# Patient Record
Sex: MALE | Race: BLACK OR AFRICAN AMERICAN | NOT HISPANIC OR LATINO
[De-identification: names, ages, dates, MRNs, and addresses within clinical notes are randomized per-mention and may not be internally consistent; named-entity substitution may affect disease eponyms.]

---

## 2018-12-07 ENCOUNTER — LABORATORY RESULT (OUTPATIENT)
Age: 63
End: 2018-12-07

## 2018-12-07 ENCOUNTER — APPOINTMENT (OUTPATIENT)
Dept: RHEUMATOLOGY | Facility: CLINIC | Age: 63
End: 2018-12-07
Payer: MEDICARE

## 2018-12-07 VITALS
HEART RATE: 74 BPM | TEMPERATURE: 97.7 F | DIASTOLIC BLOOD PRESSURE: 82 MMHG | BODY MASS INDEX: 29.39 KG/M2 | HEIGHT: 74 IN | OXYGEN SATURATION: 97 % | WEIGHT: 229 LBS | SYSTOLIC BLOOD PRESSURE: 125 MMHG

## 2018-12-07 PROCEDURE — 99204 OFFICE O/P NEW MOD 45 MIN: CPT

## 2018-12-10 ENCOUNTER — RX RENEWAL (OUTPATIENT)
Age: 63
End: 2018-12-10

## 2018-12-10 LAB
25(OH)D3 SERPL-MCNC: 27.2 NG/ML
ALBUMIN SERPL ELPH-MCNC: 4.4 G/DL
ALP BLD-CCNC: 55 U/L
ALT SERPL-CCNC: 22 U/L
ANION GAP SERPL CALC-SCNC: 12 MMOL/L
APPEARANCE: CLEAR
AST SERPL-CCNC: 22 U/L
B BURGDOR IGG+IGM SER QL IB: NORMAL
BILIRUB SERPL-MCNC: 0.4 MG/DL
BILIRUBIN URINE: NEGATIVE
BLOOD URINE: NEGATIVE
BUN SERPL-MCNC: 15 MG/DL
CALCIUM SERPL-MCNC: 10 MG/DL
CCP AB SER IA-ACNC: >250 UNITS
CHLORIDE SERPL-SCNC: 103 MMOL/L
CO2 SERPL-SCNC: 26 MMOL/L
COLOR: YELLOW
CREAT SERPL-MCNC: 0.94 MG/DL
CRP SERPL-MCNC: <0.1 MG/DL
DEPRECATED KAPPA LC FREE/LAMBDA SER: 1.41 RATIO
FERRITIN SERPL-MCNC: 235 NG/ML
FOLATE SERPL-MCNC: 18.2 NG/ML
GLUCOSE QUALITATIVE U: NEGATIVE MG/DL
GLUCOSE SERPL-MCNC: 98 MG/DL
HBV CORE IGG+IGM SER QL: REACTIVE
HBV SURFACE AB SER QL: NONREACTIVE
HBV SURFACE AG SER QL: NONREACTIVE
HCV AB SER QL: REACTIVE
HCV S/CO RATIO: 12.96 S/CO
IGA SER QL IEP: 221 MG/DL
IGG SER QL IEP: 878 MG/DL
IGM SER QL IEP: 152 MG/DL
IRON SATN MFR SERPL: 39 %
IRON SERPL-MCNC: 119 UG/DL
KAPPA LC CSF-MCNC: 1.04 MG/DL
KAPPA LC SERPL-MCNC: 1.47 MG/DL
KETONES URINE: NEGATIVE
LEUKOCYTE ESTERASE URINE: NEGATIVE
M TB IFN-G BLD-IMP: NEGATIVE
NITRITE URINE: NEGATIVE
PH URINE: 5.5
POTASSIUM SERPL-SCNC: 4.8 MMOL/L
PROT SERPL-MCNC: 6.9 G/DL
PROTEIN URINE: NEGATIVE MG/DL
QUANTIFERON TB PLUS MITOGEN MINUS NIL: 7.48 IU/ML
QUANTIFERON TB PLUS NIL: 0.04 IU/ML
QUANTIFERON TB PLUS TB1 MINUS NIL: -0.01 IU/ML
QUANTIFERON TB PLUS TB2 MINUS NIL: -0.02 IU/ML
RF+CCP IGG SER-IMP: ABNORMAL
RHEUMATOID FACT SER QL: 14 IU/ML
SODIUM SERPL-SCNC: 141 MMOL/L
SPECIFIC GRAVITY URINE: 1.02
TIBC SERPL-MCNC: 305 UG/DL
TSH SERPL-ACNC: 0.49 UIU/ML
UIBC SERPL-MCNC: 186 UG/DL
URATE SERPL-MCNC: 7.3 MG/DL
UROBILINOGEN URINE: NEGATIVE MG/DL
VIT B12 SERPL-MCNC: 682 PG/ML

## 2018-12-13 LAB
ARSENIC, BLOOD: 17 UG/L
CADMIUM SERPL-MCNC: NORMAL UG/L
LEAD BLD-MCNC: 3 UG/DL
MERCURY BLD-MCNC: 2.6 UG/L

## 2018-12-14 ENCOUNTER — APPOINTMENT (OUTPATIENT)
Dept: HEPATOLOGY | Facility: CLINIC | Age: 63
End: 2018-12-14
Payer: MEDICARE

## 2018-12-14 VITALS
BODY MASS INDEX: 30.43 KG/M2 | RESPIRATION RATE: 18 BRPM | SYSTOLIC BLOOD PRESSURE: 133 MMHG | WEIGHT: 237 LBS | HEART RATE: 62 BPM | DIASTOLIC BLOOD PRESSURE: 88 MMHG | TEMPERATURE: 97.5 F | OXYGEN SATURATION: 94 %

## 2018-12-14 PROCEDURE — 99204 OFFICE O/P NEW MOD 45 MIN: CPT

## 2018-12-17 LAB
CHOLEST SERPL-MCNC: 221 MG/DL
CHOLEST/HDLC SERPL: 2.8 RATIO
ESTIMATED AVERAGE GLUCOSE: 126 MG/DL
HBA1C MFR BLD HPLC: 6 %
HBV DNA # SERPL NAA+PROBE: NOT DETECTED IU/ML
HCV GENTYP BLD NAA+PROBE: ABNORMAL
HCV RNA SERPL NAA DL=5-ACNC: ABNORMAL IU/ML
HCV RNA SERPL NAA+PROBE-LOG IU: 6.28 LOGIU/ML
HDLC SERPL-MCNC: 78 MG/DL
HEPATITIS A IGG ANTIBODY: NONREACTIVE
HEPB DNA PCR LOG: NOT DETECTED LOGIU/ML
LDLC SERPL CALC-MCNC: 118 MG/DL
TRIGL SERPL-MCNC: 125 MG/DL

## 2018-12-19 ENCOUNTER — FORM ENCOUNTER (OUTPATIENT)
Age: 63
End: 2018-12-19

## 2018-12-20 ENCOUNTER — APPOINTMENT (OUTPATIENT)
Dept: ULTRASOUND IMAGING | Facility: CLINIC | Age: 63
End: 2018-12-20
Payer: MEDICARE

## 2018-12-20 ENCOUNTER — OUTPATIENT (OUTPATIENT)
Dept: OUTPATIENT SERVICES | Facility: HOSPITAL | Age: 63
LOS: 1 days | End: 2018-12-20
Payer: MEDICARE

## 2018-12-20 ENCOUNTER — APPOINTMENT (OUTPATIENT)
Dept: HEPATOLOGY | Facility: CLINIC | Age: 63
End: 2018-12-20
Payer: MEDICARE

## 2018-12-20 DIAGNOSIS — B19.20 UNSPECIFIED VIRAL HEPATITIS C WITHOUT HEPATIC COMA: ICD-10-CM

## 2018-12-20 PROCEDURE — 76700 US EXAM ABDOM COMPLETE: CPT | Mod: 26

## 2018-12-20 PROCEDURE — 76700 US EXAM ABDOM COMPLETE: CPT

## 2018-12-20 PROCEDURE — 91200 LIVER ELASTOGRAPHY: CPT

## 2018-12-28 ENCOUNTER — RX RENEWAL (OUTPATIENT)
Age: 63
End: 2018-12-28

## 2018-12-28 RX ORDER — ADALIMUMAB 40MG/0.8ML
40 KIT SUBCUTANEOUS
Qty: 2 | Refills: 0 | Status: DISCONTINUED | COMMUNITY
Start: 2018-12-07 | End: 2018-12-28

## 2019-01-02 ENCOUNTER — RX RENEWAL (OUTPATIENT)
Age: 64
End: 2019-01-02

## 2019-01-02 DIAGNOSIS — R25.2 CRAMP AND SPASM: ICD-10-CM

## 2019-01-09 ENCOUNTER — APPOINTMENT (OUTPATIENT)
Dept: RHEUMATOLOGY | Facility: CLINIC | Age: 64
End: 2019-01-09
Payer: MEDICARE

## 2019-01-09 VITALS
SYSTOLIC BLOOD PRESSURE: 127 MMHG | HEIGHT: 74 IN | HEART RATE: 64 BPM | WEIGHT: 240 LBS | BODY MASS INDEX: 30.8 KG/M2 | TEMPERATURE: 97.8 F | DIASTOLIC BLOOD PRESSURE: 79 MMHG | OXYGEN SATURATION: 97 %

## 2019-01-09 PROCEDURE — 99213 OFFICE O/P EST LOW 20 MIN: CPT

## 2019-01-09 RX ORDER — METHOTREXATE 2.5 MG/1
2.5 TABLET ORAL
Qty: 16 | Refills: 2 | Status: DISCONTINUED | COMMUNITY
Start: 2018-12-07 | End: 2019-01-09

## 2019-01-09 NOTE — HISTORY OF PRESENT ILLNESS
[___ Month(s) Ago] : [unfilled] month(s) ago [FreeTextEntry1] : started methotrexate, but developed severe bloating and diarrhea after 2 doses. He has stopped and now this has resolved. \par Continues to have left wrist pain despite prednisone 30 mg\par Pending starting hep C medication.\par No other complains today\par x-rays with radiocarpal joint joint narrowing and signs of RA progressive disease\par Positive CCp and RF\par

## 2019-01-09 NOTE — ASSESSMENT
[FreeTextEntry1] : 63 year-old male with long standing RA - not treated for at least 3 years with signs of progressive disease with likely wrist fusion\par Lumbar and cervical fusion in 2015 - hx of stenosis\par \par 1. RA - on prednisone 30 mg - unable to tolerate methotrexate 10 mg - has stopped - continue with prednisone\par 2. Hepatitis B and C - will speak with ID - possibly starting hepatitis B medication first in order to start Humira ASAP for better disease control. \par 3. spinal fusion - x-rays with no C1-2 widening - continue with pain management\par \par I reviewed previous labs results with patients.\par Diagnosis and Prognosis discussed\par Continue with current medications\par education provided on prednisone side effects\par F/u 2 months\par

## 2019-01-09 NOTE — REVIEW OF SYSTEMS
[Feeling Tired] : feeling tired [As Noted in HPI] : as noted in HPI [Joint Pain] : joint pain [Joint Swelling] : joint swelling [Joint Stiffness] : joint stiffness [Negative] : Heme/Lymph

## 2019-01-09 NOTE — PHYSICAL EXAM
[General Appearance - Alert] : alert [General Appearance - In No Acute Distress] : in no acute distress [Neck Appearance] : the appearance of the neck was normal [Neck Cervical Mass (___cm)] : no neck mass was observed [Jugular Venous Distention Increased] : there was no jugular-venous distention [Thyroid Diffuse Enlargement] : the thyroid was not enlarged [Thyroid Nodule] : there were no palpable thyroid nodules [Auscultation Breath Sounds / Voice Sounds] : lungs were clear to auscultation bilaterally [Heart Rate And Rhythm] : heart rate was normal and rhythm regular [Heart Sounds] : normal S1 and S2 [Heart Sounds Gallop] : no gallops [Murmurs] : no murmurs [Heart Sounds Pericardial Friction Rub] : no pericardial rub [Full Pulse] : the pedal pulses are present [Edema] : there was no peripheral edema [Bowel Sounds] : normal bowel sounds [Abdomen Soft] : soft [Abdomen Tenderness] : non-tender [Abdomen Mass (___ Cm)] : no abdominal mass palpated [Cervical Lymph Nodes Enlarged Posterior Bilaterally] : posterior cervical [Cervical Lymph Nodes Enlarged Anterior Bilaterally] : anterior cervical [Supraclavicular Lymph Nodes Enlarged Bilaterally] : supraclavicular [No CVA Tenderness] : no ~M costovertebral angle tenderness [No Spinal Tenderness] : no spinal tenderness [Abnormal Walk] : normal gait [Nail Clubbing] : no clubbing  or cyanosis of the fingernails [Musculoskeletal - Swelling] : no joint swelling seen [Motor Tone] : muscle strength and tone were normal [FreeTextEntry1] : hands with signs of RA deformities - with decreased wrist rOM and pain on movement - tender joints: shoulders (with decreased ROM), elbows with fixed contraction , knees and ankles/mtp [Skin Color & Pigmentation] : normal skin color and pigmentation [Skin Turgor] : normal skin turgor [] : no rash [Oriented To Time, Place, And Person] : oriented to person, place, and time [Impaired Insight] : insight and judgment were intact [Affect] : the affect was normal

## 2019-01-29 ENCOUNTER — APPOINTMENT (OUTPATIENT)
Dept: HEPATOLOGY | Facility: CLINIC | Age: 64
End: 2019-01-29
Payer: MEDICARE

## 2019-01-29 VITALS
WEIGHT: 240 LBS | SYSTOLIC BLOOD PRESSURE: 125 MMHG | BODY MASS INDEX: 30.8 KG/M2 | HEIGHT: 74 IN | HEART RATE: 67 BPM | DIASTOLIC BLOOD PRESSURE: 80 MMHG | RESPIRATION RATE: 17 BRPM | TEMPERATURE: 97.7 F

## 2019-01-29 PROCEDURE — 99214 OFFICE O/P EST MOD 30 MIN: CPT

## 2019-01-29 NOTE — HISTORY OF PRESENT ILLNESS
[Needlestick Exposure] : needlestick exposure [IV Drug Use] : IV drug use [Autoimmune Disorder] : autoimmune disorder [Fatigue] : denies fatigue [Malaise] : denies malaise [Fever] : denies fever [Diffuse Joint Pain (Arthralgias)] : arthralgias stable [Muscle Aches, Generalized (Myalgias)] : denies myalgias [Yellow Skin Or Eyes (Jaundice)] : denies jaundice [Abdominal Pain] : denies abdominal pain [Urine Tests Nonspecific Abnormal Findings Biliuria] : denies dark urine [Light Colored Bowel Movement (Acholic Stools)] : denies pale stools [Insomnia] : denies insomnia [Skin: Rash] : denies rash [Itching (Pruritus)] : denies pruritus [Shortness Of Breath] : denies shortness of breath [Infected Sexual Partner] : no infected sexual partner [Tattoo] : no tattoos [Body Piercing] : no body piercing [Hemodialysis] : no hemodialysis [Transfusion before 1992] : no transfusion before 1992 [Transplant before 1992] : no transplant before 1992 [Incarceration] : no incarceration [Alcohol Abuse] : no alcohol abuse [Household Contact to HBV] : no household contact to HBV [Travel to Endemic Area] : no travel to an endemic area [Occupational Exposure] : no occupational exposure [Cocaine Use] : no cocaine use [Wt Gain ___ Lbs] : no recent weight gain [Wt Loss ___ Lbs] : no recent weight loss [de-identified] : Mr. AMADO is a 63 year old man with rheumatoid arthritis, who was treated for hepatitis C in 2002 and was found to have again detectable HCV RNA. He is also HBcAb positive (isolated) and planned to start Humira.\par Adalimumab and methotrexate were ordered, but he has not started these, pending today's appointment. \par His RA was diagnosed in 2000, and he was treated with several drugs including Enbrel and "different kinds of injections" - is unsure about methotrexate. His symptoms improved and he stopped taking these.\par Weight 237 lbs with BMI 30.4 as of 12/14 - used to be 250 lbs for a long time, max weight was almost 300 lbs. Alcohol: does not drink, never did heavily.\par \par - 1/29/19: started Tenofovir (TDF) in mid-January. Since then, has been feeling a little "off" and an empty feeling in his stomach, and early satiety. Has not lost weight. Has not started Humira yet pending today's appointment. Did not tolerate Methotrexate as he developed bloating and diarrhea - not on it. Is on prednisone 30 mg/d for his RA. Has not started hepatitis C medication, but it has been ordered. Taking pain medication for back pain.\par  HCV treatment (8 wk tx) / after HCV tx completion then start Hep B treatment with tenofovir / then start humira.\par \par Workup:\par - fibroscan 12/20/18: 4.5 kPa which is consistent with F0-F1 disease,  dB/m (S 3).\par - US abdomen 12/20/18: liver: diffusely coarsened echotexture, no mass. Spleen WNL. [de-identified] : used IV drugs remotely

## 2019-01-29 NOTE — ASSESSMENT
[FreeTextEntry1] : - h/o hepatitis C, treated in 2002, found HCV RNA positive\par - HBcAb positive (isolated)\par - LFTs and PLT normal; fibroscan suggests no significant fibrosis, US shows coarsened echotexture of liver - nonspecific; no physical exam findings of cirrhosis\par - early satiety since starting Tenofovir - possibly side effect. Is on PPI.\par - obese BMI 30, was much heavier in the past; no DM\par \par Plan:\par - continue Tenofovir TDF. He will call me in 2 weeks - if still uncomfortable, may switch to Entecavir\par - start Humira, as we could switch to Entecavir "on the fly" if necessary\par - start hepatitis C medication once approved\par - return in 1 month, after repeat HCV and HBV PCRs

## 2019-01-29 NOTE — REVIEW OF SYSTEMS
[Arthralgias] : arthralgias [Negative] : Heme/Lymph [Heart Rate Is Slow] : the heart rate was not slow [Heart Rate Is Fast] : the heart rate was not fast [Chest Pain] : no chest pain [Palpitations] : no palpitations [Leg Claudication] : no intermittent leg claudication [FreeTextEntry5] : occas. has edema - not now

## 2019-02-01 ENCOUNTER — MEDICATION RENEWAL (OUTPATIENT)
Age: 64
End: 2019-02-01

## 2019-02-01 ENCOUNTER — RX RENEWAL (OUTPATIENT)
Age: 64
End: 2019-02-01

## 2019-02-26 ENCOUNTER — APPOINTMENT (OUTPATIENT)
Dept: RHEUMATOLOGY | Facility: CLINIC | Age: 64
End: 2019-02-26

## 2019-02-28 ENCOUNTER — EMERGENCY (EMERGENCY)
Facility: HOSPITAL | Age: 64
LOS: 0 days | Discharge: HOME | End: 2019-02-28
Payer: MEDICARE

## 2019-02-28 VITALS
TEMPERATURE: 98 F | DIASTOLIC BLOOD PRESSURE: 65 MMHG | SYSTOLIC BLOOD PRESSURE: 141 MMHG | HEIGHT: 74 IN | WEIGHT: 240.08 LBS | RESPIRATION RATE: 20 BRPM | OXYGEN SATURATION: 99 % | HEART RATE: 71 BPM

## 2019-02-28 PROCEDURE — 73610 X-RAY EXAM OF ANKLE: CPT | Mod: 26,LT

## 2019-02-28 PROCEDURE — 73630 X-RAY EXAM OF FOOT: CPT | Mod: 26,LT

## 2019-02-28 PROCEDURE — 73590 X-RAY EXAM OF LOWER LEG: CPT | Mod: 26,LT

## 2019-02-28 PROCEDURE — 99284 EMERGENCY DEPT VISIT MOD MDM: CPT | Mod: 25

## 2019-02-28 PROCEDURE — 93971 EXTREMITY STUDY: CPT | Mod: 26

## 2019-02-28 PROCEDURE — 29515 APPLICATION SHORT LEG SPLINT: CPT

## 2019-02-28 RX ORDER — IBUPROFEN 200 MG
1 TABLET ORAL
Qty: 20 | Refills: 0
Start: 2019-02-28

## 2019-02-28 NOTE — ED ADULT NURSE NOTE - CHIEF COMPLAINT QUOTE
pt complaining of left ankle pain and swelling times 3 days. denies trauma. history of rheumatoid arthritis and htn.

## 2019-02-28 NOTE — ED ADULT NURSE NOTE - OBJECTIVE STATEMENT
Pt is a 63YOM who is here with pain in his left ankle. Pt was walking around and his left ankle got wrapped up in a carpet 3 days ago, and it twisted his left foot. Pt states the pain hurts in the heel of his foot, through his ankle and up his calf. Pt is able to perform dorsiflexion, but not plantar flexion. Pt has RA and has been medicating with his percocet that he takes for the Rheumatoid Arthritis but iti s not helping reduce the pain.

## 2019-02-28 NOTE — ED PROVIDER NOTE - OBJECTIVE STATEMENT
64 y/o male with PMH RA, HTN here c/o L ankle pain and swelling x 3 days. pt states he stepped funny and ever since then has been having difficulty walking due to the pain. pt states he took percocet which he has from his prior back pain which has helped alleviate the pain. no change in sensation. no fevers. pt otherwise has no other complaints.    ROS: No fever/chills. No eye pain/changes in vision, No ear pain/sore throat/dysphagia, No chest pain/palpitations. No SOB/cough/. No abdominal pain, N/V/D, no black/bloody bm. No dysuria/frequency/discharge, No headache. No Dizziness.    No rashes or breaks in skin. No numbness/tingling/weakness.

## 2019-02-28 NOTE — ED PROVIDER NOTE - CLINICAL SUMMARY MEDICAL DECISION MAKING FREE TEXT BOX
pt here with L ankle/post foot pain s/p ? twisted while walking. no deformity, xray neg for obv fracture or dislocation, ? achilles rupture, pt placed in post splint, given crutches and provided ortho fu, return precuations given, discussed compartment syndrome with pt and educated re fu needs, ok with dc

## 2019-02-28 NOTE — ED PROVIDER NOTE - PHYSICAL EXAMINATION
Gen: Alert, NAD, well appearing  Head: NC, AT, PERRL, EOMI, normal lids/conjunctiva  ENT: B TM WNL, normal hearing, patent oropharynx without erythema/exudate, uvula midline  Neck: +supple, no tenderness/meningismus/JVD, +Trachea midline  Pulm: Bilateral BS, normal resp effort, no wheeze/stridor/retractions  CV: RRR, no M/R/G, +dist pulses  Abd: soft, NT/ND, +BS, no hepatosplenomegaly  Mskel: no erythema/cyanosis, ttp lateral L mal with +swelling, neg muñoz, able to wiggle all toes, sensations intact, str 5/5, no warmth or open wound  Skin: no rash  Neuro: AAOx3, no sensory/motor deficits, CN 2-12 intact

## 2019-03-01 DIAGNOSIS — M25.472 EFFUSION, LEFT ANKLE: ICD-10-CM
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
## 2019-03-01 DIAGNOSIS — Z79.52 LONG TERM (CURRENT) USE OF SYSTEMIC STEROIDS: ICD-10-CM
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
## 2019-03-01 DIAGNOSIS — Z91.013 ALLERGY TO SEAFOOD: ICD-10-CM

## 2019-03-01 DIAGNOSIS — M25.572 PAIN IN LEFT ANKLE AND JOINTS OF LEFT FOOT: ICD-10-CM

## 2019-03-01 DIAGNOSIS — I10 ESSENTIAL (PRIMARY) HYPERTENSION: ICD-10-CM

## 2019-03-01 DIAGNOSIS — M06.9 RHEUMATOID ARTHRITIS, UNSPECIFIED: ICD-10-CM

## 2019-03-04 ENCOUNTER — RX RENEWAL (OUTPATIENT)
Age: 64
End: 2019-03-04

## 2019-03-06 ENCOUNTER — RX RENEWAL (OUTPATIENT)
Age: 64
End: 2019-03-06

## 2019-03-06 ENCOUNTER — LABORATORY RESULT (OUTPATIENT)
Age: 64
End: 2019-03-06

## 2019-03-07 LAB
ALBUMIN SERPL ELPH-MCNC: 4.6 G/DL
ALP BLD-CCNC: 60 U/L
ALT SERPL-CCNC: 21 U/L
ANION GAP SERPL CALC-SCNC: 21 MMOL/L
AST SERPL-CCNC: 31 U/L
BILIRUB SERPL-MCNC: 0.6 MG/DL
BUN SERPL-MCNC: 14 MG/DL
CALCIUM SERPL-MCNC: 10.8 MG/DL
CHLORIDE SERPL-SCNC: 118 MMOL/L
CO2 SERPL-SCNC: 18 MMOL/L
CREAT SERPL-MCNC: 0.92 MG/DL
POTASSIUM SERPL-SCNC: 6.1 MMOL/L
PROT SERPL-MCNC: 7.5 G/DL
SODIUM SERPL-SCNC: 156 MMOL/L

## 2019-03-07 RX ORDER — POTASSIUM CHLORIDE 1500 MG/1
TABLET, EXTENDED RELEASE ORAL
Refills: 0 | Status: DISCONTINUED | COMMUNITY
End: 2019-03-07

## 2019-03-08 LAB
HCV RNA SERPL NAA DL=5-ACNC: NOT DETECTED IU/ML
HCV RNA SERPL NAA+PROBE-LOG IU: NOT DETECTED LOGIU/ML

## 2019-03-11 ENCOUNTER — APPOINTMENT (OUTPATIENT)
Dept: RHEUMATOLOGY | Facility: CLINIC | Age: 64
End: 2019-03-11
Payer: MEDICARE

## 2019-03-11 VITALS
DIASTOLIC BLOOD PRESSURE: 80 MMHG | HEART RATE: 70 BPM | BODY MASS INDEX: 30.8 KG/M2 | OXYGEN SATURATION: 98 % | HEIGHT: 74 IN | TEMPERATURE: 97.8 F | SYSTOLIC BLOOD PRESSURE: 131 MMHG | WEIGHT: 240 LBS | RESPIRATION RATE: 16 BRPM

## 2019-03-11 LAB
ALBUMIN SERPL ELPH-MCNC: 3.7 G/DL
ALP BLD-CCNC: 68 U/L
ALT SERPL-CCNC: 18 U/L
ANION GAP SERPL CALC-SCNC: 15 MMOL/L
AST SERPL-CCNC: 19 U/L
BILIRUB SERPL-MCNC: 0.3 MG/DL
BUN SERPL-MCNC: 20 MG/DL
CALCIUM SERPL-MCNC: 9.6 MG/DL
CHLORIDE SERPL-SCNC: 103 MMOL/L
CO2 SERPL-SCNC: 23 MMOL/L
CREAT SERPL-MCNC: 0.96 MG/DL
GLUCOSE SERPL-MCNC: 125 MG/DL
POTASSIUM SERPL-SCNC: 4.1 MMOL/L
PROT SERPL-MCNC: 6.3 G/DL
SODIUM SERPL-SCNC: 141 MMOL/L

## 2019-03-11 PROCEDURE — 99214 OFFICE O/P EST MOD 30 MIN: CPT

## 2019-03-11 RX ORDER — MORPHINE SULFATE 60 MG/1
60 TABLET, FILM COATED, EXTENDED RELEASE ORAL
Qty: 60 | Refills: 0 | Status: DISCONTINUED | COMMUNITY
Start: 2018-09-19 | End: 2019-03-11

## 2019-03-11 RX ORDER — VALSARTAN AND HYDROCHLOROTHIAZIDE 320; 12.5 MG/1; MG/1
320-12.5 TABLET, FILM COATED ORAL
Refills: 0 | Status: DISCONTINUED | COMMUNITY
End: 2019-03-11

## 2019-03-11 RX ORDER — LEVOFLOXACIN 750 MG/1
750 TABLET, FILM COATED ORAL
Qty: 10 | Refills: 0 | Status: DISCONTINUED | COMMUNITY
Start: 2019-02-23 | End: 2019-03-11

## 2019-03-11 RX ORDER — FOLIC ACID 1 MG/1
1 TABLET ORAL DAILY
Qty: 90 | Refills: 3 | Status: DISCONTINUED | COMMUNITY
Start: 2018-12-07 | End: 2019-03-11

## 2019-03-12 ENCOUNTER — LABORATORY RESULT (OUTPATIENT)
Age: 64
End: 2019-03-12

## 2019-03-12 ENCOUNTER — APPOINTMENT (OUTPATIENT)
Dept: HEPATOLOGY | Facility: CLINIC | Age: 64
End: 2019-03-12
Payer: MEDICARE

## 2019-03-12 VITALS
DIASTOLIC BLOOD PRESSURE: 85 MMHG | HEIGHT: 74 IN | WEIGHT: 243 LBS | RESPIRATION RATE: 16 BRPM | BODY MASS INDEX: 31.18 KG/M2 | TEMPERATURE: 97.6 F | HEART RATE: 75 BPM | SYSTOLIC BLOOD PRESSURE: 125 MMHG

## 2019-03-12 DIAGNOSIS — R68.81 EARLY SATIETY: ICD-10-CM

## 2019-03-12 DIAGNOSIS — E87.5 HYPERKALEMIA: ICD-10-CM

## 2019-03-12 PROCEDURE — 99214 OFFICE O/P EST MOD 30 MIN: CPT

## 2019-03-12 NOTE — HISTORY OF PRESENT ILLNESS
[Needlestick Exposure] : needlestick exposure [IV Drug Use] : IV drug use [Autoimmune Disorder] : autoimmune disorder [Fatigue] : denies fatigue [Malaise] : denies malaise [Fever] : denies fever [Diffuse Joint Pain (Arthralgias)] : arthralgias stable [Muscle Aches, Generalized (Myalgias)] : denies myalgias [Yellow Skin Or Eyes (Jaundice)] : denies jaundice [Abdominal Pain] : denies abdominal pain [Urine Tests Nonspecific Abnormal Findings Biliuria] : denies dark urine [Light Colored Bowel Movement (Acholic Stools)] : denies pale stools [Insomnia] : denies insomnia [Skin: Rash] : denies rash [Itching (Pruritus)] : denies pruritus [Shortness Of Breath] : denies shortness of breath [Infected Sexual Partner] : no infected sexual partner [Tattoo] : no tattoos [Body Piercing] : no body piercing [Hemodialysis] : no hemodialysis [Transfusion before 1992] : no transfusion before 1992 [Transplant before 1992] : no transplant before 1992 [Incarceration] : no incarceration [Alcohol Abuse] : no alcohol abuse [Household Contact to HBV] : no household contact to HBV [Travel to Endemic Area] : no travel to an endemic area [Occupational Exposure] : no occupational exposure [Cocaine Use] : no cocaine use [Wt Gain ___ Lbs] : no recent weight gain [Wt Loss ___ Lbs] : no recent weight loss [de-identified] : Mr. AMADO is a 63 year old man with rheumatoid arthritis, who was treated for hepatitis C in 2002 and was found to have again detectable HCV RNA. He is also HBcAb positive (isolated) and planned to start Humira.\par Adalimumab and methotrexate were ordered, but he has not started these, pending today's appointment. \par His RA was diagnosed in 2000, and he was treated with several drugs including Enbrel and "different kinds of injections" - is unsure about methotrexate. His symptoms improved and he stopped taking these.\par Weight 237 lbs with BMI 30.4 as of 12/14 - used to be 250 lbs for a long time, max weight was almost 300 lbs. Alcohol: does not drink, never did heavily.\par \par - 3/12/19: arthritis not improved. Did not complain of early satiety anymore.\par - 1/29/19: started Tenofovir (TDF) in mid-January. Since then, has been feeling a little "off" and an empty feeling in his stomach, and early satiety. Has not lost weight. Has not started Humira yet pending today's appointment. Did not tolerate Methotrexate as he developed bloating and diarrhea - not on it. Is on prednisone 30 mg/d for his RA. Has not started hepatitis C medication, but it has been ordered. Taking pain medication for back pain.\par  HCV treatment (8 wk tx) / after HCV tx completion then start Hep B treatment with tenofovir / then start humira.\par \par Workup:\par - fibroscan 12/20/18: 4.5 kPa which is consistent with F0-F1 disease,  dB/m (S 3).\par - US abdomen 12/20/18: liver: diffusely coarsened echotexture, no mass. Spleen WNL. [de-identified] : used IV drugs remotely

## 2019-03-12 NOTE — ASSESSMENT
[FreeTextEntry1] : - hepatitis C recurrence after treatment in 2002, on Mavyret until early April; last viral load negative.\par - HBcAb positive (isolated)\par - LFTs and PLT normal; fibroscan suggests no significant fibrosis, US shows coarsened echotexture of liver - nonspecific\par - early satiety since starting Tenofovir - possibly side effect. Is on PPI. Resolved.\par - obese BMI 30, was much heavier in the past; no DM\par \par Plan:\par - continue Tenofovir TDF\par - continue antirheumatic medications as required, currently on humira, methotrexate, prednisone; I explained that he will need to continue hepatitis B medication until at least 6 months after stopping humira\par - start hepatitis C medication once approved\par - return in July, after repeat hepatitis C PCR to evaluate for SVR12. He already has an appointment and labs have been ordered.\par - hyperkalemia: resolved after stopping KCl. He will call his PCP

## 2019-03-13 LAB
BASOPHILS # BLD AUTO: 0.03 K/UL
BASOPHILS NFR BLD AUTO: 0.2 %
CRP SERPL-MCNC: <0.1 MG/DL
EOSINOPHIL # BLD AUTO: 0.18 K/UL
EOSINOPHIL NFR BLD AUTO: 1.3 %
ERYTHROCYTE [SEDIMENTATION RATE] IN BLOOD BY WESTERGREN METHOD: 27 MM/HR
HCT VFR BLD CALC: 43.4 %
HGB BLD-MCNC: 13.7 G/DL
IMM GRANULOCYTES NFR BLD AUTO: 0.6 %
LYMPHOCYTES # BLD AUTO: 4.49 K/UL
LYMPHOCYTES NFR BLD AUTO: 32.3 %
MAN DIFF?: NORMAL
MCHC RBC-ENTMCNC: 31.6 GM/DL
MCHC RBC-ENTMCNC: 33.2 PG
MCV RBC AUTO: 105.1 FL
MONOCYTES # BLD AUTO: 1.36 K/UL
MONOCYTES NFR BLD AUTO: 9.8 %
NEUTROPHILS # BLD AUTO: 7.74 K/UL
NEUTROPHILS NFR BLD AUTO: 55.8 %
PLATELET # BLD AUTO: 237 K/UL
RBC # BLD: 4.13 M/UL
RBC # FLD: 13.6 %
WBC # FLD AUTO: 13.88 K/UL

## 2019-03-17 ENCOUNTER — RX RENEWAL (OUTPATIENT)
Age: 64
End: 2019-03-17

## 2019-03-17 RX ORDER — ADALIMUMAB 40MG/0.4ML
40 KIT SUBCUTANEOUS
Qty: 1 | Refills: 3 | Status: DISCONTINUED | COMMUNITY
Start: 2018-12-07 | End: 2019-03-17

## 2019-03-18 ENCOUNTER — RX RENEWAL (OUTPATIENT)
Age: 64
End: 2019-03-18

## 2019-03-18 LAB
ADALIMUMAB AB SERPL-MCNC: 1645 NG/ML
ADALIMUMAB SERPL-MCNC: <0.6 UG/ML
AMPHET UR-MCNC: NEGATIVE
BARBITURATES UR-MCNC: NEGATIVE
BENZODIAZ UR-MCNC: NEGATIVE
COCAINE METAB.OTHER UR-MCNC: NEGATIVE
CREATININE, URINE: 117.7 MG/DL
METHADONE UR-MCNC: NEGATIVE
METHAQUALONE UR-MCNC: NEGATIVE
OPIATES UR-MCNC: NORMAL
PCP UR-MCNC: NEGATIVE
PROPOXYPH UR QL: NEGATIVE
THC UR QL: NEGATIVE

## 2019-03-19 ENCOUNTER — RX RENEWAL (OUTPATIENT)
Age: 64
End: 2019-03-19

## 2019-03-20 ENCOUNTER — RX RENEWAL (OUTPATIENT)
Age: 64
End: 2019-03-20

## 2019-03-27 ENCOUNTER — LABORATORY RESULT (OUTPATIENT)
Age: 64
End: 2019-03-27

## 2019-03-27 ENCOUNTER — APPOINTMENT (OUTPATIENT)
Dept: RHEUMATOLOGY | Facility: CLINIC | Age: 64
End: 2019-03-27
Payer: MEDICARE

## 2019-03-27 PROCEDURE — 96413 CHEMO IV INFUSION 1 HR: CPT

## 2019-03-27 PROCEDURE — 36415 COLL VENOUS BLD VENIPUNCTURE: CPT

## 2019-04-10 ENCOUNTER — RX RENEWAL (OUTPATIENT)
Age: 64
End: 2019-04-10

## 2019-04-23 ENCOUNTER — APPOINTMENT (OUTPATIENT)
Dept: RHEUMATOLOGY | Facility: CLINIC | Age: 64
End: 2019-04-23
Payer: MEDICARE

## 2019-04-23 VITALS
WEIGHT: 240 LBS | DIASTOLIC BLOOD PRESSURE: 85 MMHG | SYSTOLIC BLOOD PRESSURE: 136 MMHG | HEART RATE: 68 BPM | HEIGHT: 74 IN | TEMPERATURE: 97.6 F | BODY MASS INDEX: 30.8 KG/M2 | OXYGEN SATURATION: 97 %

## 2019-04-23 PROCEDURE — 99214 OFFICE O/P EST MOD 30 MIN: CPT | Mod: 25

## 2019-04-23 PROCEDURE — 20610 DRAIN/INJ JOINT/BURSA W/O US: CPT | Mod: LT

## 2019-04-23 RX ORDER — LIDOCAINE HYDROCHLORIDE 10 MG/ML
1 INJECTION, SOLUTION INFILTRATION; PERINEURAL
Refills: 0 | Status: COMPLETED | OUTPATIENT
Start: 2019-04-23

## 2019-04-23 RX ORDER — METHYLPRED ACET/NACL,ISO-OS/PF 40 MG/ML
40 VIAL (ML) INJECTION
Qty: 1 | Refills: 0 | Status: COMPLETED | OUTPATIENT
Start: 2019-04-23

## 2019-04-23 RX ADMIN — METHYLPREDNISOLONE ACETATE MG/ML: 40 INJECTION, SUSPENSION INTRA-ARTICULAR; INTRALESIONAL; INTRAMUSCULAR; SOFT TISSUE at 00:00

## 2019-04-23 RX ADMIN — LIDOCAINE HYDROCHLORIDE %: 10 INJECTION, SOLUTION INFILTRATION; PERINEURAL at 00:00

## 2019-04-24 ENCOUNTER — APPOINTMENT (OUTPATIENT)
Dept: RHEUMATOLOGY | Facility: CLINIC | Age: 64
End: 2019-04-24
Payer: MEDICARE

## 2019-04-24 ENCOUNTER — LABORATORY RESULT (OUTPATIENT)
Age: 64
End: 2019-04-24

## 2019-04-24 PROCEDURE — 36415 COLL VENOUS BLD VENIPUNCTURE: CPT

## 2019-04-24 PROCEDURE — 96413 CHEMO IV INFUSION 1 HR: CPT

## 2019-05-22 ENCOUNTER — APPOINTMENT (OUTPATIENT)
Dept: RHEUMATOLOGY | Facility: CLINIC | Age: 64
End: 2019-05-22
Payer: MEDICARE

## 2019-05-22 PROCEDURE — 36415 COLL VENOUS BLD VENIPUNCTURE: CPT

## 2019-05-22 PROCEDURE — 96413 CHEMO IV INFUSION 1 HR: CPT

## 2019-06-06 ENCOUNTER — RX RENEWAL (OUTPATIENT)
Age: 64
End: 2019-06-06

## 2019-06-19 ENCOUNTER — APPOINTMENT (OUTPATIENT)
Dept: RHEUMATOLOGY | Facility: CLINIC | Age: 64
End: 2019-06-19
Payer: MEDICARE

## 2019-06-19 ENCOUNTER — LABORATORY RESULT (OUTPATIENT)
Age: 64
End: 2019-06-19

## 2019-06-19 PROCEDURE — 36415 COLL VENOUS BLD VENIPUNCTURE: CPT

## 2019-06-19 PROCEDURE — 96413 CHEMO IV INFUSION 1 HR: CPT

## 2019-06-24 ENCOUNTER — FORM ENCOUNTER (OUTPATIENT)
Age: 64
End: 2019-06-24

## 2019-06-25 ENCOUNTER — APPOINTMENT (OUTPATIENT)
Dept: RHEUMATOLOGY | Facility: CLINIC | Age: 64
End: 2019-06-25
Payer: MEDICARE

## 2019-06-25 VITALS
HEART RATE: 64 BPM | HEIGHT: 74 IN | WEIGHT: 240 LBS | SYSTOLIC BLOOD PRESSURE: 129 MMHG | OXYGEN SATURATION: 96 % | BODY MASS INDEX: 30.8 KG/M2 | TEMPERATURE: 97.8 F | DIASTOLIC BLOOD PRESSURE: 89 MMHG

## 2019-06-25 PROCEDURE — 73562 X-RAY EXAM OF KNEE 3: CPT | Mod: RT

## 2019-06-25 PROCEDURE — 20610 DRAIN/INJ JOINT/BURSA W/O US: CPT | Mod: LT

## 2019-06-25 PROCEDURE — 99214 OFFICE O/P EST MOD 30 MIN: CPT | Mod: 25

## 2019-06-25 RX ORDER — LIDOCAINE HYDROCHLORIDE 10 MG/ML
1 INJECTION, SOLUTION INFILTRATION; PERINEURAL
Refills: 0 | Status: COMPLETED | OUTPATIENT
Start: 2019-06-25

## 2019-06-25 RX ORDER — METHYLPRED ACET/NACL,ISO-OS/PF 40 MG/ML
40 VIAL (ML) INJECTION
Qty: 1 | Refills: 0 | Status: COMPLETED | OUTPATIENT
Start: 2019-06-25

## 2019-06-25 RX ADMIN — LIDOCAINE HYDROCHLORIDE %: 10 INJECTION, SOLUTION INFILTRATION; PERINEURAL at 00:00

## 2019-06-25 RX ADMIN — METHYLPREDNISOLONE ACETATE MG/ML: 40 INJECTION, SUSPENSION INTRA-ARTICULAR; INTRALESIONAL; INTRAMUSCULAR; SOFT TISSUE at 00:00

## 2019-07-01 ENCOUNTER — APPOINTMENT (OUTPATIENT)
Dept: HEPATOLOGY | Facility: CLINIC | Age: 64
End: 2019-07-01

## 2019-07-02 ENCOUNTER — RX RENEWAL (OUTPATIENT)
Age: 64
End: 2019-07-02

## 2019-08-01 ENCOUNTER — APPOINTMENT (OUTPATIENT)
Dept: RHEUMATOLOGY | Facility: CLINIC | Age: 64
End: 2019-08-01
Payer: MEDICARE

## 2019-08-01 VITALS
HEART RATE: 84 BPM | BODY MASS INDEX: 30.8 KG/M2 | HEIGHT: 74 IN | TEMPERATURE: 98 F | SYSTOLIC BLOOD PRESSURE: 127 MMHG | DIASTOLIC BLOOD PRESSURE: 87 MMHG | OXYGEN SATURATION: 96 % | WEIGHT: 240 LBS

## 2019-08-01 PROCEDURE — 99214 OFFICE O/P EST MOD 30 MIN: CPT

## 2019-08-01 RX ORDER — IBUPROFEN 600 MG/1
600 TABLET, FILM COATED ORAL
Qty: 20 | Refills: 0 | Status: DISCONTINUED | COMMUNITY
Start: 2019-02-28 | End: 2019-08-01

## 2019-08-14 ENCOUNTER — APPOINTMENT (OUTPATIENT)
Dept: RHEUMATOLOGY | Facility: CLINIC | Age: 64
End: 2019-08-14
Payer: MEDICARE

## 2019-08-14 ENCOUNTER — LABORATORY RESULT (OUTPATIENT)
Age: 64
End: 2019-08-14

## 2019-08-14 PROCEDURE — 96413 CHEMO IV INFUSION 1 HR: CPT

## 2019-08-26 ENCOUNTER — RX RENEWAL (OUTPATIENT)
Age: 64
End: 2019-08-26

## 2019-09-10 ENCOUNTER — RX RENEWAL (OUTPATIENT)
Age: 64
End: 2019-09-10

## 2019-10-03 ENCOUNTER — APPOINTMENT (OUTPATIENT)
Dept: RHEUMATOLOGY | Facility: CLINIC | Age: 64
End: 2019-10-03
Payer: MEDICARE

## 2019-10-03 VITALS
TEMPERATURE: 97.4 F | HEART RATE: 61 BPM | DIASTOLIC BLOOD PRESSURE: 85 MMHG | OXYGEN SATURATION: 97 % | SYSTOLIC BLOOD PRESSURE: 133 MMHG

## 2019-10-03 PROCEDURE — 99214 OFFICE O/P EST MOD 30 MIN: CPT

## 2019-10-03 NOTE — PHYSICAL EXAM
[General Appearance - Alert] : alert [General Appearance - In No Acute Distress] : in no acute distress [Neck Appearance] : the appearance of the neck was normal [Neck Cervical Mass (___cm)] : no neck mass was observed [Jugular Venous Distention Increased] : there was no jugular-venous distention [Thyroid Diffuse Enlargement] : the thyroid was not enlarged [Thyroid Nodule] : there were no palpable thyroid nodules [Auscultation Breath Sounds / Voice Sounds] : lungs were clear to auscultation bilaterally [Heart Rate And Rhythm] : heart rate was normal and rhythm regular [Heart Sounds Gallop] : no gallops [Heart Sounds] : normal S1 and S2 [Murmurs] : no murmurs [Heart Sounds Pericardial Friction Rub] : no pericardial rub [Full Pulse] : the pedal pulses are present [Edema] : there was no peripheral edema [Bowel Sounds] : normal bowel sounds [Abdomen Soft] : soft [Abdomen Tenderness] : non-tender [Abdomen Mass (___ Cm)] : no abdominal mass palpated [Cervical Lymph Nodes Enlarged Posterior Bilaterally] : posterior cervical [Cervical Lymph Nodes Enlarged Anterior Bilaterally] : anterior cervical [Supraclavicular Lymph Nodes Enlarged Bilaterally] : supraclavicular [No CVA Tenderness] : no ~M costovertebral angle tenderness [No Spinal Tenderness] : no spinal tenderness [Abnormal Walk] : normal gait [Nail Clubbing] : no clubbing  or cyanosis of the fingernails [Motor Tone] : muscle strength and tone were normal [Musculoskeletal - Swelling] : no joint swelling seen [Skin Color & Pigmentation] : normal skin color and pigmentation [Skin Turgor] : normal skin turgor [] : no rash [Oriented To Time, Place, And Person] : oriented to person, place, and time [Impaired Insight] : insight and judgment were intact [Affect] : the affect was normal [FreeTextEntry1] : tender joints: bilateral wrists and left knee

## 2019-10-03 NOTE — ASSESSMENT
[FreeTextEntry1] : 63 year-old male with long standing RA - not treated for at least 3 years with signs of progressive disease with likely wrist fusion\par Lumbar and cervical fusion in 2015 - hx. of stenosis\par \par 1. RA - on prednisone 30 mg - unable to tolerate methotrexate 10 mg - no improvement on Humira - presence of antibodies - now on Simponi aria - s/p 7 nfusions -on prednisone 20 mg - still with mild synovitis of the wrist - improvement on pain - taking less steroids -  much improvement today - no synovitis - feels better, but still need prednisone - will add low dose leflunomide to help to decrease steroid load - will increase to 20 mg as tolerated with close liver observation\par 2. Hepatitis B and C - on tenofovir and mavyrtec - hep C viral load at 0\par 3. spinal fusion - x-rays with no C1-2 widening - worsening thoracic pain - not planning any more surgery - bilateral leg and hand cramping - \par 4. Pain management - on oxycodone - pain is 3/10 - after medication  better on long acting with more relief and need for less rescue medications\par no side effects  - no constipation - no sleepiness\par 5. Knee pain and swelling -left knee injection today -better since having ankle injection\par \par \par I reviewed previous labs results with patients.\par Diagnosis and Prognosis discussed\par Continue with current medications\par education provided on prednisone side effects\par F/u  1 month

## 2019-10-03 NOTE — HISTORY OF PRESENT ILLNESS
[___ Month(s) Ago] : [unfilled] month(s) ago [RF] : RF [CCP] : CCP [Presence of Erosions] : presence of erosions [Currently Experiencing] : currently experiencing [Joint Pain] : joint pain [Morning Stiffness] : morning stiffness [FreeTextEntry1] : On simponi aria X 7 doses still on prednisone 20 mg  more for wrists and knees - other joints are doing okay\par ongoing morning stiffness and pain\par pain is better control with long acting oxycontin - feels relief throughout the day\par Left Achilles tendon tendinitis -  resolved after the injection\par bilateral knee pain -   s/p left injection - now with return of symptoms with more pain and stiffness \par   [FreeTextEntry5] : Humira

## 2019-10-03 NOTE — REVIEW OF SYSTEMS
[As Noted in HPI] : as noted in HPI [Joint Pain] : joint pain [Joint Swelling] : joint swelling [Joint Stiffness] : joint stiffness [Feeling Tired] : not feeling tired [Negative] : Constitutional

## 2019-10-10 ENCOUNTER — APPOINTMENT (OUTPATIENT)
Dept: RHEUMATOLOGY | Facility: CLINIC | Age: 64
End: 2019-10-10
Payer: MEDICARE

## 2019-10-10 ENCOUNTER — RX RENEWAL (OUTPATIENT)
Age: 64
End: 2019-10-10

## 2019-10-10 PROCEDURE — 36415 COLL VENOUS BLD VENIPUNCTURE: CPT

## 2019-10-10 PROCEDURE — 96413 CHEMO IV INFUSION 1 HR: CPT

## 2019-10-11 LAB
ALBUMIN SERPL ELPH-MCNC: 4.4 G/DL
ALP BLD-CCNC: 47 U/L
ALT SERPL-CCNC: 15 U/L
AST SERPL-CCNC: 20 U/L
BILIRUB DIRECT SERPL-MCNC: 0.1 MG/DL
BILIRUB INDIRECT SERPL-MCNC: 0.3 MG/DL
BILIRUB SERPL-MCNC: 0.4 MG/DL
CRP SERPL-MCNC: <0.1 MG/DL
ERYTHROCYTE [SEDIMENTATION RATE] IN BLOOD BY WESTERGREN METHOD: 14 MM/HR
PROT SERPL-MCNC: 6.5 G/DL

## 2019-11-07 ENCOUNTER — RX RENEWAL (OUTPATIENT)
Age: 64
End: 2019-11-07

## 2019-11-13 ENCOUNTER — RX RENEWAL (OUTPATIENT)
Age: 64
End: 2019-11-13

## 2019-11-26 ENCOUNTER — APPOINTMENT (OUTPATIENT)
Dept: RHEUMATOLOGY | Facility: CLINIC | Age: 64
End: 2019-11-26
Payer: MEDICARE

## 2019-11-26 VITALS
OXYGEN SATURATION: 96 % | SYSTOLIC BLOOD PRESSURE: 134 MMHG | DIASTOLIC BLOOD PRESSURE: 87 MMHG | WEIGHT: 240 LBS | HEART RATE: 63 BPM | TEMPERATURE: 97.6 F | HEIGHT: 74 IN | BODY MASS INDEX: 30.8 KG/M2

## 2019-11-26 PROCEDURE — 99214 OFFICE O/P EST MOD 30 MIN: CPT

## 2019-11-26 RX ORDER — SARILUMAB 200 MG/1.14ML
200 INJECTION, SOLUTION SUBCUTANEOUS
Refills: 0 | Status: COMPLETED | OUTPATIENT
Start: 2019-11-26

## 2019-11-26 RX ORDER — GOLIMUMAB 50 MG/4ML
50 SOLUTION INTRAVENOUS
Qty: 3 | Refills: 6 | Status: DISCONTINUED | OUTPATIENT
Start: 2019-03-17 | End: 2019-11-26

## 2019-11-26 RX ADMIN — SARILUMAB 0 MG/1.14ML: 200 INJECTION, SOLUTION SUBCUTANEOUS at 00:00

## 2019-12-04 ENCOUNTER — APPOINTMENT (OUTPATIENT)
Dept: RHEUMATOLOGY | Facility: CLINIC | Age: 64
End: 2019-12-04

## 2019-12-12 ENCOUNTER — RX RENEWAL (OUTPATIENT)
Age: 64
End: 2019-12-12

## 2020-01-07 ENCOUNTER — RX RENEWAL (OUTPATIENT)
Age: 65
End: 2020-01-07

## 2020-01-07 ENCOUNTER — APPOINTMENT (OUTPATIENT)
Dept: RHEUMATOLOGY | Facility: CLINIC | Age: 65
End: 2020-01-07
Payer: MEDICARE

## 2020-01-07 VITALS
WEIGHT: 244 LBS | HEIGHT: 74 IN | HEART RATE: 66 BPM | RESPIRATION RATE: 16 BRPM | TEMPERATURE: 98.1 F | BODY MASS INDEX: 31.32 KG/M2 | DIASTOLIC BLOOD PRESSURE: 87 MMHG | OXYGEN SATURATION: 96 % | SYSTOLIC BLOOD PRESSURE: 126 MMHG

## 2020-01-07 PROCEDURE — 99213 OFFICE O/P EST LOW 20 MIN: CPT

## 2020-01-07 RX ORDER — GLECAPREVIR AND PIBRENTASVIR 40; 100 MG/1; MG/1
100-40 TABLET, FILM COATED ORAL
Qty: 28 | Refills: 1 | Status: DISCONTINUED | COMMUNITY
Start: 2019-01-29 | End: 2020-01-07

## 2020-01-08 LAB
ALBUMIN SERPL ELPH-MCNC: 4.7 G/DL
ALP BLD-CCNC: 39 U/L
ALT SERPL-CCNC: 20 U/L
ANION GAP SERPL CALC-SCNC: 14 MMOL/L
AST SERPL-CCNC: 21 U/L
BASOPHILS # BLD AUTO: 0.01 K/UL
BASOPHILS NFR BLD AUTO: 0.1 %
BILIRUB SERPL-MCNC: 0.5 MG/DL
BUN SERPL-MCNC: 17 MG/DL
CALCIUM SERPL-MCNC: 9.9 MG/DL
CHLORIDE SERPL-SCNC: 97 MMOL/L
CO2 SERPL-SCNC: 26 MMOL/L
CREAT SERPL-MCNC: 0.99 MG/DL
CRP SERPL-MCNC: <0.1 MG/DL
EOSINOPHIL # BLD AUTO: 0 K/UL
EOSINOPHIL NFR BLD AUTO: 0 %
ERYTHROCYTE [SEDIMENTATION RATE] IN BLOOD BY WESTERGREN METHOD: 8 MM/HR
GLUCOSE SERPL-MCNC: 99 MG/DL
HCT VFR BLD CALC: 44.8 %
HGB BLD-MCNC: 15.1 G/DL
IMM GRANULOCYTES NFR BLD AUTO: 0.3 %
LYMPHOCYTES # BLD AUTO: 2.25 K/UL
LYMPHOCYTES NFR BLD AUTO: 26.1 %
MAN DIFF?: NORMAL
MCHC RBC-ENTMCNC: 33.7 GM/DL
MCHC RBC-ENTMCNC: 33.9 PG
MCV RBC AUTO: 100.7 FL
MONOCYTES # BLD AUTO: 0.56 K/UL
MONOCYTES NFR BLD AUTO: 6.5 %
NEUTROPHILS # BLD AUTO: 5.76 K/UL
NEUTROPHILS NFR BLD AUTO: 67 %
PLATELET # BLD AUTO: 198 K/UL
POTASSIUM SERPL-SCNC: 5.1 MMOL/L
PROT SERPL-MCNC: 6.9 G/DL
RBC # BLD: 4.45 M/UL
RBC # FLD: 13.1 %
SODIUM SERPL-SCNC: 137 MMOL/L
WBC # FLD AUTO: 8.61 K/UL

## 2020-04-21 ENCOUNTER — APPOINTMENT (OUTPATIENT)
Dept: RHEUMATOLOGY | Facility: CLINIC | Age: 65
End: 2020-04-21

## 2020-06-09 ENCOUNTER — APPOINTMENT (OUTPATIENT)
Dept: RHEUMATOLOGY | Facility: CLINIC | Age: 65
End: 2020-06-09
Payer: MEDICARE

## 2020-06-09 VITALS
BODY MASS INDEX: 31.06 KG/M2 | DIASTOLIC BLOOD PRESSURE: 93 MMHG | WEIGHT: 242 LBS | HEIGHT: 74 IN | HEART RATE: 73 BPM | SYSTOLIC BLOOD PRESSURE: 150 MMHG | OXYGEN SATURATION: 97 %

## 2020-06-09 LAB
ALBUMIN SERPL ELPH-MCNC: 4.6 G/DL
ALP BLD-CCNC: 46 U/L
ALT SERPL-CCNC: 23 U/L
ANION GAP SERPL CALC-SCNC: 17 MMOL/L
AST SERPL-CCNC: 19 U/L
BASOPHILS # BLD AUTO: 0.02 K/UL
BASOPHILS NFR BLD AUTO: 0.2 %
BILIRUB SERPL-MCNC: 0.3 MG/DL
BUN SERPL-MCNC: 16 MG/DL
CALCIUM SERPL-MCNC: 9.7 MG/DL
CHLORIDE SERPL-SCNC: 97 MMOL/L
CO2 SERPL-SCNC: 27 MMOL/L
CREAT SERPL-MCNC: 1.12 MG/DL
CRP SERPL-MCNC: <0.1 MG/DL
EOSINOPHIL # BLD AUTO: 0.02 K/UL
EOSINOPHIL NFR BLD AUTO: 0.2 %
ERYTHROCYTE [SEDIMENTATION RATE] IN BLOOD BY WESTERGREN METHOD: 6 MM/HR
GLUCOSE SERPL-MCNC: 90 MG/DL
HCT VFR BLD CALC: 43.2 %
HGB BLD-MCNC: 14 G/DL
IMM GRANULOCYTES NFR BLD AUTO: 0.6 %
LYMPHOCYTES # BLD AUTO: 2.54 K/UL
LYMPHOCYTES NFR BLD AUTO: 23.5 %
MAN DIFF?: NORMAL
MCHC RBC-ENTMCNC: 32.4 GM/DL
MCHC RBC-ENTMCNC: 33.5 PG
MCV RBC AUTO: 103.3 FL
MONOCYTES # BLD AUTO: 1.05 K/UL
MONOCYTES NFR BLD AUTO: 9.7 %
NEUTROPHILS # BLD AUTO: 7.13 K/UL
NEUTROPHILS NFR BLD AUTO: 65.8 %
PLATELET # BLD AUTO: 220 K/UL
POTASSIUM SERPL-SCNC: 4.4 MMOL/L
PROT SERPL-MCNC: 6.4 G/DL
RBC # BLD: 4.18 M/UL
RBC # FLD: 12.6 %
SODIUM SERPL-SCNC: 141 MMOL/L
WBC # FLD AUTO: 10.82 K/UL

## 2020-06-09 PROCEDURE — 99214 OFFICE O/P EST MOD 30 MIN: CPT

## 2020-06-09 RX ORDER — OMEGA-3/DHA/EPA/FISH OIL 300-1000MG
400 CAPSULE ORAL
Qty: 90 | Refills: 2 | Status: DISCONTINUED | COMMUNITY
Start: 2019-01-02 | End: 2020-06-09

## 2020-06-09 RX ORDER — LEFLUNOMIDE 10 MG/1
10 TABLET, FILM COATED ORAL DAILY
Qty: 30 | Refills: 3 | Status: DISCONTINUED | COMMUNITY
Start: 2019-10-03 | End: 2020-06-09

## 2020-06-09 NOTE — ASSESSMENT
[FreeTextEntry1] : 63 year-old male with long standing RA - not treated for at least 3 years with signs of progressive disease with likely wrist fusion\par Lumbar and cervical fusion in 2015 - hx. of stenosis\par \par 1. RA - on prednisone 20 mg - unable to tolerate methotrexate 10 mg - no improvement on Humira - presence of antibodies - was Simponi aria - s/p 7 infusions -on prednisone 20 mg - significant response to kevzara - doing well with no synovitis on exam today - full rom\par 2. Hepatitis B and C - on tenofovir and mavyrtec - hep C viral load at 0\par 3. spinal fusion - x-rays with no C1-2 widening - worsening thoracic pain - not planning any more surgery - bilateral leg and hand cramping - bilateral feet paresthesias  - referral to PMR - likely stenosis - will need injection\par 4. Pain management - on oxycodone and OxyContin - before pain - 9/10 after  - pain is 4/10 - after medication  better on long acting with more relief and need for less rescue medications - no side effects from medication\par no side effects  - no constipation - no sleepiness - doing well with stable symptoms \par 5. Knee pain and swelling -left knee injection today -better since having ankle injection - pending Synvisc  -  - doing well with minimal pain - since the steroid injection\par \par I reviewed previous labs results with patients.\par Labs today\par Diagnosis and Prognosis discussed\par Continue with current medications\par F/u  3 months

## 2020-06-09 NOTE — PHYSICAL EXAM
[General Appearance - Alert] : alert [General Appearance - In No Acute Distress] : in no acute distress [Neck Appearance] : the appearance of the neck was normal [Neck Cervical Mass (___cm)] : no neck mass was observed [Jugular Venous Distention Increased] : there was no jugular-venous distention [Thyroid Diffuse Enlargement] : the thyroid was not enlarged [Thyroid Nodule] : there were no palpable thyroid nodules [Auscultation Breath Sounds / Voice Sounds] : lungs were clear to auscultation bilaterally [Heart Rate And Rhythm] : heart rate was normal and rhythm regular [Heart Sounds] : normal S1 and S2 [Heart Sounds Gallop] : no gallops [Murmurs] : no murmurs [Heart Sounds Pericardial Friction Rub] : no pericardial rub [Full Pulse] : the pedal pulses are present [Edema] : there was no peripheral edema [Bowel Sounds] : normal bowel sounds [Abdomen Soft] : soft [Abdomen Tenderness] : non-tender [Abdomen Mass (___ Cm)] : no abdominal mass palpated [Cervical Lymph Nodes Enlarged Posterior Bilaterally] : posterior cervical [Cervical Lymph Nodes Enlarged Anterior Bilaterally] : anterior cervical [Supraclavicular Lymph Nodes Enlarged Bilaterally] : supraclavicular [No CVA Tenderness] : no ~M costovertebral angle tenderness [No Spinal Tenderness] : no spinal tenderness [Abnormal Walk] : normal gait [Nail Clubbing] : no clubbing  or cyanosis of the fingernails [Musculoskeletal - Swelling] : no joint swelling seen [Motor Tone] : muscle strength and tone were normal [Skin Color & Pigmentation] : normal skin color and pigmentation [Skin Turgor] : normal skin turgor [] : no rash [Oriented To Time, Place, And Person] : oriented to person, place, and time [Impaired Insight] : insight and judgment were intact [Affect] : the affect was normal [FreeTextEntry1] : bilateral feet with decreased sensation

## 2020-06-09 NOTE — HISTORY OF PRESENT ILLNESS
[___ Month(s) Ago] : [unfilled] month(s) ago [RF] : RF [CCP] : CCP [Presence of Erosions] : presence of erosions [Currently Experiencing] : currently experiencing [Joint Pain] : joint pain [Morning Stiffness] : morning stiffness [FreeTextEntry1] : minimal pain over wrists and knees\par no side effects - \par ongoing pain over the back and the bilateral feet - no swelling - has a hx of flat foot \par still on prednisone 20 mg\par ongoing numbness of bilateral feet from mid foot up to the toes\par \par morning stiffness  1-2 hours\par no side effects from kevzara\par has not seen hepatology since 03/2019 [FreeTextEntry5] : Humira

## 2020-06-09 NOTE — REVIEW OF SYSTEMS
[As Noted in HPI] : as noted in HPI [Joint Pain] : joint pain [Joint Swelling] : joint swelling [Joint Stiffness] : joint stiffness [Negative] : Heme/Lymph [Feeling Tired] : not feeling tired

## 2020-06-23 ENCOUNTER — APPOINTMENT (OUTPATIENT)
Dept: PHYSICAL MEDICINE AND REHAB | Facility: CLINIC | Age: 65
End: 2020-06-23
Payer: MEDICARE

## 2020-06-23 VITALS — BODY MASS INDEX: 31.44 KG/M2 | HEIGHT: 74 IN | WEIGHT: 245 LBS

## 2020-06-23 PROCEDURE — 99204 OFFICE O/P NEW MOD 45 MIN: CPT

## 2020-06-23 NOTE — HISTORY OF PRESENT ILLNESS
[FreeTextEntry1] : 63 yo M with PMH RA, HTN, cervical and lumbar fusion 2015, hepatitis B and C who presents with neck and low back pain.  Patient reports that low back pain is more prominent on this visit.\par \par Onset: several years.  No inciting events, trauma, or falls.\par Location: lower lumbar spine and bilateral (R > L ) buttock\par Characteristics: sharp \par Aggravating factors: prolonged standing and walking\par Alleviating factors: rest\par Radiation: denies but patient reports bilateral feet paresthesia.\par Treatments: NSAIDs without benefit, prednisone 20 mg PO daily with some relief, oxycodone with some relief, no recent PT/HEP\par Severity: 6-10/10\par \par Diagnostic studies:\par Last MRI was in 2016 but patient does not recall results.  No recent advanced imaging.\par No previous EMG/NCS\par \par Patient denies new weakness, numbness or paresthesia.  Patient denies bowel/bladder dysfunction, fevers, chills, weight loss, night pain, or night sweats.\par

## 2020-06-23 NOTE — PHYSICAL EXAM
[FreeTextEntry1] : Gen: NAD\par HEENT: neck supple\par CV: no cyanosis\par Pulm: breathing well on room air\par Abd: soft\par Low back: range of motion limited by pain, tenderness to palpation lower lumbar paraspinals, sciatic notch, and bilateral SI joints (R > L), neg straight leg raise, pos FABERE, neg FAIR\par Msk: \par 5/5 hip flexion B/L, 5/5 knee extension B/L, 5/5 knee flexion B/L, 5/5 dorsiflexion B/L, 5/5 plantar flexion B/L\par 5/5 shoulder abduction B/L, 5/5 elbow flexion B/L, 5/5 elbow extension B/L, 5/5 wrist extension B/L, 5/5 hand  B/L\par Neuro: sensation intact to light touch in bilateral upper and lower extremities, reflexes 2+ brachioradialis, biceps, triceps bilaterally, reflexes 2+ patella, medial hamstring, achilles bilaterally, negative babinski, negative cotter\par

## 2020-06-23 NOTE — ASSESSMENT
[FreeTextEntry1] : 63 yo M who presents with low back pain and bilateral buttock pain consistent with lumbar spinal stenosis, failed lumbar spine surgery syndrome, and bilateral sacroiliitis (R > L).\par \par -MRI lumbar spine w/o contrast ordered\par -start PT/HEP, new referral provided \par -Patient asked to bring in a list of medications that he has tried to his next appointment.\par -RTC following MRI to review results, if pain persists or worsen despite compliance with above, then will consider scheduling lumbar TFESI, lumbar MBB, or SI joint injections at next visit pending results of MRI.\par \par Saman Aldridge MD\par Spine and Sports Medicine\par \par [employer]\par [facility] [place]\par \par

## 2020-07-05 ENCOUNTER — NON-APPOINTMENT (OUTPATIENT)
Age: 65
End: 2020-07-05

## 2020-09-08 ENCOUNTER — APPOINTMENT (OUTPATIENT)
Dept: RHEUMATOLOGY | Facility: CLINIC | Age: 65
End: 2020-09-08

## 2020-09-16 ENCOUNTER — APPOINTMENT (OUTPATIENT)
Dept: RHEUMATOLOGY | Facility: CLINIC | Age: 65
End: 2020-09-16
Payer: MEDICARE

## 2020-09-16 VITALS
OXYGEN SATURATION: 98 % | SYSTOLIC BLOOD PRESSURE: 148 MMHG | TEMPERATURE: 97.7 F | WEIGHT: 245 LBS | HEIGHT: 74 IN | BODY MASS INDEX: 31.44 KG/M2 | HEART RATE: 81 BPM | DIASTOLIC BLOOD PRESSURE: 91 MMHG

## 2020-09-16 PROCEDURE — 99214 OFFICE O/P EST MOD 30 MIN: CPT | Mod: 25

## 2020-09-16 PROCEDURE — 20610 DRAIN/INJ JOINT/BURSA W/O US: CPT | Mod: LT

## 2020-09-17 LAB
B PERT IGG+IGM PNL SER: ABNORMAL
COLOR FLD: YELLOW
EOSINOPHIL # FLD MANUAL: 0 %
FLUID INTAKE SUBSTANCE CLASS: NORMAL
LYMPHOCYTES # FLD MANUAL: 30 %
MESOTHL CELL NFR FLD: 0 %
MONOS+MACROS NFR FLD MANUAL: 47 %
NEUTS SEG # FLD MANUAL: 23 %
NRBC # FLD: 0
RBC # FLD MANUAL: 1000 /UL
SYCRY CLARITY: ABNORMAL
SYCRY COLOR: YELLOW
SYCRY ID: NORMAL
SYCRY TUBE: NORMAL
TOTAL CELLS COUNTED FLD: 31 /UL
TUBE TYPE: NORMAL
UNIDENT CELLS NFR FLD MANUAL: 0 %
VARIANT LYMPHS # FLD MANUAL: 0 %

## 2020-12-29 ENCOUNTER — RX RENEWAL (OUTPATIENT)
Age: 65
End: 2020-12-29

## 2021-03-25 ENCOUNTER — APPOINTMENT (OUTPATIENT)
Dept: RHEUMATOLOGY | Facility: CLINIC | Age: 66
End: 2021-03-25
Payer: MEDICARE

## 2021-03-25 ENCOUNTER — LABORATORY RESULT (OUTPATIENT)
Age: 66
End: 2021-03-25

## 2021-03-25 ENCOUNTER — APPOINTMENT (OUTPATIENT)
Dept: INTERNAL MEDICINE | Facility: CLINIC | Age: 66
End: 2021-03-25
Payer: MEDICARE

## 2021-03-25 ENCOUNTER — OUTPATIENT (OUTPATIENT)
Dept: OUTPATIENT SERVICES | Facility: HOSPITAL | Age: 66
LOS: 1 days | End: 2021-03-25
Payer: MEDICARE

## 2021-03-25 VITALS
DIASTOLIC BLOOD PRESSURE: 80 MMHG | BODY MASS INDEX: 30.29 KG/M2 | OXYGEN SATURATION: 96 % | WEIGHT: 236 LBS | SYSTOLIC BLOOD PRESSURE: 130 MMHG | HEART RATE: 63 BPM | HEIGHT: 74 IN

## 2021-03-25 VITALS
HEIGHT: 74 IN | DIASTOLIC BLOOD PRESSURE: 91 MMHG | BODY MASS INDEX: 30.54 KG/M2 | OXYGEN SATURATION: 95 % | WEIGHT: 238 LBS | SYSTOLIC BLOOD PRESSURE: 155 MMHG | HEART RATE: 65 BPM | TEMPERATURE: 96.7 F

## 2021-03-25 DIAGNOSIS — I10 ESSENTIAL (PRIMARY) HYPERTENSION: ICD-10-CM

## 2021-03-25 DIAGNOSIS — B18.1 CHRONIC VIRAL HEPATITIS B W/OUT DELTA-AGENT: ICD-10-CM

## 2021-03-25 DIAGNOSIS — Z80.0 FAMILY HISTORY OF MALIGNANT NEOPLASM OF DIGESTIVE ORGANS: ICD-10-CM

## 2021-03-25 DIAGNOSIS — Z83.3 FAMILY HISTORY OF DIABETES MELLITUS: ICD-10-CM

## 2021-03-25 DIAGNOSIS — Z87.39 PERSONAL HISTORY OF OTHER DISEASES OF THE MUSCULOSKELETAL SYSTEM AND CONNECTIVE TISSUE: ICD-10-CM

## 2021-03-25 DIAGNOSIS — Z86.19 PERSONAL HISTORY OF OTHER INFECTIOUS AND PARASITIC DISEASES: ICD-10-CM

## 2021-03-25 DIAGNOSIS — Z79.52 LONG TERM (CURRENT) USE OF SYSTEMIC STEROIDS: ICD-10-CM

## 2021-03-25 DIAGNOSIS — Z82.5 FAMILY HISTORY OF ASTHMA AND OTHER CHRONIC LOWER RESPIRATORY DISEASES: ICD-10-CM

## 2021-03-25 PROCEDURE — G0463: CPT

## 2021-03-25 PROCEDURE — 99214 OFFICE O/P EST MOD 30 MIN: CPT

## 2021-03-25 PROCEDURE — 99213 OFFICE O/P EST LOW 20 MIN: CPT | Mod: GE

## 2021-03-25 RX ORDER — HYLAN G-F 20 16MG/2ML
48 SYRINGE (ML) INTRAARTICULAR
Qty: 1 | Refills: 0 | Status: DISCONTINUED | COMMUNITY
Start: 2019-10-03 | End: 2021-03-25

## 2021-03-26 NOTE — ASSESSMENT
[FreeTextEntry1] : 63 year old man with rheumatoid arthritis, who was treated for hepatitis C in 2002 and was found to have again detectable HCV RNA, which has again cleared in 2019, isolated HBcAb positivity, which has cleared in 2019, on 10mg prednisone daily, Kevzara 200mg injectable Q2 weeks and Tenofovir oral daily, following closely with GI and rheumatology, who presented to the clinic to establish care.\par \par #Hep C and chronic hep B\par -Pt has cleared labs for Hep C viral detection in 2019 s/p successful treatment, however + for HepBcAb, chronic hep B infection, currently on Tenofovir (TDF) per GI recs\par -Pt regularly follows GI/hepatology with regular imaging of the liver to monitor for HCC\par -Will continue to monitor labs \par \par #Rheumatoid arthritis\par -Pt is on chronic steroids, need to monitor for long term effects such as immunosuppression, platelet dysfunction and coagulopathies, hyperlipidemia, risk of diabetes, osteoporosis risk and upper GI effects such as ulcer formation\par -Offered pt DEXA screening to monitor for osteoporosis\par -Pt has ASCVD score of 18.8%, if cholesterol and LDL elevated, will start pt on moderate intensity statin\par -Will check CBC, CMP, lipid panel, A1C\par \par #ASCVD score elevated at 18.8%\par \par #Chronic HTN\par -Referral ordered for opthalmology for establishing care to rule out end organ damage within eyes from chronic HTN, pt agrees to follow up when convenient\par -Pt Cr stable, will order urinalysis for monitoring for proteinuria within follow up visit\par -Pt denies symptoms of elevated BP such as HA, vision changes, chest pain/SOB\par -Prescribed pt blood pressure device to monitor BPs at home\par -Suggested pt eat DASH diet, low sodium, and increase physical/aerobic activity\par \par #HCM\par -Pt says he received the flu shot every year\par -Pt plans to get the COVID-19 vaccine within the next month\par -Pt told to come into office 1 month after completing course for COVID-19 vaccination, to complete [id number], prevnar 13, and Zoster vaccine\par -Pt declined colonoscopy, does not want cancer screening, and refuses to have future surgeries or colonoscopies, pt understands risks of not pursuing cancer screens\par -Follow up on DEXA scan\par -Will call pt with results on CBC, CMP, lipid panel, A1C\par \par Note authored by Damari Alves MD\par \par Case discussed with Dr. Darryl Williamson, Attending Physician

## 2021-03-26 NOTE — PHYSICAL EXAM
[No Acute Distress] : no acute distress [Well Nourished] : well nourished [Well Developed] : well developed [Well-Appearing] : well-appearing [Normal Sclera/Conjunctiva] : normal sclera/conjunctiva [PERRL] : pupils equal round and reactive to light [EOMI] : extraocular movements intact [Normal Outer Ear/Nose] : the outer ears and nose were normal in appearance [Normal Oropharynx] : the oropharynx was normal [No JVD] : no jugular venous distention [No Lymphadenopathy] : no lymphadenopathy [Supple] : supple [No Respiratory Distress] : no respiratory distress  [No Accessory Muscle Use] : no accessory muscle use [Clear to Auscultation] : lungs were clear to auscultation bilaterally [Normal Rate] : normal rate  [Regular Rhythm] : with a regular rhythm [Normal S1, S2] : normal S1 and S2 [No Murmur] : no murmur heard [No Edema] : there was no peripheral edema [Soft] : abdomen soft [Non Tender] : non-tender [Non-distended] : non-distended [No Masses] : no abdominal mass palpated [No HSM] : no HSM [Normal Posterior Cervical Nodes] : no posterior cervical lymphadenopathy [Normal Anterior Cervical Nodes] : no anterior cervical lymphadenopathy [No CVA Tenderness] : no CVA  tenderness [No Spinal Tenderness] : no spinal tenderness [Grossly Normal Strength/Tone] : grossly normal strength/tone [No Focal Deficits] : no focal deficits [Normal Gait] : normal gait [Normal Affect] : the affect was normal [Normal Insight/Judgement] : insight and judgment were intact [de-identified] : No complaint of tenderness in back currently

## 2021-03-26 NOTE — HEALTH RISK ASSESSMENT
[Good] : ~his/her~  mood as  good [No] : No [No falls in past year] : Patient reported no falls in the past year [0] : 2) Feeling down, depressed, or hopeless: Not at all (0) [Patient declined colonoscopy] : Patient declined colonoscopy [With Family] : lives with family [# of Members in Household ___] :  household currently consist of [unfilled] member(s) [Sexually Active] : sexually active [Feels Safe at Home] : Feels safe at home [] : No [[id number]] : 0 [High Risk Behavior] : no high risk behavior [ColonoscopyComments] : Due to preference not to screen for cancer, wishes a natural death [de-identified] : 15 years of marriage

## 2021-03-26 NOTE — HISTORY OF PRESENT ILLNESS
[FreeTextEntry1] : Comprehensive visit to establish care [de-identified] : 63 year old man with rheumatoid arthritis, who was treated for hepatitis C in  and was found to have again detectable HCV RNA, which has again cleared in 2019, isolated HBcAb positivity, which has cleared in 2019, on 10mg prednisone daily, Kevzara 200mg injectable Q2 weeks and Tenofovir oral daily, following closely with GI and rheumatology, who presented to the clinic to establish care.\leslie Pt previously has seen another PMD for 27 years but decided to switch physicians because pt had recently moved.\leslie Pt has good compliance with his medications.\leslie Pt has long standing RA with signs of progressive disease with wrist fusion, Lumbar and cervical fusion in , and history of stenosis. He has had surgery for lumbar stenosis in 2015 which relieved the numbness in his left leg at that time. However, pt felt as if he was left debilitated after the surgery and requires daily oxycontin 10mg BID standing and oxycodone 10mg-325mg Q4-6 hours for chronic back pain. He has arthritis pain chronically in his b/l feet, knees, arms, hands, back.\leslie Pt has personally decided to eliminate future screenings for colon cancer because he did not like the previous colonoscopy he had done about 10 years ago, which was normal. He says he has done the FIT test in the past which also was normal. He says he would not like to know if he has cancer because he would not like to undergo further surgeries or colonoscopies. Pt understands the risks and possible complications of declining a colonoscopy and FIT test and understands and accepts the fact he could potentially die from refusing colon cancer screenings. He says he would like to pursue talk of becoming DNR/DNI when he approaches 80 years old. He feels contented with the life he has lived and would like no further drastic measures to his care. \leslie Pt will be obtaining the COVID-19 vaccine next month, and agrees to follow up afterwards for the [id number] and [id number]. He was previously hospitalized 3 times for walking pneumonia between 2017- 2019. He takes the flu shot every year. \leslie Pt is working on resolving his obesity. His diet consists of plenty of water, vegetables, large amounts of seafood, and occasional "junk food" (cookies, chips), 1 can of soda a day, but avoids fried foods. He denies hx of bone fractures. For exercise he runs and plays with his 6 and 11 year old children and does yard work. He can walk 1-2 blocks and 2 flights limited only by joint pain, especially back pain, but denies SOB or chest pain.\par \par Allergies: none\par PSH: cervical and lumbar fusion in 2015\par FH: negative for heart attacks, strokes, or coagulation disorders. Father  from emphysema and had diabetes. Mother's brother  from colon cancer

## 2021-03-26 NOTE — REVIEW OF SYSTEMS
[Lower Ext Edema] : lower extremity edema [Joint Pain] : joint pain [Back Pain] : back pain [Fever] : no fever [Chills] : no chills [Fatigue] : no fatigue [Night Sweats] : no night sweats [Vision Problems] : no vision problems [Nasal Discharge] : no nasal discharge [Sore Throat] : no sore throat [Chest Pain] : no chest pain [Palpitations] : no palpitations [Orthopena] : no orthopnea [Shortness Of Breath] : no shortness of breath [Wheezing] : no wheezing [Cough] : no cough [Dyspnea on Exertion] : not dyspnea on exertion [Abdominal Pain] : no abdominal pain [Nausea] : no nausea [Constipation] : no constipation [Diarrhea] : no diarrhea [Vomiting] : no vomiting [Heartburn] : no heartburn [Melena] : no melena [Dysuria] : no dysuria [Nocturia] : no nocturia [Hematuria] : no hematuria [Frequency] : no frequency [Skin Rash] : no skin rash [Headache] : no headache [Dizziness] : no dizziness [Fainting] : no fainting [Unsteady Walk] : no ataxia [Memory Loss] : no memory loss [Suicidal] : not suicidal [Insomnia] : no insomnia [Anxiety] : no anxiety [Depression] : no depression [FreeTextEntry5] : Chronic LE pitting edema +1 in left leg and trace in right leg [FreeTextEntry9] : Chronic back and joint pain from cervica/lumbar fusion surgery 2015 and due to rheumatoid arthritis

## 2021-03-27 DIAGNOSIS — Z79.52 LONG TERM (CURRENT) USE OF SYSTEMIC STEROIDS: ICD-10-CM

## 2021-03-27 DIAGNOSIS — M54.9 DORSALGIA, UNSPECIFIED: ICD-10-CM

## 2021-03-27 DIAGNOSIS — E66.9 OBESITY, UNSPECIFIED: ICD-10-CM

## 2021-03-27 DIAGNOSIS — E78.5 HYPERLIPIDEMIA, UNSPECIFIED: ICD-10-CM

## 2021-03-27 DIAGNOSIS — Z87.39 PERSONAL HISTORY OF OTHER DISEASES OF THE MUSCULOSKELETAL SYSTEM AND CONNECTIVE TISSUE: ICD-10-CM

## 2021-03-30 LAB
APPEARANCE: CLEAR
BILIRUBIN URINE: NEGATIVE
BLOOD URINE: NEGATIVE
COLOR: YELLOW
GLUCOSE QUALITATIVE U: NEGATIVE
KETONES URINE: NORMAL
LEUKOCYTE ESTERASE URINE: NEGATIVE
NITRITE URINE: NEGATIVE
PH URINE: 6
PROTEIN URINE: ABNORMAL
SPECIFIC GRAVITY URINE: 1.04
UROBILINOGEN URINE: ABNORMAL

## 2021-04-04 LAB
AMPHET UR-MCNC: NEGATIVE
BARBITURATES UR-MCNC: NEGATIVE
BENZODIAZ UR-MCNC: NEGATIVE
COCAINE METAB.OTHER UR-MCNC: NEGATIVE
CREATININE, URINE: 232.8 MG/DL
METHADONE UR-MCNC: NEGATIVE
METHAQUALONE UR-MCNC: NEGATIVE
OPIATES UR-MCNC: NEGATIVE
PCP UR-MCNC: NEGATIVE
PROPOXYPH UR QL: NEGATIVE
THC UR QL: NEGATIVE

## 2021-04-14 ENCOUNTER — LABORATORY RESULT (OUTPATIENT)
Age: 66
End: 2021-04-14

## 2021-04-15 LAB
ALBUMIN SERPL ELPH-MCNC: 4.8 G/DL
ALP BLD-CCNC: 52 U/L
ALT SERPL-CCNC: 12 U/L
ANION GAP SERPL CALC-SCNC: 18 MMOL/L
AST SERPL-CCNC: 25 U/L
BILIRUB SERPL-MCNC: 0.4 MG/DL
BUN SERPL-MCNC: 14 MG/DL
CALCIUM SERPL-MCNC: 10.4 MG/DL
CHLORIDE SERPL-SCNC: 99 MMOL/L
CO2 SERPL-SCNC: 22 MMOL/L
CREAT SERPL-MCNC: 1.01 MG/DL
CRP SERPL-MCNC: <3 MG/L
DEPRECATED KAPPA LC FREE/LAMBDA SER: 1.17 RATIO
GLUCOSE SERPL-MCNC: 91 MG/DL
HCV AB SER QL: REACTIVE
HCV S/CO RATIO: 10.53 S/CO
IGA SER QL IEP: 177 MG/DL
IGG SER QL IEP: 688 MG/DL
IGM SER QL IEP: 238 MG/DL
KAPPA LC CSF-MCNC: 1.09 MG/DL
KAPPA LC SERPL-MCNC: 1.28 MG/DL
POTASSIUM SERPL-SCNC: 5.7 MMOL/L
PROT SERPL-MCNC: 7.1 G/DL
SODIUM SERPL-SCNC: 140 MMOL/L
TSH SERPL-ACNC: 0.51 UIU/ML
URATE SERPL-MCNC: 5 MG/DL

## 2021-04-20 LAB — DRUG ABUSE PANEL-9, SERUM: NORMAL

## 2021-06-17 ENCOUNTER — APPOINTMENT (OUTPATIENT)
Dept: RHEUMATOLOGY | Facility: CLINIC | Age: 66
End: 2021-06-17
Payer: MEDICARE

## 2021-06-17 VITALS
SYSTOLIC BLOOD PRESSURE: 124 MMHG | DIASTOLIC BLOOD PRESSURE: 73 MMHG | RESPIRATION RATE: 16 BRPM | TEMPERATURE: 97.4 F | HEIGHT: 74 IN | WEIGHT: 235 LBS | BODY MASS INDEX: 30.16 KG/M2 | HEART RATE: 73 BPM | OXYGEN SATURATION: 96 %

## 2021-06-17 PROCEDURE — 20610 DRAIN/INJ JOINT/BURSA W/O US: CPT | Mod: LT

## 2021-06-17 PROCEDURE — 99212 OFFICE O/P EST SF 10 MIN: CPT | Mod: 25

## 2021-06-29 ENCOUNTER — RX RENEWAL (OUTPATIENT)
Age: 66
End: 2021-06-29

## 2021-07-02 ENCOUNTER — RX RENEWAL (OUTPATIENT)
Age: 66
End: 2021-07-02

## 2021-07-09 ENCOUNTER — NON-APPOINTMENT (OUTPATIENT)
Age: 66
End: 2021-07-09

## 2021-07-29 ENCOUNTER — RX RENEWAL (OUTPATIENT)
Age: 66
End: 2021-07-29

## 2021-08-18 ENCOUNTER — APPOINTMENT (OUTPATIENT)
Dept: RHEUMATOLOGY | Facility: CLINIC | Age: 66
End: 2021-08-18

## 2021-08-30 ENCOUNTER — RX RENEWAL (OUTPATIENT)
Age: 66
End: 2021-08-30

## 2021-10-05 ENCOUNTER — RX RENEWAL (OUTPATIENT)
Age: 66
End: 2021-10-05

## 2021-10-08 ENCOUNTER — NON-APPOINTMENT (OUTPATIENT)
Age: 66
End: 2021-10-08

## 2021-10-12 ENCOUNTER — APPOINTMENT (OUTPATIENT)
Dept: RHEUMATOLOGY | Facility: CLINIC | Age: 66
End: 2021-10-12
Payer: MEDICARE

## 2021-10-12 ENCOUNTER — LABORATORY RESULT (OUTPATIENT)
Age: 66
End: 2021-10-12

## 2021-10-12 ENCOUNTER — RX RENEWAL (OUTPATIENT)
Age: 66
End: 2021-10-12

## 2021-10-12 PROCEDURE — 99215 OFFICE O/P EST HI 40 MIN: CPT | Mod: 25

## 2021-10-12 PROCEDURE — 90662 IIV NO PRSV INCREASED AG IM: CPT

## 2021-10-12 PROCEDURE — 20610 DRAIN/INJ JOINT/BURSA W/O US: CPT | Mod: LT

## 2021-10-12 PROCEDURE — G0008: CPT

## 2021-10-12 RX ORDER — METHYLPRED ACET/NACL,ISO-OS/PF 40 MG/ML
40 VIAL (ML) INJECTION
Qty: 1 | Refills: 0 | Status: COMPLETED | OUTPATIENT
Start: 2021-10-12

## 2021-10-12 RX ORDER — METOPROLOL TARTRATE 50 MG/1
50 TABLET, FILM COATED ORAL
Refills: 0 | Status: DISCONTINUED | COMMUNITY
Start: 1900-01-01 | End: 2021-10-12

## 2021-10-12 RX ORDER — POTASSIUM CHLORIDE 750 MG/1
10 CAPSULE, EXTENDED RELEASE ORAL
Qty: 90 | Refills: 1 | Status: ACTIVE | COMMUNITY
Start: 2021-10-12

## 2021-10-12 RX ADMIN — METHYLPREDNISOLONE ACETATE MG/ML: 40 INJECTION, SUSPENSION INTRA-ARTICULAR; INTRALESIONAL; INTRAMUSCULAR; SOFT TISSUE at 00:00

## 2021-10-12 NOTE — REVIEW OF SYSTEMS
[As Noted in HPI] : as noted in HPI [Joint Pain] : joint pain [Joint Swelling] : joint swelling [Negative] : Heme/Lymph

## 2021-10-13 LAB
ALBUMIN SERPL ELPH-MCNC: 4.2 G/DL
ALP BLD-CCNC: 56 U/L
ALT SERPL-CCNC: 18 U/L
ANION GAP SERPL CALC-SCNC: 14 MMOL/L
AST SERPL-CCNC: 21 U/L
B PERT IGG+IGM PNL SER: CLEAR
BASOPHILS # BLD AUTO: 0 K/UL
BASOPHILS NFR BLD AUTO: 0 %
BILIRUB SERPL-MCNC: 0.2 MG/DL
BUN SERPL-MCNC: 18 MG/DL
CALCIUM SERPL-MCNC: 9.6 MG/DL
CHLORIDE SERPL-SCNC: 105 MMOL/L
CO2 SERPL-SCNC: 27 MMOL/L
COLOR FLD: YELLOW
CREAT SERPL-MCNC: 1.01 MG/DL
CRP SERPL-MCNC: <3 MG/L
EOSINOPHIL # BLD AUTO: 0 K/UL
EOSINOPHIL # FLD MANUAL: 3 %
EOSINOPHIL NFR BLD AUTO: 0 %
ERYTHROCYTE [SEDIMENTATION RATE] IN BLOOD BY WESTERGREN METHOD: 9 MM/HR
ESTIMATED AVERAGE GLUCOSE: 114 MG/DL
FLUID INTAKE SUBSTANCE CLASS: NORMAL
GLUCOSE SERPL-MCNC: 86 MG/DL
HBA1C MFR BLD HPLC: 5.6 %
HCT VFR BLD CALC: 43.5 %
HGB BLD-MCNC: 13.7 G/DL
LYMPHOCYTES # BLD AUTO: 2.56 K/UL
LYMPHOCYTES # FLD MANUAL: 17 %
LYMPHOCYTES NFR BLD AUTO: 20 %
MAN DIFF?: NORMAL
MCHC RBC-ENTMCNC: 31.5 GM/DL
MCHC RBC-ENTMCNC: 34.2 PG
MCV RBC AUTO: 108.5 FL
MESOTHL CELL NFR FLD: 0 %
MONOCYTES # BLD AUTO: 1 K/UL
MONOCYTES NFR BLD AUTO: 7.8 %
MONOS+MACROS NFR FLD MANUAL: 59 %
NEUTROPHILS # BLD AUTO: 9.13 K/UL
NEUTROPHILS NFR BLD AUTO: 71.3 %
NEUTS SEG # FLD MANUAL: 21 %
NRBC # FLD: 0
PLATELET # BLD AUTO: 222 K/UL
POTASSIUM SERPL-SCNC: 5 MMOL/L
PROT SERPL-MCNC: 6.3 G/DL
RBC # BLD: 4.01 M/UL
RBC # FLD MANUAL: 1000 /UL
RBC # FLD: 13.2 %
SODIUM SERPL-SCNC: 145 MMOL/L
SYCRY CLARITY: ABNORMAL
SYCRY COLOR: YELLOW
SYCRY ID: NORMAL
SYCRY TUBE: NORMAL
TOTAL CELLS COUNTED FLD: 25 /UL
TSH SERPL-ACNC: 1.01 UIU/ML
TUBE TYPE: NORMAL
UNIDENT CELLS NFR FLD MANUAL: 0 %
VARIANT LYMPHS # FLD MANUAL: 0 %
WBC # FLD AUTO: 12.81 K/UL

## 2021-10-13 NOTE — PHYSICAL EXAM
[General Appearance - Alert] : alert [General Appearance - In No Acute Distress] : in no acute distress [Auscultation Breath Sounds / Voice Sounds] : lungs were clear to auscultation bilaterally [Heart Rate And Rhythm] : heart rate was normal and rhythm regular [Heart Sounds] : normal S1 and S2 [Heart Sounds Gallop] : no gallops [Murmurs] : no murmurs [Heart Sounds Pericardial Friction Rub] : no pericardial rub [Full Pulse] : the pedal pulses are present [Edema] : there was no peripheral edema [Skin Color & Pigmentation] : normal skin color and pigmentation [Skin Turgor] : normal skin turgor [Oriented To Time, Place, And Person] : oriented to person, place, and time [Impaired Insight] : insight and judgment were intact [Affect] : the affect was normal [Bowel Sounds] : normal bowel sounds [Abdomen Soft] : soft [Abdomen Tenderness] : non-tender [] : no hepato-splenomegaly [Abdomen Mass (___ Cm)] : no abdominal mass palpated [Cervical Lymph Nodes Enlarged Posterior Bilaterally] : posterior cervical [Cervical Lymph Nodes Enlarged Anterior Bilaterally] : anterior cervical [Supraclavicular Lymph Nodes Enlarged Bilaterally] : supraclavicular [FreeTextEntry1] : left knee with large effusion

## 2021-10-13 NOTE — PROCEDURE
[Today's Date:] : Date: [unfilled] [Arthrocentesis] : arthrocentesis was performed [Soft Tissue Injection] : soft tissue injection was performed [Patient] : the patient [Risks] : risks [Consent Obtained] : written consent was obtained prior to the procedure and is detailed in the patient's record [Therapeutic] : therapeutic [#1 Site: ______] : #1 site identified in the [unfilled] [Ethyl Chloride] : ethyl chloride [___ ml Inj] : [unfilled] ~Uml [Depomedrol ___ mg] : Depomedrol [unfilled] mg [#2 Site: ___] : # 2 site identified in the [unfilled] [Tolerated Well] : the patient tolerated the procedure well [No Complications] : there were no complications [Instructions Given] : handouts/patient instructions were given to patient [Patient Instructed to Call] : patient was instructed to call if redness at site, a decrease in range of motion or an increase in pain is noted after procedure. [___ml of fluid] : [unfilled] ml of fluid was aspirated [Crystal Identification] : crystal identification [Cell Count] : cell count [de-identified] : fluzone 0.5 ml - IM

## 2021-10-13 NOTE — HISTORY OF PRESENT ILLNESS
[___ Month(s) Ago] : [unfilled] month(s) ago [FreeTextEntry1] : still on 10 mg of prednisone with well controlled symptoms. \par no flares since the last visit\par left knee is better since the aspiration - but with ongoing swelling and decreased ROM\par no other complains\par

## 2021-10-13 NOTE — ASSESSMENT
[FreeTextEntry1] : 63 year-old male with long standing RA - not treated for at least 3 years with signs of progressive disease with likely wrist fusion\par Lumbar and cervical fusion in 2015 - hx. of stenosis\par \par 1. RA -  still on prednisone 10 mg - could not tolerate leflunomide with severe diarrhea\par no flares - but ongoing stiffness and joint pain - probably long term damage - stable on kevzara - now on 10 mg of prednisone will decrease to 7.5 mg\par 2. Hepatitis B and C - on tenofovir  hep C viral load at 0 - needs to make an appt - has not been seen since 2019\par 3. spinal fusion - x-rays with no C1-2 widening - worsening thoracic pain - not planning any more surgery - bilateral leg and hand cramping - bilateral feet paresthesias  - referral to PMR - likely stenosis - will need injection\par 4. Pain management - on oxycodone and OxyContin - before pain - 9/10 after  - pain is 4/10 - after medication  better on long acting with more relief and need for less rescue medications - no side effects from medication\par no side effects  - no constipation - no sleepiness - doing well with stable symptoms  - no signs of abuse  - continue with current regimen\par 5. Knee pain and swelling  arthrocentesis today \par \par \par I reviewed previous labs results with patients.\par Labs today\par Diagnosis and Prognosis discussed\par Continue with current medications\par F/u  3 months

## 2021-11-09 ENCOUNTER — RESULT REVIEW (OUTPATIENT)
Age: 66
End: 2021-11-09

## 2021-11-09 ENCOUNTER — APPOINTMENT (OUTPATIENT)
Dept: RADIOLOGY | Facility: IMAGING CENTER | Age: 66
End: 2021-11-09
Payer: MEDICARE

## 2021-11-09 ENCOUNTER — OUTPATIENT (OUTPATIENT)
Dept: OUTPATIENT SERVICES | Facility: HOSPITAL | Age: 66
LOS: 1 days | End: 2021-11-09
Payer: MEDICARE

## 2021-11-09 DIAGNOSIS — Z00.8 ENCOUNTER FOR OTHER GENERAL EXAMINATION: ICD-10-CM

## 2021-11-09 PROCEDURE — 77080 DXA BONE DENSITY AXIAL: CPT | Mod: 26

## 2021-11-09 PROCEDURE — 73562 X-RAY EXAM OF KNEE 3: CPT | Mod: 26,50

## 2021-11-09 PROCEDURE — 73620 X-RAY EXAM OF FOOT: CPT | Mod: 26,50

## 2021-11-09 PROCEDURE — 73130 X-RAY EXAM OF HAND: CPT | Mod: 26,50

## 2021-11-09 PROCEDURE — 73620 X-RAY EXAM OF FOOT: CPT

## 2021-11-09 PROCEDURE — 77080 DXA BONE DENSITY AXIAL: CPT

## 2021-11-09 PROCEDURE — 73562 X-RAY EXAM OF KNEE 3: CPT

## 2021-11-09 PROCEDURE — 73130 X-RAY EXAM OF HAND: CPT

## 2021-11-11 ENCOUNTER — NON-APPOINTMENT (OUTPATIENT)
Age: 66
End: 2021-11-11

## 2021-11-12 ENCOUNTER — NON-APPOINTMENT (OUTPATIENT)
Age: 66
End: 2021-11-12

## 2021-11-12 ENCOUNTER — APPOINTMENT (OUTPATIENT)
Dept: ORTHOPEDIC SURGERY | Facility: CLINIC | Age: 66
End: 2021-11-12
Payer: MEDICARE

## 2021-11-12 VITALS
SYSTOLIC BLOOD PRESSURE: 147 MMHG | HEIGHT: 74 IN | HEART RATE: 71 BPM | DIASTOLIC BLOOD PRESSURE: 87 MMHG | WEIGHT: 240 LBS | BODY MASS INDEX: 30.8 KG/M2

## 2021-11-12 DIAGNOSIS — M21.42 FLAT FOOT [PES PLANUS] (ACQUIRED), LEFT FOOT: ICD-10-CM

## 2021-11-12 DIAGNOSIS — M21.41 FLAT FOOT [PES PLANUS] (ACQUIRED), RIGHT FOOT: ICD-10-CM

## 2021-11-12 DIAGNOSIS — M76.829 POSTERIOR TIBIAL TENDINITIS, UNSPECIFIED LEG: ICD-10-CM

## 2021-11-12 PROCEDURE — 99203 OFFICE O/P NEW LOW 30 MIN: CPT

## 2021-11-12 NOTE — PHYSICAL EXAM
[de-identified] : Right Lower Extremity\par o Ankle :\par ¦ Inspection/Palpation : marked posterior tibialis tendon tenderness to palpation with localized swelling, tightness to palpation heel chords \par ¦ Range of Motion : arc of motion full and painless in all planes\par ¦ Stability : no joint instability on provocative testing\par ¦ Strength : all muscles 5/5\par o Muscle Bulk : no atrophy\par o Sensation : sensation intact to light touch\par o Skin : no skin lesions, no discoloration\par o Vascular Exam : no edema, no cyanosis, posterior tibialis and dorsalis pedis pulses normal \par o Special Tests: Anterior Drawer (-) \par o Bilateral Standing Exam:\par ¦ Inspection: severe pes planovalgus bilateral L>R\par \par Left Lower Extremity\par ¦ Inspection/Palpation : marked posterior tibialis tendon tenderness to palpation with localized swelling, tightness to palpation heel chords \par ¦ Range of Motion : arc of motion full and painless in all planes\par ¦ Stability : no joint instability on provocative testing\par ¦ Strength : all muscles 5/5\par o Muscle Bulk : no atrophy\par o Sensation : sensation intact to light touch\par o Skin : no skin lesions, no discoloration\par o Vascular Exam : no edema, no cyanosis, posterior tibialis and dorsalis pedis pulses normal \par o Special Tests: Anterior Drawer (-) \par o Bilateral Standing Exam:\par ¦ Inspection: severe pes planovalgus bilateral L>R \par  [de-identified] : Patient comes to today's visit with outside imaging already performed. I reviewed the images in detail with the patient and discussed the findings as highlighted below. \par \par \par EXAM: XR FOOT 2 VIEWS BI\par \par \par PROCEDURE DATE: 11/09/2021\par \par \par \par INTERPRETATION: EXAMINATION: XR FOOT 2 VIEWS BILATERAL\par \par CLINICAL INDICATION:Arthritis, pain\par \par COMPARISON: Left foot radiographs dated 2/28/2019 and bilateral foot radiographs dated 12/7/2018\par \par TECHNIQUE: Bilateral feet, 2 views were obtained each\par \par INTERPRETATION:\par There is no acute fracture or dislocation. Osteopenia is present. In the left foot at the plantar aspect of the second/third digit there is a tiny linear radiopaque foreign body which was not clearly seen on the prior imaging.\par \par Midfoot alignment is maintained bilaterally. There are scattered mild interphalangeal joint and midfoot degenerative changes.\par \par IMPRESSION:\par \par Plantar foot linear foreign body within the left second/third digit region.\par \par Mild midfoot arthritis.\par \par --- End of Report ---\par

## 2021-11-12 NOTE — DISCUSSION/SUMMARY
[de-identified] : The underlying pathophysiology was reviewed in great detail with the patient as well as the various treatment options, including ice, analgesics, NSAIDs, Physical therapy, steroid injections, bracing, regeral to foot and ankle specialist\par \par Discussed utilization of a supportive shoe or over the counter orthotics.\par \par Activity modifications and restrictions were discussed. \par \par A home exercise sheet was given and discussed with the patient to follow. \par \par A prescription for Physical Therapy was provided.\par \par  A prescription was provided for a left ankle [place] brace. \par \par FU 6 weeks. \par \par All questions were answered, all alternatives discussed and the patient is in complete agreement with that plan. Follow-up appointment as instructed. Any issues and the patient will call or come in sooner.

## 2021-11-12 NOTE — HISTORY OF PRESENT ILLNESS
[de-identified] : HUNTER AMADO is a 65 year male presenting to the office complaining of bilateral foot and ankle pain. He presents to the office ambulating independently.  Patient reports pain intermittently for years with worsening pain overtime.  Denies injury or trauma to the area.  The patient describes the pain as a dull aching, and occasionally sharp pain localized to lateral and medial aspect of the ankle that is intermittent in nature.  symptoms are exacerbated prolonged standing, and walking.  Pain is alleviated with rest.  Patient reports some sensations of instability and  weakness. He presents with xrays from [facility] \par Patient is taking NSAIDs for pain relief with mild to moderate relief in His symptoms. \par

## 2022-01-03 ENCOUNTER — RX RENEWAL (OUTPATIENT)
Age: 67
End: 2022-01-03

## 2022-01-25 ENCOUNTER — LABORATORY RESULT (OUTPATIENT)
Age: 67
End: 2022-01-25

## 2022-01-25 ENCOUNTER — APPOINTMENT (OUTPATIENT)
Dept: RHEUMATOLOGY | Facility: CLINIC | Age: 67
End: 2022-01-25
Payer: MEDICARE

## 2022-01-25 VITALS
HEART RATE: 92 BPM | TEMPERATURE: 97.4 F | BODY MASS INDEX: 30.8 KG/M2 | HEIGHT: 74 IN | DIASTOLIC BLOOD PRESSURE: 92 MMHG | OXYGEN SATURATION: 96 % | WEIGHT: 240 LBS | SYSTOLIC BLOOD PRESSURE: 162 MMHG

## 2022-01-25 DIAGNOSIS — M17.10 UNILATERAL PRIMARY OSTEOARTHRITIS, UNSPECIFIED KNEE: ICD-10-CM

## 2022-01-25 DIAGNOSIS — M72.2 PLANTAR FASCIAL FIBROMATOSIS: ICD-10-CM

## 2022-01-25 PROCEDURE — 99214 OFFICE O/P EST MOD 30 MIN: CPT | Mod: 25

## 2022-01-25 PROCEDURE — 20610 DRAIN/INJ JOINT/BURSA W/O US: CPT

## 2022-01-25 NOTE — PROCEDURE
[Today's Date:] : Date: [unfilled] [Arthrocentesis] : arthrocentesis was performed [Soft Tissue Injection] : soft tissue injection was performed [Patient] : the patient [Risks] : risks [Consent Obtained] : written consent was obtained prior to the procedure and is detailed in the patient's record [Therapeutic] : therapeutic [#1 Site: ______] : #1 site identified in the [unfilled] [Ethyl Chloride] : ethyl chloride [___ ml Inj] : [unfilled] ~Uml [___ml of fluid] : [unfilled] ml of fluid was aspirated [Crystal Identification] : crystal identification [Cell Count] : cell count [Depomedrol ___ mg] : Depomedrol [unfilled] mg [Tolerated Well] : the patient tolerated the procedure well [No Complications] : there were no complications [Instructions Given] : handouts/patient instructions were given to patient [Patient Instructed to Call] : patient was instructed to call if redness at site, a decrease in range of motion or an increase in pain is noted after procedure. [#2 Site: ___] : # 2 site identified in the [unfilled]

## 2022-01-26 PROBLEM — M17.10 ARTHRITIS OF KNEE: Status: ACTIVE | Noted: 2019-06-25

## 2022-01-26 LAB
ALBUMIN SERPL ELPH-MCNC: 4.7 G/DL
ALP BLD-CCNC: 62 U/L
ALT SERPL-CCNC: 21 U/L
ANION GAP SERPL CALC-SCNC: 17 MMOL/L
APPEARANCE: CLEAR
AST SERPL-CCNC: 23 U/L
BASOPHILS # BLD AUTO: 0.01 K/UL
BASOPHILS NFR BLD AUTO: 0.1 %
BILIRUB SERPL-MCNC: 0.4 MG/DL
BILIRUBIN URINE: NEGATIVE
BLOOD URINE: NEGATIVE
BUN SERPL-MCNC: 14 MG/DL
CALCIUM SERPL-MCNC: 10.2 MG/DL
CHLORIDE SERPL-SCNC: 98 MMOL/L
CO2 SERPL-SCNC: 25 MMOL/L
COLOR: YELLOW
CREAT SERPL-MCNC: 0.97 MG/DL
CRP SERPL-MCNC: <3 MG/L
EOSINOPHIL # BLD AUTO: 0 K/UL
EOSINOPHIL NFR BLD AUTO: 0 %
ERYTHROCYTE [SEDIMENTATION RATE] IN BLOOD BY WESTERGREN METHOD: 15 MM/HR
GLUCOSE QUALITATIVE U: NEGATIVE
GLUCOSE SERPL-MCNC: 101 MG/DL
HCT VFR BLD CALC: 47 %
HGB BLD-MCNC: 15.1 G/DL
IMM GRANULOCYTES NFR BLD AUTO: 0.5 %
KETONES URINE: NEGATIVE
LEUKOCYTE ESTERASE URINE: NEGATIVE
LYMPHOCYTES # BLD AUTO: 1.09 K/UL
LYMPHOCYTES NFR BLD AUTO: 12.3 %
MAN DIFF?: NORMAL
MCHC RBC-ENTMCNC: 32.1 GM/DL
MCHC RBC-ENTMCNC: 33.1 PG
MCV RBC AUTO: 103.1 FL
MONOCYTES # BLD AUTO: 0.46 K/UL
MONOCYTES NFR BLD AUTO: 5.2 %
NEUTROPHILS # BLD AUTO: 7.27 K/UL
NEUTROPHILS NFR BLD AUTO: 81.9 %
NITRITE URINE: NEGATIVE
PH URINE: 7
PLATELET # BLD AUTO: 234 K/UL
POTASSIUM SERPL-SCNC: 5 MMOL/L
PROT SERPL-MCNC: 7 G/DL
PROTEIN URINE: NORMAL
RBC # BLD: 4.56 M/UL
RBC # FLD: 13.6 %
SODIUM SERPL-SCNC: 139 MMOL/L
SPECIFIC GRAVITY URINE: 1.02
UROBILINOGEN URINE: NORMAL
WBC # FLD AUTO: 8.87 K/UL

## 2022-01-26 NOTE — ASSESSMENT
[FreeTextEntry1] : 63 year-old male with long standing RA - not treated for at least 3 years with signs of progressive disease with likely wrist fusion\par Lumbar and cervical fusion in 2015 - hx. of stenosis\par \par 1. RA -  still on prednisone 10 mg - could not tolerate leflunomide with severe diarrhea\par no flares - but ongoing stiffness and joint pain - probably long term damage - stable on kevzara - now on 10 mg of prednisone will decrease to 7.5 mg - RA is stable on current regimen, despite dependency on prednisone 10 mg - will try to taper slowly 1 mg every month as tolerated\par 2. Hepatitis B and C - on tenofovir  hep C viral load at 0 - needs to make an appt - has not been seen since 2019 - needs f/u\par 3. spinal fusion - x-rays with no C1-2 widening - worsening thoracic pain - not planning any more surgery - bilateral leg and hand cramping - bilateral feet paresthesias  - referral to PMR - likely stenosis - will need injection\par 4. Pain management - on oxycodone and OxyContin - before pain - 9/10 after  - pain is 4/10 - after medication  better on long acting with more relief and need for less rescue medications - no side effects from medication\par no side effects  - no constipation - no sleepiness - doing well with stable symptoms  - no signs of abuse  - continue with current regimen - stable pain levels with no signs of abuse\par 5. Knee pain and swelling  arthrocentesis today again - will send for MRI as x-rays done were normal. \par 6. foot pain - likely plantar fasciitis - referral to podiatry\par \par I reviewed previous labs results with patients.\par Labs today\par Diagnosis and Prognosis discussed\par Continue with current medications\par F/u  3 months

## 2022-01-26 NOTE — HISTORY OF PRESENT ILLNESS
[___ Month(s) Ago] : [unfilled] month(s) ago [FreeTextEntry1] : still on 10 mg of prednisone  and kevzara every other week with well controlled symptoms. \par ongoing left knee pain, left ankle and foot\par ongoing swelling of the left nee - x-rays showed mild OA - has pain while walking. \par all other joints are stable\par

## 2022-01-26 NOTE — PHYSICAL EXAM
[General Appearance - Alert] : alert [General Appearance - In No Acute Distress] : in no acute distress [Auscultation Breath Sounds / Voice Sounds] : lungs were clear to auscultation bilaterally [Heart Rate And Rhythm] : heart rate was normal and rhythm regular [Heart Sounds] : normal S1 and S2 [Heart Sounds Gallop] : no gallops [Murmurs] : no murmurs [Heart Sounds Pericardial Friction Rub] : no pericardial rub [Full Pulse] : the pedal pulses are present [Edema] : there was no peripheral edema [Bowel Sounds] : normal bowel sounds [Abdomen Soft] : soft [Abdomen Tenderness] : non-tender [Abdomen Mass (___ Cm)] : no abdominal mass palpated [Cervical Lymph Nodes Enlarged Posterior Bilaterally] : posterior cervical [Cervical Lymph Nodes Enlarged Anterior Bilaterally] : anterior cervical [Supraclavicular Lymph Nodes Enlarged Bilaterally] : supraclavicular [Skin Color & Pigmentation] : normal skin color and pigmentation [Skin Turgor] : normal skin turgor [] : no rash [Oriented To Time, Place, And Person] : oriented to person, place, and time [Impaired Insight] : insight and judgment were intact [Affect] : the affect was normal [FreeTextEntry1] : left knee with large effusion -all joints with full ROM - no active synovitis

## 2022-01-27 ENCOUNTER — NON-APPOINTMENT (OUTPATIENT)
Age: 67
End: 2022-01-27

## 2022-01-28 ENCOUNTER — OUTPATIENT (OUTPATIENT)
Dept: OUTPATIENT SERVICES | Facility: HOSPITAL | Age: 67
LOS: 1 days | End: 2022-01-28
Payer: MEDICARE

## 2022-01-28 ENCOUNTER — APPOINTMENT (OUTPATIENT)
Dept: INTERNAL MEDICINE | Facility: CLINIC | Age: 67
End: 2022-01-28
Payer: MEDICARE

## 2022-01-28 VITALS
OXYGEN SATURATION: 97 % | DIASTOLIC BLOOD PRESSURE: 74 MMHG | SYSTOLIC BLOOD PRESSURE: 134 MMHG | HEART RATE: 70 BPM | WEIGHT: 242.13 LBS | BODY MASS INDEX: 31.07 KG/M2 | HEIGHT: 74 IN

## 2022-01-28 DIAGNOSIS — I10 ESSENTIAL (PRIMARY) HYPERTENSION: ICD-10-CM

## 2022-01-28 LAB
DEPRECATED KAPPA LC FREE/LAMBDA SER: 1.35 RATIO
HBV CORE IGG+IGM SER QL: REACTIVE
HBV SURFACE AB SER QL: ABNORMAL
HBV SURFACE AG SER QL: NONREACTIVE
HCV AB SER QL: REACTIVE
HCV S/CO RATIO: 12.73 S/CO
IGA SER QL IEP: 202 MG/DL
IGG SER QL IEP: 655 MG/DL
IGM SER QL IEP: 294 MG/DL
KAPPA LC CSF-MCNC: 1.01 MG/DL
KAPPA LC SERPL-MCNC: 1.36 MG/DL

## 2022-01-28 PROCEDURE — G0463: CPT

## 2022-01-28 PROCEDURE — 99214 OFFICE O/P EST MOD 30 MIN: CPT | Mod: GC

## 2022-01-30 NOTE — ASSESSMENT
[FreeTextEntry1] : 67yo man with PMH RA (on Kevzara and prednisone), HBV (on Tenofovir), HCV, and history of chronic back pain s/p cervical and lumbar spinal fusion surgery 2014 who presents for f/u lower back pain.\par \par #Lower Back Pain\par - PMR referral given\par - Stressed follow up with MRI and PT referrals\par - Continue current pain regimen, oxycodone & oxycontin BID, patient endorses no constipation, no sedation, no tolerance or cravings, aware of addictive risks and motivated to use safely (family obligations)\par \par #HCV/HBV\par Not currently following with GI and low health literacy regarding HCV & HBV, compliant with tenofovir. CMP wnl 1/25/2022. \par - GI referral given\par \par #Current Smoker\par 1ppd, has quit in the past, has found nicotine replacement helpful in the past, declines medical intervention at this time, smokefree.gov resource reviewed, motivational interviewing given. Declined referral to smoking cessation program.\par \par #HCM\par - Declines CRC screening, declines lung cancer screening\par --> -Extensive discussion of CRC screening well documented in CPE note 3/2021, patient does not wish to pursue screening for any cancers at this time i\par - fully covid vaccinated incluing booster\par - had flu shot 10/2021\par - does not think he has had any pneumococcal or zoster vaccinations --> PCV13 offered and declined, patient will consider; counseled on indication for  vaccination and risks.\par - has immunization record at home and will bring in to clinic next visit\par - DEXA 11/2021 wnl

## 2022-01-30 NOTE — COUNSELING
[Yes] : Risk of tobacco use and health benefits of smoking cessation discussed: Yes [Cessation strategies including cessation program discussed] : Cessation strategies including cessation program discussed [Use of nicotine replacement therapies and other medications discussed] : Use of nicotine replacement therapies and other medications discussed [Encouraged to pick a quit date and identify support needed to quit] : Encouraged to pick a quit date and identify support needed to quit [FreeTextEntry2] : p [FreeTextEntry1] : 8

## 2022-01-30 NOTE — PHYSICAL EXAM
[No Acute Distress] : no acute distress [Well Nourished] : well nourished [Well Developed] : well developed [Normal Sclera/Conjunctiva] : normal sclera/conjunctiva [PERRL] : pupils equal round and reactive to light [Normal Oropharynx] : the oropharynx was normal [No Respiratory Distress] : no respiratory distress  [No Accessory Muscle Use] : no accessory muscle use [Normal Rate] : normal rate  [Regular Rhythm] : with a regular rhythm [No Edema] : there was no peripheral edema [Soft] : abdomen soft [Non Tender] : non-tender [Non-distended] : non-distended [No HSM] : no HSM [No CVA Tenderness] : no CVA  tenderness [No Spinal Tenderness] : no spinal tenderness [Grossly Normal Strength/Tone] : grossly normal strength/tone [Normal Affect] : the affect was normal [Normal Insight/Judgement] : insight and judgment were intact [de-identified] : no point tenderness

## 2022-01-30 NOTE — REVIEW OF SYSTEMS
[Joint Pain] : joint pain [Muscle Pain] : muscle pain [Back Pain] : back pain [Negative] : Respiratory [Fever] : no fever [Chills] : no chills [Fatigue] : no fatigue [Recent Change In Weight] : ~T no recent weight change [Abdominal Pain] : no abdominal pain [Nausea] : no nausea [Constipation] : no constipation [Diarrhea] : no diarrhea [Melena] : no melena [Dysuria] : no dysuria [Incontinence] : no incontinence [Hematuria] : no hematuria [Muscle Weakness] : no muscle weakness [Dizziness] : no dizziness [Confusion] : no confusion [Unsteady Walk] : no ataxia [Easy Bleeding] : no easy bleeding [Easy Bruising] : no easy bruising

## 2022-01-30 NOTE — HISTORY OF PRESENT ILLNESS
[FreeTextEntry1] : Worsening of lower back pain [de-identified] : 65yo man with PMH RA (on Kevzara and prednisone), HBV (HepBcAb (isolated) on Tenofovir), HCV, and history of chronic back pain s/p cervical and lumbar spinal fusion surgery 2014 who presents for f/u lower back pain.\par \par #Lower back pain\par Patient has longstanding back pain treated with oxycodone & oxycontin BID, how with 6 months of new right-sided lower back pain. Pain is most intense just above the iliac crest on the R. States that this pain feels different from typical back pain, does not feel like a muscle spasm, but feels like a "crippling pain," a burning "electricity" that comes on at maximal intensity, very bad for a few minutes, and then reduces to moderate pain and lingers. Denies radiation of pain.  Walking or standing in one place make it worse. Continuing longstanding pain regimen (takes oxycodone and oxycontin in morning and night), which improves but does not entirely alleviate the pain. No numbness, no incontinence, no weakness, no loss of balance. \par Had spinal fusion 2014 for numbness and weakness in legs, numbness/weakness improved but with residual pain and muscle cramping.\par Exercise: has an inversion machine, home weights, exercise bike at home, movement is limited by pain but patient has worked to find modifications that ensure he still has physical activity.\par Discussed risks of opioid use, no constipation, no drowsiness, no cravings, no tolerance, patient is aware of addictive potential of opioids and is vigilant, fearful of addiction due to responsibility to family.\par His biggest concern is that this new pain is related to something internal such as kidneys or liver. Chrnoic pain causing stress, worried he wont be able to provide for his family. Currently on disability. 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  Able to access meds, insurance is covering well. Denies food insecurity. \par \par #HCV/HBV\par Not following with GI and poor health literacy regarding HCV/HBV. Taking tenofovir daily. Agreeable to reconnecting with GI.\par \par #Current every day smoker\par Currently smokes 1ppd. Has quit several times in the past but resumes usually i/s/o stress. Has used patch in the past, but not currently interested in pursuing medical assistance for smoking cessation.\par Discussed lung cancer screening but patient does not wish to pursue any cancer screening at this time (counseling well documented in CPE note 3/2021).\par \par #HCM/Immunization\par - flu shot 10/21\par - received pfizer covid vaccine, no booster and does not currently want.\par - does not think he has had any pneumococcal or zoster vaccinations --> PCV13 offered and declined, patient will consider; counseled on indication for  vaccination and risks.\par - has immunizatino record at home and will bring in to clinic next visit\par \par \par #PCP\par Wants a clinic where he will see same doctor and develop longstanding relationship. Discussed nature of this clinic as a teaching clinic where we try to ensure clinician continuity but it cannot be guaranteed.

## 2022-02-01 LAB
DRUG ABUSE PANEL-9, SERUM: NORMAL
M TB IFN-G BLD-IMP: NEGATIVE
QUANTIFERON TB PLUS MITOGEN MINUS NIL: 5.36 IU/ML
QUANTIFERON TB PLUS NIL: 0.02 IU/ML
QUANTIFERON TB PLUS TB1 MINUS NIL: -0.02 IU/ML
QUANTIFERON TB PLUS TB2 MINUS NIL: -0.01 IU/ML

## 2022-02-04 ENCOUNTER — OUTPATIENT (OUTPATIENT)
Dept: OUTPATIENT SERVICES | Facility: HOSPITAL | Age: 67
LOS: 1 days | End: 2022-02-04
Payer: MEDICARE

## 2022-02-04 ENCOUNTER — APPOINTMENT (OUTPATIENT)
Dept: MRI IMAGING | Facility: CLINIC | Age: 67
End: 2022-02-04
Payer: MEDICARE

## 2022-02-04 DIAGNOSIS — M17.10 UNILATERAL PRIMARY OSTEOARTHRITIS, UNSPECIFIED KNEE: ICD-10-CM

## 2022-02-04 PROCEDURE — G1004: CPT

## 2022-02-04 PROCEDURE — 73721 MRI JNT OF LWR EXTRE W/O DYE: CPT | Mod: ME

## 2022-02-04 PROCEDURE — 73721 MRI JNT OF LWR EXTRE W/O DYE: CPT | Mod: 26,LT,ME

## 2022-02-10 DIAGNOSIS — F17.200 NICOTINE DEPENDENCE, UNSPECIFIED, UNCOMPLICATED: ICD-10-CM

## 2022-02-10 DIAGNOSIS — M54.9 DORSALGIA, UNSPECIFIED: ICD-10-CM

## 2022-02-14 ENCOUNTER — EMERGENCY (EMERGENCY)
Facility: HOSPITAL | Age: 67
LOS: 1 days | Discharge: HOME | End: 2022-02-14
Attending: EMERGENCY MEDICINE | Admitting: EMERGENCY MEDICINE
Payer: MEDICARE

## 2022-02-14 VITALS
WEIGHT: 229.94 LBS | HEART RATE: 74 BPM | OXYGEN SATURATION: 95 % | RESPIRATION RATE: 16 BRPM | HEIGHT: 74 IN | TEMPERATURE: 98 F | DIASTOLIC BLOOD PRESSURE: 85 MMHG | SYSTOLIC BLOOD PRESSURE: 149 MMHG

## 2022-02-14 DIAGNOSIS — M43.26 FUSION OF SPINE, LUMBAR REGION: Chronic | ICD-10-CM

## 2022-02-14 PROCEDURE — 73562 X-RAY EXAM OF KNEE 3: CPT | Mod: 26,LT

## 2022-02-14 PROCEDURE — 99283 EMERGENCY DEPT VISIT LOW MDM: CPT

## 2022-02-14 PROCEDURE — 73562 X-RAY EXAM OF KNEE 3: CPT

## 2022-02-14 PROCEDURE — 99283 EMERGENCY DEPT VISIT LOW MDM: CPT | Mod: 25

## 2022-02-14 NOTE — ED PROVIDER NOTE - PATIENT PORTAL LINK FT
You can access the FollowMyHealth Patient Portal offered by [facility] by registering at the following website: [email or web address]. By joining [facility]’s FollowMyHealth portal, you will also be able to view your health information using other applications (apps) compatible with our system.

## 2022-02-14 NOTE — ED PROVIDER NOTE - PROGRESS NOTE DETAILS
discussed diagnostic value of xray, unlikely to add value however patient still requesting xray - Meenu Oliva PA-C Attending MD Armstrong: Patient re-evaluated and feeling improved.  No acute issues at  this time.  Lab and radiology tests reviewed with patient and/or family.  Patient stable for discharge. Follow up instructions given, importance of follow up emphasized, return to ED parameters reviewed and patient verbalized understanding.  All questions answered, all concerns addressed.

## 2022-02-14 NOTE — ED PROVIDER NOTE - NSFOLLOWUPINSTRUCTIONS_ED_ALL_ED_FT
Rest. Apply cold pack for 20 minutes multiple times daily. Elevate.   Take Motrin 400-600mg every 6 hrs as needed for pain. Take with food  continue your percocet as prescribed  Follow up with orthopedics in 7-10 days if you have persistent pain as you may require physical therapy and or additional imaging

## 2022-02-14 NOTE — ED PROVIDER NOTE - EXTREMITY EXAM
lle with pain to medial joint line, full active ROM, sensation intact. knee joint stable, able to extend against resistance. n/v intact distally

## 2022-02-14 NOTE — ED PROVIDER NOTE - ATTENDING CONTRIBUTION TO CARE
Attending MD Armstrong: I personally have seen and examined this patient.  Resident note reviewed and agree on plan of care and except where noted.  See below for details.     seen in [facility]    66M with PMH/PSH including s/p lumbar fusion, chronic back pain on Percocet, ambulates with cane presents to the ED with L knee pain status post fall.  Reports slipped on ice and fell onto knees, reports both knees "went out" and then fell backwards.  Reports has back pain, took Rx'ed Percocet.  Reports has been able to ambulate since fall but presents because he had already been having issues with L knee and wanted to make sure he did not worsen anything.  Review of EMR reveals MRI 2/4/22 with "1.  Low-grade partial-thickness tear at the attachment of the MCL on the femur. 2.  Horizontal tear of the posterior horn of the medial meniscus with associated 9 x 13 mm parameniscal cyst.  3.  Complex tear of the lateral meniscus.  4.  Chondral loss in the patellofemoral and lateral tibiofemoral compartments.  5.  Small knee joint effusion with synovial proliferation.  6.  Mild semimembranosus-tibial collateral ligament bursitis."  Denies preceding dizziness, weakness, sensory changes.  Denies LOC, hitting head.  Denies chest pain, shortness of breath, palpitations. Denies abdominal pain, nausea, vomiting, diarrhea.  Denies urinary complaints. Denies numbness, weakness or tingling in extremities. Denies loss of urinary or bowel continence. Denies change in vision, double vision, sudden loss of vision, headache.  A ten (10) point review of systems was negative other than as stated in the HPI or elsewhere in the chart.    Exam:   General: NAD  HENT: head NCAT, airway patent  Eyes: PERRL  Lungs: lungs CTAB with good inspiratory effort, no wheezing, no rhonchi, no rales  Cardiac: +S1S2, no m/r/g  GI: abdomen soft with +BS, NT, ND  : no CVAT  MSK: FROM at neck, no tenderness to midline palpation, well healed midline vertical lower spine incision,  +paralumbar tenderness to palpation, +diffuse tenderness to the L knee with no erythema, no ecchymosis, mild edema, FROM at bilateral knees, stable, +2 DPs  Neuro: moving all extremities spontaneously 5/5 strength, sensory grossly intact, no gross neuro deficits  Psych: normal mood and affect     A/P: 66M with L knee pain and back pain after fall, will obtain XR of L Knee, possible worsening of known pathology, discussed would see bony injury but would not see other injuries like those he had imaged on MRI, took Percocet, declines pain medication. Attending MD Armstrong:   I personally have seen and examined this patient.  Physician assistant note reviewed and agree on plan of care and except where noted.  See below for details.     seen in [facility]    66M with PMH/PSH including s/p lumbar fusion, chronic back pain on Percocet, ambulates with cane presents to the ED with L knee pain status post fall.  Reports slipped on ice and fell onto knees, reports both knees "went out" and then fell backwards.  Reports has back pain, took Rx'ed Percocet.  Reports has been able to ambulate since fall but presents because he had already been having issues with L knee and wanted to make sure he did not worsen anything.  Review of EMR reveals MRI 2/4/22 with "1.  Low-grade partial-thickness tear at the attachment of the MCL on the femur. 2.  Horizontal tear of the posterior horn of the medial meniscus with associated 9 x 13 mm parameniscal cyst.  3.  Complex tear of the lateral meniscus.  4.  Chondral loss in the patellofemoral and lateral tibiofemoral compartments.  5.  Small knee joint effusion with synovial proliferation.  6.  Mild semimembranosus-tibial collateral ligament bursitis."  Denies preceding dizziness, weakness, sensory changes.  Denies LOC, hitting head.  Denies chest pain, shortness of breath, palpitations. Denies abdominal pain, nausea, vomiting, diarrhea.  Denies urinary complaints. Denies numbness, weakness or tingling in extremities. Denies loss of urinary or bowel continence. Denies change in vision, double vision, sudden loss of vision, headache.  A ten (10) point review of systems was negative other than as stated in the HPI or elsewhere in the chart.    Exam:   General: NAD  HENT: head NCAT, airway patent  Eyes: PERRL  Lungs: lungs CTAB with good inspiratory effort, no wheezing, no rhonchi, no rales  Cardiac: +S1S2, no m/r/g  GI: abdomen soft with +BS, NT, ND  : no CVAT  MSK: FROM at neck, no tenderness to midline palpation, well healed midline vertical lower spine incision,  +paralumbar tenderness to palpation, +diffuse tenderness to the L knee with no erythema, no ecchymosis, mild edema, FROM at bilateral knees, stable, +2 DPs  Neuro: moving all extremities spontaneously 5/5 strength, sensory grossly intact, no gross neuro deficits  Psych: normal mood and affect     A/P: 66M with L knee pain and back pain after fall, will obtain XR of L Knee, possible worsening of known pathology, discussed would see bony injury but would not see other injuries like those he had imaged on MRI, took Percocet, declines pain medication.

## 2022-02-14 NOTE — ED PROVIDER NOTE - NSFOLLOWUPCLINICS_GEN_ALL_ED_FT
[facility] Orthopedic Surgery  Orthopedic Surgery  [address], [address]rd & 4th [place] [address]  Phone: [phone]  Fax:

## 2022-02-14 NOTE — ED PROVIDER NOTE - OBJECTIVE STATEMENT
65 y/o male hx lumbar fusion, chronic back pain on percocet walks with cane presents to the ED for left medial knee pain s/p fall. patient was walking outside, slipped on ice and states "both his knees twisted out" and he fell backwards. reports mild paraspinal back pain for which he took percocet. patient has been ambulatory since but wanted to come in to "make sure everything was okay"

## 2022-02-15 ENCOUNTER — APPOINTMENT (OUTPATIENT)
Dept: ORTHOPEDIC SURGERY | Facility: CLINIC | Age: 67
End: 2022-02-15
Payer: MEDICARE

## 2022-02-15 VITALS — BODY MASS INDEX: 31.06 KG/M2 | HEIGHT: 74 IN | WEIGHT: 242 LBS

## 2022-02-15 PROCEDURE — 99203 OFFICE O/P NEW LOW 30 MIN: CPT

## 2022-02-25 NOTE — ADDENDUM
[FreeTextEntry1] : This note was written by Jazmin Bah on 02/15/2022 acting solely as a scribe for Dr. Vin Ceron.\par \par All medical record entries made by the Scribe were at my, Dr. Vin Ceron, direction and personally dictated by me on 02/15/2022. I have personally reviewed the chart and agree that the record accurately reflects my personal performance of the history, physical exam, assessment and plan.

## 2022-02-25 NOTE — HISTORY OF PRESENT ILLNESS
[de-identified] : 66 year old male presents today with left knee pain x 1 year. He fell about one year ago, was seen by Dr. Cheyenne Ta, knee was aspirated and injected with cortisone. He has been managing pain with aspiration and cortisone injection. Last injection was 3 weeks. He has obtained MRI which revealed a meniscus tear and referred here for treatment. He slipped on black ice yesterday and further aggravated his knee. He was seen at [facility] ER and x-rays were negative for fx. The pain is intermittent worse with bending and walking. Taking Percocet for pain. He has been referred for PMR and he has appointment next month. \par \par The patient's past medical history, past surgical history, medications and allergies were reviewed by me today with the patient and documented accordingly. In addition, the patient's family and social history, which were noncontributory to this visit, were reviewed also.

## 2022-02-25 NOTE — PHYSICAL EXAM
[de-identified] : Oriented to time, place, person\par Mood: Normal\par Affect: Normal\par Appearance: Healthy, well appearing, no acute distress.\par Gait: Normal\par Assistive Devices: None\par \par Left Knee Exam:\par \par Skin: Clean, dry, intact\par Inspection: No obvious malalignment, no masses, + swelling, moderate effusion\par Pulses: 2+ DP/PT pulses \par ROM: 0-130 degrees of flexion. + pain with deep knee flexion/extension.\par Tenderness: mild MJLT. mild LJLT. No pain over the patella facets. No pain to the quadriceps tendon. No pain to the patella tendon. No posterior knee tenderness.\par Stability: Stable to varus, valgus. Negative Lachman testing. Negative anterior drawer, negative posterior drawer.\par Strength: 5/5 Q/H/TA/GS/EHL, without atrophy\par Neuro: Intact to light touch throughout, DTRs normal\par Additional Tests: Negative Parish's test, Negative patellar grind test  [de-identified] : Images were reviewed from ER dated 2.14.2021\par \par 4 views of the left knee were obtained that show no acute fracture or dislocation. There is no medial, no lateral and mild patellofemoral degenerative changes seen. There is no significant malalignment. No significant other obvious osseous abnormality, otherwise unremarkable. \par \par MRI left knee dated 2.4.2022 shows low-grade partial-thickness tear at the attachment of the MCL on the femur. Horizontal tear of the posterior horn of the medial meniscus with associated 9 x 13 mm parameniscal cyst. Complex tear of the lateral meniscus. Chondral loss in the patellofemoral and lateral tibiofemoral compartments.

## 2022-02-25 NOTE — DISCUSSION/SUMMARY
[de-identified] : 65 y/o male with left knee pain. \par \par Patient presents for evaluation of left knee pain.  Patient is obtained an MRI through his rheumatologist that shows degenerative cleavage tearing of the posterior horn of the medial meniscus with associated meniscal cyst.  There is also degenerative tearing of the lateral meniscus with tricompartmental chondral loss.  Discussed that this is consistent with degenerative arthrosis of the knee as well as some irritation of the medial collateral ligament.  \par \par I discussed the treatment of degenerative arthritis with the patient at length today, as well as the chronic degenerative process and likely future progression of the disease. Pain may be related to the bony degenerative changes seen on XR imaging, or the associated degeneration to the soft tissues within the knee joint, including cartilage, meniscus, or ligamentous structures.  I described the spectrum of treatment options available including nonoperative modalities to total joint arthroplasty. Noninvasive and nonoperative treatment modalities include weight reduction, activity modification with low impact exercise, PRN use of acetaminophen or anti-inflammatory medication, natural anti-inflammatory supplements, glucosamine/chondroitin, and physical therapy (for strengthening and conditioning). More invasive treatments can include injection therapies; including cortisone, hyaluronic acid (visco-supplementation), or platelet-rich-plasma (PRP) injections. Definitive treatment could also include future total joint arthroplasty if symptoms persist and cause prolonged disability or interference with activities of daily living. \par \par The risks and benefits of each treatment option was discussed and all questions were answered. At this time recommendations are for conservative and symptomatic care as detailed above with impact loading activity restriction, low impact exercise and avoidance of strenuous activity. \par \par Other recommendations include OTC NSAIDs or acetaminophen (if tolerated/able), with application of ice to the knee 2-3x daily for 20 minutes or after periods of activity. \par \par Follow up as needed.

## 2022-03-01 ENCOUNTER — APPOINTMENT (OUTPATIENT)
Dept: PHYSICAL MEDICINE AND REHAB | Facility: CLINIC | Age: 67
End: 2022-03-01

## 2022-03-01 VITALS
DIASTOLIC BLOOD PRESSURE: 83 MMHG | TEMPERATURE: 98 F | HEART RATE: 69 BPM | SYSTOLIC BLOOD PRESSURE: 148 MMHG | OXYGEN SATURATION: 95 %

## 2022-04-07 ENCOUNTER — TRANSCRIPTION ENCOUNTER (OUTPATIENT)
Age: 67
End: 2022-04-07

## 2022-04-07 ENCOUNTER — OUTPATIENT (OUTPATIENT)
Dept: OUTPATIENT SERVICES | Facility: HOSPITAL | Age: 67
LOS: 1 days | End: 2022-04-07
Payer: MEDICARE

## 2022-04-07 ENCOUNTER — APPOINTMENT (OUTPATIENT)
Dept: INTERNAL MEDICINE | Facility: CLINIC | Age: 67
End: 2022-04-07
Payer: MEDICARE

## 2022-04-07 VITALS
SYSTOLIC BLOOD PRESSURE: 138 MMHG | BODY MASS INDEX: 27.08 KG/M2 | HEART RATE: 96 BPM | DIASTOLIC BLOOD PRESSURE: 74 MMHG | OXYGEN SATURATION: 97 % | HEIGHT: 74 IN | WEIGHT: 211 LBS

## 2022-04-07 DIAGNOSIS — Z00.00 ENCOUNTER FOR GENERAL ADULT MEDICAL EXAMINATION W/OUT ABNORMAL FINDINGS: ICD-10-CM

## 2022-04-07 DIAGNOSIS — M43.26 FUSION OF SPINE, LUMBAR REGION: Chronic | ICD-10-CM

## 2022-04-07 DIAGNOSIS — I10 ESSENTIAL (PRIMARY) HYPERTENSION: ICD-10-CM

## 2022-04-07 PROCEDURE — G0463: CPT

## 2022-04-07 PROCEDURE — 99214 OFFICE O/P EST MOD 30 MIN: CPT | Mod: GC

## 2022-04-07 PROCEDURE — 82550 ASSAY OF CK (CPK): CPT

## 2022-04-07 PROCEDURE — 82607 VITAMIN B-12: CPT

## 2022-04-07 NOTE — REVIEW OF SYSTEMS
[Joint Pain] : joint pain [Muscle Weakness] : muscle weakness [Back Pain] : back pain [Joint Swelling] : joint swelling [Unsteady Walk] : ataxia [Negative] : Heme/Lymph

## 2022-04-08 LAB
CK SERPL-CCNC: 429 U/L
FOLATE SERPL-MCNC: 15.7 NG/ML
VIT B12 SERPL-MCNC: 558 PG/ML

## 2022-04-08 NOTE — PHYSICAL EXAM
[No Acute Distress] : no acute distress [Well Nourished] : well nourished [Well Developed] : well developed [Well-Appearing] : well-appearing [Normal Sclera/Conjunctiva] : normal sclera/conjunctiva [PERRL] : pupils equal round and reactive to light [EOMI] : extraocular movements intact [Normal Outer Ear/Nose] : the outer ears and nose were normal in appearance [Normal Oropharynx] : the oropharynx was normal [No JVD] : no jugular venous distention [No Lymphadenopathy] : no lymphadenopathy [Supple] : supple [Thyroid Normal, No Nodules] : the thyroid was normal and there were no nodules present [No Respiratory Distress] : no respiratory distress  [No Accessory Muscle Use] : no accessory muscle use [Clear to Auscultation] : lungs were clear to auscultation bilaterally [Normal Rate] : normal rate  [Regular Rhythm] : with a regular rhythm [Normal S1, S2] : normal S1 and S2 [No Murmur] : no murmur heard [No Carotid Bruits] : no carotid bruits [No Abdominal Bruit] : a ~M bruit was not heard ~T in the abdomen [No Varicosities] : no varicosities [Pedal Pulses Present] : the pedal pulses are present [No Edema] : there was no peripheral edema [No Palpable Aorta] : no palpable aorta [No Extremity Clubbing/Cyanosis] : no extremity clubbing/cyanosis [Soft] : abdomen soft [Non Tender] : non-tender [Non-distended] : non-distended [No Masses] : no abdominal mass palpated [No HSM] : no HSM [Normal Bowel Sounds] : normal bowel sounds [Normal Posterior Cervical Nodes] : no posterior cervical lymphadenopathy [Normal Anterior Cervical Nodes] : no anterior cervical lymphadenopathy [No CVA Tenderness] : no CVA  tenderness [No Spinal Tenderness] : no spinal tenderness [Grossly Normal Strength/Tone] : grossly normal strength/tone [No Rash] : no rash [Coordination Grossly Intact] : coordination grossly intact [No Focal Deficits] : no focal deficits [Normal Gait] : normal gait [Deep Tendon Reflexes (DTR)] : deep tendon reflexes were 2+ and symmetric [Normal Affect] : the affect was normal [Normal Insight/Judgement] : insight and judgment were intact [de-identified] : Joint swelling in hands, no erythematous.

## 2022-04-08 NOTE — HISTORY OF PRESENT ILLNESS
[FreeTextEntry1] : unstable gait, subjective LE weakness [de-identified] : 67yo man with PMH RA (on Kevzara and prednisone), HBV (HepBcAb (isolated) on Tenofovir), HCV, and history of chronic back pain s/p cervical and lumbar spinal fusion surgery 2014 who presents for intermittent unstable gait and subjective LE weakness for the last 1-2 months.\par \par Patient notes he does have chronic pain in his joints and back which has been stable, but recently noticed over the last 1-2 months that he has been a bit unsteady. When walking to the bathroom he notes needing to hold on to the wall to steady himself. He feels his knees might buckle. He notes that he is active and has two kids that he plays with at home. He denies pain in his legs and knees, but does not some numbness in his feet, which hes had for awhile. Denies numbness or tingling in his legs, or radiating pain. Denies new back pain, night sweats. He notes that as the day moves on he is gradually more mobile. Notes he hasn’t started and new meds recently, and he denies stiffness. denies vertigo or confusion, saddle anesthesia, bowel or bladder incontinence.

## 2022-04-08 NOTE — END OF VISIT
[] : Resident [FreeTextEntry3] : offered pt as well as ortho spine referral given numbness in feet and hx back surgery but pt declined. wanting to f/u with specialist only

## 2022-04-08 NOTE — ASSESSMENT
[FreeTextEntry1] : 65yo man with PMH RA (on Kevzara and prednisone), HBV (HepBcAb (isolated) on Tenofovir), HCV, and history of chronic back pain s/p cervical and lumbar spinal fusion surgery 2014 who presents for unsteady gait and subjective weakness.\par \par #unsteady gait/LE weakness.\par - Complains of unsteady gait and possible weakness, but on exam, he has good strength. He does appear a bit unsteady on his feet, but he has good vibratory and sensory detection. His hx of back surgery and degenerative disease is somewhat suspicious for spinal stenosis. Possible that he has deconditioning from his pain and possible inactivity, which he denies. He is not interested in trying PT, which i think could benefit him greatly and decrease risk of fall.\par - Recommend referral to ortho back specialist. Will defer CT or MRI for now.\par - No alarm symptoms to suggest cord compression or urgent escalation in care. \par - Will check CK as his is on steroids chronically (r/o myopathy). Will check B12/folate to r/o deficiency.\par \par Mickey Hagan\par PGY-3

## 2022-04-11 ENCOUNTER — NON-APPOINTMENT (OUTPATIENT)
Age: 67
End: 2022-04-11

## 2022-04-13 DIAGNOSIS — M54.9 DORSALGIA, UNSPECIFIED: ICD-10-CM

## 2022-04-13 DIAGNOSIS — M54.16 RADICULOPATHY, LUMBAR REGION: ICD-10-CM

## 2022-04-13 DIAGNOSIS — M48.061 SPINAL STENOSIS, LUMBAR REGION WITHOUT NEUROGENIC CLAUDICATION: ICD-10-CM

## 2022-04-20 ENCOUNTER — EMERGENCY (EMERGENCY)
Facility: HOSPITAL | Age: 67
LOS: 1 days | End: 2022-04-20
Attending: EMERGENCY MEDICINE
Payer: MEDICARE

## 2022-04-20 VITALS
SYSTOLIC BLOOD PRESSURE: 122 MMHG | DIASTOLIC BLOOD PRESSURE: 83 MMHG | HEART RATE: 83 BPM | RESPIRATION RATE: 18 BRPM | OXYGEN SATURATION: 100 % | TEMPERATURE: 98 F | WEIGHT: 240.08 LBS | HEIGHT: 74 IN

## 2022-04-20 DIAGNOSIS — M43.26 FUSION OF SPINE, LUMBAR REGION: Chronic | ICD-10-CM

## 2022-04-20 DIAGNOSIS — Z98.1 ARTHRODESIS STATUS: Chronic | ICD-10-CM

## 2022-04-20 PROCEDURE — 70450 CT HEAD/BRAIN W/O DYE: CPT | Mod: 26,MA

## 2022-04-20 PROCEDURE — 99220: CPT

## 2022-04-20 PROCEDURE — 99284 EMERGENCY DEPT VISIT MOD MDM: CPT

## 2022-04-20 RX ORDER — ACETAMINOPHEN 500 MG
650 TABLET ORAL ONCE
Refills: 0 | Status: COMPLETED | OUTPATIENT
Start: 2022-04-20 | End: 2022-04-20

## 2022-04-20 RX ORDER — OXYCODONE HYDROCHLORIDE 5 MG/1
10 TABLET ORAL ONCE
Refills: 0 | Status: DISCONTINUED | OUTPATIENT
Start: 2022-04-20 | End: 2022-04-20

## 2022-04-20 RX ADMIN — OXYCODONE HYDROCHLORIDE 10 MILLIGRAM(S): 5 TABLET ORAL at 22:03

## 2022-04-20 RX ADMIN — Medication 650 MILLIGRAM(S): at 22:03

## 2022-04-20 NOTE — ED PROVIDER NOTE - PROGRESS NOTE DETAILS
Attending MD Armstrong: Seen by neuro, recommend MRI Brain without, add on Vit B12, Folate. Attending MD Armstrong: Consulted neuro, will come evaluate patient.  Consulted spine, requested XR L spine.  Will await.

## 2022-04-20 NOTE — ED PROVIDER NOTE - PHYSICAL EXAMINATION
CONSTITUTIONAL: Patient is awake, alert and oriented x 3. Patient is well appearing and in no acute distress.  HEAD: NC/AT  EYES: PERRL b/l, EOMI  ENT: Airway patent, nasal mucosa clear, mouth with normal mucosa.  NECK: supple, no LAD  LUNGS: CTAB, no increased WOB, no wheezing/rales appreciated  HEART: RRR.+S1S2, no murmurs appreciated  ABDOMEN: Soft nd/nt, +BS, no rebound or guarding.   EXTREMITY: WWP, no edema or calf tenderness b/l, full ROM upper and lower ext b/l  SKIN: No rash or lesions appreciated  NEURO: Strength 5/5 UE/LE. normal sensation, unstable gait

## 2022-04-20 NOTE — ED ADULT NURSE NOTE - OBJECTIVE STATEMENT
66y m pt with spouse at bedside c/o chronic back pain; pain radiates down left leg to foot; pt also experiencing numbness to foot; pt states had spinal surgery 5 years ago; fusion to L1-L2 and a cervical fusion,  not sure of numbers; pt also has RA and takes an infusion with prednisone and percocet; 66y m pt with spouse at bedside c/o chronic back pain; pain radiates down left leg to foot; pt also experiencing numbness to foot; pt states had spinal surgery 5 years ago; fusion to L1-L2 and a cervical fusion,  not sure of numbers; pt also has RA and takes an infusion with prednisone and percocet; pt states was supposed to get a referral to neurologist but referral was for rheumatologist; pt states has been having balance problems; pt also fell x 1 year ago and has had left knee drained of fluid multiple times; pt wants referral to neurologist

## 2022-04-20 NOTE — ED PROVIDER NOTE - OBJECTIVE STATEMENT
65 yo male with PMHx of rheumatoid arthritis, lumbar spine fusion, chronic back pain presented with months of worsening low back pain with radiation to b/l anterior thighs and associated paresthesias. Also noted numbness b/l in feet. Pain is unrelieved by Percocet which pt takes BID. Endorsed difficulty with gait, has been feeling unstable lately. Denied chest pain, SOB, LOC/syncope, dizziness, headaches.

## 2022-04-20 NOTE — ED PROVIDER NOTE - NS ED ATTENDING STATEMENT MOD
This was a shared visit with the BRANDON. I reviewed and verified the documentation and independently performed the documented:

## 2022-04-20 NOTE — ED PROVIDER NOTE - ATTENDING APP SHARED VISIT CONTRIBUTION OF CARE
Attending MD Armstrong: I personally have seen and examined this patient.  Resident note reviewed and agree on plan of care and except where noted.  See below for details.     seen in Blue 32R, accompanied by wife    66M with PMH/PSH including HTN, chronic back pain on BID Percocet 325/10 s/p lumbar fusion in 2015 presents to the ED with worsening gait and chronic back pain.  Reports presents today because wife noted that patient is veering off to a side when walking.  Wife reports that over the last two months or so patient had previously had episodes on first waking where he needed to hold on to the wall but reports that this would resolve after initial time of waking.  Reports has also noted bilateral numbness in toes over the last two months, reports this is unchanged.  Denies increased pain with ambulating, reports increased instability walking.  Denies feeling decreased strength in legs.  Denies loss of urinary or bowel continence.  Denies new fall or trauma.  Denies dizziness, change in vision, double vision, sudden loss of vision, difficulty speaking, facial asymmetry.  Denies chest pain, shortness of breath, abdominal pain, nausea, vomiting, diarrhea, urinary complaints. Denies fevers, chills.  A ten (10) point review of systems was negative other than as stated in the HPI or elsewhere in the chart.     Exam:   General: NAD  HENT: head NCAT, airway patent   Eyes: PERRL  Lungs: lungs CTAB with good inspiratory effort, no wheezing, no rhonchi, no rales  Cardiac: +S1S2, no m/r/g  GI: abdomen soft with +BS, NT, ND  MSK: FROM at neck, well healed midline vertical lower spine incision (lower thoracic through lumbar), +L paralumbar tenderness to palpation, no calf tenderness, swelling, erythema or warmth  Neuro: CN 2-12 grossly intact, moving all extremities spontaneously, sensory grossly intact, no gross neuro deficits, no saddle anesthesia, veers to left with ambulating, antalgic gait  Psych: normal mood and affect     A/P: 66M with     TO BE COMPLETED Attending MD Armstrong: I personally have seen and examined this patient.  Resident note reviewed and agree on plan of care and except where noted.  See below for details.     seen in Blue 32R, accompanied by wife    66M with PMH/PSH including HTN, chronic back pain on BID Percocet 325/10 s/p lumbar fusion in 2015 presents to the ED with worsening gait and chronic back pain.  Reports presents today because wife noted that patient is veering off to a side when walking.  Wife reports that over the last two months or so patient had previously had episodes on first waking where he needed to hold on to the wall but reports that this would resolve after initial time of waking.  Reports has also noted bilateral numbness in toes over the last two months, reports this is unchanged.  Denies increased pain with ambulating, reports increased instability walking.  Denies feeling decreased strength in legs.  Denies loss of urinary or bowel continence.  Denies new fall or trauma.  Denies dizziness, change in vision, double vision, sudden loss of vision, difficulty speaking, facial asymmetry.  Denies chest pain, shortness of breath, abdominal pain, nausea, vomiting, diarrhea, urinary complaints. Denies fevers, chills.  A ten (10) point review of systems was negative other than as stated in the HPI or elsewhere in the chart.     Exam:   General: NAD  HENT: head NCAT, airway patent   Eyes: PERRL  Lungs: lungs CTAB with good inspiratory effort, no wheezing, no rhonchi, no rales  Cardiac: +S1S2, no m/r/g  GI: abdomen soft with +BS, NT, ND  MSK: FROM at neck, well healed midline vertical lower spine incision (lower thoracic through lumbar), +L paralumbar tenderness to palpation, no calf tenderness, swelling, erythema or warmth  Neuro: CN 2-12 grossly intact, moving all extremities spontaneously, sensory grossly intact, no gross neuro deficits, no saddle anesthesia, veers to left with ambulating, antalgic gait  Psych: normal mood and affect     A/P: 66M with Attending MD Armstrong: I personally have seen and examined this patient.  Resident note reviewed and agree on plan of care and except where noted.  See below for details.     seen in Blue 32R, accompanied by wife    66M with PMH/PSH including HTN, chronic back pain on BID Percocet 325/10 s/p lumbar fusion in 2015 presents to the ED with worsening gait and chronic back pain.  Reports presents today because wife noted that patient is veering off to a side when walking.  Wife reports that over the last two months or so patient had previously had episodes on first waking where he needed to hold on to the wall but reports that this would resolve after initial time of waking.  Reports has also noted bilateral numbness in toes over the last two months, reports this is unchanged.  Denies increased pain with ambulating, reports increased instability walking.  Denies feeling decreased strength in legs.  Denies loss of urinary or bowel continence.  Denies new fall or trauma.  Denies dizziness, change in vision, double vision, sudden loss of vision, difficulty speaking, facial asymmetry.  Denies chest pain, shortness of breath, abdominal pain, nausea, vomiting, diarrhea, urinary complaints. Denies fevers, chills.  A ten (10) point review of systems was negative other than as stated in the HPI or elsewhere in the chart.     Exam:   General: NAD  HENT: head NCAT, airway patent   Eyes: PERRL  Lungs: lungs CTAB with good inspiratory effort, no wheezing, no rhonchi, no rales  Cardiac: +S1S2, no m/r/g  GI: abdomen soft with +BS, NT, ND  MSK: FROM at neck, well healed midline vertical lower spine incision (lower thoracic through lumbar), +L paralumbar tenderness to palpation, no calf tenderness, swelling, erythema or warmth  Neuro: CN 2-12 grossly intact, moving all extremities spontaneously, sensory grossly intact, no gross neuro deficits, no saddle anesthesia, veers to left with ambulating, antalgic gait  Psych: normal mood and affect     A/P: 66M with new change in gait, antalgic vs neuro, will obtain CT head, labs, neuro consult, spine consult, pain control, reassess

## 2022-04-21 VITALS
RESPIRATION RATE: 18 BRPM | SYSTOLIC BLOOD PRESSURE: 112 MMHG | HEART RATE: 67 BPM | DIASTOLIC BLOOD PRESSURE: 73 MMHG | TEMPERATURE: 98 F | OXYGEN SATURATION: 97 %

## 2022-04-21 LAB
ALBUMIN SERPL ELPH-MCNC: 4 G/DL — SIGNIFICANT CHANGE UP (ref 3.3–5)
ALP SERPL-CCNC: 50 U/L — SIGNIFICANT CHANGE UP (ref 40–120)
ALT FLD-CCNC: 17 U/L — SIGNIFICANT CHANGE UP (ref 10–45)
ANION GAP SERPL CALC-SCNC: 16 MMOL/L — SIGNIFICANT CHANGE UP (ref 5–17)
AST SERPL-CCNC: 25 U/L — SIGNIFICANT CHANGE UP (ref 10–40)
BASOPHILS # BLD AUTO: 0.03 K/UL — SIGNIFICANT CHANGE UP (ref 0–0.2)
BASOPHILS NFR BLD AUTO: 0.3 % — SIGNIFICANT CHANGE UP (ref 0–2)
BILIRUB SERPL-MCNC: 0.4 MG/DL — SIGNIFICANT CHANGE UP (ref 0.2–1.2)
BUN SERPL-MCNC: 10 MG/DL — SIGNIFICANT CHANGE UP (ref 7–23)
CALCIUM SERPL-MCNC: 9.1 MG/DL — SIGNIFICANT CHANGE UP (ref 8.4–10.5)
CHLORIDE SERPL-SCNC: 98 MMOL/L — SIGNIFICANT CHANGE UP (ref 96–108)
CK SERPL-CCNC: 293 U/L — HIGH (ref 30–200)
CO2 SERPL-SCNC: 21 MMOL/L — LOW (ref 22–31)
CREAT SERPL-MCNC: 0.9 MG/DL — SIGNIFICANT CHANGE UP (ref 0.5–1.3)
EGFR: 94 ML/MIN/1.73M2 — SIGNIFICANT CHANGE UP
EOSINOPHIL # BLD AUTO: 0.09 K/UL — SIGNIFICANT CHANGE UP (ref 0–0.5)
EOSINOPHIL NFR BLD AUTO: 0.8 % — SIGNIFICANT CHANGE UP (ref 0–6)
FOLATE SERPL-MCNC: >20 NG/ML — SIGNIFICANT CHANGE UP
GLUCOSE SERPL-MCNC: 95 MG/DL — SIGNIFICANT CHANGE UP (ref 70–99)
HCT VFR BLD CALC: 43.5 % — SIGNIFICANT CHANGE UP (ref 39–50)
HGB BLD-MCNC: 14.3 G/DL — SIGNIFICANT CHANGE UP (ref 13–17)
IMM GRANULOCYTES NFR BLD AUTO: 0.5 % — SIGNIFICANT CHANGE UP (ref 0–1.5)
LYMPHOCYTES # BLD AUTO: 36.7 % — SIGNIFICANT CHANGE UP (ref 13–44)
LYMPHOCYTES # BLD AUTO: 4.13 K/UL — HIGH (ref 1–3.3)
MAGNESIUM SERPL-MCNC: 2.2 MG/DL — SIGNIFICANT CHANGE UP (ref 1.6–2.6)
MCHC RBC-ENTMCNC: 32.8 PG — SIGNIFICANT CHANGE UP (ref 27–34)
MCHC RBC-ENTMCNC: 32.9 GM/DL — SIGNIFICANT CHANGE UP (ref 32–36)
MCV RBC AUTO: 99.8 FL — SIGNIFICANT CHANGE UP (ref 80–100)
MONOCYTES # BLD AUTO: 1.42 K/UL — HIGH (ref 0–0.9)
MONOCYTES NFR BLD AUTO: 12.6 % — SIGNIFICANT CHANGE UP (ref 2–14)
NEUTROPHILS # BLD AUTO: 5.52 K/UL — SIGNIFICANT CHANGE UP (ref 1.8–7.4)
NEUTROPHILS NFR BLD AUTO: 49.1 % — SIGNIFICANT CHANGE UP (ref 43–77)
NRBC # BLD: 0 /100 WBCS — SIGNIFICANT CHANGE UP (ref 0–0)
PHOSPHATE SERPL-MCNC: 4 MG/DL — SIGNIFICANT CHANGE UP (ref 2.5–4.5)
PLATELET # BLD AUTO: 247 K/UL — SIGNIFICANT CHANGE UP (ref 150–400)
POTASSIUM SERPL-MCNC: 4.6 MMOL/L — SIGNIFICANT CHANGE UP (ref 3.5–5.3)
POTASSIUM SERPL-SCNC: 4.6 MMOL/L — SIGNIFICANT CHANGE UP (ref 3.5–5.3)
PROT SERPL-MCNC: 6.6 G/DL — SIGNIFICANT CHANGE UP (ref 6–8.3)
RBC # BLD: 4.36 M/UL — SIGNIFICANT CHANGE UP (ref 4.2–5.8)
RBC # FLD: 12.6 % — SIGNIFICANT CHANGE UP (ref 10.3–14.5)
SARS-COV-2 RNA SPEC QL NAA+PROBE: SIGNIFICANT CHANGE UP
SODIUM SERPL-SCNC: 135 MMOL/L — SIGNIFICANT CHANGE UP (ref 135–145)
VIT B12 SERPL-MCNC: 584 PG/ML — SIGNIFICANT CHANGE UP (ref 232–1245)
WBC # BLD: 11.25 K/UL — HIGH (ref 3.8–10.5)
WBC # FLD AUTO: 11.25 K/UL — HIGH (ref 3.8–10.5)

## 2022-04-21 PROCEDURE — 87635 SARS-COV-2 COVID-19 AMP PRB: CPT

## 2022-04-21 PROCEDURE — 85025 COMPLETE CBC W/AUTO DIFF WBC: CPT

## 2022-04-21 PROCEDURE — 72100 X-RAY EXAM L-S SPINE 2/3 VWS: CPT

## 2022-04-21 PROCEDURE — 70450 CT HEAD/BRAIN W/O DYE: CPT | Mod: MA

## 2022-04-21 PROCEDURE — 82550 ASSAY OF CK (CPK): CPT

## 2022-04-21 PROCEDURE — 83735 ASSAY OF MAGNESIUM: CPT

## 2022-04-21 PROCEDURE — 82607 VITAMIN B-12: CPT

## 2022-04-21 PROCEDURE — 99284 EMERGENCY DEPT VISIT MOD MDM: CPT | Mod: 25

## 2022-04-21 PROCEDURE — 99236 HOSP IP/OBS SAME DATE HI 85: CPT | Mod: FS

## 2022-04-21 PROCEDURE — 84100 ASSAY OF PHOSPHORUS: CPT

## 2022-04-21 PROCEDURE — 80053 COMPREHEN METABOLIC PANEL: CPT

## 2022-04-21 PROCEDURE — 82746 ASSAY OF FOLIC ACID SERUM: CPT

## 2022-04-21 PROCEDURE — 72100 X-RAY EXAM L-S SPINE 2/3 VWS: CPT | Mod: 26

## 2022-04-21 PROCEDURE — G0378: CPT

## 2022-04-21 PROCEDURE — 97161 PT EVAL LOW COMPLEX 20 MIN: CPT

## 2022-04-21 PROCEDURE — 72148 MRI LUMBAR SPINE W/O DYE: CPT | Mod: 26,MA

## 2022-04-21 PROCEDURE — 72148 MRI LUMBAR SPINE W/O DYE: CPT | Mod: MA

## 2022-04-21 RX ORDER — ACETAMINOPHEN 500 MG
975 TABLET ORAL EVERY 6 HOURS
Refills: 0 | Status: DISCONTINUED | OUTPATIENT
Start: 2022-04-21 | End: 2022-04-24

## 2022-04-21 RX ORDER — OXYCODONE HYDROCHLORIDE 5 MG/1
10 TABLET ORAL
Refills: 0 | Status: DISCONTINUED | OUTPATIENT
Start: 2022-04-21 | End: 2022-04-21

## 2022-04-21 RX ORDER — HYDROCHLOROTHIAZIDE 25 MG
25 TABLET ORAL DAILY
Refills: 0 | Status: DISCONTINUED | OUTPATIENT
Start: 2022-04-21 | End: 2022-04-24

## 2022-04-21 RX ORDER — VALSARTAN 80 MG/1
320 TABLET ORAL DAILY
Refills: 0 | Status: DISCONTINUED | OUTPATIENT
Start: 2022-04-21 | End: 2022-04-24

## 2022-04-21 RX ORDER — OXYCODONE HYDROCHLORIDE 5 MG/1
10 TABLET ORAL ONCE
Refills: 0 | Status: DISCONTINUED | OUTPATIENT
Start: 2022-04-21 | End: 2022-04-21

## 2022-04-21 RX ORDER — METOPROLOL TARTRATE 50 MG
50 TABLET ORAL
Refills: 0 | Status: DISCONTINUED | OUTPATIENT
Start: 2022-04-21 | End: 2022-04-24

## 2022-04-21 RX ORDER — DEXAMETHASONE 0.5 MG/5ML
10 ELIXIR ORAL ONCE
Refills: 0 | Status: DISCONTINUED | OUTPATIENT
Start: 2022-04-21 | End: 2022-04-21

## 2022-04-21 RX ADMIN — OXYCODONE HYDROCHLORIDE 10 MILLIGRAM(S): 5 TABLET ORAL at 10:42

## 2022-04-21 RX ADMIN — OXYCODONE HYDROCHLORIDE 10 MILLIGRAM(S): 5 TABLET ORAL at 14:58

## 2022-04-21 RX ADMIN — OXYCODONE HYDROCHLORIDE 10 MILLIGRAM(S): 5 TABLET ORAL at 11:54

## 2022-04-21 RX ADMIN — Medication 25 MILLIGRAM(S): at 10:33

## 2022-04-21 RX ADMIN — Medication 975 MILLIGRAM(S): at 11:52

## 2022-04-21 RX ADMIN — Medication 50 MILLIGRAM(S): at 06:35

## 2022-04-21 RX ADMIN — Medication 975 MILLIGRAM(S): at 11:18

## 2022-04-21 RX ADMIN — Medication 10 MILLIGRAM(S): at 06:35

## 2022-04-21 RX ADMIN — VALSARTAN 320 MILLIGRAM(S): 80 TABLET ORAL at 10:34

## 2022-04-21 RX ADMIN — OXYCODONE HYDROCHLORIDE 10 MILLIGRAM(S): 5 TABLET ORAL at 10:30

## 2022-04-21 RX ADMIN — Medication 650 MILLIGRAM(S): at 10:42

## 2022-04-21 RX ADMIN — OXYCODONE HYDROCHLORIDE 10 MILLIGRAM(S): 5 TABLET ORAL at 08:32

## 2022-04-21 NOTE — ED CDU PROVIDER INITIAL DAY NOTE - PHYSICAL EXAMINATION
CONSTITUTIONAL: Patient is awake, alert and oriented x 3. Patient is well appearing and in no acute distress.  	HEAD: NC/AT  	EYES: EOMI, Eyes clear.  	ENT: Airway patent, nasal mucosa clear  	NECK: supple, no midline c-spine tenderness   	LUNGS: CTAB, no increased WOB, no wheezing/rales appreciated  	HEART: RRR.+S1S2, no murmurs appreciated  	ABDOMEN: Soft nd/nt, +BS, no rebound or guarding.   	EXTREMITY: WWP, no edema or calf tenderness b/l  	SKIN: +healed lower spine incision  NEURO: Strength 5/5 UE/LE. Sensation grossly equal b/l UE/LE.    Spine: No midline T/L spine tenderness. CONSTITUTIONAL: Patient is awake, alert and oriented x 3. Patient is well appearing and in no acute distress.  	HEAD: NC/AT  	EYES: EOMI, Eyes clear.  	ENT: Airway patent, nasal mucosa clear  	NECK: supple, no midline c-spine tenderness   	LUNGS: CTAB, no increased WOB, no wheezing/rales appreciated  	HEART: RRR.+S1S2, no murmurs appreciated  	ABDOMEN: Soft nd/nt, +BS, no rebound or guarding.   	EXTREMITY: No edema or calf tenderness b/l  	SKIN: +healed lower spine incision  NEURO: Strength 5/5 UE/LE. Sensation grossly equal b/l UE/LE.    Spine: No midline T/L spine tenderness.

## 2022-04-21 NOTE — PHYSICAL THERAPY INITIAL EVALUATION ADULT - PLANNED THERAPY INTERVENTIONS, PT EVAL
GOAL: Pt will negotiate 10 steps with 1 HR and step to pattern independently in 4 weeks./balance training/gait training

## 2022-04-21 NOTE — CONSULT NOTE ADULT - SUBJECTIVE AND OBJECTIVE BOX
p ([id number])     HPI: 66M pmhx htn, RA on prednisone, L2-S2 fxn 7y ago p/w x2m gait imbalance, inc in chronic mechanical back pain, b/l feet numbness, R post. thigh paresthesias. Denies b/b changes, saddle anesthesia.     Imaging: MR L-spine degen throughout, worst at L1-2, no acute pathology. Hardware intact    Exam: BUE 5/5, R HF 4+/5, L HF 4+/5, dec sensation b/l feet, wide based gait, reflexes wnl no hoffmans/clonus     --Anticoagulation:    =====================  PAST MEDICAL HISTORY   HTN (hypertension)    Rheumatoid arthritis      PAST SURGICAL HISTORY   No significant past surgical history    No significant past surgical history    Fusion of spine, lumbar region    Status post cervical spinal fusion      shellfish (Anaphylaxis)      MEDICATIONS:  Antibiotics:    Neuro:  acetaminophen     Tablet .. 975 milliGRAM(s) Oral every 6 hours PRN  oxyCODONE    IR 10 milliGRAM(s) Oral two times a day PRN    Other:  dexAMETHasone  IVPB 10 milliGRAM(s) IV Intermittent once  hydrochlorothiazide 25 milliGRAM(s) Oral daily  metoprolol tartrate 50 milliGRAM(s) Oral two times a day  predniSONE   Tablet 10 milliGRAM(s) Oral daily  valsartan 320 milliGRAM(s) Oral daily      SOCIAL HISTORY:   Occupation:   Marital Status:     FAMILY HISTORY:  No pertinent family history in first degree relatives        ROS: Negative except per HPI    LABS:                          14.3   11.25 )-----------( 247      ( 21 Apr 2022 01:15 )             43.5     04-21    135  |  98  |  10  ----------------------------<  95  4.6   |  21<L>  |  0.90    Ca    9.1      21 Apr 2022 01:15  Phos  4.0     04-21  Mg     2.2     04-21    TPro  6.6  /  Alb  4.0  /  TBili  0.4  /  DBili  x   /  AST  25  /  ALT  17  /  AlkPhos  50  04-21

## 2022-04-21 NOTE — PHYSICAL THERAPY INITIAL EVALUATION ADULT - BALANCE TRAINING, PT EVAL
GOAL: Pt will increase static/ dynamic standing balance to Good with rolling walker to improve safety with functional activities in 4 weeks.

## 2022-04-21 NOTE — PHYSICAL THERAPY INITIAL EVALUATION ADULT - GAIT DEVIATIONS NOTED, PT EVAL
decreased geo/decreased velocity of limb motion/decreased stride length/decreased weight-shifting ability

## 2022-04-21 NOTE — ED ADULT NURSE REASSESSMENT NOTE - NS ED NURSE REASSESS COMMENT FT1
Pt received from FLAQUITA Mahmood. Pt oriented to CDU & plan of care was discussed. Pt AO4. Neuro check intact except pt endorses 8/10 back pain affecting ambulation. Pt limping & hunched over during ambulation. No other deficits noted. Pt aware to notify RN or PA for assistance prior to ambulation Safety & comfort measures maintained. Call bell in reach. Will continue to monitor. Pt received from FLAQUITA Mahmood. Pt oriented to CDU & plan of care was discussed. Pt AO4. Neuro check intact except pt endorses 8/10 back pain affecting ambulation. Pt limping & hunched over during ambulation. Pt does not want any pain meds at this time as he states nothing helps. Hot packs provided for comfort. No other deficits noted. Pt aware to notify RN or PA for assistance prior to ambulation Safety & comfort measures maintained. Call bell in reach. Will continue to monitor.

## 2022-04-21 NOTE — ED CDU PROVIDER INITIAL DAY NOTE - NS ED ROS FT
[id number] [id number] Constitutional: No fever or chills  Eyes: No visual changes, eye pain   CV: No chest pain or lower extremity edema  Resp: No SOB no cough  GI: No abd pain. No nausea or vomiting. No diarrhea.   : No dysuria, hematuria.   MSK: +back pain  Skin: No rash  Psych: No complaints   Neuro: No headache. + numbness +change in gait. No weakness.  Endo: No DM

## 2022-04-21 NOTE — ED CDU PROVIDER INITIAL DAY NOTE - ATTENDING CONTRIBUTION TO CARE
Attending MD Armstrong: I personally have seen and examined this patient.  Resident note reviewed and agree on plan of care and except where noted.  See below for details.     seen in Blue 32R, accompanied by wife    66M with PMH/PSH including HTN, chronic back pain on BID Percocet 325/10 s/p lumbar fusion in 2015 presents to the ED with worsening gait and chronic back pain.  Reports presents today because wife noted that patient is veering off to a side when walking.  Wife reports that over the last two months or so patient had previously had episodes on first waking where he needed to hold on to the wall but reports that this would resolve after initial time of waking.  Reports has also noted bilateral numbness in toes over the last two months, reports this is unchanged.  Denies increased pain with ambulating, reports increased instability walking.  Denies feeling decreased strength in legs.  Denies loss of urinary or bowel continence.  Denies new fall or trauma.  Denies dizziness, change in vision, double vision, sudden loss of vision, difficulty speaking, facial asymmetry.  Denies chest pain, shortness of breath, abdominal pain, nausea, vomiting, diarrhea, urinary complaints. Denies fevers, chills.  A ten (10) point review of systems was negative other than as stated in the HPI or elsewhere in the chart.     Exam:   General: NAD  HENT: head NCAT, airway patent   Eyes: PERRL  Lungs: lungs CTAB with good inspiratory effort, no wheezing, no rhonchi, no rales  Cardiac: +S1S2, no m/r/g  GI: abdomen soft with +BS, NT, ND  MSK: FROM at neck, well healed midline vertical lower spine incision (lower thoracic through lumbar), +L paralumbar tenderness to palpation, no calf tenderness, swelling, erythema or warmth  Neuro: CN 2-12 grossly intact, moving all extremities spontaneously, sensory grossly intact, no gross neuro deficits, no saddle anesthesia, veers to left with ambulating, antalgic gait  Psych: normal mood and affect     A/P: 66M with change in gait, neuro vs spine etiology, CDU for frequent re-eval, MRIs, final consultant recs

## 2022-04-21 NOTE — ED CDU PROVIDER DISPOSITION NOTE - ATTENDING APP SHARED VISIT CONTRIBUTION OF CARE
I performed a history and physical exam of the patient and discussed their management with the Advanced Care Practitioner. I reviewed the ACP's note and agree with the documented findings and plan of care.     66 year old male  with pmhx of RA on prednisone daily, hep C htn, here with chronic back pain and with worsening gait.  Patient reports walking to one side. Patient has 5/5 b/l ue/le. skin intact. no bowel/bladder incontinence. PT evaluated patient. CT head negative. neuro/ortho spine following. Ptient has MRi.  neuro checks, reassess

## 2022-04-21 NOTE — CONSULT NOTE ADULT - ATTENDING COMMENTS
History and exam per resident section, confirmed by me. He has a history of spinal fusion and back pain, for past 2 months has felt unsteady when getting up ("crashes into walls") and some numbness in his feet. No diplopia, vision changes, speech changes, difficulty with ADLs.     On exam, no nystagmus, EOMI, PERRL, no ataxia on FNF or HTS. Staggered when getting up, but Romberg negative. Gait wide-based. Motor 5/5. Decrease sensation in toes, including decreased joint position sense, worse on right.    Impression: Imbalance for 2 months, exam with neuropathy in his feet, but no clear ataxia. He will need neuropathy workup and possible brain MRI (will need open MR), should follow up for that with general neurology.

## 2022-04-21 NOTE — ED CDU PROVIDER DISPOSITION NOTE - PATIENT PORTAL LINK FT
You can access the FollowMyHealth Patient Portal offered by [facility] by registering at the following website: [email or web address]. By joining [employer]’s FollowMyHealth portal, you will also be able to view your health information using other applications (apps) compatible with our system.

## 2022-04-21 NOTE — CONSULT NOTE ADULT - ASSESSMENT
66M pmhx htn, RA on prednisone, L2-S2 fxn 7y ago p/w x2m gait imbalance, inc in chronic mechanical back pain, b/l feet numbness, R post. thigh paresthesias. Denies b/b changes, saddle anesthesia. MR L-spine degen throughout, worst at L1-2, no acute pathology. BUE 5/5, R HF 4+/5, L HF 4+/5, dec sensation b/l feet, wide based gait, reflexes wnl no hoffmans/clonus  -No acute nsgy intervention  -Outpt open MR brain, c-spine to investigate gait imbalance as patient cannot tolerate   -PT, conservative management   -Rec outpt f/u Dr. Vazquez 1-2 weeks
Sunady Hernández is a 66 year old RH male with a past medical history of HTN, rheumatoid arthritis (on prednisone), lumbar spine fusion, chronic back pain who is presenting for a 1.5-2 month history of increased difficulty in ambulation, vearing to the L.    Impression: L leaning antalgic gait with severe back pain likely localized to the lumbar spine. Cannot fully exclude a vascular etiology which would be localized to the cerebellum however much less likely.    Recs:  [] agree with MRI lumbar spine w/o contrast  [] add MRI brain w/o contrast  [] may remain off aspirin for now as lower suspicion for stroke  [] please draw B12, folate, CPK, A1c, lipid panel  [] PT/OT  [] fall precautions  [] upon discharge may follow up with his neurologist or may follow up at [address] [place] [address] ([phone])    Case to be seen and discussed with neurology attending, Dr. Carranza.

## 2022-04-21 NOTE — ED CDU PROVIDER INITIAL DAY NOTE - OBJECTIVE STATEMENT
65 yo male with PMHx of Rheumatoid Arthritis on prednisone daily, Hep C, HTN, cervical spine/lumbar spine fusion 2015 with Dr. Clay MADDOX, chronic back pain presents with chronic back pain and worsening gait. State that in the morning after first waking up he has been having unsteady gait that typically resolves. Yesterday he states that his instability persisted throughout the day, causing him to veer more to the side. Endorses chronic numbness to his feet, unchanged today. Takes Percocet and Oxycodone for pain, without much relief.  Denies chest pain, visual changes, SOB, LOC/syncope, dizziness, headaches, urinary complaints. In the past, patient reports following with pain management.  ED course: CT head negative for acute pathology. Neuro and Ortho/Spine consulted. Plan for MRI, neuro chec 67 yo male with PMHx of Rheumatoid Arthritis on prednisone daily, Hep C, HTN, cervical spine/lumbar spine fusion 2015 with Dr. Clay MADDOX, chronic back pain presents with chronic back pain and worsening gait. State that in the morning after first waking up he has been having unsteady gait that typically resolves. Yesterday he states that his instability persisted throughout the day, causing him to veer more to the side. Endorses chronic numbness to his feet, unchanged today. Takes Percocet and Oxycodone for pain, without much relief.  Denies chest pain, visual changes, SOB, LOC/syncope, dizziness, headaches, urinary complaints. In the past, patient reports following with pain management.  ED course: CT head negative for acute pathology. Neuro and Ortho/Spine consulted. Plan for MRI, neuro checks, PT consult in CDU.

## 2022-04-21 NOTE — ED CDU PROVIDER INITIAL DAY NOTE - PROGRESS NOTE DETAILS
CDU NOTE MINH Stephenson: VSS NAD. Patient reporting persistent pain despite 10mg Oxycodone. Pt states he normally takes 2 percocet each 10/325 q 12 hours. I stop checked. Pt is prescribed Percocet 10/325, last dispensed 120 tablets on 4/1 which is consistent with home dose he prescribes. He is also prescribed Oxycontin ER 10mg last dispensed 60 tablets on 4/20. Will given an additional 10mg Oxycodone IR to relief given this is what pt takes at home. Pt had MR L spine this am. States he cannot stay in machine to tolerate MR brain. Seen by Neuro resident and attending. Per Neuro if MR L-Spine w/out acute findings pt can have outpt open MR for further evaluation  Sandy Stephenson PA-C Pt felt improvement of pain after given additional 10mg Oxy. MR L-Spine revealed degenerative changes, surgical changes, L1-L2 mild to moderate thecal sac compression and spinal stenosis. He was seen and evaluated by NSX Spine service who recommended Decadron 10 IV x 1, PT and out pt follow up. Results and recommendations reviewed with pt. Pt endorsed understanding to f/up with both Neuro and Nsx for continued management. Will provide Podiatry as well per Neuro recs. Pt seen and evaluated by PT who recommended pt have outpt PT with d/c home with a walker to be used PRN. Case management arranged for walker. Discussed plan with Dr. Young, will d/c home   Sandy Stephenson PA-C Initial verbal recommendation for decadron 10. Nsx did not place rec in their note. Spoke to nsx Halima who advised pt does not need IV steroids, rec made before formal eval. Order canceled  Sandy Stephenson PA-C Pt felt improvement of pain after given additional 10mg Oxy. MR L-Spine revealed degenerative changes, surgical changes, L1-L2 mild to moderate thecal sac compression and spinal stenosis. He was seen and evaluated by NSX Spine service who recommended, PT and out pt follow up. Results and recommendations reviewed with pt. Pt endorsed understanding to f/up with both Neuro and Nsx for continued management. Will provide Podiatry as well per Neuro recs. Pt seen and evaluated by PT who recommended pt have outpt PT with d/c home with a walker to be used PRN. Case management arranged for walker. Discussed plan with Dr. Young, will d/c home   Sandy Stephenson PA-C

## 2022-04-21 NOTE — ED CDU PROVIDER INITIAL DAY NOTE - DETAILS
Back pain/Change in gait  -Neuro/Spine consults  -Neuro checks, pain control, MRI  -DW Arata, vitals every 4 hours, frequent reevaluations

## 2022-04-21 NOTE — PHYSICAL THERAPY INITIAL EVALUATION ADULT - PERTINENT HX OF CURRENT PROBLEM, REHAB EVAL
65 yo M h/o rheumatoid arthritis, lumbar spine fusion, chronic back pain p/w months of worsening LBP with radiation to b/l anterior thighs and associated paresthesias. Also noted numbness b/l in feet. Pain is unrelieved by Percocet which pt takes BID. Endorsed difficulty with gait, has been feeling unstable lately. He notes that as the day progresses he feels that his symptoms get worse. CT Head 4/20: Neg. XRay Lumbar 4/21: No radiographically evident fx. Spinal canal is poorly evaluated.

## 2022-04-21 NOTE — CONSULT NOTE ADULT - SUBJECTIVE AND OBJECTIVE BOX
HPI:  Sunday Hernández is a 66 year old RH male with a past medical history of HTN, rheumatoid arthritis (on prednisone), lumbar spine fusion, chronic back pain who is presenting for a 1.5-2 month history of increased difficulty in ambulation, vearing to the L. At baseline, patient ambulates without assistive devices but notes that he has a lot of back pain when he does ambulate and is oriented to person, place, time and currently working. He notes that for the last 2 months he has had increased difficulty in ambulating and notes that when he wakes up in the morning he feels that he is "drunk" and is stumbling and has to hold on to walls in order to ambulate. He notes that as the day progresses he feels that his symptoms get worse. He has back pain which is increased when he does ambulate. He also notes that he has numbness on the bottom of his feet which is chronic. Denies any exacerbating or remitting factors. Denies any headaches    NIHSS: 0  MRS: 0    REVIEW OF SYSTEMS    A 10-system ROS was performed and is negative except for those items noted above and/or in the HPI.    PAST MEDICAL & SURGICAL HISTORY:  HTN (hypertension)    Rheumatoid arthritis    Fusion of spine, lumbar region    Status post cervical spinal fusion    FAMILY HISTORY:    SOCIAL HISTORY:   T/E/D:   Occupation:   Lives with:     MEDICATIONS (HOME):  Home Medications:  hydrochlorothiazide 25 mg oral tablet: 1 tab(s) orally once a day (28 Feb 2019 18:17)  HYDROcodone:  orally  (28 Feb 2019 18:17)  leflunomide 20 mg oral tablet: 1 tab(s) orally once a day (28 Feb 2019 18:17)  Metoprolol Tartrate 50 mg oral tablet: 1 tab(s) orally 2 times a day (28 Feb 2019 18:17)  predniSONE 10 mg oral tablet: 1 tab(s) orally once a day (28 Feb 2019 18:17)    MEDICATIONS  (STANDING):    MEDICATIONS  (PRN):    ALLERGIES/INTOLERANCES:  Allergies  No Known Drug Allergies  shellfish (Anaphylaxis)    Intolerances    VITALS & EXAMINATION:  Vital Signs Last 24 Hrs  T(C): 37.2 (20 Apr 2022 22:21), Max: 37.2 (20 Apr 2022 22:21)  T(F): 98.9 (20 Apr 2022 22:21), Max: 98.9 (20 Apr 2022 22:21)  HR: 93 (20 Apr 2022 22:21) (83 - 93)  BP: 128/78 (20 Apr 2022 22:21) (122/83 - 128/78)  BP(mean): --  RR: 20 (20 Apr 2022 22:21) (18 - 20)  SpO2: 94% (20 Apr 2022 22:21) (94% - 100%)    General:  Constitutional: Obese Male, appears stated age, in no apparent distress including pain  Head: Normocephalic & atraumatic.  ENT: Patent ear canals, intact TM, mucus membranes moist & pink, neck supple, no lymphadenopathy.   Respiratory: Patent airway. All lung fields are clear to auscultation bilaterally.  Extremities: No cyanosis, clubbing, or edema.  Skin: No rashes, bruising, or discoloration.    Cardiovascular (>2): RRR no murmurs. Carotid pulsations symmetric, no bruits. Normal capillary beds refill, 1-2 seconds or less.     Neurological (>12):  MS: Awake, alert, oriented to person, place, situation, time. Normal affect. Follows all commands.    Language: Speech is clear, fluent with good repetition & comprehension (able to name objects___)    CNs: PERRLA (R = 3mm, L = 3mm). VFF. EOMI no nystagmus, no diplopia. V1-3 intact to LT/pinprick, well developed masseter muscles b/l. No facial asymmetry b/l, full eye closure strength b/l. Hearing grossly normal (rubbing fingers) b/l. Symmetric palate elevation in midline. Gag reflex deferred. Head turning & shoulder shrug intact b/l. Tongue midline, normal movements, no atrophy.    Fundoscopic: pale w/ sharp discs margins No vascular changes.      Motor: Normal muscle bulk & tone. No noticeable tremor or seizure. No pronator drift.              Deltoid	Biceps	Triceps	Wrist	Finger ABd	   R	5	5	5	5	5		5 	  L	5	5	5	5	5		5    	H-Flex	H-Ext	H-ABd	H-ADd	K-Flex	K-Ext	D-Flex	P-Flex  R	5	5	5	5	5	5	5	5 	   L	5	5	5	5	5	5	5	5	     Sensation: Intact to LT/PP/Temp/Vibration/Position b/l throughout.     Cortical: Extinction on DSS (neglect): none    Reflexes:              Biceps(C5)       BR(C6)     Triceps(C7)               Patellar(L4)    Achilles(S1)    Plantar Resp  R	2	          2	             2		        2		    2		Down   L	2	          2	             2		        2		    2		Down     Coordination: intact rapid-alt movements. No dysmetria to FTN/HTS    Gait: Normal Romberg. No postural instability. Normal stance and tandem gait.     LABORATORY:  CBC   Chem       LFTs   Coagulopathy   Lipid Panel   A1c   Cardiac enzymes     U/A   CSF  Immunological  Other    STUDIES & IMAGING:  Studies (EKG, EEG, EMG, etc):     Radiology (XR, CT, MR, U/S, TTE/JHOAN): HPI:  Sunday Hernández is a 66 year old RH male with a past medical history of HTN, rheumatoid arthritis (on prednisone), lumbar spine fusion, chronic back pain who is presenting for a 1.5-2 month history of increased difficulty in ambulation, vearing to the L. At baseline, patient ambulates without assistive devices but notes that he has a lot of back pain when he does ambulate and is oriented to person, place, time and currently working. He notes that for the last 2 months he has had increased difficulty in ambulating and notes that when he wakes up in the morning he feels that he is "drunk" and is stumbling and has to hold on to walls in order to ambulate. He notes that as the day progresses he feels that his symptoms get worse. He has back pain which is increased when he does ambulate. He also notes that he has numbness on the bottom of his feet which is chronic. Denies any exacerbating or remitting factors. Denies any headaches, visual changes, auditory changes. vertigo, lightheadedness, loss of consciousness, dysphagia, dysarthria, any symptoms in his UE. No recent illnesses, sick contacts, falls, head trauma. Was recently seen in his PCP clinic and had labs ordered noting an elevated , low normal B12. At that time, further imaging of back was deferred.   Prior imaging of the L spine in 2020 as per outpatient records note degenerative changes to the L1-L2 area of the spine which are severe high grade stenosis which is progression from imaging done in 2015.    NIHSS: 0  MRS: 0    REVIEW OF SYSTEMS    A 10-system ROS was performed and is negative except for those items noted above and/or in the HPI.    PAST MEDICAL & SURGICAL HISTORY:  HTN (hypertension)    Rheumatoid arthritis    Fusion of spine, lumbar region    Status post cervical spinal fusion    FAMILY HISTORY:  No reported family history of neurological problems    SOCIAL HISTORY:   T/E/D: current smoker 1/2ppd for 30 years, denies alcohol, illicit drugs  Occupation:   Lives with wife and children    MEDICATIONS (HOME):  Home Medications:  hydrochlorothiazide 25 mg oral tablet: 1 tab(s) orally once a day (28 Feb 2019 18:17)  HYDROcodone:  orally  (28 Feb 2019 18:17)  leflunomide 20 mg oral tablet: 1 tab(s) orally once a day (28 Feb 2019 18:17)  Metoprolol Tartrate 50 mg oral tablet: 1 tab(s) orally 2 times a day (28 Feb 2019 18:17)  predniSONE 10 mg oral tablet: 1 tab(s) orally once a day (28 Feb 2019 18:17)    ALLERGIES/INTOLERANCES:  Allergies  No Known Drug Allergies  shellfish (Anaphylaxis)    Intolerances    VITALS & EXAMINATION:  Vital Signs Last 24 Hrs  T(C): 37.2 (20 Apr 2022 22:21), Max: 37.2 (20 Apr 2022 22:21)  T(F): 98.9 (20 Apr 2022 22:21), Max: 98.9 (20 Apr 2022 22:21)  HR: 93 (20 Apr 2022 22:21) (83 - 93)  BP: 128/78 (20 Apr 2022 22:21) (122/83 - 128/78)  BP(mean): --  RR: 20 (20 Apr 2022 22:21) (18 - 20)  SpO2: 94% (20 Apr 2022 22:21) (94% - 100%)    General:  Constitutional: Appears stated age.  Head: Normocephalic & atraumatic.    Neurological (>12):  MS: Awake, alert, oriented to person, place, situation, time. Normal affect. Follows all commands.    Language: Speech is clear, fluent.    CNs: PERRL (R = 4mm, L = 4mm), sluggish reaction bilaterally. VF intact in all 4 quadrants. EOMI no nystagmus, no diplopia. V1-3 intact to LT, well developed masseter muscles b/l. No facial asymmetry b/l, full eye closure strength b/l. Hearing grossly normal (rubbing fingers) b/l. Symmetric palate elevation in midline. Shoulder shrug intact b/l. Tongue midline, normal movements, no atrophy.    Motor: Normal muscle bulk & tone. No noticeable tremor or seizure. No pronator drift.              Deltoid	Biceps	Triceps	   R	5	5	5	5		  L	5	5	5	5    	H-Flex	H-Ext	K-Flex	K-Ext	D-Flex	P-Flex  R	5-	5	5	5	5-	5		   L	5-	5	5	5	5-	5		     Sensation: Intact to LT b/l throughout. Slight reduction to vibratory sense at the feet bilaterally.    Reflexes:              Biceps(C5)       BR(C6)     Triceps(C7)               Patellar(L4)    Achilles(S1)    Plantar Resp  R	2	          2	             2		        0		    0		Down   L	2	          2	             2		        0		    0		Down     Coordination: No dysmetria to FTN    Gait: Antalgic gait favoring the L leg. L leaning wide based gait. Unable to perform tandem gait    LABORATORY:    STUDIES & IMAGING:    Radiology (XR, CT, MR, U/S, TTE/JHOAN):    CT Head No Cont (04.20.22 @ 22:38)  IMPRESSION:    No acute intracranial hemorrhage, mass effect, or CT evidence of an acute   vascular territorial infarct.

## 2022-04-21 NOTE — ED CDU PROVIDER INITIAL DAY NOTE - NS ED ATTENDING STATEMENT MOD
I have seen and examined this patient and fully participated in the care of this patient as the teaching attending.  The service was shared with the BRANDON.  I reviewed and verified the documentation and independently performed the documented:

## 2022-04-21 NOTE — ED CDU PROVIDER DISPOSITION NOTE - NSFOLLOWUPINSTRUCTIONS_ED_ALL_ED_FT
1. Please follow up with your Primary Care Doctor after discharge to discuss ED visit. Bring a copy of your results to follow up appointment     2. Please rest, stay hydrated and continue all at home medications as previously prescribed    3. Follow up with both Neurology and Neurosurgery (Spine) for continued evaluation, treatment and management once discharged from the hospital. Please see office information below to call and schedule appointments:    Antonino Carranza; PhD)  Neurology  [address], [address]  [place] [address]  Phone: [phone]  Fax: [phone]    Tomasa Vazquez (MD)  Neurosurgery (Spine)  [address], [address]  [place] [address]  Phone: [phone]  Fax: [phone]    4. Please use walker assistance as recommended. A prescription for Physical Therapy was provided for you for outpatient treatment    5. We additionally recommend follow up with Podiatry for further evaluation of your foot tingling. Please see office information below:    Fede Sharma ([facility])  [address] [place] [address]  Phone: [phone]  Fax: [phone]    6. Return to ED for any new or worsened symptoms of concern

## 2022-05-03 ENCOUNTER — APPOINTMENT (OUTPATIENT)
Dept: RHEUMATOLOGY | Facility: CLINIC | Age: 67
End: 2022-05-03
Payer: MEDICARE

## 2022-05-03 VITALS
OXYGEN SATURATION: 95 % | BODY MASS INDEX: 27.85 KG/M2 | WEIGHT: 217 LBS | DIASTOLIC BLOOD PRESSURE: 89 MMHG | HEIGHT: 74 IN | HEART RATE: 69 BPM | SYSTOLIC BLOOD PRESSURE: 136 MMHG | TEMPERATURE: 97.2 F

## 2022-05-03 PROCEDURE — 99215 OFFICE O/P EST HI 40 MIN: CPT

## 2022-05-13 ENCOUNTER — APPOINTMENT (OUTPATIENT)
Dept: NEUROSURGERY | Facility: CLINIC | Age: 67
End: 2022-05-13
Payer: MEDICARE

## 2022-05-13 VITALS — WEIGHT: 235 LBS | BODY MASS INDEX: 30.16 KG/M2 | HEIGHT: 74 IN

## 2022-05-13 PROCEDURE — 99214 OFFICE O/P EST MOD 30 MIN: CPT

## 2022-05-19 ENCOUNTER — APPOINTMENT (OUTPATIENT)
Dept: CT IMAGING | Facility: CLINIC | Age: 67
End: 2022-05-19
Payer: MEDICARE

## 2022-05-19 ENCOUNTER — OUTPATIENT (OUTPATIENT)
Dept: OUTPATIENT SERVICES | Facility: HOSPITAL | Age: 67
LOS: 1 days | End: 2022-05-19
Payer: MEDICARE

## 2022-05-19 DIAGNOSIS — M51.26 OTHER INTERVERTEBRAL DISC DISPLACEMENT, LUMBAR REGION: ICD-10-CM

## 2022-05-19 DIAGNOSIS — M43.26 FUSION OF SPINE, LUMBAR REGION: Chronic | ICD-10-CM

## 2022-05-19 DIAGNOSIS — M54.16 RADICULOPATHY, LUMBAR REGION: ICD-10-CM

## 2022-05-19 DIAGNOSIS — Z98.1 ARTHRODESIS STATUS: Chronic | ICD-10-CM

## 2022-05-19 PROCEDURE — 72131 CT LUMBAR SPINE W/O DYE: CPT | Mod: 26,ME

## 2022-05-19 PROCEDURE — 72131 CT LUMBAR SPINE W/O DYE: CPT | Mod: ME

## 2022-05-19 PROCEDURE — G1004: CPT

## 2022-05-19 PROCEDURE — 72128 CT CHEST SPINE W/O DYE: CPT | Mod: 26,ME

## 2022-05-19 PROCEDURE — 72128 CT CHEST SPINE W/O DYE: CPT | Mod: ME

## 2022-05-23 ENCOUNTER — APPOINTMENT (OUTPATIENT)
Dept: NEUROLOGY | Facility: CLINIC | Age: 67
End: 2022-05-23
Payer: MEDICARE

## 2022-05-23 VITALS
BODY MASS INDEX: 30.16 KG/M2 | WEIGHT: 235 LBS | SYSTOLIC BLOOD PRESSURE: 124 MMHG | HEIGHT: 74 IN | HEART RATE: 97 BPM | DIASTOLIC BLOOD PRESSURE: 92 MMHG

## 2022-05-23 DIAGNOSIS — M62.58 MUSCLE WASTING AND ATROPHY, NOT ELSEWHERE CLASSIFIED, OTHER SITE: ICD-10-CM

## 2022-05-23 PROCEDURE — 99214 OFFICE O/P EST MOD 30 MIN: CPT

## 2022-06-01 ENCOUNTER — APPOINTMENT (OUTPATIENT)
Dept: NEUROSURGERY | Facility: CLINIC | Age: 67
End: 2022-06-01
Payer: MEDICARE

## 2022-06-01 ENCOUNTER — NON-APPOINTMENT (OUTPATIENT)
Age: 67
End: 2022-06-01

## 2022-06-01 VITALS
SYSTOLIC BLOOD PRESSURE: 124 MMHG | DIASTOLIC BLOOD PRESSURE: 92 MMHG | WEIGHT: 235 LBS | HEART RATE: 76 BPM | BODY MASS INDEX: 30.16 KG/M2 | HEIGHT: 74 IN | OXYGEN SATURATION: 94 %

## 2022-06-01 DIAGNOSIS — R26.0 ATAXIC GAIT: ICD-10-CM

## 2022-06-01 DIAGNOSIS — M50.90 CERVICAL DISC DISORDER, UNSPECIFIED, UNSPECIFIED CERVICAL REGION: ICD-10-CM

## 2022-06-01 DIAGNOSIS — M48.04 SPINAL STENOSIS, THORACIC REGION: ICD-10-CM

## 2022-06-01 PROCEDURE — 99213 OFFICE O/P EST LOW 20 MIN: CPT

## 2022-06-01 NOTE — REASON FOR VISIT
[New Patient Visit] : a new patient visit [Referred By: _________] : Patient was referred by CHLOE [Spouse] : spouse

## 2022-06-02 NOTE — ASSESSMENT
[FreeTextEntry1] : Mr. Sunday Hernández is a 66 year old man S/P Spinal Fusion and decompression presenting with adjacent level stenosis and Junctional Kyphosis. Comprehensive  workup recommended MRI Thoracic And LUMBAR and Scoliosis Xray to evaluate junctional kyphosis and sagittal balance. Smoking cessation recommended and is necessary to proceed with surgery. Referral to Dr. Saman Souza to establish care with PCP for comprehensive medical and cardiovascular workup.\par \par Please see Dr. Conner's dictation for details.\par I, Dr. Rohan Conner evaluated the patient with the nurse practitioner Jason Flores and established the plan of care. I personally discuss this patient with the nurse practitioner at the time of the visit. I agree with the assessment and plan as written, unless noted below.\par \par

## 2022-06-02 NOTE — PHYSICAL EXAM
[Oriented To Time, Place, And Person] : oriented to person, place, and time [Impaired Insight] : insight and judgment were intact [No Visual Abnormalities] : no visible abnormalities [Deformity] : deformity [Antalgic] : antalgic [] : no respiratory distress [Heart Rate And Rhythm] : heart rate was normal and rhythm regular [Able to toe walk] : the patient was not able to toe walk [Able to heel walk] : the patient was not able to heel walk [Sclera] : the sclera and conjunctiva were normal [Outer Ear] : the ears and nose were normal in appearance [Neck Appearance] : the appearance of the neck was normal [FreeTextEntry1] : Ambulates with cane; Wears LSO brace

## 2022-06-02 NOTE — ASSESSMENT
[FreeTextEntry1] : 65 yo man with post-laminectomy syndrome.  On exam patient has distal atrophy in lower extremities with lower motor neuron sensorimotor dysfunction (related to nerve root disease vs polyneuropathy).\par \par Plan:\par 1. EMG/NCS and consult Dr Henderson after reviewing EMG\par 2. PT/OT, gait stabilization\par 3. Follow up with Dr Vazquez to review spinal imaging\par 4. Patient agrees with plan.\par 5. Follow up after testing completed.\par \par Samy Hector MD\par [facility]\par \par Greater than 50% of the encounter was spent on counseling and coordination of care discussing differential diagnosis, diagnostic testing, and treatment options. We have talked about appropriate follow up, and I have spent 45 minutes of face to face time with the patient.\par

## 2022-06-02 NOTE — REVIEW OF SYSTEMS
[As Noted in HPI] : as noted in HPI [Feeling Poorly] : feeling poorly [Sleep Disturbances] : sleep disturbances [Leg Weakness] : leg weakness [Poor Coordination] : poor coordination [Numbness] : numbness [Tingling] : tingling [Abnormal Sensation] : an abnormal sensation [Difficulty Walking] : difficulty walking [Negative] : Heme/Lymph

## 2022-06-02 NOTE — HISTORY OF PRESENT ILLNESS
[FreeTextEntry1] : progressive gait difficulty [de-identified] : Mr. Sunday Hernández is a 66 year old man presenting with a PMHx of RA,  HTN, GERD, Hepatitic C, Smoker, S/P Prior Lumbar Surgeries (decompression and  fusion) who presents for evaluation of lumbar spine.\par \par In 2014 he underwent Cervical spine fusion followed by lowerback surgery that same month\par Over the year her reports progressive gait difficulty. Progressive gait difficulty. He uses LSO brace and cane to ambulate. He reports that he is unable to stand with an erect posture. He ambulates with cane with great difficulty. He states that he spends most of his days lying down. His current pain syndrome is impacting his quality of life. He denies any bowel bladder difficulty.

## 2022-06-02 NOTE — PHYSICAL EXAM
[FreeTextEntry1] : Awake, alert, oriented x 3.  Language fluent.  Comprehension intact.  Naming intact.  Repetition intact.  Affect normal.  Cranial nerves grossly intact.  Motor exam: distal atrophy in lower extremities, 4+/5 foot dorsiflexion and plantarflexion.  No tremors or fasciculations.  Sensory exam: impaired LT/vibration in distal LEs, proprioception intact.  DTRs: 1+ throughout, flexor plantar response bilaterally, no clonus.  Coordination: no dysmetria.  Gait: wide based, unsteady.  Romberg - negative

## 2022-06-07 NOTE — PHYSICAL EXAM
[General Appearance - Alert] : alert [General Appearance - In No Acute Distress] : in no acute distress [General Appearance - Well Nourished] : well nourished [Oriented To Time, Place, And Person] : oriented to person, place, and time [Impaired Insight] : insight and judgment were intact [Affect] : the affect was normal [Motor Tone] : muscle tone was normal in all four extremities [Motor Strength] : muscle strength was normal in all four extremities [Involuntary Movements] : no involuntary movements were seen [5] : C5 Biceps 5/5 [4] : L3 quadriceps 4/5 [Sensation Tactile Decrease] : light touch was intact [Limited Balance] : the patient's balance was impaired [2+] : Patella left 2+ [No Visual Abnormalities] : no visible abnormailities [Antalgic] : antalgic [Sclera] : the sclera and conjunctiva were normal [PERRL With Normal Accommodation] : pupils were equal in size, round, reactive to light, with normal accommodation [Extraocular Movements] : extraocular movements were intact [Full Visual Field] : full visual field [Outer Ear] : the ears and nose were normal in appearance [Hearing Threshold Finger Rub Not [place]] : hearing was normal [Neck Appearance] : the appearance of the neck was normal [Neck Cervical Mass (___cm)] : no neck mass was observed [Exaggerated Use Of Accessory Muscles For Inspiration] : no accessory muscle use [Heart Rate And Rhythm] : heart rate was normal and rhythm regular [Edema] : there was no peripheral edema [Bowel Sounds] : normal bowel sounds [Abdomen Tenderness] : non-tender [No Spinal Tenderness] : no spinal tenderness [Skin Color & Pigmentation] : normal skin color and pigmentation [Skin Turgor] : normal skin turgor [] : no rash [FreeTextEntry1] : ambulating with a cane [Tremor] : no tremor present [___] : absent on the right [___] : absent on the left [Able to toe walk] : the patient was not able to toe walk [Able to heel walk] : the patient was not able to heel walk

## 2022-06-07 NOTE — ASSESSMENT
[FreeTextEntry1] : 66 years old man with PMHx of rheumatoid arthritis on Prednisone, lumbar spine fusion L2 to S2 and cervical spine fusion; started to have worsening low back pain radiating to b/l anterior thighs and associated paresthesias in feet and difficulty ambulating  MRI thoracic and  lumbar spine showed  degenerative discs  throughout with spinal stenosis.\par \par Plan:\par - CT thoracic and lumbar spine\par - Referral to Dr. Cory Conner to asses for surgical intervention \par - Referral to neurology

## 2022-06-07 NOTE — HISTORY OF PRESENT ILLNESS
[de-identified] : The patient with PMHx of rheumatoid arthritis on Prednisone, lumbar spine fusion L2 to S2 7 years ago who was recently admitted for worsening low back pain with radiation to b/l anterior thighs and associated paresthesias in feet. He also has difficulty with ambulation and unsteady gait.  He is ambulating with a cane. Pain is unrelieved by Percocet. MRI lumbar spine showed  degenerative discs  throughout, worst at L1-2, with thecal sac compression. No acute intervention indicated and he was discharged home with a follow up here.\par \par MRI Thoracic spine done and showed multilevel spondylosis and spinal canal stenosis. \par \par

## 2022-06-13 ENCOUNTER — RX RENEWAL (OUTPATIENT)
Age: 67
End: 2022-06-13

## 2022-06-15 ENCOUNTER — APPOINTMENT (OUTPATIENT)
Dept: MRI IMAGING | Facility: CLINIC | Age: 67
End: 2022-06-15
Payer: MEDICARE

## 2022-06-15 ENCOUNTER — OUTPATIENT (OUTPATIENT)
Dept: OUTPATIENT SERVICES | Facility: HOSPITAL | Age: 67
LOS: 1 days | End: 2022-06-15
Payer: MEDICARE

## 2022-06-15 DIAGNOSIS — M43.26 FUSION OF SPINE, LUMBAR REGION: Chronic | ICD-10-CM

## 2022-06-15 DIAGNOSIS — M50.90 CERVICAL DISC DISORDER, UNSPECIFIED, UNSPECIFIED CERVICAL REGION: ICD-10-CM

## 2022-06-15 DIAGNOSIS — Z98.1 ARTHRODESIS STATUS: Chronic | ICD-10-CM

## 2022-06-15 DIAGNOSIS — M48.061 SPINAL STENOSIS, LUMBAR REGION WITHOUT NEUROGENIC CLAUDICATION: ICD-10-CM

## 2022-06-15 PROCEDURE — 72141 MRI NECK SPINE W/O DYE: CPT | Mod: ME

## 2022-06-15 PROCEDURE — G1004: CPT

## 2022-06-15 PROCEDURE — 72141 MRI NECK SPINE W/O DYE: CPT | Mod: 26,ME

## 2022-06-17 ENCOUNTER — APPOINTMENT (OUTPATIENT)
Dept: MRI IMAGING | Facility: CLINIC | Age: 67
End: 2022-06-17
Payer: MEDICARE

## 2022-06-17 ENCOUNTER — APPOINTMENT (OUTPATIENT)
Dept: RADIOLOGY | Facility: CLINIC | Age: 67
End: 2022-06-17
Payer: MEDICARE

## 2022-06-17 ENCOUNTER — OUTPATIENT (OUTPATIENT)
Dept: OUTPATIENT SERVICES | Facility: HOSPITAL | Age: 67
LOS: 1 days | End: 2022-06-17
Payer: MEDICARE

## 2022-06-17 DIAGNOSIS — M43.26 FUSION OF SPINE, LUMBAR REGION: Chronic | ICD-10-CM

## 2022-06-17 DIAGNOSIS — M54.16 RADICULOPATHY, LUMBAR REGION: ICD-10-CM

## 2022-06-17 DIAGNOSIS — R26.0 ATAXIC GAIT: ICD-10-CM

## 2022-06-17 DIAGNOSIS — Z98.1 ARTHRODESIS STATUS: Chronic | ICD-10-CM

## 2022-06-17 DIAGNOSIS — M48.061 SPINAL STENOSIS, LUMBAR REGION WITHOUT NEUROGENIC CLAUDICATION: ICD-10-CM

## 2022-06-17 DIAGNOSIS — M54.9 DORSALGIA, UNSPECIFIED: ICD-10-CM

## 2022-06-17 DIAGNOSIS — M48.04 SPINAL STENOSIS, THORACIC REGION: ICD-10-CM

## 2022-06-17 PROCEDURE — 72082 X-RAY EXAM ENTIRE SPI 2/3 VW: CPT | Mod: 26

## 2022-06-17 PROCEDURE — G1004: CPT

## 2022-06-17 PROCEDURE — 72146 MRI CHEST SPINE W/O DYE: CPT | Mod: 26,ME

## 2022-06-17 PROCEDURE — 72146 MRI CHEST SPINE W/O DYE: CPT | Mod: ME

## 2022-06-17 PROCEDURE — 72082 X-RAY EXAM ENTIRE SPI 2/3 VW: CPT

## 2022-06-22 ENCOUNTER — APPOINTMENT (OUTPATIENT)
Dept: NEUROSURGERY | Facility: CLINIC | Age: 67
End: 2022-06-22
Payer: MEDICARE

## 2022-06-22 PROCEDURE — 99212 OFFICE O/P EST SF 10 MIN: CPT

## 2022-06-24 NOTE — REASON FOR VISIT
[Follow-Up: _____] : a [unfilled] follow-up visit [Family Member] : family member [FreeTextEntry1] : Mr. Hernández is here today to review images and discuss surgical interventions.  He continues to have lower back pain. Take Percocet and oxycodone for pain with some relief. MRI of thoracic spine shows severe spinal stenosis compressing on the spinal cord. He reports he stopped smoking on 6/1/2022. Uses nicotine patches occasionally. Patient has been taking prednisone for RA for many decades.

## 2022-06-24 NOTE — HISTORY OF PRESENT ILLNESS
[FreeTextEntry1] : In 2014 he underwent Cervical spine fusion followed by lower back surgery that same month\par Over the year her reports progressive gait difficulty. Progressive gait difficulty. He uses LSO brace and cane to ambulate. He reports that he is unable to stand with an erect posture. He ambulates with cane with great difficulty. He states that he spends most of his days lying down. His current pain syndrome is impacting his quality of life. He denies any bowel bladder difficulty.

## 2022-06-24 NOTE — ASSESSMENT
[FreeTextEntry1] : Mr. Sunday Hernández is a 66 year old man S/P Spinal Fusion and decompression presenting with adjacent level stenosis and Junctional Kyphosis. MRI Thoracic And Lumbar spine reviewed today. Scoliosis Xray was not done he presented today with thoracic AP and Lat. Patient will have scoliosis series done for surgical planning at [place]. Encouraged to continue smoking cessation. Encouraged to exercise and stay active. He will start Prehab at [facility] prior to surgery. He will return in one month. \par \par \par Please see Dr. Conner's dictation for details.\par I, Dr. Rohan Conner evaluated the patient with the nurse practitioner Sera Alvarenga and established the plan of care. I personally discuss this patient with the nurse practitioner at the time of the visit. I agree with the assessment and plan as written, unless noted below.

## 2022-07-01 ENCOUNTER — APPOINTMENT (OUTPATIENT)
Dept: RADIOLOGY | Facility: CLINIC | Age: 67
End: 2022-07-01

## 2022-07-01 ENCOUNTER — OUTPATIENT (OUTPATIENT)
Dept: OUTPATIENT SERVICES | Facility: HOSPITAL | Age: 67
LOS: 1 days | End: 2022-07-01
Payer: MEDICARE

## 2022-07-01 DIAGNOSIS — M48.04 SPINAL STENOSIS, THORACIC REGION: ICD-10-CM

## 2022-07-01 DIAGNOSIS — Z98.1 ARTHRODESIS STATUS: Chronic | ICD-10-CM

## 2022-07-01 DIAGNOSIS — M47.14 OTHER SPONDYLOSIS WITH MYELOPATHY, THORACIC REGION: ICD-10-CM

## 2022-07-01 DIAGNOSIS — M43.26 FUSION OF SPINE, LUMBAR REGION: Chronic | ICD-10-CM

## 2022-07-01 PROCEDURE — 72082 X-RAY EXAM ENTIRE SPI 2/3 VW: CPT | Mod: 26

## 2022-07-01 PROCEDURE — 72082 X-RAY EXAM ENTIRE SPI 2/3 VW: CPT

## 2022-07-12 ENCOUNTER — RX RENEWAL (OUTPATIENT)
Age: 67
End: 2022-07-12

## 2022-07-14 ENCOUNTER — APPOINTMENT (OUTPATIENT)
Dept: NEUROLOGY | Facility: CLINIC | Age: 67
End: 2022-07-14

## 2022-07-14 PROCEDURE — 95907 NVR CNDJ TST 1-2 STUDIES: CPT

## 2022-07-18 ENCOUNTER — APPOINTMENT (OUTPATIENT)
Dept: HEPATOLOGY | Facility: CLINIC | Age: 67
End: 2022-07-18

## 2022-07-18 VITALS
HEIGHT: 74 IN | HEART RATE: 87 BPM | OXYGEN SATURATION: 94 % | WEIGHT: 210 LBS | SYSTOLIC BLOOD PRESSURE: 130 MMHG | DIASTOLIC BLOOD PRESSURE: 86 MMHG | TEMPERATURE: 97.6 F | BODY MASS INDEX: 26.95 KG/M2 | RESPIRATION RATE: 16 BRPM

## 2022-07-18 LAB
AFP-TM SERPL-MCNC: <1.8 NG/ML
ALBUMIN SERPL ELPH-MCNC: 4.3 G/DL
ALP BLD-CCNC: 53 U/L
ALT SERPL-CCNC: 20 U/L
ANION GAP SERPL CALC-SCNC: 16 MMOL/L
AST SERPL-CCNC: 28 U/L
BASOPHILS # BLD AUTO: 0.04 K/UL
BASOPHILS NFR BLD AUTO: 0.3 %
BILIRUB SERPL-MCNC: 0.3 MG/DL
BUN SERPL-MCNC: 11 MG/DL
CALCIUM SERPL-MCNC: 10.2 MG/DL
CHLORIDE SERPL-SCNC: 99 MMOL/L
CHOLEST SERPL-MCNC: 208 MG/DL
CO2 SERPL-SCNC: 23 MMOL/L
CREAT SERPL-MCNC: 0.95 MG/DL
EGFR: 88 ML/MIN/1.73M2
EOSINOPHIL # BLD AUTO: 0.08 K/UL
EOSINOPHIL NFR BLD AUTO: 0.6 %
ESTIMATED AVERAGE GLUCOSE: 117 MG/DL
GLUCOSE SERPL-MCNC: 78 MG/DL
HBA1C MFR BLD HPLC: 5.7 %
HCT VFR BLD CALC: 43.7 %
HDLC SERPL-MCNC: 52 MG/DL
HEPB DNA PCR INT: NOT DETECTED
HEPB DNA PCR LOG: NOT DETECTED LOGIU/ML
HGB BLD-MCNC: 14.2 G/DL
IMM GRANULOCYTES NFR BLD AUTO: 0.3 %
INR PPP: 1.02 RATIO
LDLC SERPL CALC-MCNC: 126 MG/DL
LYMPHOCYTES # BLD AUTO: 2.6 K/UL
LYMPHOCYTES NFR BLD AUTO: 18.8 %
MAN DIFF?: NORMAL
MCHC RBC-ENTMCNC: 32.5 GM/DL
MCHC RBC-ENTMCNC: 33 PG
MCV RBC AUTO: 101.6 FL
MONOCYTES # BLD AUTO: 1.46 K/UL
MONOCYTES NFR BLD AUTO: 10.6 %
NEUTROPHILS # BLD AUTO: 9.61 K/UL
NEUTROPHILS NFR BLD AUTO: 69.4 %
NONHDLC SERPL-MCNC: 155 MG/DL
PLATELET # BLD AUTO: 250 K/UL
POTASSIUM SERPL-SCNC: 4.2 MMOL/L
PROT SERPL-MCNC: 6.9 G/DL
PT BLD: 12 SEC
RBC # BLD: 4.3 M/UL
RBC # FLD: 12.4 %
SODIUM SERPL-SCNC: 139 MMOL/L
TRIGL SERPL-MCNC: 149 MG/DL
WBC # FLD AUTO: 13.83 K/UL

## 2022-07-18 PROCEDURE — 99214 OFFICE O/P EST MOD 30 MIN: CPT

## 2022-07-18 NOTE — HISTORY OF PRESENT ILLNESS
[Needlestick Exposure] : needlestick exposure [IV Drug Use] : IV drug use [Autoimmune Disorder] : autoimmune disorder [Fatigue] : denies fatigue [Malaise] : denies malaise [Fever] : denies fever [Diffuse Joint Pain (Arthralgias)] : arthralgias stable [Muscle Aches, Generalized (Myalgias)] : denies myalgias [Yellow Skin Or Eyes (Jaundice)] : denies jaundice [Urine Tests Nonspecific Abnormal Findings Biliuria] : denies dark urine [Abdominal Pain] : denies abdominal pain [Light Colored Bowel Movement (Acholic Stools)] : denies pale stools [Insomnia] : denies insomnia [Skin: Rash] : denies rash [Itching (Pruritus)] : denies pruritus [Shortness Of Breath] : denies shortness of breath [Infected Sexual Partner] : no infected sexual partner [Tattoo] : no tattoos [Body Piercing] : no body piercing [Hemodialysis] : no hemodialysis [Transfusion before 1992] : no transfusion before 1992 [Transplant before 1992] : no transplant before 1992 [Incarceration] : no incarceration [Alcohol Abuse] : no alcohol abuse [Household Contact to HBV] : no household contact to HBV [Travel to Endemic Area] : no travel to an endemic area [Occupational Exposure] : no occupational exposure [Cocaine Use] : no cocaine use [Wt Gain ___ Lbs] : no recent weight gain [Wt Loss ___ Lbs] : no recent weight loss [de-identified] : - 7/18/22: returns after >3 yrs. Finished Mavyret in early 2019. Had recent BW. Doing well, lost 30 lbs by dieting, 210 lbs, BMI 27. He needs another back surgery. Now off adalimumab, on sarilumab (anti-IL-6)\par \par - Mr. AMADO is a 63 year old man with rheumatoid arthritis, who was treated for hepatitis C in 2002 and was found to have again detectable HCV RNA. He is also HBcAb positive (isolated) and planned to start Humira.\par Adalimumab and methotrexate were ordered, but he has not started these, pending today's appointment. \par His RA was diagnosed in 2000, and he was treated with several drugs including Enbrel and "different kinds of injections" - is unsure about methotrexate. His symptoms improved and he stopped taking these.\par Weight 237 lbs with BMI 30.4 as of 12/14 - used to be 250 lbs for a long time, max weight was almost 300 lbs. Alcohol: does not drink, never did heavily.\par \par - 3/12/19: arthritis not improved. Did not complain of early satiety anymore.\par - 1/29/19: started Tenofovir (TDF) in mid-January. Since then, has been feeling a little "off" and an empty feeling in his stomach, and early satiety. Has not lost weight. Has not started Humira yet pending today's appointment. Did not tolerate Methotrexate as he developed bloating and diarrhea - not on it. Is on prednisone 30 mg/d for his RA. Has not started hepatitis C medication, but it has been ordered. Taking pain medication for back pain.\par  HCV treatment (8 wk tx) / after HCV tx completion then start Hep B treatment with tenofovir / then start humira.\par \par Workup:\par - fibroscan 12/20/18: 4.5 kPa which is consistent with F0-F1 disease,  dB/m (S 3).\par - US abdomen 12/20/18: liver: diffusely coarsened echotexture, no mass. Spleen WNL. [de-identified] : used IV drugs remotely

## 2022-07-18 NOTE — ASSESSMENT
[FreeTextEntry1] : Mr. AMADO is a 66 year old man with \par - h/o hepatitis C recurrence after treatment in 2002, s/p Mavyret in 2019 with SVR12, i.e. successful treatment\par - HBcAb positive, HBsAb indeterminate; on tenofovir as he is \par - LFTs and PLT normal; fibroscan suggests no significant fibrosis, US shows coarsened echotexture of liver - nonspecific\par - early satiety since starting Tenofovir - possibly side effect. Is on PPI. Resolved.\par - obese BMI 27 after 30 lbs weight loss by dieting; glucose intolerance\par \par Plan:\par - continue Tenofovir TDF\par - continue antirheumatic medications as required, currently on sarilumab (anti-IL6), prednisone;\par - fibroscan before next visit in 1 year after bloowork and fibroscan. Continuation of tenofovir will depend on his immunosuppression

## 2022-07-20 LAB
HCV RNA SERPL NAA+PROBE-LOG IU: NOT DETECTED LOGIU/ML
HEPC RNA INTERP: NOT DETECTED

## 2022-07-21 ENCOUNTER — RX RENEWAL (OUTPATIENT)
Age: 67
End: 2022-07-21

## 2022-07-21 ENCOUNTER — APPOINTMENT (OUTPATIENT)
Dept: CARDIOLOGY | Facility: CLINIC | Age: 67
End: 2022-07-21

## 2022-07-21 ENCOUNTER — NON-APPOINTMENT (OUTPATIENT)
Age: 67
End: 2022-07-21

## 2022-07-21 VITALS
HEIGHT: 74 IN | WEIGHT: 215.44 LBS | TEMPERATURE: 98.3 F | OXYGEN SATURATION: 98 % | HEART RATE: 59 BPM | DIASTOLIC BLOOD PRESSURE: 70 MMHG | BODY MASS INDEX: 27.65 KG/M2 | SYSTOLIC BLOOD PRESSURE: 110 MMHG

## 2022-07-21 DIAGNOSIS — Z87.891 PERSONAL HISTORY OF NICOTINE DEPENDENCE: ICD-10-CM

## 2022-07-21 DIAGNOSIS — M25.421 EFFUSION, RIGHT ELBOW: ICD-10-CM

## 2022-07-21 DIAGNOSIS — Z12.5 ENCOUNTER FOR SCREENING FOR MALIGNANT NEOPLASM OF PROSTATE: ICD-10-CM

## 2022-07-21 DIAGNOSIS — Z00.00 ENCOUNTER FOR GENERAL ADULT MEDICAL EXAMINATION W/OUT ABNORMAL FINDINGS: ICD-10-CM

## 2022-07-21 DIAGNOSIS — M70.30 OTHER BURSITIS OF ELBOW, UNSPECIFIED ELBOW: ICD-10-CM

## 2022-07-21 PROCEDURE — 99204 OFFICE O/P NEW MOD 45 MIN: CPT

## 2022-07-21 PROCEDURE — 93000 ELECTROCARDIOGRAM COMPLETE: CPT

## 2022-07-21 RX ORDER — POTASSIUM CHLORIDE 1500 MG/1
20 TABLET, FILM COATED, EXTENDED RELEASE ORAL
Qty: 180 | Refills: 3 | Status: DISCONTINUED | COMMUNITY
Start: 2021-10-12 | End: 2022-07-21

## 2022-07-21 NOTE — REVIEW OF SYSTEMS
[Joint Pain] : joint pain [Back Pain] : back pain [Joint Swelling] : joint swelling [Negative] : Heme/Lymph [FreeTextEntry6] : see hpi

## 2022-07-21 NOTE — PHYSICAL EXAM
[No Acute Distress] : no acute distress [Well Nourished] : well nourished [Well Developed] : well developed [Well-Appearing] : well-appearing [Normal Sclera/Conjunctiva] : normal sclera/conjunctiva [PERRL] : pupils equal round and reactive to light [EOMI] : extraocular movements intact [Normal Outer Ear/Nose] : the outer ears and nose were normal in appearance [Normal Oropharynx] : the oropharynx was normal [No JVD] : no jugular venous distention [No Lymphadenopathy] : no lymphadenopathy [Supple] : supple [Thyroid Normal, No Nodules] : the thyroid was normal and there were no nodules present [No Respiratory Distress] : no respiratory distress  [No Accessory Muscle Use] : no accessory muscle use [Clear to Auscultation] : lungs were clear to auscultation bilaterally [Normal Rate] : normal rate  [Regular Rhythm] : with a regular rhythm [Normal S1, S2] : normal S1 and S2 [No Murmur] : no murmur heard [No Carotid Bruits] : no carotid bruits [No Abdominal Bruit] : a ~M bruit was not heard ~T in the abdomen [No Varicosities] : no varicosities [Pedal Pulses Present] : the pedal pulses are present [No Edema] : there was no peripheral edema [No Palpable Aorta] : no palpable aorta [No Extremity Clubbing/Cyanosis] : no extremity clubbing/cyanosis [Soft] : abdomen soft [Non Tender] : non-tender [Non-distended] : non-distended [No Masses] : no abdominal mass palpated [No HSM] : no HSM [Normal Bowel Sounds] : normal bowel sounds [Normal Posterior Cervical Nodes] : no posterior cervical lymphadenopathy [Normal Anterior Cervical Nodes] : no anterior cervical lymphadenopathy [No CVA Tenderness] : no CVA  tenderness [No Spinal Tenderness] : no spinal tenderness [No Rash] : no rash [Coordination Grossly Intact] : coordination grossly intact [No Focal Deficits] : no focal deficits [Normal Gait] : normal gait [Deep Tendon Reflexes (DTR)] : deep tendon reflexes were 2+ and symmetric [Normal Affect] : the affect was normal [Normal Insight/Judgement] : insight and judgment were intact [de-identified] : right elbow bursitis

## 2022-07-21 NOTE — HISTORY OF PRESENT ILLNESS
[FreeTextEntry1] : Here to establish primary care [de-identified] : This is a 67 y/o male with a pmhx of HTN, Hep C, Rheum arthritis here to establish today. Pt reports chest congestion since quitting smoking 2.5 months ago. Pt admits to dyspnea due to back pain. Pt reports spinal fusion and decompression of L1-L2, but still admits to pain and will be needing another surgery.  Pt reports a fall 1 month ago with subsequent swelling of right elbow. Patient denies chest pain, palpitations, dizziness, syncope, changes in bowel/bladder habits or appetite. pt with ocassional dyspnea and rare cough

## 2022-07-27 ENCOUNTER — APPOINTMENT (OUTPATIENT)
Dept: NEUROSURGERY | Facility: CLINIC | Age: 67
End: 2022-07-27

## 2022-07-27 VITALS
SYSTOLIC BLOOD PRESSURE: 100 MMHG | HEIGHT: 74 IN | DIASTOLIC BLOOD PRESSURE: 68 MMHG | OXYGEN SATURATION: 95 % | HEART RATE: 71 BPM | BODY MASS INDEX: 27.65 KG/M2 | WEIGHT: 215.44 LBS

## 2022-07-27 DIAGNOSIS — G95.20 UNSPECIFIED CORD COMPRESSION: ICD-10-CM

## 2022-07-27 PROCEDURE — 99213 OFFICE O/P EST LOW 20 MIN: CPT

## 2022-07-27 NOTE — REASON FOR VISIT
[Follow-Up: _____] : a [unfilled] follow-up visit [FreeTextEntry1] : Today he presents with comprehensive discussion regarding surgery\par He reports that his symptoms continue to impact his quality of life.\par He ambulates with cane to support his balance

## 2022-07-27 NOTE — DATA REVIEWED
[de-identified] : Cervical and Thoracic Spine 6/17/22 [facility] [de-identified] : Scoliosis Xray [facility] 7/1/22

## 2022-07-27 NOTE — REVIEW OF SYSTEMS
[Feeling Poorly] : feeling poorly [Poor Coordination] : poor coordination [Numbness] : numbness [Tingling] : tingling [Abnormal Sensation] : an abnormal sensation [Difficulty Walking] : difficulty walking [Arthralgias] : arthralgias [Negative] : Gastrointestinal [FreeTextEntry9] : Ambulates with cane

## 2022-07-27 NOTE — PHYSICAL EXAM
[General Appearance - Alert] : alert [General Appearance - In No Acute Distress] : in no acute distress [Oriented To Time, Place, And Person] : oriented to person, place, and time [Motor Tone] : muscle tone was normal in all four extremities [Motor Strength] : muscle strength was normal in all four extremities [Sensation Tactile Decrease] : light touch was intact [Deformity] : deformity [Loss Of Normal Lordosis] : a loss of normal lordosis [Sclera] : the sclera and conjunctiva were normal [Neck Appearance] : the appearance of the neck was normal [] : no respiratory distress [Heart Rate And Rhythm] : heart rate was normal and rhythm regular [FreeTextEntry1] : Ambulates with cane

## 2022-07-27 NOTE — ASSESSMENT
[FreeTextEntry1] : Mr. Sunday Hernández is a 66 year old man Presenting with Lumbar Cord compression, proximal junctional kyphosis and Failed fusion and conservative management.\par \par Smoking cessation since 6-1-22; Continual smoking cessation recommended\par Proximal Junctional Kyphosis and degenerative Spinal stenosis and spinal cord compression and hardware failure\par Goals of surgery to revisie fusion, extend hardware \par \par \par Surgery recommendation was result of shared decision making. The patient tried conservative treatment and failed therapy. All surgical  and non-surgical options were discussed. After detailed imaging review and patient examination surgical intervention was discussed with the patient to halt progression of symptoms. \par Potential surgical risks and benefits and alternative discussed with time allowed for questions and answers given. \par Pre-op requirements and postop recovery and expectations discussed regarding the benefits and risks for surgery.\par \par Instructions to Stop taking all non-steroidal anti-inflammatory medicines (ibuprofen, naproxen, etc.) and blood thinners (Coumadin, Plavix, aspirin, etc.) 7-10 days before surgery. Stop using nicotine and drinking alcohol 1 week before and 2 weeks after surgery to avoid bleeding and healing problems.\par \par  Pt agrees to proceed with surgery.\par Preop planning and care coordination in progress. Spine surgery questionnaire and Virtual Spine class information provided.\par Teachback protocol implemented.\par \par \par Rehabilitation \par Bone stimulator\par \par Please see Dr. Conner's dictation for details.\par I, Dr. Rohan Conner evaluated the patient with the nurse practitioner Jason Flores and established the plan of care. I personally discuss this patient with the nurse practitioner at the time of the visit. I agree with the assessment and plan as written, unless noted below.\par \par \par \par

## 2022-07-28 ENCOUNTER — NON-APPOINTMENT (OUTPATIENT)
Age: 67
End: 2022-07-28

## 2022-07-28 ENCOUNTER — APPOINTMENT (OUTPATIENT)
Dept: THORACIC SURGERY | Facility: CLINIC | Age: 67
End: 2022-07-28

## 2022-07-28 VITALS — WEIGHT: 215 LBS | HEIGHT: 74 IN | BODY MASS INDEX: 27.59 KG/M2

## 2022-07-28 DIAGNOSIS — Z87.891 PERSONAL HISTORY OF NICOTINE DEPENDENCE: ICD-10-CM

## 2022-07-28 PROCEDURE — G0296 VISIT TO DETERM LDCT ELIG: CPT

## 2022-07-29 ENCOUNTER — NON-APPOINTMENT (OUTPATIENT)
Age: 67
End: 2022-07-29

## 2022-07-29 LAB
25(OH)D3 SERPL-MCNC: 54.8 NG/ML
ANION GAP SERPL CALC-SCNC: 14 MMOL/L
APPEARANCE: CLEAR
BACTERIA: NEGATIVE
BASOPHILS # BLD AUTO: 0.03 K/UL
BASOPHILS NFR BLD AUTO: 0.3 %
BILIRUBIN URINE: NEGATIVE
BLOOD URINE: NORMAL
BUN SERPL-MCNC: 11 MG/DL
CALCIUM SERPL-MCNC: 10.4 MG/DL
CHLORIDE SERPL-SCNC: 100 MMOL/L
CHOLEST SERPL-MCNC: 219 MG/DL
CO2 SERPL-SCNC: 25 MMOL/L
COLOR: YELLOW
CREAT SERPL-MCNC: 1 MG/DL
EGFR: 83 ML/MIN/1.73M2
EOSINOPHIL # BLD AUTO: 0.02 K/UL
EOSINOPHIL NFR BLD AUTO: 0.2 %
ESTIMATED AVERAGE GLUCOSE: 114 MG/DL
GLUCOSE QUALITATIVE U: NEGATIVE
GLUCOSE SERPL-MCNC: 95 MG/DL
HBA1C MFR BLD HPLC: 5.6 %
HCT VFR BLD CALC: 45 %
HDLC SERPL-MCNC: 58 MG/DL
HGB BLD-MCNC: 15.1 G/DL
HYALINE CASTS: 0 /LPF
IMM GRANULOCYTES NFR BLD AUTO: 0.4 %
KETONES URINE: NEGATIVE
LDLC SERPL CALC-MCNC: 139 MG/DL
LEUKOCYTE ESTERASE URINE: NEGATIVE
LYMPHOCYTES # BLD AUTO: 1.49 K/UL
LYMPHOCYTES NFR BLD AUTO: 15.5 %
MAN DIFF?: NORMAL
MCHC RBC-ENTMCNC: 33.3 PG
MCHC RBC-ENTMCNC: 33.6 GM/DL
MCV RBC AUTO: 99.3 FL
MICROSCOPIC-UA: NORMAL
MONOCYTES # BLD AUTO: 0.65 K/UL
MONOCYTES NFR BLD AUTO: 6.7 %
NEUTROPHILS # BLD AUTO: 7.4 K/UL
NEUTROPHILS NFR BLD AUTO: 76.9 %
NITRITE URINE: NEGATIVE
NONHDLC SERPL-MCNC: 161 MG/DL
PH URINE: 6.5
PLATELET # BLD AUTO: 246 K/UL
POTASSIUM SERPL-SCNC: 4.4 MMOL/L
PROTEIN URINE: NEGATIVE
PSA SERPL-MCNC: 0.43 NG/ML
RBC # BLD: 4.53 M/UL
RBC # FLD: 12.3 %
RED BLOOD CELLS URINE: 4 /HPF
SODIUM SERPL-SCNC: 140 MMOL/L
SPECIFIC GRAVITY URINE: 1.02
SQUAMOUS EPITHELIAL CELLS: 0 /HPF
T4 FREE SERPL-MCNC: 1.4 NG/DL
TRIGL SERPL-MCNC: 110 MG/DL
TSH SERPL-ACNC: 1.32 UIU/ML
UROBILINOGEN URINE: NORMAL
WBC # FLD AUTO: 9.63 K/UL
WHITE BLOOD CELLS URINE: 1 /HPF

## 2022-08-01 ENCOUNTER — OUTPATIENT (OUTPATIENT)
Dept: OUTPATIENT SERVICES | Facility: HOSPITAL | Age: 67
LOS: 1 days | End: 2022-08-01
Payer: MEDICARE

## 2022-08-01 ENCOUNTER — APPOINTMENT (OUTPATIENT)
Dept: CT IMAGING | Facility: CLINIC | Age: 67
End: 2022-08-01

## 2022-08-01 DIAGNOSIS — M43.26 FUSION OF SPINE, LUMBAR REGION: Chronic | ICD-10-CM

## 2022-08-01 DIAGNOSIS — Z98.1 ARTHRODESIS STATUS: Chronic | ICD-10-CM

## 2022-08-01 DIAGNOSIS — Z87.891 PERSONAL HISTORY OF NICOTINE DEPENDENCE: ICD-10-CM

## 2022-08-01 PROCEDURE — 71271 CT THORAX LUNG CANCER SCR C-: CPT

## 2022-08-01 PROCEDURE — 71271 CT THORAX LUNG CANCER SCR C-: CPT | Mod: 26

## 2022-08-01 NOTE — HISTORY OF PRESENT ILLNESS
[TextBox_13] : Referred by Dr. Yang\par \par Mr. HUNTER AMADO is a 66 year old man with a history of nicotine dependence\par \par He was seen in the office by Dr. Yang for review of eligibility for, as well as, discussion of Low-Dose CT lung cancer screening program. Over the telephone today we reviewed and confirmed that the patient meets screening eligibility criteria:\par \par -Age: 66 years old\par \par Smoking status:\par \par -Former smoker\par \par \par -Number of pack(s) per day: 1\par \par -Number of years smoked: 20\par \par -Number of pack years smokin\par \par -Quit year:\par \par Mr. AMADO denies any signs or symptoms of lung cancer including new cough, change in cough, hemoptysis and unintentional weight loss.\par \par Mr. AMADO denies any personal history of lung cancer. No lung cancer in a 1st degree relative. Denies any history of lung disease. Denies any history of occupational exposures\par  [TextBox_6] : 1 [TextBox_8] : 20

## 2022-08-01 NOTE — PLAN
[FreeTextEntry1] : Plan:\par \par -Low Dose CT chest for lung cancer screening scheduled for 8/1/22 at [address] [place] [address]\par \par -Patient to follow up with Dr. Yang to review results of LDCT\par \par -Encouraged continued smoking abstinence\par \par \par Engaged in shared decision making with Mr. HUNTER AMADO . Answered all questions. He verbalized understanding and agreement. He knows to call back with any questions or concerns\par

## 2022-08-02 ENCOUNTER — OUTPATIENT (OUTPATIENT)
Dept: OUTPATIENT SERVICES | Facility: HOSPITAL | Age: 67
LOS: 1 days | End: 2022-08-02
Payer: MEDICARE

## 2022-08-02 ENCOUNTER — APPOINTMENT (OUTPATIENT)
Dept: GASTROENTEROLOGY | Facility: HOSPITAL | Age: 67
End: 2022-08-02

## 2022-08-02 VITALS
HEIGHT: 74 IN | BODY MASS INDEX: 28.23 KG/M2 | WEIGHT: 220 LBS | DIASTOLIC BLOOD PRESSURE: 78 MMHG | RESPIRATION RATE: 14 BRPM | TEMPERATURE: 97 F | HEART RATE: 62 BPM | SYSTOLIC BLOOD PRESSURE: 113 MMHG

## 2022-08-02 DIAGNOSIS — Z98.1 ARTHRODESIS STATUS: Chronic | ICD-10-CM

## 2022-08-02 DIAGNOSIS — M43.26 FUSION OF SPINE, LUMBAR REGION: Chronic | ICD-10-CM

## 2022-08-02 DIAGNOSIS — Z12.11 ENCOUNTER FOR SCREENING FOR MALIGNANT NEOPLASM OF COLON: ICD-10-CM

## 2022-08-02 DIAGNOSIS — R10.9 UNSPECIFIED ABDOMINAL PAIN: ICD-10-CM

## 2022-08-02 PROCEDURE — 99204 OFFICE O/P NEW MOD 45 MIN: CPT

## 2022-08-02 PROCEDURE — G0463: CPT

## 2022-08-02 PROCEDURE — 99214 OFFICE O/P EST MOD 30 MIN: CPT

## 2022-08-02 NOTE — END OF VISIT
[] : Fellow [FreeTextEntry3] : ATTEST CONSULT\par I personally saw and examined the patient on 08/02/2022 and confirmed the nuñez examination findings. I reviewed the relevant data an discussed the plans as noted above with the resident/fellow and with the patient. I was involved in the integration of the findings in order to formulate the plan of care. \par 65 yo M with HTN, HCV, RA, h/o spinal fusion and decompression of L1-L2 with plans for possible s urgical intervention who presents as a new patient to GI referred for screening colonoscopy.\par - Patient declined a screening colonoscopy and a cologuard test despite explaining the risks and benefits of both methods. \par

## 2022-08-02 NOTE — HISTORY OF PRESENT ILLNESS
[de-identified] : 67 yo M with HTN, HCV, RA, h/o spinal fusion and decompression of L1-L2 with plans for possible s urgical intervention who presents as a new patient to GI referred for screening colonoscopy.\par \par Patient states he is no longer seeing the PCP who initially sent the referral for colonoscopy and states that he informed her that he does not want to proceed with a colonoscopy. States he had one in 2013/14 that was unremarkable and that he told his prior PCP that he would do it only that one time and never again. Denies any n/v/d or constipation, no bloody red/black BMs, no abdominal pain or difficulty or painful swallowing. Former smoker (quit a few mos prior). Reportedly has a maternal uncle who had colon cancer (unclear age of x). No alcohol or illicit drug use.  No unintentional weight loss.\par \par Last CBC and CMP 7/2022 were wnl.

## 2022-08-02 NOTE — PHYSICAL EXAM
[General Appearance - Alert] : alert [General Appearance - In No Acute Distress] : in no acute distress [Sclera] : the sclera and conjunctiva were normal [Extraocular Movements] : extraocular movements were intact [Hearing Threshold Finger Rub Not [place]] : hearing was normal [Neck Appearance] : the appearance of the neck was normal [Exaggerated Use Of Accessory Muscles For Inspiration] : no accessory muscle use [Abdomen Soft] : soft [Abdomen Tenderness] : non-tender [] : no hepato-splenomegaly [Abdomen Mass (___ Cm)] : no abdominal mass palpated [Abdomen Hernia] : no hernia was discovered [Involuntary Movements] : no involuntary movements were seen [Oriented To Time, Place, And Person] : oriented to person, place, and time [Affect] : the affect was normal [FreeTextEntry1] : Ambulates with cane

## 2022-08-02 NOTE — REVIEW OF SYSTEMS
[As Noted in HPI] : as noted in HPI [Fever] : no fever [Chills] : no chills [Recent Weight Loss (___ Lbs)] : no recent weight loss [Sore Throat] : no sore throat

## 2022-08-02 NOTE — CONSULT LETTER
[Dear  ___] : Dear  [unfilled], [Consult Letter:] : I had the pleasure of evaluating your patient, [unfilled]. [Please see my note below.] : Please see my note below. [Consult Closing:] : Thank you very much for allowing me to participate in the care of this patient.  If you have any questions, please do not hesitate to contact me. [Sincerely,] : Sincerely, [FreeTextEntry3] : Daina Valerio MD\par \par Assistant Clinical Professor \par Division of Gastroenterology at [facility]\par [facility]\par [facility]\par [facility]\par \par [address], [address] [facility] [address], [place] [address]\par Tel: [phone] | Fax: [phone]\par \par Twitter (Personal): @[email or web address] \par \par \par

## 2022-08-02 NOTE — ASSESSMENT
[FreeTextEntry1] : 67 yo M with HTN, HCV, RA, h/o spinal fusion and decompression of L1-L2 with plans for possible s urgical intervention who presents as a new patient to GI referred for screening colonoscopy.\par \par # Colon cancer screening\par - Discussed risks and benefits with patient regarding screening colonoscopy; patient declining colonoscopy as well as any non invasive testing such as Cologuard. Patient declining any evaluation to screen or assess for colon cancer; believes if he is going to die from colon cancer, then let it be, but he does not want to know or get any additional evaluation. States that he understands the risk of not proceeding with colon cancer screening and that it may result in missing cancer and, ultimately, death and does not want to proceed. Understands that he may be at increased risk considering his family history and continues to decline. \par \par Plan\par - No plans for colonoscopy per patient wishes \par - Continue to follow up with PCP and Hepatology\par \par RTC as needed\par \par Preliminary note until signed by Attending.\par \par Ami Brown MD\par GI/Hepatology Fellow, PGYVI

## 2022-08-04 ENCOUNTER — APPOINTMENT (OUTPATIENT)
Dept: RHEUMATOLOGY | Facility: CLINIC | Age: 67
End: 2022-08-04

## 2022-08-04 VITALS
SYSTOLIC BLOOD PRESSURE: 119 MMHG | OXYGEN SATURATION: 96 % | WEIGHT: 220 LBS | HEIGHT: 74 IN | TEMPERATURE: 97.6 F | DIASTOLIC BLOOD PRESSURE: 83 MMHG | BODY MASS INDEX: 28.23 KG/M2 | HEART RATE: 73 BPM

## 2022-08-04 PROCEDURE — 99214 OFFICE O/P EST MOD 30 MIN: CPT

## 2022-08-08 ENCOUNTER — APPOINTMENT (OUTPATIENT)
Dept: CARDIOLOGY | Facility: CLINIC | Age: 67
End: 2022-08-08

## 2022-08-08 ENCOUNTER — NON-APPOINTMENT (OUTPATIENT)
Age: 67
End: 2022-08-08

## 2022-08-08 DIAGNOSIS — R91.1 SOLITARY PULMONARY NODULE: ICD-10-CM

## 2022-08-08 PROCEDURE — 93306 TTE W/DOPPLER COMPLETE: CPT

## 2022-08-09 ENCOUNTER — APPOINTMENT (OUTPATIENT)
Dept: CARDIOLOGY | Facility: CLINIC | Age: 67
End: 2022-08-09

## 2022-08-09 PROCEDURE — A9500: CPT

## 2022-08-09 PROCEDURE — 93015 CV STRESS TEST SUPVJ I&R: CPT

## 2022-08-09 PROCEDURE — 78452 HT MUSCLE IMAGE SPECT MULT: CPT

## 2022-08-09 RX ORDER — REGADENOSON 0.08 MG/ML
0.4 INJECTION, SOLUTION INTRAVENOUS
Qty: 1 | Refills: 0 | Status: COMPLETED | OUTPATIENT
Start: 2022-08-09

## 2022-08-09 RX ADMIN — REGADENOSON 5 MG/5ML: 0.08 INJECTION, SOLUTION INTRAVENOUS at 00:00

## 2022-08-10 ENCOUNTER — OUTPATIENT (OUTPATIENT)
Dept: OUTPATIENT SERVICES | Facility: HOSPITAL | Age: 67
LOS: 1 days | End: 2022-08-10
Payer: MEDICARE

## 2022-08-10 ENCOUNTER — NON-APPOINTMENT (OUTPATIENT)
Age: 67
End: 2022-08-10

## 2022-08-10 VITALS
SYSTOLIC BLOOD PRESSURE: 125 MMHG | OXYGEN SATURATION: 97 % | TEMPERATURE: 98 F | RESPIRATION RATE: 16 BRPM | DIASTOLIC BLOOD PRESSURE: 85 MMHG | HEART RATE: 86 BPM | HEIGHT: 74 IN | WEIGHT: 210.1 LBS

## 2022-08-10 DIAGNOSIS — G95.20 UNSPECIFIED CORD COMPRESSION: ICD-10-CM

## 2022-08-10 DIAGNOSIS — Z29.9 ENCOUNTER FOR PROPHYLACTIC MEASURES, UNSPECIFIED: ICD-10-CM

## 2022-08-10 DIAGNOSIS — Z98.1 ARTHRODESIS STATUS: Chronic | ICD-10-CM

## 2022-08-10 DIAGNOSIS — I10 ESSENTIAL (PRIMARY) HYPERTENSION: ICD-10-CM

## 2022-08-10 DIAGNOSIS — Z01.818 ENCOUNTER FOR OTHER PREPROCEDURAL EXAMINATION: ICD-10-CM

## 2022-08-10 DIAGNOSIS — M43.26 FUSION OF SPINE, LUMBAR REGION: Chronic | ICD-10-CM

## 2022-08-10 DIAGNOSIS — B19.20 UNSPECIFIED VIRAL HEPATITIS C WITHOUT HEPATIC COMA: ICD-10-CM

## 2022-08-10 LAB
ALBUMIN SERPL ELPH-MCNC: 4.3 G/DL — SIGNIFICANT CHANGE UP (ref 3.3–5)
ALP SERPL-CCNC: 61 U/L — SIGNIFICANT CHANGE UP (ref 40–120)
ALT FLD-CCNC: 22 U/L — SIGNIFICANT CHANGE UP (ref 10–45)
ANION GAP SERPL CALC-SCNC: 15 MMOL/L — SIGNIFICANT CHANGE UP (ref 5–17)
AST SERPL-CCNC: 21 U/L — SIGNIFICANT CHANGE UP (ref 10–40)
BILIRUB SERPL-MCNC: 0.3 MG/DL — SIGNIFICANT CHANGE UP (ref 0.2–1.2)
BLD GP AB SCN SERPL QL: NEGATIVE — SIGNIFICANT CHANGE UP
BUN SERPL-MCNC: 16 MG/DL — SIGNIFICANT CHANGE UP (ref 7–23)
CALCIUM SERPL-MCNC: 9.4 MG/DL — SIGNIFICANT CHANGE UP (ref 8.4–10.5)
CHLORIDE SERPL-SCNC: 97 MMOL/L — SIGNIFICANT CHANGE UP (ref 96–108)
CO2 SERPL-SCNC: 24 MMOL/L — SIGNIFICANT CHANGE UP (ref 22–31)
CREAT SERPL-MCNC: 0.93 MG/DL — SIGNIFICANT CHANGE UP (ref 0.5–1.3)
EGFR: 91 ML/MIN/1.73M2 — SIGNIFICANT CHANGE UP
GLUCOSE SERPL-MCNC: 118 MG/DL — HIGH (ref 70–99)
HCT VFR BLD CALC: 40.8 % — SIGNIFICANT CHANGE UP (ref 39–50)
HGB BLD-MCNC: 13.4 G/DL — SIGNIFICANT CHANGE UP (ref 13–17)
MCHC RBC-ENTMCNC: 32.3 PG — SIGNIFICANT CHANGE UP (ref 27–34)
MCHC RBC-ENTMCNC: 32.8 GM/DL — SIGNIFICANT CHANGE UP (ref 32–36)
MCV RBC AUTO: 98.3 FL — SIGNIFICANT CHANGE UP (ref 80–100)
NRBC # BLD: 0 /100 WBCS — SIGNIFICANT CHANGE UP (ref 0–0)
PLATELET # BLD AUTO: 264 K/UL — SIGNIFICANT CHANGE UP (ref 150–400)
POTASSIUM SERPL-MCNC: 3.9 MMOL/L — SIGNIFICANT CHANGE UP (ref 3.5–5.3)
POTASSIUM SERPL-SCNC: 3.9 MMOL/L — SIGNIFICANT CHANGE UP (ref 3.5–5.3)
PROT SERPL-MCNC: 7.2 G/DL — SIGNIFICANT CHANGE UP (ref 6–8.3)
RBC # BLD: 4.15 M/UL — LOW (ref 4.2–5.8)
RBC # FLD: 11.9 % — SIGNIFICANT CHANGE UP (ref 10.3–14.5)
RH IG SCN BLD-IMP: POSITIVE — SIGNIFICANT CHANGE UP
SODIUM SERPL-SCNC: 136 MMOL/L — SIGNIFICANT CHANGE UP (ref 135–145)
WBC # BLD: 17.83 K/UL — HIGH (ref 3.8–10.5)
WBC # FLD AUTO: 17.83 K/UL — HIGH (ref 3.8–10.5)

## 2022-08-10 PROCEDURE — 80053 COMPREHEN METABOLIC PANEL: CPT

## 2022-08-10 PROCEDURE — 86900 BLOOD TYPING SEROLOGIC ABO: CPT

## 2022-08-10 PROCEDURE — 87641 MR-STAPH DNA AMP PROBE: CPT

## 2022-08-10 PROCEDURE — 85027 COMPLETE CBC AUTOMATED: CPT

## 2022-08-10 PROCEDURE — G0463: CPT

## 2022-08-10 PROCEDURE — 87640 STAPH A DNA AMP PROBE: CPT

## 2022-08-10 PROCEDURE — 86901 BLOOD TYPING SEROLOGIC RH(D): CPT

## 2022-08-10 PROCEDURE — 86850 RBC ANTIBODY SCREEN: CPT

## 2022-08-10 RX ORDER — CEFAZOLIN SODIUM 1 G
2000 VIAL (EA) INJECTION ONCE
Refills: 0 | Status: DISCONTINUED | OUTPATIENT
Start: 2022-08-18 | End: 2022-08-19

## 2022-08-10 NOTE — H&P PST ADULT - HISTORY OF PRESENT ILLNESS
65 yo M with h/o HTN, RA, GERD, Hepatitis C, s/p prior lumbar decompression & fusion in 2015  c/o worsening of  back pain x 2 months,  unsteady when getting up ("crashes into walls")  numbness in his feet with difficulty with ADLs . Pt had neurology evaluation- s/p MRI lumbar spine revealed unspecified cord compression. Pt scheduled for posterior approach, exploration of prior L2 pelvis fusion, T10-L2 decompression, T9 pelvis fusion on 8/18/22  **Pt denies any double vision, vision changes, speech changes, urinary or bowel incontinence, fever, chills, or sick contacts  **Covid 19 PCR on 8/15/22

## 2022-08-10 NOTE — H&P PST ADULT - ASSESSMENT
CAPRINI SCORE [CLOT updated 18]    AGE RELATED RISK FACTORS                                                       MOBILITY RELATED FACTORS  [ ] Age 41-60 years                                            (1 Point)                    [ ] Bed rest                                                        (1 Point)  [x ] Age: 61-74 years                                           (2 Points)                  [ ] Plaster cast                                                   (2 Points)  [ ] Age= 75 years                                              (3 Points)                    [ ] Bed bound for more than 72 hours                 (2 Points)    DISEASE RELATED RISK FACTORS                                               GENDER SPECIFIC FACTORS  [ ] Edema in the lower extremities                       (1 Point)              [ ] Pregnancy                                                     (1 Point)  [ ] Varicose veins                                               (1 Point)                     [ ] Post-partum < 6 weeks                                   (1 Point)             [ x] BMI > 25 Kg/m2                                            (1 Point)                     [ ] Hormonal therapy  or oral contraception          (1 Point)                 [ ] Sepsis (in the previous month)                        (1 Point)               [ ] History of pregnancy complications                 (1 point)  [ ] Pneumonia or serious lung disease                                               [ ] Unexplained or recurrent                     (1 Point)           (in the previous month)                               (1 Point)  [ ] Abnormal pulmonary function test                     (1 Point)                 SURGERY RELATED RISK FACTORS  [ ] Acute myocardial infarction                              (1 Point)               [ ]  Section                                             (1 Point)  [ ] Congestive heart failure (in the previous month)  (1 Point)      [ ] Minor surgery                                                  (1 Point)   [ ] Inflammatory bowel disease                             (1 Point)               [ ] Arthroscopic surgery                                        (2 Points)  [ ] Central venous access                                      (2 Points)                x[ ] General surgery lasting more than 45 minutes (2 points)  [ ] Present or previous malignancy                     (2 Points)                [ ] Elective arthroplasty                                         (5 points)    [ ] Stroke (in the previous month)                          (5 Points)                                                                                                                                                           HEMATOLOGY RELATED FACTORS                                                 TRAUMA RELATED RISK FACTORS  [ ] Prior episodes of VTE                                     (3 Points)                [ ] Fracture of the hip, pelvis, or leg                       (5 Points)  [ ] Positive family history for VTE                         (3 Points)             [ ] Acute spinal cord injury (in the previous month)  (5 Points)  [ ] Prothrombin 22407 A                                     (3 Points)               [ ] Paralysis  (less than 1 month)                             (5 Points)  [ ] Factor V Leiden                                             (3 Points)                  [ ] Multiple Trauma within 1 month                        (5 Points)  [ ] Lupus anticoagulants                                     (3 Points)                                                           [ ] Anticardiolipin antibodies                               (3 Points)                                                       [ ] High homocysteine in the blood                      (3 Points)                                             [ ] Other congenital or acquired thrombophilia      (3 Points)                                                [ ] Heparin induced thrombocytopenia                  (3 Points)                                     Total Score [    5      ]

## 2022-08-10 NOTE — H&P PST ADULT - NSICDXPASTSURGICALHX_GEN_ALL_CORE_FT
PAST SURGICAL HISTORY:  Fusion of spine, lumbar region 2015    Status post cervical spinal fusion 2014

## 2022-08-10 NOTE — H&P PST ADULT - PROBLEM SELECTOR PLAN 1
Posterior approach  Exploration of prior L2-Pelvis fusion  T10-L2 decompression  T9- Pelvis fusion  Labs- CBC, CMP, MRSA/MSSA, T&S  Pre op instructions discussed Posterior approach  Exploration of prior L2-Pelvis fusion  T10-L2 decompression  T9- Pelvis fusion  Labs- CBC, CMP, MRSA/MSSA, T&S  Pre op instructions discussed  Pre op cardiology evaluation

## 2022-08-10 NOTE — H&P PST ADULT - NSICDXPASTMEDICALHX_GEN_ALL_CORE_FT
PAST MEDICAL HISTORY:  Chronic GERD     Degenerative lumbar spinal stenosis     History of hepatitis C     HTN (hypertension)     Lumbar cord compression     Rheumatoid arthritis

## 2022-08-10 NOTE — H&P PST ADULT - FALL HARM RISK - RISK INTERVENTIONS

## 2022-08-11 LAB
MRSA PCR RESULT.: SIGNIFICANT CHANGE UP
S AUREUS DNA NOSE QL NAA+PROBE: SIGNIFICANT CHANGE UP

## 2022-08-12 PROBLEM — K21.9 GASTRO-ESOPHAGEAL REFLUX DISEASE WITHOUT ESOPHAGITIS: Chronic | Status: ACTIVE | Noted: 2022-08-10

## 2022-08-12 PROBLEM — G95.20 UNSPECIFIED CORD COMPRESSION: Chronic | Status: ACTIVE | Noted: 2022-08-10

## 2022-08-12 PROBLEM — Z86.19 PERSONAL HISTORY OF OTHER INFECTIOUS AND PARASITIC DISEASES: Chronic | Status: ACTIVE | Noted: 2022-08-10

## 2022-08-12 PROBLEM — M48.061 SPINAL STENOSIS, LUMBAR REGION WITHOUT NEUROGENIC CLAUDICATION: Chronic | Status: ACTIVE | Noted: 2022-08-10

## 2022-08-15 ENCOUNTER — NON-APPOINTMENT (OUTPATIENT)
Age: 67
End: 2022-08-15

## 2022-08-15 ENCOUNTER — APPOINTMENT (OUTPATIENT)
Dept: CARDIOLOGY | Facility: CLINIC | Age: 67
End: 2022-08-15

## 2022-08-15 ENCOUNTER — OUTPATIENT (OUTPATIENT)
Dept: OUTPATIENT SERVICES | Facility: HOSPITAL | Age: 67
LOS: 1 days | End: 2022-08-15

## 2022-08-15 VITALS
HEIGHT: 74 IN | OXYGEN SATURATION: 96 % | DIASTOLIC BLOOD PRESSURE: 70 MMHG | BODY MASS INDEX: 27.98 KG/M2 | WEIGHT: 218 LBS | SYSTOLIC BLOOD PRESSURE: 118 MMHG | HEART RATE: 64 BPM | TEMPERATURE: 97.7 F

## 2022-08-15 DIAGNOSIS — Z11.52 ENCOUNTER FOR SCREENING FOR COVID-19: ICD-10-CM

## 2022-08-15 DIAGNOSIS — M43.26 FUSION OF SPINE, LUMBAR REGION: Chronic | ICD-10-CM

## 2022-08-15 DIAGNOSIS — Z01.818 ENCOUNTER FOR OTHER PREPROCEDURAL EXAMINATION: ICD-10-CM

## 2022-08-15 DIAGNOSIS — M51.26 OTHER INTERVERTEBRAL DISC DISPLACEMENT, LUMBAR REGION: ICD-10-CM

## 2022-08-15 DIAGNOSIS — R79.89 OTHER SPECIFIED ABNORMAL FINDINGS OF BLOOD CHEMISTRY: ICD-10-CM

## 2022-08-15 DIAGNOSIS — Z98.1 ARTHRODESIS STATUS: Chronic | ICD-10-CM

## 2022-08-15 LAB — SARS-COV-2 RNA SPEC QL NAA+PROBE: SIGNIFICANT CHANGE UP

## 2022-08-15 PROCEDURE — 93000 ELECTROCARDIOGRAM COMPLETE: CPT | Mod: NC,PD

## 2022-08-15 PROCEDURE — 99214 OFFICE O/P EST MOD 30 MIN: CPT

## 2022-08-15 NOTE — PHYSICAL EXAM
[General Appearance - Well Developed] : well developed [Normal Appearance] : normal appearance [Well Groomed] : well groomed [General Appearance - Well Nourished] : well nourished [No Deformities] : no deformities [General Appearance - In No Acute Distress] : no acute distress [Normal Conjunctiva] : the conjunctiva exhibited no abnormalities [Eyelids - No Xanthelasma] : the eyelids demonstrated no xanthelasmas [Normal Oral Mucosa] : normal oral mucosa [No Oral Pallor] : no oral pallor [No Oral Cyanosis] : no oral cyanosis [Normal Jugular Venous A Waves Present] : normal jugular venous A waves present [Normal Jugular Venous V Waves Present] : normal jugular venous V waves present [No Jugular Venous Regalado A Waves] : no jugular venous regalado A waves [Respiration, Rhythm And Depth] : normal respiratory rhythm and effort [Exaggerated Use Of Accessory Muscles For Inspiration] : no accessory muscle use [Heart Rate And Rhythm] : heart rate and rhythm were normal [Heart Sounds] : normal S1 and S2 [Murmurs] : no murmurs present [Abdomen Soft] : soft [Abdomen Tenderness] : non-tender [Abdomen Mass (___ Cm)] : no abdominal mass palpated [Nail Clubbing] : no clubbing of the fingernails [Cyanosis, Localized] : no localized cyanosis [Petechial Hemorrhages (___cm)] : no petechial hemorrhages [Skin Color & Pigmentation] : normal skin color and pigmentation [No Venous Stasis] : no venous stasis [] : no rash [Skin Lesions] : no skin lesions [No Skin Ulcers] : no skin ulcer [No Xanthoma] : no  xanthoma was observed [Oriented To Time, Place, And Person] : oriented to person, place, and time [Affect] : the affect was normal [Mood] : the mood was normal [No Anxiety] : not feeling anxious [Abnormal Walk] : normal gait [Gait - Sufficient For Exercise Testing] : the gait was sufficient for exercise testing [FreeTextEntry1] : mild wheezes bilaterally

## 2022-08-15 NOTE — HISTORY OF PRESENT ILLNESS
[Preoperative Visit] : for a medical evaluation prior to surgery [Date of Surgery ___] : on [unfilled] [Good] : Good [Electrocardiogram] : ~T an ECG ~C was performed [Scheduled Procedure ___] : a [unfilled] [Surgeon Name ___] : surgeon: [unfilled] [Prior Anesthesia] : Prior anesthesia [Prev Anesthesia Reaction] : no previous anesthesia reaction [FreeTextEntry1] : This is a 66 year old male who presents today for pre-op clearance for spinal surgery scheduled for 8/18/2022 with Dr. Conner. He feels overall well today with no acute complaints. Denies chest pain, dyspnea, dizziness, palpations, changes in appetite/bowel habits, nausea, vomiting, abdominal pain.pt with improvement in cough s/p recent labs increased wbc however was on medrol \par

## 2022-08-16 ENCOUNTER — APPOINTMENT (OUTPATIENT)
Dept: PULMONOLOGY | Facility: CLINIC | Age: 67
End: 2022-08-16

## 2022-08-16 VITALS
BODY MASS INDEX: 27.46 KG/M2 | OXYGEN SATURATION: 96 % | RESPIRATION RATE: 16 BRPM | SYSTOLIC BLOOD PRESSURE: 101 MMHG | HEART RATE: 68 BPM | WEIGHT: 214 LBS | DIASTOLIC BLOOD PRESSURE: 69 MMHG | HEIGHT: 74 IN | TEMPERATURE: 97.5 F

## 2022-08-16 DIAGNOSIS — R06.2 WHEEZING: ICD-10-CM

## 2022-08-16 DIAGNOSIS — K21.9 GASTRO-ESOPHAGEAL REFLUX DISEASE W/OUT ESOPHAGITIS: ICD-10-CM

## 2022-08-16 PROCEDURE — 94060 EVALUATION OF WHEEZING: CPT

## 2022-08-16 PROCEDURE — ZZZZZ: CPT

## 2022-08-16 PROCEDURE — 99205 OFFICE O/P NEW HI 60 MIN: CPT | Mod: 25

## 2022-08-16 PROCEDURE — 94727 GAS DIL/WSHOT DETER LNG VOL: CPT

## 2022-08-16 PROCEDURE — 94729 DIFFUSING CAPACITY: CPT

## 2022-08-16 NOTE — PHYSICAL EXAM
[No Acute Distress] : no acute distress [Normal Oropharynx] : normal oropharynx [Normal Appearance] : normal appearance [No Neck Mass] : no neck mass [Normal Rate/Rhythm] : normal rate/rhythm [Normal S1, S2] : normal s1, s2 [No Murmurs] : no murmurs [No Resp Distress] : no resp distress [Clear to Auscultation Bilaterally] : clear to auscultation bilaterally [Normal to Percussion] : normal to percussion [Kyphosis] : kyphosis [Benign] : benign [Not Tender] : not tender [No HSM] : no hsm [Normal Gait] : normal gait [No Clubbing] : no clubbing [No Cyanosis] : no cyanosis [No Edema] : no edema [FROM] : FROM [Normal Color/ Pigmentation] : normal color/ pigmentation [No Focal Deficits] : no focal deficits [Oriented x3] : oriented x3 [Normal Affect] : normal affect

## 2022-08-16 NOTE — DISCUSSION/SUMMARY
[FreeTextEntry1] : Component of chronic obstructive pulmonary disease.\par Symptomatic GERD.\par Nocturnal wheezing or cough may be related to underlying COPD or related to GE reflux.\par Component of reversibility.\par Preop for back surgery

## 2022-08-16 NOTE — ASSESSMENT
[FreeTextEntry1] : Change Omeprazole to PM\par Start Trelegy 200 OD.  Given samples and instructed in proper use.\par F/U 1 month to assess response to therapy.\par Follow-up CT in 1 year.\par No pulmonary contraindications to surgery.\par \par 80 minutes spent in evaluation management review of studies and discussion.

## 2022-08-16 NOTE — HISTORY OF PRESENT ILLNESS
[Former] : former [TextBox_4] : HUNTER AMADO is a 66 year old  M referred for pulmonary evaluation for nocturnal wheeze. \par \par \par 3 weeks ago noted wheezing in PM. No URI. Mild AM mucous in throat. Predominantly white or yellow. \par Took Doxy then Ceftin and medrol henny with no definitive response.\par Positive limited by back in terms of exercise tolerance.\par No chest pain. \par Denies upper airway congestion\par Positive nocturnal heartburn. Increased because eats lying down secondary to pain.\par \par Had screening CT 8/1/22\par \par For OR this week lower back. \par \par Past pulmonary history. N\par Occupational Exposure. N\par Family history of pulmonary disease. Father COPD smoker.  Father asthma\par Recent travel N\par Pets N\par  [TextBox_11] : 1 [TextBox_13] : 44 [YearQuit] : 2022

## 2022-08-16 NOTE — REASON FOR VISIT
[Consultation] : a consultation [Abnormal CXR/ Chest CT] : an abnormal CXR/ chest CT [TextBox_44] : wheezing for weeks ,especially at night

## 2022-08-16 NOTE — PROCEDURE
[FreeTextEntry1] : 08/16/2022\par Pulmonary function testing\par These data demonstrate a mild-moderate obstructive ventilatory deficit. There is a significant bronchodilator response There is elevation in the RV/TLC ratio indicative of possible air trapping. There is a mild diffusion impairment. Corrects to normal with lung volume correction

## 2022-08-17 ENCOUNTER — NON-APPOINTMENT (OUTPATIENT)
Age: 67
End: 2022-08-17

## 2022-08-17 ENCOUNTER — TRANSCRIPTION ENCOUNTER (OUTPATIENT)
Age: 67
End: 2022-08-17

## 2022-08-18 ENCOUNTER — APPOINTMENT (OUTPATIENT)
Dept: NEUROSURGERY | Facility: HOSPITAL | Age: 67
End: 2022-08-18

## 2022-08-18 ENCOUNTER — INPATIENT (INPATIENT)
Facility: HOSPITAL | Age: 67
LOS: 7 days | Discharge: NURSING, REHAB OR LONG-TERM CARE | DRG: 453 | End: 2022-08-26
Attending: NEUROLOGICAL SURGERY | Admitting: NEUROLOGICAL SURGERY
Payer: MEDICARE

## 2022-08-18 ENCOUNTER — TRANSCRIPTION ENCOUNTER (OUTPATIENT)
Age: 67
End: 2022-08-18

## 2022-08-18 VITALS
TEMPERATURE: 98 F | HEART RATE: 71 BPM | DIASTOLIC BLOOD PRESSURE: 78 MMHG | SYSTOLIC BLOOD PRESSURE: 122 MMHG | WEIGHT: 210.1 LBS | RESPIRATION RATE: 16 BRPM | HEIGHT: 74 IN | OXYGEN SATURATION: 98 %

## 2022-08-18 DIAGNOSIS — M43.26 FUSION OF SPINE, LUMBAR REGION: Chronic | ICD-10-CM

## 2022-08-18 DIAGNOSIS — Z98.1 ARTHRODESIS STATUS: Chronic | ICD-10-CM

## 2022-08-18 DIAGNOSIS — G95.20 UNSPECIFIED CORD COMPRESSION: ICD-10-CM

## 2022-08-18 LAB
ALBUMIN SERPL ELPH-MCNC: 3.3 G/DL — SIGNIFICANT CHANGE UP (ref 3.3–5)
ALP SERPL-CCNC: 46 U/L — SIGNIFICANT CHANGE UP (ref 40–120)
ALT FLD-CCNC: 29 U/L — SIGNIFICANT CHANGE UP (ref 10–45)
ANION GAP SERPL CALC-SCNC: 16 MMOL/L — SIGNIFICANT CHANGE UP (ref 5–17)
APTT BLD: 21.9 SEC — LOW (ref 27.5–35.5)
AST SERPL-CCNC: 56 U/L — HIGH (ref 10–40)
BILIRUB SERPL-MCNC: 0.3 MG/DL — SIGNIFICANT CHANGE UP (ref 0.2–1.2)
BUN SERPL-MCNC: 10 MG/DL — SIGNIFICANT CHANGE UP (ref 7–23)
CALCIUM SERPL-MCNC: 8.4 MG/DL — SIGNIFICANT CHANGE UP (ref 8.4–10.5)
CHLORIDE SERPL-SCNC: 100 MMOL/L — SIGNIFICANT CHANGE UP (ref 96–108)
CK SERPL-CCNC: 5470 U/L — HIGH (ref 30–200)
CO2 SERPL-SCNC: 19 MMOL/L — LOW (ref 22–31)
CREAT SERPL-MCNC: 0.78 MG/DL — SIGNIFICANT CHANGE UP (ref 0.5–1.3)
EGFR: 98 ML/MIN/1.73M2 — SIGNIFICANT CHANGE UP
GAS PNL BLDA: SIGNIFICANT CHANGE UP
GLUCOSE SERPL-MCNC: 180 MG/DL — HIGH (ref 70–99)
INR BLD: 1.05 RATIO — SIGNIFICANT CHANGE UP (ref 0.88–1.16)
NT-PROBNP SERPL-SCNC: 44 PG/ML — SIGNIFICANT CHANGE UP (ref 0–300)
POTASSIUM SERPL-MCNC: 4.4 MMOL/L — SIGNIFICANT CHANGE UP (ref 3.5–5.3)
POTASSIUM SERPL-SCNC: 4.4 MMOL/L — SIGNIFICANT CHANGE UP (ref 3.5–5.3)
PROT SERPL-MCNC: 5.2 G/DL — LOW (ref 6–8.3)
PROTHROM AB SERPL-ACNC: 12.1 SEC — SIGNIFICANT CHANGE UP (ref 10.5–13.4)
RH IG SCN BLD-IMP: POSITIVE — SIGNIFICANT CHANGE UP
SODIUM SERPL-SCNC: 135 MMOL/L — SIGNIFICANT CHANGE UP (ref 135–145)

## 2022-08-18 PROCEDURE — 71045 X-RAY EXAM CHEST 1 VIEW: CPT | Mod: 26

## 2022-08-18 PROCEDURE — 63266 EXCISE INTRSPINL LESION THRC: CPT | Mod: 59

## 2022-08-18 PROCEDURE — 22843 INSERT SPINE FIXATION DEVICE: CPT

## 2022-08-18 PROCEDURE — 22854 INSJ BIOMECHANICAL DEVICE: CPT

## 2022-08-18 PROCEDURE — 72100 X-RAY EXAM L-S SPINE 2/3 VWS: CPT | Mod: 26

## 2022-08-18 PROCEDURE — 61783 SCAN PROC SPINAL: CPT

## 2022-08-18 PROCEDURE — 22614 ARTHRD PST TQ 1NTRSPC EA ADD: CPT

## 2022-08-18 PROCEDURE — 22610 ARTHRD PST TQ 1NTRSPC THRC: CPT

## 2022-08-18 PROCEDURE — 22207 INCIS SPINE 3 COLUMN LUMBAR: CPT | Mod: 22

## 2022-08-18 PROCEDURE — 22558 ARTHRD ANT NTRBD MIN DSC LUM: CPT

## 2022-08-18 PROCEDURE — 22212 INCIS 1 VERTEBRAL SEG THORAC: CPT | Mod: 59

## 2022-08-18 DEVICE — SCREW VERSE CFX FENESTRATED 7X50MM: Type: IMPLANTABLE DEVICE | Site: BACK | Status: FUNCTIONAL

## 2022-08-18 DEVICE — IMPLANTABLE DEVICE: Type: IMPLANTABLE DEVICE | Site: BACK | Status: FUNCTIONAL

## 2022-08-18 DEVICE — SCREW VERSE CFX FENESTRATED 6X45MM: Type: IMPLANTABLE DEVICE | Site: BACK | Status: FUNCTIONAL

## 2022-08-18 DEVICE — SCREW ST MIS SNGL INNER VIPER: Type: IMPLANTABLE DEVICE | Site: BACK | Status: FUNCTIONAL

## 2022-08-18 DEVICE — HEMASORB ABS BONE PUTTY PLUS: Type: IMPLANTABLE DEVICE | Site: BACK | Status: FUNCTIONAL

## 2022-08-18 DEVICE — SCREW VERSE FENESTRATED 6X50MM: Type: IMPLANTABLE DEVICE | Site: BACK | Status: FUNCTIONAL

## 2022-08-18 DEVICE — ROD 480MM STRL: Type: IMPLANTABLE DEVICE | Site: BACK | Status: FUNCTIONAL

## 2022-08-18 DEVICE — KIT A-LINE 1LUM 20G X 12CM SAFE KIT: Type: IMPLANTABLE DEVICE | Site: BACK | Status: FUNCTIONAL

## 2022-08-18 DEVICE — FLOSEAL FAST PREP 10ML: Type: IMPLANTABLE DEVICE | Site: BACK | Status: FUNCTIONAL

## 2022-08-18 DEVICE — CONN TI OPEN/CLOSED 5.5-5.5: Type: IMPLANTABLE DEVICE | Site: BACK | Status: FUNCTIONAL

## 2022-08-18 DEVICE — KIT BONE CEMENT CONFIDENCE: Type: IMPLANTABLE DEVICE | Site: BACK | Status: FUNCTIONAL

## 2022-08-18 DEVICE — GRAFT BONE INFUSE KIT LG II: Type: IMPLANTABLE DEVICE | Site: BACK | Status: FUNCTIONAL

## 2022-08-18 RX ORDER — HYDROCORTISONE 20 MG
50 TABLET ORAL EVERY 8 HOURS
Refills: 0 | Status: COMPLETED | OUTPATIENT
Start: 2022-08-18 | End: 2022-08-19

## 2022-08-18 RX ORDER — POLYETHYLENE GLYCOL 3350 17 G/17G
17 POWDER, FOR SOLUTION ORAL DAILY
Refills: 0 | Status: DISCONTINUED | OUTPATIENT
Start: 2022-08-18 | End: 2022-08-20

## 2022-08-18 RX ORDER — SODIUM CHLORIDE 9 MG/ML
3 INJECTION INTRAMUSCULAR; INTRAVENOUS; SUBCUTANEOUS EVERY 8 HOURS
Refills: 0 | Status: DISCONTINUED | OUTPATIENT
Start: 2022-08-18 | End: 2022-08-18

## 2022-08-18 RX ORDER — ACETAMINOPHEN 500 MG
1000 TABLET ORAL EVERY 8 HOURS
Refills: 0 | Status: DISCONTINUED | OUTPATIENT
Start: 2022-08-18 | End: 2022-08-19

## 2022-08-18 RX ORDER — HYDROMORPHONE HYDROCHLORIDE 2 MG/ML
0.5 INJECTION INTRAMUSCULAR; INTRAVENOUS; SUBCUTANEOUS
Refills: 0 | Status: DISCONTINUED | OUTPATIENT
Start: 2022-08-18 | End: 2022-08-18

## 2022-08-18 RX ORDER — SODIUM CHLORIDE 9 MG/ML
1000 INJECTION, SOLUTION INTRAVENOUS
Refills: 0 | Status: DISCONTINUED | OUTPATIENT
Start: 2022-08-18 | End: 2022-08-21

## 2022-08-18 RX ORDER — CEFAZOLIN SODIUM 1 G
2000 VIAL (EA) INJECTION EVERY 8 HOURS
Refills: 0 | Status: COMPLETED | OUTPATIENT
Start: 2022-08-18 | End: 2022-08-19

## 2022-08-18 RX ORDER — MAGNESIUM HYDROXIDE 400 MG/1
30 TABLET, CHEWABLE ORAL EVERY 12 HOURS
Refills: 0 | Status: DISCONTINUED | OUTPATIENT
Start: 2022-08-18 | End: 2022-08-26

## 2022-08-18 RX ORDER — TENOFOVIR DISOPROXIL FUMARATE 300 MG/1
300 TABLET, FILM COATED ORAL DAILY
Refills: 0 | Status: DISCONTINUED | OUTPATIENT
Start: 2022-08-18 | End: 2022-08-26

## 2022-08-18 RX ORDER — HYDROMORPHONE HYDROCHLORIDE 2 MG/ML
0.25 INJECTION INTRAMUSCULAR; INTRAVENOUS; SUBCUTANEOUS ONCE
Refills: 0 | Status: DISCONTINUED | OUTPATIENT
Start: 2022-08-18 | End: 2022-08-18

## 2022-08-18 RX ORDER — NALOXONE HYDROCHLORIDE 4 MG/.1ML
0.1 SPRAY NASAL
Refills: 0 | Status: DISCONTINUED | OUTPATIENT
Start: 2022-08-18 | End: 2022-08-18

## 2022-08-18 RX ORDER — HYDROMORPHONE HYDROCHLORIDE 2 MG/ML
30 INJECTION INTRAMUSCULAR; INTRAVENOUS; SUBCUTANEOUS
Refills: 0 | Status: DISCONTINUED | OUTPATIENT
Start: 2022-08-18 | End: 2022-08-18

## 2022-08-18 RX ORDER — ACETAMINOPHEN 500 MG
1000 TABLET ORAL ONCE
Refills: 0 | Status: COMPLETED | OUTPATIENT
Start: 2022-08-18 | End: 2022-08-18

## 2022-08-18 RX ORDER — GABAPENTIN 400 MG/1
300 CAPSULE ORAL
Refills: 0 | Status: DISCONTINUED | OUTPATIENT
Start: 2022-08-18 | End: 2022-08-21

## 2022-08-18 RX ORDER — SENNA PLUS 8.6 MG/1
2 TABLET ORAL AT BEDTIME
Refills: 0 | Status: DISCONTINUED | OUTPATIENT
Start: 2022-08-18 | End: 2022-08-26

## 2022-08-18 RX ORDER — PANTOPRAZOLE SODIUM 20 MG/1
40 TABLET, DELAYED RELEASE ORAL
Refills: 0 | Status: DISCONTINUED | OUTPATIENT
Start: 2022-08-18 | End: 2022-08-26

## 2022-08-18 RX ORDER — ENOXAPARIN SODIUM 100 MG/ML
40 INJECTION SUBCUTANEOUS EVERY 24 HOURS
Refills: 0 | Status: DISCONTINUED | OUTPATIENT
Start: 2022-08-19 | End: 2022-08-26

## 2022-08-18 RX ORDER — ONDANSETRON 8 MG/1
4 TABLET, FILM COATED ORAL EVERY 6 HOURS
Refills: 0 | Status: DISCONTINUED | OUTPATIENT
Start: 2022-08-18 | End: 2022-08-26

## 2022-08-18 RX ORDER — CHLORHEXIDINE GLUCONATE 213 G/1000ML
1 SOLUTION TOPICAL ONCE
Refills: 0 | Status: DISCONTINUED | OUTPATIENT
Start: 2022-08-18 | End: 2022-08-18

## 2022-08-18 RX ORDER — METHOCARBAMOL 500 MG/1
500 TABLET, FILM COATED ORAL EVERY 8 HOURS
Refills: 0 | Status: DISCONTINUED | OUTPATIENT
Start: 2022-08-18 | End: 2022-08-26

## 2022-08-18 RX ORDER — ATORVASTATIN CALCIUM 80 MG/1
20 TABLET, FILM COATED ORAL AT BEDTIME
Refills: 0 | Status: DISCONTINUED | OUTPATIENT
Start: 2022-08-18 | End: 2022-08-26

## 2022-08-18 RX ORDER — METOPROLOL TARTRATE 50 MG
50 TABLET ORAL
Refills: 0 | Status: DISCONTINUED | OUTPATIENT
Start: 2022-08-18 | End: 2022-08-21

## 2022-08-18 RX ORDER — INSULIN LISPRO 100/ML
VIAL (ML) SUBCUTANEOUS EVERY 6 HOURS
Refills: 0 | Status: DISCONTINUED | OUTPATIENT
Start: 2022-08-18 | End: 2022-08-19

## 2022-08-18 RX ORDER — LIDOCAINE HCL 20 MG/ML
0.2 VIAL (ML) INJECTION ONCE
Refills: 0 | Status: DISCONTINUED | OUTPATIENT
Start: 2022-08-18 | End: 2022-08-18

## 2022-08-18 RX ORDER — ONDANSETRON 8 MG/1
4 TABLET, FILM COATED ORAL EVERY 6 HOURS
Refills: 0 | Status: DISCONTINUED | OUTPATIENT
Start: 2022-08-18 | End: 2022-08-18

## 2022-08-18 RX ADMIN — HYDROMORPHONE HYDROCHLORIDE 0.25 MILLIGRAM(S): 2 INJECTION INTRAMUSCULAR; INTRAVENOUS; SUBCUTANEOUS at 23:06

## 2022-08-18 RX ADMIN — HYDROMORPHONE HYDROCHLORIDE 0.25 MILLIGRAM(S): 2 INJECTION INTRAMUSCULAR; INTRAVENOUS; SUBCUTANEOUS at 23:36

## 2022-08-18 RX ADMIN — Medication 100 MILLIGRAM(S): at 22:51

## 2022-08-18 RX ADMIN — Medication 400 MILLIGRAM(S): at 22:51

## 2022-08-18 RX ADMIN — Medication 50 MILLIGRAM(S): at 23:59

## 2022-08-18 RX ADMIN — Medication 1000 MILLIGRAM(S): at 23:51

## 2022-08-18 RX ADMIN — HYDROMORPHONE HYDROCHLORIDE 0.25 MILLIGRAM(S): 2 INJECTION INTRAMUSCULAR; INTRAVENOUS; SUBCUTANEOUS at 23:51

## 2022-08-18 RX ADMIN — SODIUM CHLORIDE 100 MILLILITER(S): 9 INJECTION, SOLUTION INTRAVENOUS at 22:50

## 2022-08-18 RX ADMIN — ONDANSETRON 4 MILLIGRAM(S): 8 TABLET, FILM COATED ORAL at 22:15

## 2022-08-18 NOTE — PATIENT PROFILE ADULT - FALL HARM RISK - HARM RISK INTERVENTIONS

## 2022-08-18 NOTE — BRIEF OPERATIVE NOTE - OPERATION/FINDINGS
Surgical incision closure of back. 4 drains, 2 HMV placed deep, and 2 JPs placed over muscle. Muscle flaps elevated and closed, skin reapproximated in multi-layered fashion. EBL minimal from plastic surgery portion. No additional specimens
Posterior L2 PSO, extension of fusion to T9  T10-L2 Decompression    Plastics Closure (Donn)

## 2022-08-18 NOTE — PRE-OP CHECKLIST - IV STARTED
Constitutional: Awake, alert, in no acute distress, appears intoxicated with slurred speech  Eyes: no scleral icterus  HENT: normocephalic, atraumatic, moist oral mucosa  Neck: supple  CV: RRR, no murmur  Pulm: non-labored respirations, CTAB  Abdomen: soft, non-tender, non-distended  Extremities: no edema, no deformity  Skin: no rash, no jaundice  Neuro: moving all extremities equally
yes

## 2022-08-18 NOTE — BRIEF OPERATIVE NOTE - NSICDXBRIEFPROCEDURE_GEN_ALL_CORE_FT
PROCEDURES:  Pedicle subtraction osteotomy, lumbar spine, posterior approach 18-Aug-2022 20:12:57  Gregory Venegas  
PROCEDURES:  Closure, wound, back, secondary 18-Aug-2022 21:23:19  Taz Guardado

## 2022-08-19 LAB
A1C WITH ESTIMATED AVERAGE GLUCOSE RESULT: 5.5 % — SIGNIFICANT CHANGE UP (ref 4–5.6)
ALBUMIN SERPL ELPH-MCNC: 3.4 G/DL — SIGNIFICANT CHANGE UP (ref 3.3–5)
ALP SERPL-CCNC: 44 U/L — SIGNIFICANT CHANGE UP (ref 40–120)
ALT FLD-CCNC: 35 U/L — SIGNIFICANT CHANGE UP (ref 10–45)
AMPHET UR-MCNC: NEGATIVE — SIGNIFICANT CHANGE UP
ANION GAP SERPL CALC-SCNC: 14 MMOL/L — SIGNIFICANT CHANGE UP (ref 5–17)
ANION GAP SERPL CALC-SCNC: 16 MMOL/L — SIGNIFICANT CHANGE UP (ref 5–17)
AST SERPL-CCNC: 76 U/L — HIGH (ref 10–40)
BARBITURATES UR SCN-MCNC: NEGATIVE — SIGNIFICANT CHANGE UP
BASOPHILS # BLD AUTO: 0.04 K/UL — SIGNIFICANT CHANGE UP (ref 0–0.2)
BASOPHILS NFR BLD AUTO: 0.1 % — SIGNIFICANT CHANGE UP (ref 0–2)
BENZODIAZ UR-MCNC: NEGATIVE — SIGNIFICANT CHANGE UP
BILIRUB SERPL-MCNC: 0.3 MG/DL — SIGNIFICANT CHANGE UP (ref 0.2–1.2)
BUN SERPL-MCNC: 11 MG/DL — SIGNIFICANT CHANGE UP (ref 7–23)
BUN SERPL-MCNC: 12 MG/DL — SIGNIFICANT CHANGE UP (ref 7–23)
CALCIUM SERPL-MCNC: 8.3 MG/DL — LOW (ref 8.4–10.5)
CALCIUM SERPL-MCNC: 8.3 MG/DL — LOW (ref 8.4–10.5)
CHLORIDE SERPL-SCNC: 100 MMOL/L — SIGNIFICANT CHANGE UP (ref 96–108)
CHLORIDE SERPL-SCNC: 101 MMOL/L — SIGNIFICANT CHANGE UP (ref 96–108)
CHOLEST SERPL-MCNC: 124 MG/DL — SIGNIFICANT CHANGE UP
CK SERPL-CCNC: 8045 U/L — HIGH (ref 30–200)
CK SERPL-CCNC: 9014 U/L — HIGH (ref 30–200)
CO2 SERPL-SCNC: 20 MMOL/L — LOW (ref 22–31)
CO2 SERPL-SCNC: 21 MMOL/L — LOW (ref 22–31)
COCAINE METAB.OTHER UR-MCNC: NEGATIVE — SIGNIFICANT CHANGE UP
CREAT SERPL-MCNC: 0.77 MG/DL — SIGNIFICANT CHANGE UP (ref 0.5–1.3)
CREAT SERPL-MCNC: 0.81 MG/DL — SIGNIFICANT CHANGE UP (ref 0.5–1.3)
EGFR: 97 ML/MIN/1.73M2 — SIGNIFICANT CHANGE UP
EGFR: 99 ML/MIN/1.73M2 — SIGNIFICANT CHANGE UP
EOSINOPHIL # BLD AUTO: 0 K/UL — SIGNIFICANT CHANGE UP (ref 0–0.5)
EOSINOPHIL NFR BLD AUTO: 0 % — SIGNIFICANT CHANGE UP (ref 0–6)
ESTIMATED AVERAGE GLUCOSE: 111 MG/DL — SIGNIFICANT CHANGE UP (ref 68–114)
ETHANOL SERPL-MCNC: <10 MG/DL — SIGNIFICANT CHANGE UP (ref 0–10)
GLUCOSE BLDC GLUCOMTR-MCNC: 111 MG/DL — HIGH (ref 70–99)
GLUCOSE BLDC GLUCOMTR-MCNC: 141 MG/DL — HIGH (ref 70–99)
GLUCOSE BLDC GLUCOMTR-MCNC: 143 MG/DL — HIGH (ref 70–99)
GLUCOSE BLDC GLUCOMTR-MCNC: 145 MG/DL — HIGH (ref 70–99)
GLUCOSE SERPL-MCNC: 136 MG/DL — HIGH (ref 70–99)
GLUCOSE SERPL-MCNC: 154 MG/DL — HIGH (ref 70–99)
HCT VFR BLD CALC: 30.2 % — LOW (ref 39–50)
HCT VFR BLD CALC: 32.1 % — LOW (ref 39–50)
HDLC SERPL-MCNC: 45 MG/DL — SIGNIFICANT CHANGE UP
HGB BLD-MCNC: 10.3 G/DL — LOW (ref 13–17)
HGB BLD-MCNC: 10.8 G/DL — LOW (ref 13–17)
IMM GRANULOCYTES NFR BLD AUTO: 1.1 % — SIGNIFICANT CHANGE UP (ref 0–1.5)
LIPID PNL WITH DIRECT LDL SERPL: 62 MG/DL — SIGNIFICANT CHANGE UP
LYMPHOCYTES # BLD AUTO: 0.72 K/UL — LOW (ref 1–3.3)
LYMPHOCYTES # BLD AUTO: 2.6 % — LOW (ref 13–44)
MAGNESIUM SERPL-MCNC: 2 MG/DL — SIGNIFICANT CHANGE UP (ref 1.6–2.6)
MANUAL SMEAR VERIFICATION: SIGNIFICANT CHANGE UP
MCHC RBC-ENTMCNC: 32.5 PG — SIGNIFICANT CHANGE UP (ref 27–34)
MCHC RBC-ENTMCNC: 32.8 PG — SIGNIFICANT CHANGE UP (ref 27–34)
MCHC RBC-ENTMCNC: 33.6 GM/DL — SIGNIFICANT CHANGE UP (ref 32–36)
MCHC RBC-ENTMCNC: 34.1 GM/DL — SIGNIFICANT CHANGE UP (ref 32–36)
MCV RBC AUTO: 96.2 FL — SIGNIFICANT CHANGE UP (ref 80–100)
MCV RBC AUTO: 96.7 FL — SIGNIFICANT CHANGE UP (ref 80–100)
METHADONE UR-MCNC: NEGATIVE — SIGNIFICANT CHANGE UP
MONOCYTES # BLD AUTO: 3.4 K/UL — HIGH (ref 0–0.9)
MONOCYTES NFR BLD AUTO: 12.5 % — SIGNIFICANT CHANGE UP (ref 2–14)
NEUTROPHILS # BLD AUTO: 22.83 K/UL — HIGH (ref 1.8–7.4)
NEUTROPHILS NFR BLD AUTO: 83.7 % — HIGH (ref 43–77)
NON HDL CHOLESTEROL: 78 MG/DL — SIGNIFICANT CHANGE UP
NRBC # BLD: 0 /100 WBCS — SIGNIFICANT CHANGE UP (ref 0–0)
NRBC # BLD: 0 /100 WBCS — SIGNIFICANT CHANGE UP (ref 0–0)
OPIATES UR-MCNC: NEGATIVE — SIGNIFICANT CHANGE UP
OXYCODONE UR-MCNC: POSITIVE
PCP SPEC-MCNC: SIGNIFICANT CHANGE UP
PCP UR-MCNC: NEGATIVE — SIGNIFICANT CHANGE UP
PHOSPHATE SERPL-MCNC: 3.9 MG/DL — SIGNIFICANT CHANGE UP (ref 2.5–4.5)
PLAT MORPH BLD: NORMAL — SIGNIFICANT CHANGE UP
PLATELET # BLD AUTO: 181 K/UL — SIGNIFICANT CHANGE UP (ref 150–400)
PLATELET # BLD AUTO: 200 K/UL — SIGNIFICANT CHANGE UP (ref 150–400)
POTASSIUM SERPL-MCNC: 3.8 MMOL/L — SIGNIFICANT CHANGE UP (ref 3.5–5.3)
POTASSIUM SERPL-MCNC: 3.9 MMOL/L — SIGNIFICANT CHANGE UP (ref 3.5–5.3)
POTASSIUM SERPL-SCNC: 3.8 MMOL/L — SIGNIFICANT CHANGE UP (ref 3.5–5.3)
POTASSIUM SERPL-SCNC: 3.9 MMOL/L — SIGNIFICANT CHANGE UP (ref 3.5–5.3)
PROT SERPL-MCNC: 5.5 G/DL — LOW (ref 6–8.3)
RBC # BLD: 3.14 M/UL — LOW (ref 4.2–5.8)
RBC # BLD: 3.32 M/UL — LOW (ref 4.2–5.8)
RBC # FLD: 11.9 % — SIGNIFICANT CHANGE UP (ref 10.3–14.5)
RBC # FLD: 12.3 % — SIGNIFICANT CHANGE UP (ref 10.3–14.5)
RBC BLD AUTO: NORMAL — SIGNIFICANT CHANGE UP
SMUDGE CELLS # BLD: PRESENT — SIGNIFICANT CHANGE UP
SODIUM SERPL-SCNC: 136 MMOL/L — SIGNIFICANT CHANGE UP (ref 135–145)
SODIUM SERPL-SCNC: 136 MMOL/L — SIGNIFICANT CHANGE UP (ref 135–145)
T3 SERPL-MCNC: 84 NG/DL — SIGNIFICANT CHANGE UP (ref 80–200)
T4 AB SER-ACNC: 7.2 UG/DL — SIGNIFICANT CHANGE UP (ref 4.6–12)
THC UR QL: NEGATIVE — SIGNIFICANT CHANGE UP
TRIGL SERPL-MCNC: 80 MG/DL — SIGNIFICANT CHANGE UP
TROPONIN T, HIGH SENSITIVITY RESULT: 12 NG/L — SIGNIFICANT CHANGE UP (ref 0–51)
TSH SERPL-MCNC: 0.24 UIU/ML — LOW (ref 0.27–4.2)
URATE SERPL-MCNC: 2.1 MG/DL — LOW (ref 3.4–8.8)
WBC # BLD: 26.55 K/UL — HIGH (ref 3.8–10.5)
WBC # BLD: 27.49 K/UL — HIGH (ref 3.8–10.5)
WBC # FLD AUTO: 26.55 K/UL — HIGH (ref 3.8–10.5)
WBC # FLD AUTO: 27.49 K/UL — HIGH (ref 3.8–10.5)

## 2022-08-19 PROCEDURE — 99291 CRITICAL CARE FIRST HOUR: CPT

## 2022-08-19 PROCEDURE — 71045 X-RAY EXAM CHEST 1 VIEW: CPT | Mod: 26

## 2022-08-19 PROCEDURE — 70450 CT HEAD/BRAIN W/O DYE: CPT | Mod: 26

## 2022-08-19 PROCEDURE — 72128 CT CHEST SPINE W/O DYE: CPT | Mod: 26

## 2022-08-19 PROCEDURE — 72131 CT LUMBAR SPINE W/O DYE: CPT | Mod: 26

## 2022-08-19 RX ORDER — ACETAMINOPHEN 500 MG
650 TABLET ORAL EVERY 6 HOURS
Refills: 0 | Status: DISCONTINUED | OUTPATIENT
Start: 2022-08-19 | End: 2022-08-26

## 2022-08-19 RX ORDER — OXYCODONE HYDROCHLORIDE 5 MG/1
15 TABLET ORAL EVERY 12 HOURS
Refills: 0 | Status: DISCONTINUED | OUTPATIENT
Start: 2022-08-19 | End: 2022-08-26

## 2022-08-19 RX ORDER — METOPROLOL TARTRATE 50 MG
1 TABLET ORAL
Qty: 0 | Refills: 0 | DISCHARGE

## 2022-08-19 RX ORDER — DEXMEDETOMIDINE HYDROCHLORIDE IN 0.9% SODIUM CHLORIDE 4 UG/ML
0.2 INJECTION INTRAVENOUS
Qty: 200 | Refills: 0 | Status: DISCONTINUED | OUTPATIENT
Start: 2022-08-19 | End: 2022-08-19

## 2022-08-19 RX ORDER — HALOPERIDOL DECANOATE 100 MG/ML
2.5 INJECTION INTRAMUSCULAR ONCE
Refills: 0 | Status: COMPLETED | OUTPATIENT
Start: 2022-08-19 | End: 2022-08-19

## 2022-08-19 RX ORDER — ACETAMINOPHEN 500 MG
1000 TABLET ORAL ONCE
Refills: 0 | Status: COMPLETED | OUTPATIENT
Start: 2022-08-19 | End: 2022-08-19

## 2022-08-19 RX ORDER — OXYCODONE HYDROCHLORIDE 5 MG/1
10 TABLET ORAL EVERY 4 HOURS
Refills: 0 | Status: DISCONTINUED | OUTPATIENT
Start: 2022-08-19 | End: 2022-08-26

## 2022-08-19 RX ORDER — PHENYLEPHRINE HYDROCHLORIDE 10 MG/ML
0.2 INJECTION INTRAVENOUS
Qty: 40 | Refills: 0 | Status: DISCONTINUED | OUTPATIENT
Start: 2022-08-19 | End: 2022-08-19

## 2022-08-19 RX ORDER — SODIUM CHLORIDE 9 MG/ML
1000 INJECTION INTRAMUSCULAR; INTRAVENOUS; SUBCUTANEOUS ONCE
Refills: 0 | Status: COMPLETED | OUTPATIENT
Start: 2022-08-19 | End: 2022-08-19

## 2022-08-19 RX ORDER — SODIUM CHLORIDE 9 MG/ML
500 INJECTION, SOLUTION INTRAVENOUS ONCE
Refills: 0 | Status: COMPLETED | OUTPATIENT
Start: 2022-08-19 | End: 2022-08-19

## 2022-08-19 RX ORDER — OXYCODONE HYDROCHLORIDE 5 MG/1
15 TABLET ORAL EVERY 4 HOURS
Refills: 0 | Status: DISCONTINUED | OUTPATIENT
Start: 2022-08-19 | End: 2022-08-26

## 2022-08-19 RX ORDER — OMEPRAZOLE 10 MG/1
1 CAPSULE, DELAYED RELEASE ORAL
Qty: 0 | Refills: 0 | DISCHARGE

## 2022-08-19 RX ORDER — POTASSIUM PHOSPHATE, MONOBASIC 500 MG/1
1 TABLET, SOLUBLE ORAL
Qty: 0 | Refills: 0 | DISCHARGE

## 2022-08-19 RX ORDER — NALOXONE HYDROCHLORIDE 4 MG/.1ML
0.4 SPRAY NASAL ONCE
Refills: 0 | Status: COMPLETED | OUTPATIENT
Start: 2022-08-19 | End: 2022-08-19

## 2022-08-19 RX ADMIN — OXYCODONE HYDROCHLORIDE 15 MILLIGRAM(S): 5 TABLET ORAL at 20:30

## 2022-08-19 RX ADMIN — OXYCODONE HYDROCHLORIDE 15 MILLIGRAM(S): 5 TABLET ORAL at 17:13

## 2022-08-19 RX ADMIN — ATORVASTATIN CALCIUM 20 MILLIGRAM(S): 80 TABLET, FILM COATED ORAL at 21:25

## 2022-08-19 RX ADMIN — Medication 50 MILLIGRAM(S): at 08:41

## 2022-08-19 RX ADMIN — Medication 400 MILLIGRAM(S): at 05:30

## 2022-08-19 RX ADMIN — OXYCODONE HYDROCHLORIDE 10 MILLIGRAM(S): 5 TABLET ORAL at 22:20

## 2022-08-19 RX ADMIN — HALOPERIDOL DECANOATE 2.5 MILLIGRAM(S): 100 INJECTION INTRAMUSCULAR at 02:30

## 2022-08-19 RX ADMIN — SODIUM CHLORIDE 100 MILLILITER(S): 9 INJECTION, SOLUTION INTRAVENOUS at 08:40

## 2022-08-19 RX ADMIN — SODIUM CHLORIDE 100 MILLILITER(S): 9 INJECTION, SOLUTION INTRAVENOUS at 19:10

## 2022-08-19 RX ADMIN — ENOXAPARIN SODIUM 40 MILLIGRAM(S): 100 INJECTION SUBCUTANEOUS at 21:26

## 2022-08-19 RX ADMIN — Medication 1000 MILLIGRAM(S): at 22:20

## 2022-08-19 RX ADMIN — Medication 50 MILLIGRAM(S): at 17:13

## 2022-08-19 RX ADMIN — NALOXONE HYDROCHLORIDE 0.4 MILLIGRAM(S): 4 SPRAY NASAL at 01:45

## 2022-08-19 RX ADMIN — Medication 1000 MILLIGRAM(S): at 13:00

## 2022-08-19 RX ADMIN — SODIUM CHLORIDE 1000 MILLILITER(S): 9 INJECTION INTRAMUSCULAR; INTRAVENOUS; SUBCUTANEOUS at 14:41

## 2022-08-19 RX ADMIN — OXYCODONE HYDROCHLORIDE 15 MILLIGRAM(S): 5 TABLET ORAL at 19:32

## 2022-08-19 RX ADMIN — OXYCODONE HYDROCHLORIDE 10 MILLIGRAM(S): 5 TABLET ORAL at 21:25

## 2022-08-19 RX ADMIN — OXYCODONE HYDROCHLORIDE 15 MILLIGRAM(S): 5 TABLET ORAL at 17:43

## 2022-08-19 RX ADMIN — TENOFOVIR DISOPROXIL FUMARATE 300 MILLIGRAM(S): 300 TABLET, FILM COATED ORAL at 11:22

## 2022-08-19 RX ADMIN — DEXMEDETOMIDINE HYDROCHLORIDE IN 0.9% SODIUM CHLORIDE 4.77 MICROGRAM(S)/KG/HR: 4 INJECTION INTRAVENOUS at 02:20

## 2022-08-19 RX ADMIN — METHOCARBAMOL 500 MILLIGRAM(S): 500 TABLET, FILM COATED ORAL at 13:00

## 2022-08-19 RX ADMIN — HYDROMORPHONE HYDROCHLORIDE 0.25 MILLIGRAM(S): 2 INJECTION INTRAMUSCULAR; INTRAVENOUS; SUBCUTANEOUS at 00:21

## 2022-08-19 RX ADMIN — GABAPENTIN 300 MILLIGRAM(S): 400 CAPSULE ORAL at 17:13

## 2022-08-19 RX ADMIN — DEXMEDETOMIDINE HYDROCHLORIDE IN 0.9% SODIUM CHLORIDE 4.77 MICROGRAM(S)/KG/HR: 4 INJECTION INTRAVENOUS at 08:41

## 2022-08-19 RX ADMIN — SODIUM CHLORIDE 500 MILLILITER(S): 9 INJECTION, SOLUTION INTRAVENOUS at 02:20

## 2022-08-19 RX ADMIN — Medication 1000 MILLIGRAM(S): at 21:25

## 2022-08-19 RX ADMIN — OXYCODONE HYDROCHLORIDE 15 MILLIGRAM(S): 5 TABLET ORAL at 11:50

## 2022-08-19 RX ADMIN — PHENYLEPHRINE HYDROCHLORIDE 7.15 MICROGRAM(S)/KG/MIN: 10 INJECTION INTRAVENOUS at 02:20

## 2022-08-19 RX ADMIN — OXYCODONE HYDROCHLORIDE 15 MILLIGRAM(S): 5 TABLET ORAL at 11:20

## 2022-08-19 RX ADMIN — METHOCARBAMOL 500 MILLIGRAM(S): 500 TABLET, FILM COATED ORAL at 21:25

## 2022-08-19 RX ADMIN — Medication 1000 MILLIGRAM(S): at 13:30

## 2022-08-19 RX ADMIN — OXYCODONE HYDROCHLORIDE 15 MILLIGRAM(S): 5 TABLET ORAL at 14:50

## 2022-08-19 RX ADMIN — Medication 1000 MILLIGRAM(S): at 06:00

## 2022-08-19 RX ADMIN — Medication 100 MILLIGRAM(S): at 05:37

## 2022-08-19 RX ADMIN — OXYCODONE HYDROCHLORIDE 15 MILLIGRAM(S): 5 TABLET ORAL at 14:20

## 2022-08-19 NOTE — PROGRESS NOTE ADULT - ASSESSMENT
66M PMHx HTN, RA, GERD, Hep C s/p posterior L2 PSO, extension of fusion to T9, T10-L2 decompression, and surgical closure of back with PRS on 8/18.     Plan:  - Will remove Aquacel dressing on POD5   - Continue HMV and MALLIKA maintenance   - Remainder care per primary     Plastic Surgery   P# [phone]

## 2022-08-19 NOTE — PROGRESS NOTE ADULT - SUBJECTIVE AND OBJECTIVE BOX
Patient seen and examined at bedside.    --Anticoagulation--  enoxaparin Injectable 40 milliGRAM(s) SubCutaneous every 24 hours    T(C): 37.1 (08-18-22 @ 23:00), Max: 37.1 (08-18-22 @ 21:30)  HR: 86 (08-19-22 @ 00:00) (71 - 86)  BP: 122/78 (08-18-22 @ 05:36) (122/78 - 122/78)  RR: 19 (08-19-22 @ 00:00) (12 - 23)  SpO2: 99% (08-19-22 @ 00:00) (96% - 100%)  Wt(kg): --    Exam: Awake, agitated. Persistently groaning. Pupils 3R, blinks to threat. HUGHES AG, nonfocal exam

## 2022-08-19 NOTE — OCCUPATIONAL THERAPY INITIAL EVALUATION ADULT - PERTINENT HX OF CURRENT PROBLEM, REHAB EVAL
65 yo M with h/o HTN, RA, GERD, Hepatitis C, s/p prior lumbar decompression & fusion in 2015  c/o worsening of  back pain x 2 months,  unsteady when getting up ("crashes into walls")  numbness in his feet with difficulty with ADLs . Pt had neurology evaluation- s/p MRI lumbar spine revealed unspecified cord compression. Pt scheduled for posterior approach, exploration of prior L2 pelvis fusion, T10-L2 decompression, T9 pelvis fusion on 8/18/22.

## 2022-08-19 NOTE — OCCUPATIONAL THERAPY INITIAL EVALUATION ADULT - LIVES WITH, PROFILE
Pt lives in a private house w/ spouse and children, 4 steps to enter. walk in shower +shower chair. ambulated w/ cane prior. Pt completed ADLs seated/children/spouse

## 2022-08-19 NOTE — PROGRESS NOTE ADULT - ATTENDING COMMENTS
POD 1 Posterior L2 PSO, extension of fusion to T9; T10-L2 Decompression; s/p 4 drains  monitor CBC, drain output  per wife, patient experienced severe post anesthesia delirium/agitation for a day and a half back in 2015 after back surgery. Currently on precedex gtt, calm, but confused and perseverating CTH overnight unremarkable, will wean precedex as tolerated, delirium precaution  monitor airway, aspiration precaution   imaging per neurosurgery   PT/OT

## 2022-08-19 NOTE — PHARMACOTHERAPY INTERVENTION NOTE - COMMENTS
Spoke with patient and his wife and patient was taking the following medications. Spoke with Jennie WILKINSON and patient was taking 5 mg of prednisone instead of 10 mg prednisone and order adjusted.   Home Medications:  cefuroxime 500 mg oral tablet: 1  orally once a day for URI (19 Aug 2022 17:37) - no longer taking  Crestor 5 mg oral tablet: 1 tab(s) orally once a day (at bedtime) (19 Aug 2022 17:37)  Endocet 10/325 oral tablet: 1  orally 4 times a day, As Needed (19 Aug 2022 17:37)  hydrochlorothiazide 25 mg oral tablet: 1 tab(s) orally once a day (19 Aug 2022 17:37)  Kevzara 200 mg/1.14 mL subcutaneous solution: subcutaneous every 2 weeks - held prior to surgery x arthritis (19 Aug 2022 17:37)  Metoprolol Tartrate 50 mg oral tablet: 1 tab(s) orally 2 times a day (19 Aug 2022 17:37)  omeprazole 20 mg oral delayed release capsule: 1 cap(s) orally once a day x acid reflux (19 Aug 2022 17:37)  OxyCONTIN 15 mg oral tablet, extended release: 1 tab(s) orally every 12 hours (19 Aug 2022 17:37)  potassium chloride 20 mEq oral tablet, extended release: 1 tab(s) orally once a day (19 Aug 2022 17:37)  predniSONE 5 mg oral tablet: 1 tab(s) orally once a day x arthritis tapering down by his provider (19 Aug 2022 17:37)  rosuvastatin 5 mg oral tablet: 1 tab(s) orally once a day (at bedtime) (19 Aug 2022 17:37)  tenofovir disoproxil fumarate 300 mg oral tablet: 1 tab(s) orally once a day (19 Aug 2022 17:37) x hep B  valsartan 320 mg oral tablet: 1 tab(s) orally once a day (19 Aug 2022 17:37)   Calm

## 2022-08-19 NOTE — PROGRESS NOTE ADULT - SUBJECTIVE AND OBJECTIVE BOX
Plastic Surgery Progress Note     SUBJECTIVE: Pt seen and examined on rounds with team. Patient with agitation post-op, CTH unremarkable, CT spine this AM pending.       OBJECTIVE    PHYSICAL EXAM:   General: NAD, Lying in bed   Neuro: Awake and alert  Back: Aquacel in place, dressing c/d/i. L HMVx2 and R JPx2 with ss output. Back soft, no appreciable collections.       VITALS  T(C): 37.2 (08-19-22 @ 06:00), Max: 37.4 (08-19-22 @ 02:00)  HR: 102 (08-19-22 @ 06:00) (68 - 102)  BP: 145/85 (08-19-22 @ 06:00) (63/48 - 151/92)  RR: 22 (08-19-22 @ 06:00) (12 - 34)  SpO2: 100% (08-19-22 @ 06:00) (94% - 100%)  CAPILLARY BLOOD GLUCOSE      POCT Blood Glucose.: 145 mg/dL (19 Aug 2022 05:30)  POCT Blood Glucose.: 143 mg/dL (19 Aug 2022 00:13)      Is/Os    08-18 @ 07:01  -  08-19 @ 06:56  --------------------------------------------------------  IN:    Dexmedetomidine: 44 mL    IV PiggyBack: 50 mL    Lactated Ringers: 850 mL    Lactated Ringers Bolus: 500 mL    Phenylephrine: 24 mL  Total IN: 1468 mL    OUT:    Accordian (mL): 30 mL    Bulb (mL): 15 mL    Bulb (mL): 40 mL    Indwelling Catheter - Urethral (mL): 410 mL  Total OUT: 495 mL    Total NET: 973 mL          MEDICATIONS (STANDING): acetaminophen     Tablet .. 1000 milliGRAM(s) Oral every 8 hours  atorvastatin 20 milliGRAM(s) Oral at bedtime  ceFAZolin   IVPB 2000 milliGRAM(s) IV Intermittent once  dexMEDEtomidine Infusion 0.2 MICROgram(s)/kG/Hr IV Continuous <Continuous>  enoxaparin Injectable 40 milliGRAM(s) SubCutaneous every 24 hours  gabapentin 300 milliGRAM(s) Oral two times a day  hydrocortisone sodium succinate Injectable 50 milliGRAM(s) IV Push every 8 hours  insulin lispro (ADMELOG) corrective regimen sliding scale   SubCutaneous every 6 hours  lactated ringers. 1000 milliLiter(s) IV Continuous <Continuous>  methocarbamol 500 milliGRAM(s) Oral every 8 hours  metoprolol tartrate 50 milliGRAM(s) Oral two times a day  pantoprazole    Tablet 40 milliGRAM(s) Oral before breakfast  phenylephrine    Infusion 0.2 MICROgram(s)/kG/Min IV Continuous <Continuous>  polyethylene glycol 3350 17 Gram(s) Oral daily  senna 2 Tablet(s) Oral at bedtime  tenofovir disoproxil fumarate (VIREAD) 300 milliGRAM(s) Oral daily    MEDICATIONS (PRN):bisacodyl 5 milliGRAM(s) Oral every 12 hours PRN Constipation  magnesium hydroxide Suspension 30 milliLiter(s) Oral every 12 hours PRN Constipation  ondansetron Injectable 4 milliGRAM(s) IV Push every 6 hours PRN Nausea and/or Vomiting      LABS  CBC (08-19 @ 01:11)                              10.8<L>                         27.49<H>  )----------------(  200        83.7<H>% Neutrophils, 2.6<L>% Lymphocytes, ANC: 22.83<H>                              32.1<L>    BMP (08-19 @ 01:11)             136     |  101     |  11    		Ca++ --      Ca 8.3<L>             ---------------------------------( 154<H>		Mg --                 3.9     |  21<L>   |  0.77  			Ph --      BMP (08-18 @ 23:11)             135     |  100     |  10    		Ca++ --      Ca 8.4                ---------------------------------( 180<H>		Mg --                 4.4     |  19<L>   |  0.78  			Ph --        LFTs (08-19 @ 01:11)      TPro 5.5<L> / Alb 3.4 / TBili 0.3 / DBili -- / AST 76<H> / ALT 35 / AlkPhos 44  LFTs (08-18 @ 23:11)      TPro 5.2<L> / Alb 3.3 / TBili 0.3 / DBili -- / AST 56<H> / ALT 29 / AlkPhos 46    Coags (08-18 @ 23:12)  aPTT 21.9<L> / INR 1.05 / PT 12.1    Cardiac Markers (08-19 @ 01:11)     HSTrop: -- / CKMB: -- / CK: 8045  Cardiac Markers (08-18 @ 23:11)     HSTrop: -- / CKMB: -- / CK: 5470    ABG (08-18 @ 22:58)     7.45 / 30<L> / 89 / 21 / -2.3<L> / 97.4%     Lactate:    ABG (08-18 @ 18:19)     7.44 / 38 / 453<H> / 26 / 1.6 / 98.7<H>%     Lactate:          IMAGING STUDIES

## 2022-08-19 NOTE — PROGRESS NOTE ADULT - SUBJECTIVE AND OBJECTIVE BOX
[facility] Progress Note    Assessment/Hospital Course:    67 yo M with h/o HTN, RA, GERD, Hepatitis C, s/p prior lumbar decompression & fusion in 2015  c/o worsening of  back pain x 2 months,  unsteady when getting up ("crashes into walls")  numbness in his feet with difficulty with ADLs . Pt had neurology evaluation- s/p MRI lumbar spine revealed unspecified cord compression. Pt scheduled for posterior approach, exploration of prior L2 pelvis fusion, T10-L2 decompression, T9 pelvis fusion on 8/18/22    24 Hour Events/Subjective:  - POD0 s/p Posterior L2 PSO, extension of fusion to T9; T10-L2 Decompression; s/p 4 drians  - delayed anesthesia awakening w/ hyperactive delerium; on prop/fent gtt during OR, on further eval, ?ocular clonus with agitation, had been given dose zofran when arrived to unit for emesis; c/f serotonin syndrome...ordered for CTH for AMS which was ultimately unremarkable, required precedex for agitation during CTH c/b transient hypotension requiring brief pressors; POCUS with good contractiltiy, +Blines;  - had been given dilaudid 0.25mg IVPx2 for pain control with tylenol; during hypotensive episode, protected airway though with some hypoventilation; mm reactive pupils, given narcan 0.4 IVPx1 dose with no significant response; ultimately, as BP iproved, remained agitated with VSS; had received stress dose steroids in the s/o chronic steroid use for RA        REVIEW OF SYSTEMS:  - negative except as above    VITALS:   - T(C): 37.1 (08-18-22 @ 23:00), Max: 37.1 (08-18-22 @ 21:30)  T(F): 98.7 (08-18-22 @ 23:00), Max: 98.7 (08-18-22 @ 21:30)  HR: 86 (08-19-22 @ 00:00) (71 - 86)  BP: 122/78 (08-18-22 @ 05:36) (122/78 - 122/78)  ABP: 169/80 (08-19-22 @ 00:00) (143/76 - 171/80)  ABP(mean): 107 (08-19-22 @ 00:00) (98 - 118)  RR: 19 (08-19-22 @ 00:00) (12 - 23)  SpO2: 99% (08-19-22 @ 00:00) (96% - 100%)      IMAGING/DATA:   - Reviewed          PHYSICAL EXAM:    General: agitated  CVS: RRR  Pulm: CTAB  GI: Soft, NTND  Extremities: No LE Edema  Neuro: delayed anesthesia awakening, AOx0, ?ocular clonus, groaning , does not FC, does not attend, does not BTT, HUGHES spont    [facility] Progress Note    Assessment/Hospital Course:    67 yo M with h/o HTN, RA, GERD, Hepatitis C, s/p prior lumbar decompression & fusion in 2015  c/o worsening of  back pain x 2 months,  unsteady when getting up ("crashes into walls")  numbness in his feet with difficulty with ADLs . Pt had neurology evaluation- s/p MRI lumbar spine revealed unspecified cord compression. Pt scheduled for posterior approach, exploration of prior L2 pelvis fusion, T10-L2 decompression, T9 pelvis fusion on 8/18/22    24 Hour Events/Subjective:  - POD0 s/p Posterior L2 PSO, extension of fusion to T9; T10-L2 Decompression; s/p 4 drians  - delayed anesthesia awakening w/ hyperactive delerium; on prop/fent gtt during OR, on further eval, ?ocular clonus with agitation, had been given dose zofran when arrived to unit for emesis; c/f serotonin syndrome...ordered for CTH for AMS which was ultimately unremarkable, required precedex for agitation during CTH c/b transient hypotension requiring brief pressors; POCUS with good contractiltiy, +Blines;  - had been given dilaudid 0.25mg IVPx2 for pain control with tylenol; during hypotensive episode, protected airway though with some hypoventilation; mm reactive pupils, given narcan 0.4 IVPx1 dose with no significant response; ultimately, as BP iproved, remained agitated with VSS; had received stress dose steroids in the s/o chronic steroid use for RA  this am off precedex and back to baseline        REVIEW OF SYSTEMS:  - negative except as above    VITALS: reviewed      IMAGING/DATA:   - Reviewed          PHYSICAL EXAM:    General: calm, NAD  CVS: RRR  Pulm: CTAB  GI: Soft, NTND  Extremities: No LE Edema  Neuro: AOx2, EOMI, perseverating , FC, HUGHES spont  5/5 except LLE 4+/5 sensation intact   [facility] Progress Note    Assessment/Hospital Course:    67 yo M with h/o HTN, RA, GERD, Hepatitis C, s/p prior lumbar decompression & fusion in 2015  c/o worsening of  back pain x 2 months,  unsteady when getting up ("crashes into walls")  numbness in his feet with difficulty with ADLs . Pt had neurology evaluation- s/p MRI lumbar spine revealed unspecified cord compression. Pt scheduled for posterior approach, exploration of prior L2 pelvis fusion, T10-L2 decompression, T9 pelvis fusion on 8/18/22    24 Hour Events/Subjective:  - POD0 s/p Posterior L2 PSO, extension of fusion to T9; T10-L2 Decompression; s/p 4 drians  - delayed anesthesia awakening w/ hyperactive delerium; on prop/fent gtt during OR, on further eval, ?ocular clonus with agitation, had been given dose zofran when arrived to unit for emesis; c/f serotonin syndrome...ordered for CTH for AMS which was ultimately unremarkable, required precedex for agitation during CTH c/b transient hypotension requiring brief pressors; POCUS with good contractiltiy, +Blines;  - had been given dilaudid 0.25mg IVPx2 for pain control with tylenol; during hypotensive episode, protected airway though with some hypoventilation; mm reactive pupils, given narcan 0.4 IVPx1 dose with no significant response; ultimately, as BP iproved, remained agitated with VSS; had received stress dose steroids in the s/o chronic steroid use for RA  this am off precedex and back to baseline    REVIEW OF SYSTEMS: [x] Unable to Assess due to neurologic exam confused, perseverating  [ x] All ROS addressed below are non-contributory, except:   Neuro: [ ] Headache [ ] Back pain [ ] Numbness [ ] Weakness [ ] Ataxia [ ] Dizziness [ ] Aphasia [ ] Dysarthria [ ] Visual disturbance  Resp: [ ] Shortness of breath/dyspnea [ ] Orthopnea [ ] Cough  CV: [ ] Chest pain [ ] Palpitation [ ] Lightheadedness [ ] Syncope  Renal: [ ] Thirst [ ] Edema  GI: [ ] Nausea [ ] Emesis [ ] Abdominal pain [ ] Constipation [ ] Diarrhea  Hem: [ ] Hematemesis [ ] bBright red blood per rectum  ID: [ ] Fever [ ] Chills [ ] Dysuria  ENT: [ ] Rhinorrhea    VITALS: reviewed    IMAGING/DATA:   - Reviewed    PHYSICAL EXAM:    General: calm, NAD  CVS: RRR  Pulm: CTAB  GI: Soft, NTND  Extremities: No LE Edema  Neuro: AOx2, EOMI, perseverating , FC, HUGHES spont  5/5 except LLE 4+/5 sensation intact

## 2022-08-19 NOTE — PHYSICAL THERAPY INITIAL EVALUATION ADULT - PERTINENT HX OF CURRENT PROBLEM, REHAB EVAL
66M PMHx HTN, RA, GERD, Hep C s/p posterior L2 PSO, extension of fusion to T9, T10-L2 decompression, and surgical closure of back with PRS on 8/18. CT head 9-) 66M PMHx HTN, RA, GERD, Hep C s/p posterior L2 PSO, extension of fusion to T9, T10-L2 decompression, and surgical closure of back with PRS on 8/18. CT head (-)

## 2022-08-19 NOTE — OCCUPATIONAL THERAPY INITIAL EVALUATION ADULT - ORIENTATION, REHAB EVAL
Shingrix Pending    Insurance response  Prescription Drug Insurance: [employer]  Notes: Prior authorization submitted - will update provider when decision has been made by insurance.          person/place/time

## 2022-08-19 NOTE — PROGRESS NOTE ADULT - ASSESSMENT
-CTH noncon given AMS   -ABG, BMP show elevated CK and lactate. Otherwise values wnl.   -On IVF for elevated lactate  -CT T/L spine to eval hardware

## 2022-08-19 NOTE — PROGRESS NOTE ADULT - SUBJECTIVE AND OBJECTIVE BOX
NSCU Progress Note    Assessment/Hospital Course:    65 yo M with h/o HTN, RA, GERD, Hepatitis C, s/p prior lumbar decompression & fusion in 2015  c/o worsening of  back pain x 2 months,  unsteady when getting up ("crashes into walls")  numbness in his feet with difficulty with ADLs . Pt had neurology evaluation- s/p MRI lumbar spine revealed unspecified cord compression. Pt scheduled for posterior approach, exploration of prior L2 pelvis fusion, T10-L2 decompression, T9 pelvis fusion on 8/18/22    24 Hour Events/Subjective:  - POD0 s/p Posterior L2 PSO, extension of fusion to T9; T10-L2 Decompression; s/p 4 drians  - delayed anesthesia awakening      REVIEW OF SYSTEMS:  - negative except as above    VITALS:   - T(C): 37.1 (08-18-22 @ 23:00), Max: 37.1 (08-18-22 @ 21:30)  T(F): 98.7 (08-18-22 @ 23:00), Max: 98.7 (08-18-22 @ 21:30)  HR: 86 (08-19-22 @ 00:00) (71 - 86)  BP: 122/78 (08-18-22 @ 05:36) (122/78 - 122/78)  ABP: 169/80 (08-19-22 @ 00:00) (143/76 - 171/80)  ABP(mean): 107 (08-19-22 @ 00:00) (98 - 118)  RR: 19 (08-19-22 @ 00:00) (12 - 23)  SpO2: 99% (08-19-22 @ 00:00) (96% - 100%)      IMAGING/DATA:   - Reviewed          PHYSICAL EXAM:    General: agitated  CVS: RRR  Pulm: CTAB  GI: Soft, NTND  Extremities: No LE Edema  Neuro: delayed anesthesia awakening, AOx0, groaning , does not FC, does not attend, does not BTT, HUGHES spont    NSCU Progress Note    Assessment/Hospital Course:    67 yo M with h/o HTN, RA, GERD, Hepatitis C, s/p prior lumbar decompression & fusion in 2015  c/o worsening of  back pain x 2 months,  unsteady when getting up ("crashes into walls")  numbness in his feet with difficulty with ADLs . Pt had neurology evaluation- s/p MRI lumbar spine revealed unspecified cord compression. Pt scheduled for posterior approach, exploration of prior L2 pelvis fusion, T10-L2 decompression, T9 pelvis fusion on 8/18/22    24 Hour Events/Subjective:  - POD0 s/p Posterior L2 PSO, extension of fusion to T9; T10-L2 Decompression; s/p 4 drians  - delayed anesthesia awakening w/ hyperactive delerium      REVIEW OF SYSTEMS:  - negative except as above    VITALS:   - T(C): 37.1 (08-18-22 @ 23:00), Max: 37.1 (08-18-22 @ 21:30)  T(F): 98.7 (08-18-22 @ 23:00), Max: 98.7 (08-18-22 @ 21:30)  HR: 86 (08-19-22 @ 00:00) (71 - 86)  BP: 122/78 (08-18-22 @ 05:36) (122/78 - 122/78)  ABP: 169/80 (08-19-22 @ 00:00) (143/76 - 171/80)  ABP(mean): 107 (08-19-22 @ 00:00) (98 - 118)  RR: 19 (08-19-22 @ 00:00) (12 - 23)  SpO2: 99% (08-19-22 @ 00:00) (96% - 100%)      IMAGING/DATA:   - Reviewed          PHYSICAL EXAM:    General: agitated  CVS: RRR  Pulm: CTAB  GI: Soft, NTND  Extremities: No LE Edema  Neuro: delayed anesthesia awakening, AOx0, groaning , does not FC, does not attend, does not BTT, HUGHES spont    NSCU Progress Note    Assessment/Hospital Course:    65 yo M with h/o HTN, RA, GERD, Hepatitis C, s/p prior lumbar decompression & fusion in 2015  c/o worsening of  back pain x 2 months,  unsteady when getting up ("crashes into walls")  numbness in his feet with difficulty with ADLs . Pt had neurology evaluation- s/p MRI lumbar spine revealed unspecified cord compression. Pt scheduled for posterior approach, exploration of prior L2 pelvis fusion, T10-L2 decompression, T9 pelvis fusion on 8/18/22    24 Hour Events/Subjective:  - POD0 s/p Posterior L2 PSO, extension of fusion to T9; T10-L2 Decompression; s/p 4 drians  - delayed anesthesia awakening w/ hyperactive delerium      REVIEW OF SYSTEMS:  - negative except as above    VITALS:   - T(C): 37.1 (08-18-22 @ 23:00), Max: 37.1 (08-18-22 @ 21:30)  T(F): 98.7 (08-18-22 @ 23:00), Max: 98.7 (08-18-22 @ 21:30)  HR: 86 (08-19-22 @ 00:00) (71 - 86)  BP: 122/78 (08-18-22 @ 05:36) (122/78 - 122/78)  ABP: 169/80 (08-19-22 @ 00:00) (143/76 - 171/80)  ABP(mean): 107 (08-19-22 @ 00:00) (98 - 118)  RR: 19 (08-19-22 @ 00:00) (12 - 23)  SpO2: 99% (08-19-22 @ 00:00) (96% - 100%)      IMAGING/DATA:   - Reviewed          PHYSICAL EXAM:    General: agitated  CVS: RRR  Pulm: CTAB  GI: Soft, NTND  Extremities: No LE Edema  Neuro: delayed anesthesia awakening, AOx0, ?ocular clonus, groaning , does not FC, does not attend, does not BTT, HUGHES spont    NSCU Progress Note    Assessment/Hospital Course:    65 yo M with h/o HTN, RA, GERD, Hepatitis C, s/p prior lumbar decompression & fusion in 2015  c/o worsening of  back pain x 2 months,  unsteady when getting up ("crashes into walls")  numbness in his feet with difficulty with ADLs . Pt had neurology evaluation- s/p MRI lumbar spine revealed unspecified cord compression. Pt scheduled for posterior approach, exploration of prior L2 pelvis fusion, T10-L2 decompression, T9 pelvis fusion on 8/18/22    24 Hour Events/Subjective:  - POD0 s/p Posterior L2 PSO, extension of fusion to T9; T10-L2 Decompression; s/p 4 drians  - delayed anesthesia awakening w/ hyperactive delerium; on prop/fent gtt during OR on further eval, ?ocular clonus with agitation, had been given dose zofran when arrived to unit for emesis; c/f serotonin syndrome...ordered for CTH for AMS which was ultimately unremarkable, required precedex for agitation during CTH c/b transient hypotension requiring brief pressors; POCUS with good contractiltiy, +Blines  - had been given dilaudid 0.25mg IVPx2 for pain control with tylenol; during hypotensive episode, protected airway though with some hypoventilation; mm reactive pupils, given narcan 0.4 IVPx1 dose with no significant response; ultimately, as BP iproved, remained agitated with VSS        REVIEW OF SYSTEMS:  - negative except as above    VITALS:   - T(C): 37.1 (08-18-22 @ 23:00), Max: 37.1 (08-18-22 @ 21:30)  T(F): 98.7 (08-18-22 @ 23:00), Max: 98.7 (08-18-22 @ 21:30)  HR: 86 (08-19-22 @ 00:00) (71 - 86)  BP: 122/78 (08-18-22 @ 05:36) (122/78 - 122/78)  ABP: 169/80 (08-19-22 @ 00:00) (143/76 - 171/80)  ABP(mean): 107 (08-19-22 @ 00:00) (98 - 118)  RR: 19 (08-19-22 @ 00:00) (12 - 23)  SpO2: 99% (08-19-22 @ 00:00) (96% - 100%)      IMAGING/DATA:   - Reviewed          PHYSICAL EXAM:    General: agitated  CVS: RRR  Pulm: CTAB  GI: Soft, NTND  Extremities: No LE Edema  Neuro: delayed anesthesia awakening, AOx0, ?ocular clonus, groaning , does not FC, does not attend, does not BTT, HUGHES spont    NSCU Progress Note    Assessment/Hospital Course:    65 yo M with h/o HTN, RA, GERD, Hepatitis C, s/p prior lumbar decompression & fusion in 2015  c/o worsening of  back pain x 2 months,  unsteady when getting up ("crashes into walls")  numbness in his feet with difficulty with ADLs . Pt had neurology evaluation- s/p MRI lumbar spine revealed unspecified cord compression. Pt scheduled for posterior approach, exploration of prior L2 pelvis fusion, T10-L2 decompression, T9 pelvis fusion on 8/18/22    24 Hour Events/Subjective:  - POD0 s/p Posterior L2 PSO, extension of fusion to T9; T10-L2 Decompression; s/p 4 drians  - delayed anesthesia awakening w/ hyperactive delerium; on prop/fent gtt during OR, on further eval, ?ocular clonus with agitation, had been given dose zofran when arrived to unit for emesis; c/f serotonin syndrome...ordered for CTH for AMS which was ultimately unremarkable, required precedex for agitation during CTH c/b transient hypotension requiring brief pressors; POCUS with good contractiltiy, +Blines;  - had been given dilaudid 0.25mg IVPx2 for pain control with tylenol; during hypotensive episode, protected airway though with some hypoventilation; mm reactive pupils, given narcan 0.4 IVPx1 dose with no significant response; ultimately, as BP iproved, remained agitated with VSS; had received stress dose steroids in the s/o chronic steroid use for RA        REVIEW OF SYSTEMS:  - negative except as above    VITALS:   - T(C): 37.1 (08-18-22 @ 23:00), Max: 37.1 (08-18-22 @ 21:30)  T(F): 98.7 (08-18-22 @ 23:00), Max: 98.7 (08-18-22 @ 21:30)  HR: 86 (08-19-22 @ 00:00) (71 - 86)  BP: 122/78 (08-18-22 @ 05:36) (122/78 - 122/78)  ABP: 169/80 (08-19-22 @ 00:00) (143/76 - 171/80)  ABP(mean): 107 (08-19-22 @ 00:00) (98 - 118)  RR: 19 (08-19-22 @ 00:00) (12 - 23)  SpO2: 99% (08-19-22 @ 00:00) (96% - 100%)      IMAGING/DATA:   - Reviewed          PHYSICAL EXAM:    General: agitated  CVS: RRR  Pulm: CTAB  GI: Soft, NTND  Extremities: No LE Edema  Neuro: delayed anesthesia awakening, AOx0, ?ocular clonus, groaning , does not FC, does not attend, does not BTT, HUGHES spont

## 2022-08-19 NOTE — PROGRESS NOTE ADULT - ASSESSMENT
ASSESSMENT/PLAN:    s/p Posterior L2 PSO, extension of fusion to T9; T10-L2 Decompression; s/p 4 iva    NEURO:  - neuro checks q1h  - iva per plastics  - CTH tonight d/t AMS ddx includes delayed anesthesia awakening; discussed concern for vertebrobasilar insuff with nsg though exam difficult to interpret  - CT spine AM  - consider vessel imaging to r/o vertobasilar insuff 2/2 prolonged prone position   - pain control; unable to use PCA pump now d/t mental status, IV dilaudid 0.5mg IVPx2, tylenol given  - Activity: PT/OT as tolerated    CVS:  - SBP goal   - restart home antihypertensives as tolerated  - CXR, RIJ TLC suboptimal positioning    PULM:  - RA  - IS As tolerated  - Aspiration precautions  - ABG    RENAL:  - Fluids: 100cc/h LR for rhabdo  - daily IOs  - BMP q8h  - trend CPK levels      GI:  - Diet: ADAT, dysphagia screen  - GI prophylaxis: PPI while on CCS  - Bowel regimen: miralax, senna    ENDO:   - FS goal 120-180    HEME/ONC:  - SCDs  - Chemoppx: hold d/t fresh post op  - cbc, coags    ID:  - monitor for fevers    MISC:  RA  on home prednisone 10mg qd  - given Hydrocort 50mg [id number] then will restart home prednisone     ASSESSMENT/PLAN:    s/p Posterior L2 PSO, extension of fusion to T9; T10-L2 Decompression; s/p 4 iva    NEURO:  - neuro checks q1h  - iva per plastics  - CTH tonight d/t AMS ddx includes delayed anesthesia awakening w/ hyperactive delerium; discussed concern for vertebrobasilar insuff with nsg though exam difficult to interpret  - CT spine AM  - consider vessel imaging to r/o vertobasilar insuff 2/2 prolonged prone position   - pain control; unable to use PCA pump now d/t mental status, IV dilaudid 0.5mg IVPx2, tylenol given  - Activity: PT/OT as tolerated    CVS:  - SBP goal   - restart home antihypertensives as tolerated  - CXR, RIJ TLC suboptimal positioning    PULM:  - RA  - IS As tolerated  - Aspiration precautions  - ABG    RENAL:  - Fluids: 100cc/h LR for rhabdo  - daily IOs  - BMP q8h  - trend CPK levels      GI:  - Diet: ADAT, dysphagia screen  - GI prophylaxis: PPI while on CCS  - Bowel regimen: miralax, senna    ENDO:   - FS goal 120-180    HEME/ONC:  - SCDs  - Chemoppx: hold d/t fresh post op  - cbc, coags    ID:  - monitor for fevers    MISC:  RA  on home prednisone 10mg qd  - given Hydrocort 50mg [id number] then will restart home prednisone     ASSESSMENT/PLAN:    s/p Posterior L2 PSO, extension of fusion to T9; T10-L2 Decompression; s/p 4 iva    NEURO:  - neuro checks q1h  - iva per plastics  - CTH tonight d/t AMS ddx includes delayed anesthesia awakening w/ hyperactive delerium; discussed concern for vertebrobasilar insuff with nsg though exam difficult to interpret  - CT spine AM  - consider vessel imaging to r/o vertobasilar insuff 2/2 prolonged prone position   - pain control; unable to use PCA pump now d/t mental status, IV dilaudid 0.5mg IVPx2, tylenol given  - precedex for agitation  - ?ocular clonus c/f serotonin syndrome, will eval med rec though low concern for now  - Activity: PT/OT as tolerated    CVS:  - SBP goal   - restart home antihypertensives as tolerated  - CXR, RIJ TLC suboptimal positioning    PULM:  - RA  - IS As tolerated  - Aspiration precautions  - ABG    RENAL:  - Fluids: 100cc/h LR for rhabdo  - daily IOs  - BMP q8h  - trend CPK levels      GI:  - Diet: ADAT, dysphagia screen  - GI prophylaxis: PPI while on CCS  - Bowel regimen: miralax, senna    ENDO:   - FS goal 120-180    HEME/ONC:  - SCDs  - Chemoppx: hold d/t fresh post op  - cbc, coags    ID:  - monitor for fevers    MISC:  RA  on home prednisone 10mg qd  - given Hydrocort 50mg [id number] then will restart home prednisone     ASSESSMENT/PLAN:    s/p Posterior L2 PSO, extension of fusion to T9; T10-L2 Decompression; s/p 4 iva    NEURO:  - neuro checks q1h  - drians per plastics  - CTH tonight d/t AMS ddx includes delayed anesthesia awakening w/ hyperactive delerium; discussed concern for vertebrobasilar insuff with nsg though exam difficult to interpret  - CT spine AM  - consider vessel imaging to r/o vertobasilar insuff 2/2 prolonged prone position   - pain control; unable to use PCA pump now d/t mental status, IV dilaudid 0.5mg IVPx2, tylenol given  - precedex for agitation on hold for hypotension  - ?ocular clonus c/f serotonin syndrome, will eval med rec though low concern for now; on prop/fent drip during procedure; will avoid cyprhheptadine d/t pressor requirements, likely etiology delayed anesthesa emergence with hyperactive delerium  - Activity: PT/OT as tolerated    CVS:  - SBP goal   - restart home antihypertensives as tolerated  - pressors PRN to maintian goal  - CXR, RIJ TLC suboptimal positioning    PULM:  - RA  - IS As tolerated  - Aspiration precautions  - ABG    RENAL:  - Fluids: 100cc/h LR for rhabdo  - daily IOs  - BMP q8h  - trend CPK levels      GI:  - Diet: ADAT, dysphagia screen  - GI prophylaxis: PPI while on CCS  - Bowel regimen: miralax, senna    ENDO:   - FS goal 120-180    HEME/ONC:  - SCDs  - Chemoppx: hold d/t fresh post op  - cbc, coags    ID:  - monitor for fevers    MISC:  RA  on home prednisone 10mg qd  - given Hydrocort 50mg [id number] then will restart home prednisone     ASSESSMENT/PLAN:    s/p Posterior L2 PSO, extension of fusion to T9; T10-L2 Decompression; s/p 4 iva    NEURO:  - neuro checks q1h  - drians per plastics  - CTH tonight d/t AMS ddx includes delayed anesthesia awakening w/ hyperactive delerium; discussed concern for vertebrobasilar insuff with nsg though exam difficult to interpret  - CT spine AM  - consider vessel imaging to r/o vertobasilar insuff 2/2 prolonged prone position   - pain control; unable to use PCA pump now d/t mental status, IV dilaudid 0.5mg IVPx2, tylenol given  - precedex for agitation on hold for hypotension  - ?ocular clonus c/f serotonin syndrome, will eval med rec though low concern for now; on prop/fent drip during procedure; will avoid cyprhheptadine d/t pressor requirements, likely etiology delayed anesthesa emergence with hyperactive delerium  - Activity: PT/OT as tolerated    CVS:  - SBP goal   - restart home antihypertensives as tolerated  - pressors PRN to maintian goal  - CXR, RIJ TLC suboptimal positioning    PULM:  - RA  - IS As tolerated  - Aspiration precautions  - ABG    RENAL:  - Fluids: 100cc/h LR for rhabdo  - daily IOs  - BMP q8h  - trend CPK levels      GI:  HBV  - Diet: ADAT, dysphagia screen  - GI prophylaxis: PPI while on CCS  - Bowel regimen: miralax, senna  - on home tenofovir    ENDO:   - FS goal 120-180    HEME/ONC:  - SCDs  - Chemoppx: hold d/t fresh post op  - cbc, coags    ID:  - monitor for fevers    MISC:  RA  on home prednisone 10mg qd  - given Hydrocort 50mg [id number] then will restart home prednisone     ASSESSMENT/PLAN:    s/p Posterior L2 PSO, extension of fusion to T9; T10-L2 Decompression; s/p 4 iva    NEURO:  - neuro checks q1h  - drians per plastics  - CTH tonight d/t AMS ddx includes delayed anesthesia awakening w/ hyperactive delerium; discussed concern for vertebrobasilar insuff with nsg though exam difficult to interpret  - CT spine AM  - consider vessel imaging to r/o vertobasilar insuff 2/2 prolonged prone position   - pain control; unable to use PCA pump now d/t mental status, IV dilaudid 0.5mg IVPx2, tylenol given  - precedex for agitation on hold for hypotension  - ?ocular clonus c/f serotonin syndrome, will eval med rec though low concern for now; on prop/fent drip during procedure; will avoid cyprhheptadine d/t pressor requirements, likely etiology delayed anesthesa emergence with hyperactive delerium  - Activity: PT/OT as tolerated  - utox, alcohol level    CVS:  - SBP goal   - restart home antihypertensives as tolerated  - pressors PRN to maintian goal  - CXR, RIJ TLC suboptimal positioning    PULM:  - RA  - IS As tolerated  - Aspiration precautions  - ABG    RENAL:  - Fluids: 100cc/h LR for rhabdo; given additional 1L IVB  - daily IOs  - BMP q8h  - trend CPK levels      GI:  HBV  - Diet: ADAT, dysphagia screen  - GI prophylaxis: PPI while on CCS  - Bowel regimen: miralax, senna  - on home tenofovir    ENDO:   - FS goal 120-180    HEME/ONC:  - SCDs  - Chemoppx: hold d/t fresh post op  - cbc, coags    ID:  - monitor for fevers    MISC:  RA  on home prednisone 10mg qd  - given Hydrocort 50mg [id number] then will restart home prednisone

## 2022-08-19 NOTE — PHYSICAL THERAPY INITIAL EVALUATION ADULT - DID THE PATIENT HAVE SURGERY?
s/p spine surgical wound closure/yes s/p Posterior L2 PSO, extension of fusion to T9; T10-L2 Decompression/yes

## 2022-08-19 NOTE — PROGRESS NOTE ADULT - SUBJECTIVE AND OBJECTIVE BOX
NSCU Progress Note    Assessment/Hospital Course:    67 yo M with h/o HTN, RA, GERD, Hepatitis C, s/p prior lumbar decompression & fusion in 2015  c/o worsening of  back pain x 2 months,  unsteady when getting up ("crashes into walls")  numbness in his feet with difficulty with ADLs . Pt had neurology evaluation- s/p MRI lumbar spine revealed unspecified cord compression. Pt scheduled for posterior approach, exploration of prior L2 pelvis fusion, T10-L2 decompression, T9 pelvis fusion on 8/18/22    24 Hour Events/Subjective:  - POD0 s/p Posterior L2 PSO, extension of fusion to T9; T10-L2 Decompression; s/p 4 drians  - delayed anesthesia awakening w/ hyperactive delerium; on prop/fent gtt during OR, on further eval, ?ocular clonus with agitation, had been given dose zofran when arrived to unit for emesis; c/f serotonin syndrome...ordered for CTH for AMS which was ultimately unremarkable, required precedex for agitation during CTH c/b transient hypotension requiring brief pressors; POCUS with good contractiltiy, +Blines;  - had been given dilaudid 0.25mg IVPx2 for pain control with tylenol; during hypotensive episode, protected airway though with some hypoventilation; mm reactive pupils, given narcan 0.4 IVPx1 dose with no significant response; ultimately, as BP iproved, remained agitated with VSS; had received stress dose steroids in the s/o chronic steroid use for RA        REVIEW OF SYSTEMS:  - negative except as above    VITALS:   - T(C): 37.1 (08-18-22 @ 23:00), Max: 37.1 (08-18-22 @ 21:30)  T(F): 98.7 (08-18-22 @ 23:00), Max: 98.7 (08-18-22 @ 21:30)  HR: 86 (08-19-22 @ 00:00) (71 - 86)  BP: 122/78 (08-18-22 @ 05:36) (122/78 - 122/78)  ABP: 169/80 (08-19-22 @ 00:00) (143/76 - 171/80)  ABP(mean): 107 (08-19-22 @ 00:00) (98 - 118)  RR: 19 (08-19-22 @ 00:00) (12 - 23)  SpO2: 99% (08-19-22 @ 00:00) (96% - 100%)      IMAGING/DATA:   - Reviewed          PHYSICAL EXAM:    General: agitated  CVS: RRR  Pulm: CTAB  GI: Soft, NTND  Extremities: No LE Edema  Neuro: delayed anesthesia awakening, AOx0, ?ocular clonus, groaning , does not FC, does not attend, does not BTT, HUGHES spont    NSCU Progress Note    Assessment/Hospital Course:    65 yo M with h/o HTN, RA, GERD, Hepatitis C, s/p prior lumbar decompression & fusion in 2015  c/o worsening of  back pain x 2 months,  unsteady when getting up ("crashes into walls")  numbness in his feet with difficulty with ADLs . Pt had neurology evaluation- s/p MRI lumbar spine revealed unspecified cord compression. Pt scheduled for posterior approach, exploration of prior L2 pelvis fusion, T10-L2 decompression, T9 pelvis fusion on 8/18/22    24 Hour Events/Subjective:  - POD1 s/p Posterior L2 PSO, extension of fusion to T9; T10-L2 Decompression; s/p 4 drians  - mental status improved, has hx of delayed anesthesia emergence      REVIEW OF SYSTEMS:  - negative except as above    VITALS:   - Reviewed      IMAGING/DATA:   - Reviewed          PHYSICAL EXAM:    General: agitated  CVS: RRR  Pulm: CTAB  GI: Soft, NTND  Extremities: No LE Edema  Neuro: AOx3, EOMI, perseverating , FC, HUGHES spont  5/5 except LLE 4+/5 sensation intact

## 2022-08-19 NOTE — PROGRESS NOTE ADULT - ASSESSMENT
ASSESSMENT/PLAN:    s/p Posterior L2 PSO, extension of fusion to T9; T10-L2 Decompression; s/p 4 iva    NEURO:  - neuro checks q1h  - iva per plastics  - CTH tonight d/t AMS ddx includes delayed anesthesia awakening w/ hyperactive delerium; discussed concern for vertebrobasilar insuff with nsg though exam difficult to interpret  - CT spine AM  - pain control; unable to use PCA pump now d/t mental status, IV dilaudid 0.5mg IVPx2, tylenol given  dc precedex  oxy ir 5/10  - Activity: PT/OT as tolerated    CVS:  - SBP goal  dc jen  - restart home antihypertensives as tolerated  - pressors PRN to maintian goal curerntly off  - CXR, RIJ TLC suboptimal positioning will dc    PULM:  - RA  - IS As tolerated  - Aspiration precautions  - ABG    RENAL:  - Fluids: 100cc/h LR for rhabdo; given additional 1L IVB  - daily IOs  - BMP daily  - trend CPK levels  cortez, dc      GI:  HBV  - Diet: ADAT, dysphagia screen passed  - GI prophylaxis: PPI while on CCS  - Bowel regimen: miralax, senna  - on home tenofovir    ENDO:  5.5  - FS goal 120-180    HEME/ONC:  - SCDs  - Chemoppx: hold d/t fresh post op, resume if scan stable  - cbc, coags    ID: [email or web address]  ancef periop  - monitor for fevers    MISC:  RA  on home prednisone 10mg qd for Rheum arthritis  - given Hydrocort 50mg [id number] then will restart home prednisone    ICU  full code  at risk for deterioration due to post op delirium, agitation, acute blood loss anemia ASSESSMENT/PLAN:    s/p Posterior L2 PSO, extension of fusion to T9; T10-L2 Decompression; s/p 4 iva    NEURO:  hx of severe anesthetic reaction p/w agitation, delerium, and delayed emergence  - neuro checks q4h  - iva per plastics; theyll remove Aquacel dressing POD5  - pain control oxy ir 5/10  - Activity: PT/OT as tolerated    CVS:  - SBP goal   - restart home antihypertensives as tolerated    PULM:  - RA  - IS As tolerated  - Aspiration precautions    RENAL:  - Fluids: 100cc/h LR for rhabdo  - daily IOs  - BMP daily  - trend CPK levels      GI:  HBV  - Diet: Regular  - GI prophylaxis: PPI while on CCS  - Bowel regimen: miralax, senna  - on home tenofovir    ENDO:  5.5  - FS goal 120-180    HEME/ONC:  - SCDs  - Chemoppx: SQH  - cbc    ID: [email or web address]  ancef periop  - monitor for fevers    MISC:  RA  on home prednisone 10mg qd for Rheum arthritis  - home prednisone    ICU  full code

## 2022-08-19 NOTE — PROGRESS NOTE ADULT - ASSESSMENT
ASSESSMENT/PLAN:    s/p Posterior L2 PSO, extension of fusion to T9; T10-L2 Decompression; s/p 4 iva    NEURO:  - neuro checks q1h  - drians per plastics  - CTH tonight d/t AMS ddx includes delayed anesthesia awakening w/ hyperactive delerium; discussed concern for vertebrobasilar insuff with nsg though exam difficult to interpret  - CT spine AM  - consider vessel imaging to r/o vertobasilar insuff 2/2 prolonged prone position   - pain control; unable to use PCA pump now d/t mental status, IV dilaudid 0.5mg IVPx2, tylenol given  - precedex for agitation on hold for hypotension  - ?ocular clonus c/f serotonin syndrome, will eval med rec though low concern for now; on prop/fent drip during procedure; will avoid cyprhheptadine d/t pressor requirements, likely etiology delayed anesthesa emergence with hyperactive delerium  - Activity: PT/OT as tolerated  - utox, alcohol level    CVS:  - SBP goal   - restart home antihypertensives as tolerated  - pressors PRN to maintian goal  - CXR, RIJ TLC suboptimal positioning    PULM:  - RA  - IS As tolerated  - Aspiration precautions  - ABG    RENAL:  - Fluids: 100cc/h LR for rhabdo; given additional 1L IVB  - daily IOs  - BMP q8h  - trend CPK levels      GI:  HBV  - Diet: ADAT, dysphagia screen  - GI prophylaxis: PPI while on CCS  - Bowel regimen: miralax, senna  - on home tenofovir    ENDO:   - FS goal 120-180    HEME/ONC:  - SCDs  - Chemoppx: hold d/t fresh post op  - cbc, coags    ID:  - monitor for fevers    MISC:  RA  on home prednisone 10mg qd  - given Hydrocort 50mg [id number] then will restart home prednisone     ASSESSMENT/PLAN:    s/p Posterior L2 PSO, extension of fusion to T9; T10-L2 Decompression; s/p 4 iva    NEURO:  - neuro checks q1h  - iva per plastics  - CTH tonight d/t AMS ddx includes delayed anesthesia awakening w/ hyperactive delerium; discussed concern for vertebrobasilar insuff with nsg though exam difficult to interpret  - CT spine AM  - pain control; unable to use PCA pump now d/t mental status, IV dilaudid 0.5mg IVPx2, tylenol given  dc precedex  oxy ir 5/10  - Activity: PT/OT as tolerated    CVS:  - SBP goal  dc jen  - restart home antihypertensives as tolerated  - pressors PRN to maintian goal curerntly off  - CXR, RIJ TLC suboptimal positioning will dc    PULM:  - RA  - IS As tolerated  - Aspiration precautions  - ABG    RENAL:  - Fluids: 100cc/h LR for rhabdo; given additional 1L IVB  - daily IOs  - BMP daily  - trend CPK levels  diego, sherry      GI:  HBV  - Diet: ADAT, dysphagia screen passed  - GI prophylaxis: PPI while on CCS  - Bowel regimen: miralax, senna  - on home tenofovir    ENDO:  5.5  - FS goal 120-180    HEME/ONC:  - SCDs  - Chemoppx: hold d/t fresh post op, resume if scan stable  - cbc, coags    ID: afebirle  ancef periop  - monitor for fevers    MISC:  RA  on home prednisone 10mg qd for Rheum arthritis  - given Hydrocort 50mg [id number] then will restart home prednisone     ASSESSMENT/PLAN:    s/p Posterior L2 PSO, extension of fusion to T9; T10-L2 Decompression; s/p 4 iva    NEURO:  - neuro checks q1h  - iva per plastics  - CTH tonight d/t AMS ddx includes delayed anesthesia awakening w/ hyperactive delerium; discussed concern for vertebrobasilar insuff with nsg though exam difficult to interpret  - CT spine AM  - pain control; unable to use PCA pump now d/t mental status, IV dilaudid 0.5mg IVPx2, tylenol given  dc precedex  oxy ir 5/10  - Activity: PT/OT as tolerated    CVS:  - SBP goal  dc jen  - restart home antihypertensives as tolerated  - pressors PRN to maintian goal curerntly off  - CXR, RIJ TLC suboptimal positioning will dc    PULM:  - RA  - IS As tolerated  - Aspiration precautions  - ABG    RENAL:  - Fluids: 100cc/h LR for rhabdo; given additional 1L IVB  - daily IOs  - BMP daily  - trend CPK levels  cortez, dc      GI:  HBV  - Diet: ADAT, dysphagia screen passed  - GI prophylaxis: PPI while on CCS  - Bowel regimen: miralax, senna  - on home tenofovir    ENDO:  5.5  - FS goal 120-180    HEME/ONC:  - SCDs  - Chemoppx: hold d/t fresh post op, resume if scan stable  - cbc, coags    ID: afebirle  ancef periop  - monitor for fevers    MISC:  RA  on home prednisone 10mg qd for Rheum arthritis  - given Hydrocort 50mg [id number] then will restart home prednisone    ICU  full code  at risk for deterioration due to post op delirium, agitation, acute blood loss anemia

## 2022-08-19 NOTE — OCCUPATIONAL THERAPY INITIAL EVALUATION ADULT - TRANSFER TRAINING, PT EVAL
ACP only visit GOAL: Patient will perform functional transfer independently with use of rolling walker in 4 weeks.

## 2022-08-20 LAB
ANION GAP SERPL CALC-SCNC: 9 MMOL/L — SIGNIFICANT CHANGE UP (ref 5–17)
BUN SERPL-MCNC: 10 MG/DL — SIGNIFICANT CHANGE UP (ref 7–23)
CALCIUM SERPL-MCNC: 8.1 MG/DL — LOW (ref 8.4–10.5)
CHLORIDE SERPL-SCNC: 100 MMOL/L — SIGNIFICANT CHANGE UP (ref 96–108)
CK SERPL-CCNC: 5410 U/L — HIGH (ref 30–200)
CK SERPL-CCNC: 6321 U/L — HIGH (ref 30–200)
CO2 SERPL-SCNC: 27 MMOL/L — SIGNIFICANT CHANGE UP (ref 22–31)
CREAT SERPL-MCNC: 0.82 MG/DL — SIGNIFICANT CHANGE UP (ref 0.5–1.3)
EGFR: 97 ML/MIN/1.73M2 — SIGNIFICANT CHANGE UP
GLUCOSE SERPL-MCNC: 100 MG/DL — HIGH (ref 70–99)
HCT VFR BLD CALC: 25.6 % — LOW (ref 39–50)
HGB BLD-MCNC: 8.7 G/DL — LOW (ref 13–17)
MAGNESIUM SERPL-MCNC: 1.9 MG/DL — SIGNIFICANT CHANGE UP (ref 1.6–2.6)
MCHC RBC-ENTMCNC: 32.7 PG — SIGNIFICANT CHANGE UP (ref 27–34)
MCHC RBC-ENTMCNC: 34 GM/DL — SIGNIFICANT CHANGE UP (ref 32–36)
MCV RBC AUTO: 96.2 FL — SIGNIFICANT CHANGE UP (ref 80–100)
NRBC # BLD: 0 /100 WBCS — SIGNIFICANT CHANGE UP (ref 0–0)
PHOSPHATE SERPL-MCNC: 2.1 MG/DL — LOW (ref 2.5–4.5)
PLATELET # BLD AUTO: 150 K/UL — SIGNIFICANT CHANGE UP (ref 150–400)
POTASSIUM SERPL-MCNC: 3.6 MMOL/L — SIGNIFICANT CHANGE UP (ref 3.5–5.3)
POTASSIUM SERPL-SCNC: 3.6 MMOL/L — SIGNIFICANT CHANGE UP (ref 3.5–5.3)
RBC # BLD: 2.66 M/UL — LOW (ref 4.2–5.8)
RBC # FLD: 12.6 % — SIGNIFICANT CHANGE UP (ref 10.3–14.5)
SODIUM SERPL-SCNC: 136 MMOL/L — SIGNIFICANT CHANGE UP (ref 135–145)
WBC # BLD: 23.77 K/UL — HIGH (ref 3.8–10.5)
WBC # FLD AUTO: 23.77 K/UL — HIGH (ref 3.8–10.5)

## 2022-08-20 PROCEDURE — 71045 X-RAY EXAM CHEST 1 VIEW: CPT | Mod: 26,77

## 2022-08-20 PROCEDURE — 93970 EXTREMITY STUDY: CPT | Mod: 26

## 2022-08-20 PROCEDURE — 71045 X-RAY EXAM CHEST 1 VIEW: CPT | Mod: 26

## 2022-08-20 RX ORDER — POLYETHYLENE GLYCOL 3350 17 G/17G
17 POWDER, FOR SOLUTION ORAL EVERY 12 HOURS
Refills: 0 | Status: DISCONTINUED | OUTPATIENT
Start: 2022-08-20 | End: 2022-08-26

## 2022-08-20 RX ORDER — CHLORHEXIDINE GLUCONATE 213 G/1000ML
1 SOLUTION TOPICAL
Refills: 0 | Status: DISCONTINUED | OUTPATIENT
Start: 2022-08-20 | End: 2022-08-26

## 2022-08-20 RX ORDER — ACETAMINOPHEN 500 MG
1000 TABLET ORAL ONCE
Refills: 0 | Status: COMPLETED | OUTPATIENT
Start: 2022-08-20 | End: 2022-08-20

## 2022-08-20 RX ORDER — SODIUM CHLORIDE 9 MG/ML
10 INJECTION INTRAMUSCULAR; INTRAVENOUS; SUBCUTANEOUS
Refills: 0 | Status: DISCONTINUED | OUTPATIENT
Start: 2022-08-20 | End: 2022-08-26

## 2022-08-20 RX ORDER — SIMETHICONE 80 MG/1
80 TABLET, CHEWABLE ORAL DAILY
Refills: 0 | Status: DISCONTINUED | OUTPATIENT
Start: 2022-08-20 | End: 2022-08-20

## 2022-08-20 RX ORDER — SIMETHICONE 80 MG/1
80 TABLET, CHEWABLE ORAL EVERY 12 HOURS
Refills: 0 | Status: DISCONTINUED | OUTPATIENT
Start: 2022-08-20 | End: 2022-08-26

## 2022-08-20 RX ADMIN — OXYCODONE HYDROCHLORIDE 15 MILLIGRAM(S): 5 TABLET ORAL at 19:33

## 2022-08-20 RX ADMIN — Medication 5 MILLIGRAM(S): at 05:10

## 2022-08-20 RX ADMIN — OXYCODONE HYDROCHLORIDE 15 MILLIGRAM(S): 5 TABLET ORAL at 00:36

## 2022-08-20 RX ADMIN — OXYCODONE HYDROCHLORIDE 15 MILLIGRAM(S): 5 TABLET ORAL at 23:38

## 2022-08-20 RX ADMIN — METHOCARBAMOL 500 MILLIGRAM(S): 500 TABLET, FILM COATED ORAL at 13:24

## 2022-08-20 RX ADMIN — Medication 1000 MILLIGRAM(S): at 00:00

## 2022-08-20 RX ADMIN — Medication 5 MILLIGRAM(S): at 17:54

## 2022-08-20 RX ADMIN — OXYCODONE HYDROCHLORIDE 15 MILLIGRAM(S): 5 TABLET ORAL at 09:36

## 2022-08-20 RX ADMIN — OXYCODONE HYDROCHLORIDE 15 MILLIGRAM(S): 5 TABLET ORAL at 01:30

## 2022-08-20 RX ADMIN — PANTOPRAZOLE SODIUM 40 MILLIGRAM(S): 20 TABLET, DELAYED RELEASE ORAL at 05:16

## 2022-08-20 RX ADMIN — OXYCODONE HYDROCHLORIDE 15 MILLIGRAM(S): 5 TABLET ORAL at 06:10

## 2022-08-20 RX ADMIN — OXYCODONE HYDROCHLORIDE 15 MILLIGRAM(S): 5 TABLET ORAL at 18:54

## 2022-08-20 RX ADMIN — Medication 50 MILLIGRAM(S): at 05:10

## 2022-08-20 RX ADMIN — OXYCODONE HYDROCHLORIDE 15 MILLIGRAM(S): 5 TABLET ORAL at 14:24

## 2022-08-20 RX ADMIN — OXYCODONE HYDROCHLORIDE 15 MILLIGRAM(S): 5 TABLET ORAL at 13:24

## 2022-08-20 RX ADMIN — SIMETHICONE 80 MILLIGRAM(S): 80 TABLET, CHEWABLE ORAL at 11:41

## 2022-08-20 RX ADMIN — GABAPENTIN 300 MILLIGRAM(S): 400 CAPSULE ORAL at 05:09

## 2022-08-20 RX ADMIN — METHOCARBAMOL 500 MILLIGRAM(S): 500 TABLET, FILM COATED ORAL at 22:14

## 2022-08-20 RX ADMIN — Medication 400 MILLIGRAM(S): at 05:11

## 2022-08-20 RX ADMIN — SIMETHICONE 80 MILLIGRAM(S): 80 TABLET, CHEWABLE ORAL at 18:57

## 2022-08-20 RX ADMIN — POLYETHYLENE GLYCOL 3350 17 GRAM(S): 17 POWDER, FOR SOLUTION ORAL at 11:41

## 2022-08-20 RX ADMIN — Medication 50 MILLIGRAM(S): at 17:54

## 2022-08-20 RX ADMIN — METHOCARBAMOL 500 MILLIGRAM(S): 500 TABLET, FILM COATED ORAL at 05:09

## 2022-08-20 RX ADMIN — OXYCODONE HYDROCHLORIDE 15 MILLIGRAM(S): 5 TABLET ORAL at 05:10

## 2022-08-20 RX ADMIN — TENOFOVIR DISOPROXIL FUMARATE 300 MILLIGRAM(S): 300 TABLET, FILM COATED ORAL at 11:42

## 2022-08-20 RX ADMIN — OXYCODONE HYDROCHLORIDE 15 MILLIGRAM(S): 5 TABLET ORAL at 17:54

## 2022-08-20 RX ADMIN — OXYCODONE HYDROCHLORIDE 15 MILLIGRAM(S): 5 TABLET ORAL at 20:33

## 2022-08-20 RX ADMIN — OXYCODONE HYDROCHLORIDE 15 MILLIGRAM(S): 5 TABLET ORAL at 08:36

## 2022-08-20 RX ADMIN — ATORVASTATIN CALCIUM 20 MILLIGRAM(S): 80 TABLET, FILM COATED ORAL at 22:14

## 2022-08-20 NOTE — PROGRESS NOTE ADULT - ASSESSMENT
ASSESSMENT AND PLAN: 67 yo M with h/o HTN, RA, GERD, Hepatitis C, s/p prior lumbar decompression & fusion in 2015  c/o worsening of  back pain x 2 months,  unsteady when getting up ("crashes into walls")  numbness in his feet with difficulty with ADLs . Pt had neurology evaluation- s/p MRI lumbar spine revealed unspecified cord compression.    s/p Posterior L2 PSO, extension of fusion to T9, T10-L2 Decompression, w/ Plastics Closure 8/18/22    NEURO:   - Continue neuro checks q 4  - Continue to monitor drain outputs, drains are per Plastics (Dr. Pop)  - Continue pain control w/ Tylenol prn and Oxycodone prn, Oxycodone ER BID  - Continue Robaxin for muscle spasm  - Continue Gabapentin for neuropathic pain  - Standing Xrays when able to   - PT/OT - Acute Inpatient Rehab, PMR - P    PULM:   - On room air, O2Sat>93%  - Incentive spirometry    CV:  - -160  - Continue Metoprolol for HTN   - Continue Lipitor for HLD    ENDO:   - HgbA1c 5.5, goal euglycemia no issues    HEME/ONC:             8/20 CBC - leukocytosis downtrending, patient is afebrile, H/H downtrend, will trend with AM CBC         DVT ppx: SCDs, LE Dopps - negative, SQL    RENAL:   - LR@100  - Continue to monitor CK, mild improvement today from 6321 to 5410  - F/u AM CK    ID:   - Afebrile   - Received post-op abx  - 8/15 COVID neg    GI:    - Continue oral diet  - Senna, miralax and dulcolax, mag hydroxide prn constipation, last BM 8/19  - Continue protonix for GERD  - Simethicone prn for heartburn    MISC:   - Continue Prednisone for RA    DISCHARGE PLANNING:   PT/OT - Acute Inpatient Rehab, PMR - P    Plan to be discussed w/ Dr. Conner  [id number]    ASSESSMENT AND PLAN: 65 yo M with h/o HTN, RA, GERD, Hepatitis C, s/p prior lumbar decompression & fusion in 2015  c/o worsening of  back pain x 2 months,  unsteady when getting up ("crashes into walls")  numbness in his feet with difficulty with ADLs . Pt had neurology evaluation- s/p MRI lumbar spine revealed unspecified cord compression.    s/p Posterior L2 PSO, extension of fusion to T9, T10-L2 Decompression, w/ Plastics Closure 8/18/22    NEURO:   - Continue neuro checks q 4  - Continue to monitor drain outputs, drains are per Plastics (Dr. Pop)  - Continue pain control w/ Tylenol prn and Oxycodone prn, Oxycodone ER BID  - Continue Robaxin for muscle spasm  - Continue Gabapentin for neuropathic pain  - Standing Xrays when able to   - PT/OT - Acute Inpatient Rehab, PMR - P    PULM:   - On room air, O2Sat>93%  - Incentive spirometry    CV:  - -160  - Continue Metoprolol for HTN   - Continue Lipitor for HLD    ENDO:   - HgbA1c 5.5, goal euglycemia no issues    HEME/ONC:             8/20 CBC - leukocytosis downtrending, patient is afebrile, H/H downtrend, will trend with AM CBC         DVT ppx: SCDs, LE Dopps - negative, SQL    RENAL:   - LR@100  - Continue to monitor CK, mild improvement today from 6321 to 5410  - F/u AM CK    ID:   - Afebrile   - Received post-op abx  - 8/15 COVID neg    GI:    - Continue oral diet  - Senna, miralax and dulcolax, mag hydroxide prn constipation, last BM 8/19  - Continue protonix for GERD  - Simethicone prn for heartburn    MISC:   - Continue Prednisone for RA  - Right IJ central line place, got RT PICC for access, central line to be d/c'd upon confirmation of xray.     DISCHARGE PLANNING:   PT/OT - Acute Inpatient Rehab, PMR - P    Plan to be discussed w/ Dr. Conner  [id number]

## 2022-08-20 NOTE — PROGRESS NOTE ADULT - SUBJECTIVE AND OBJECTIVE BOX
SUBJECTIVE: HPI:  67 yo M with h/o HTN, RA, GERD, Hepatitis C, s/p prior lumbar decompression & fusion in 2015  c/o worsening of  back pain x 2 months,  unsteady when getting up ("crashes into walls")  numbness in his feet with difficulty with ADLs . Pt had neurology evaluation- s/p MRI lumbar spine revealed unspecified cord compression. Pt scheduled for posterior approach, exploration of prior L2 pelvis fusion, T10-L2 decompression, T9 pelvis fusion on 8/18/22  **Pt denies any double vision, vision changes, speech changes, urinary or bowel incontinence, fever, chills, or sick contacts  **Covid 19 PCR on 8/15/22     (10 Aug 2022 14:32)      OVERNIGHT EVENTS: [facility] overnight, patient seen and evaluated at bedside with his wife Allie Curran. Patient c/o incisional pain but controlled with current regimen. C/o heartburn, given Simethicone which resolved. Discussed to patient and wife about getting a PICC line for access as he currently has a right IJ central line, which they agreed to.     Vital Signs Last 24 Hrs  T(C): 36.9 (20 Aug 2022 16:44), Max: 37.2 (20 Aug 2022 08:20)  T(F): 98.4 (20 Aug 2022 16:44), Max: 98.9 (20 Aug 2022 08:20)  HR: 93 (20 Aug 2022 16:44) (90 - 108)  BP: 124/83 (20 Aug 2022 16:44) (102/65 - 140/70)  BP(mean): 89 (19 Aug 2022 20:00) (89 - 89)  RR: 18 (20 Aug 2022 16:44) (18 - 25)  SpO2: 93% (20 Aug 2022 16:44) (93% - 99%)    Parameters below as of 20 Aug 2022 16:44  Patient On (Oxygen Delivery Method): room air        DRAINS: Left HMV 20cc/24hrs, Left lower HMV 170cc/24hrs, Right upper MALLIKA 150cc/24hrs, Right lower MALLIKA 140cc/24hrs - drains per Plastics    PHYSICAL EXAM:    Constitutional: No Acute Distress     Neurological: AOx3, Following Commands, Moving all Extremities     Motor exam:          Upper extremity                         Delt     Bicep     Tricep    HG                                                 R         4+/5       4+5/5        4+/5       5/5                                               L          4+/5        4+/5        4+/5       5/5          Lower extremity                        HF         KF        KE       DF         PF                                                  R        5/5        5/5        5/5       5/5         5/5                                               L         5/5        5/5       5/5       5/5          5/5                                                 Sensation: [] intact to light touch  [] decreased:     Pulmonary: Clear to Auscultation, No rales, No rhonchi, No wheezes     Cardiovascular: S1, S2, Regular rate and rhythm     Gastrointestinal: Soft, Non-tender, Non-distended     Extremities: No calf tenderness     Incision:     LABS:                        8.7    23.77 )-----------( 150      ( 20 Aug 2022 07:18 )             25.6    08-20    136  |  100  |  10  ----------------------------<  100<H>  3.6   |  27  |  0.82    Ca    8.1<L>      20 Aug 2022 06:54  Phos  2.1     08-20  Mg     1.9     08-20    TPro  5.5<L>  /  Alb  3.4  /  TBili  0.3  /  DBili  x   /  AST  76<H>  /  ALT  35  /  AlkPhos  44  08-19  PT/INR - ( 18 Aug 2022 23:12 )   PT: 12.1 sec;   INR: 1.05 ratio         PTT - ( 18 Aug 2022 23:12 )  PTT:21.9 sec    MEDICATIONS:  Antibiotics:  tenofovir disoproxil fumarate (VIREAD) 300 milliGRAM(s) Oral daily    Neuro:  acetaminophen     Tablet .. 650 milliGRAM(s) Oral every 6 hours PRN Mild Pain (1 - 3)  gabapentin 300 milliGRAM(s) Oral two times a day  methocarbamol 500 milliGRAM(s) Oral every 8 hours  ondansetron Injectable 4 milliGRAM(s) IV Push every 6 hours PRN Nausea and/or Vomiting  oxyCODONE    IR 10 milliGRAM(s) Oral every 4 hours PRN Moderate Pain (4 - 6)  oxyCODONE    IR 15 milliGRAM(s) Oral every 4 hours PRN Severe Pain (7 - 10)  oxyCODONE  ER Tablet 15 milliGRAM(s) Oral every 12 hours    Cardiac:  metoprolol tartrate 50 milliGRAM(s) Oral two times a day    Pulm:    GI/:  bisacodyl 5 milliGRAM(s) Oral every 12 hours  magnesium hydroxide Suspension 30 milliLiter(s) Oral every 12 hours PRN Constipation  pantoprazole    Tablet 40 milliGRAM(s) Oral before breakfast  polyethylene glycol 3350 17 Gram(s) Oral daily  senna 2 Tablet(s) Oral at bedtime  simethicone 80 milliGRAM(s) Chew daily PRN Heartburn    Other:   atorvastatin 20 milliGRAM(s) Oral at bedtime  chlorhexidine 4% Liquid 1 Application(s) Topical <User Schedule>  enoxaparin Injectable 40 milliGRAM(s) SubCutaneous every 24 hours  lactated ringers. 1000 milliLiter(s) IV Continuous <Continuous>  predniSONE   Tablet 5 milliGRAM(s) Oral daily  sodium chloride 0.9% lock flush 10 milliLiter(s) IV Push every 1 hour PRN Pre/post blood products, medications, blood draw, and to maintain line patency    DIET: [] Regular [] CCD [] Renal [] Puree [] Dysphagia [] Tube Feeds:     IMAGING:    SUBJECTIVE: HPI:  65 yo M with h/o HTN, RA, GERD, Hepatitis C, s/p prior lumbar decompression & fusion in 2015  c/o worsening of  back pain x 2 months,  unsteady when getting up ("crashes into walls")  numbness in his feet with difficulty with ADLs . Pt had neurology evaluation- s/p MRI lumbar spine revealed unspecified cord compression. Pt scheduled for posterior approach, exploration of prior L2 pelvis fusion, T10-L2 decompression, T9 pelvis fusion on 8/18/22  **Pt denies any double vision, vision changes, speech changes, urinary or bowel incontinence, fever, chills, or sick contacts  **Covid 19 PCR on 8/15/22     (10 Aug 2022 14:32)      OVERNIGHT EVENTS: [facility], patient seen and evaluated at bedside with his wife Allie Curran. Patient c/o incisional pain but controlled with current regimen. C/o heartburn, given Simethicone which resolved. Discussed to patient and wife about getting a PICC line for access as he currently has a right IJ central line, which they agreed to.     Vital Signs Last 24 Hrs  T(C): 36.9 (20 Aug 2022 16:44), Max: 37.2 (20 Aug 2022 08:20)  T(F): 98.4 (20 Aug 2022 16:44), Max: 98.9 (20 Aug 2022 08:20)  HR: 93 (20 Aug 2022 16:44) (90 - 108)  BP: 124/83 (20 Aug 2022 16:44) (102/65 - 140/70)  BP(mean): 89 (19 Aug 2022 20:00) (89 - 89)  RR: 18 (20 Aug 2022 16:44) (18 - 25)  SpO2: 93% (20 Aug 2022 16:44) (93% - 99%)    Parameters below as of 20 Aug 2022 16:44  Patient On (Oxygen Delivery Method): room air        DRAINS: Left HMV 20cc/24hrs, Left lower HMV 170cc/24hrs, Right upper MALLIKA 150cc/24hrs, Right lower MALLIKA 140cc/24hrs - drains per Plastics    PHYSICAL EXAM:    Constitutional: No Acute Distress     Neurological: AOx3, Following Commands, Moving all Extremities     Motor exam: His exam is limited secondary to incision pain.          Upper extremity                         Delt     Bicep     Tricep    HG                                                 R         4+/5       4+5/5        4+/5       5/5                                               L          4+/5        4+/5        4+/5       5/5          Lower extremity                        HF         KF        KE       DF         PF                                                  R        4+/5        4+/5        4+/5       5/5         5/5                                               L         3/5        4+/5       4+/5       5/5          5/5                                                 Sensation: [x] intact to light touch  [] decreased:     Pulmonary: Clear to Auscultation, No rales, No rhonchi, No wheezes     Cardiovascular: S1, S2, Regular rate and rhythm     Gastrointestinal: Soft, Non-tender, Non-distended     Extremities: No calf tenderness     Incision: Aquacel dressing in place, C/D/I    LABS:                        8.7    23.77 )-----------( 150      ( 20 Aug 2022 07:18 )             25.6    08-20    136  |  100  |  10  ----------------------------<  100<H>  3.6   |  27  |  0.82    Ca    8.1<L>      20 Aug 2022 06:54  Phos  2.1     08-20  Mg     1.9     08-20    TPro  5.5<L>  /  Alb  3.4  /  TBili  0.3  /  DBili  x   /  AST  76<H>  /  ALT  35  /  AlkPhos  44  08-19  PT/INR - ( 18 Aug 2022 23:12 )   PT: 12.1 sec;   INR: 1.05 ratio         PTT - ( 18 Aug 2022 23:12 )  PTT:21.9 sec    MEDICATIONS:  Antibiotics:  tenofovir disoproxil fumarate (VIREAD) 300 milliGRAM(s) Oral daily    Neuro:  acetaminophen     Tablet .. 650 milliGRAM(s) Oral every 6 hours PRN Mild Pain (1 - 3)  gabapentin 300 milliGRAM(s) Oral two times a day  methocarbamol 500 milliGRAM(s) Oral every 8 hours  ondansetron Injectable 4 milliGRAM(s) IV Push every 6 hours PRN Nausea and/or Vomiting  oxyCODONE    IR 10 milliGRAM(s) Oral every 4 hours PRN Moderate Pain (4 - 6)  oxyCODONE    IR 15 milliGRAM(s) Oral every 4 hours PRN Severe Pain (7 - 10)  oxyCODONE  ER Tablet 15 milliGRAM(s) Oral every 12 hours    Cardiac:  metoprolol tartrate 50 milliGRAM(s) Oral two times a day    Pulm:    GI/:  bisacodyl 5 milliGRAM(s) Oral every 12 hours  magnesium hydroxide Suspension 30 milliLiter(s) Oral every 12 hours PRN Constipation  pantoprazole    Tablet 40 milliGRAM(s) Oral before breakfast  polyethylene glycol 3350 17 Gram(s) Oral daily  senna 2 Tablet(s) Oral at bedtime  simethicone 80 milliGRAM(s) Chew daily PRN Heartburn    Other:   atorvastatin 20 milliGRAM(s) Oral at bedtime  chlorhexidine 4% Liquid 1 Application(s) Topical <User Schedule>  enoxaparin Injectable 40 milliGRAM(s) SubCutaneous every 24 hours  lactated ringers. 1000 milliLiter(s) IV Continuous <Continuous>  predniSONE   Tablet 5 milliGRAM(s) Oral daily  sodium chloride 0.9% lock flush 10 milliLiter(s) IV Push every 1 hour PRN Pre/post blood products, medications, blood draw, and to maintain line patency    DIET: [x] Regular [] CCD [] Renal [] Puree [] Dysphagia [] Tube Feeds:     IMAGING:   < from: CT Lumbar Spine No Cont (08.19.22 @ 10:32) >  IMPRESSION: Posterior fusion T9-S1 with connectingrods and pedicle   screws. Vertebral plasty material T9 and L1. Interposition grafts L1 to   through L5-S1.    --- End of Report ---    LINNEA BARCLAY MD; Attending Radiologist  This document has been electronically signed. Aug 19 2022  1:38PM    < end of copied text >    < from: [facility] Duplex Lower Ext Vein Scan, Bilat (08.20.22 @ 14:19) >  IMPRESSION:  No evidence of deep venous thrombosis in either lower extremity.      --- End of Report ---      ABHAY XAVIER MD; Attending Radiologist  This document has been electronically signed. Aug 20 2022  3:28PM    < end of copied text >

## 2022-08-21 DIAGNOSIS — R74.8 ABNORMAL LEVELS OF OTHER SERUM ENZYMES: ICD-10-CM

## 2022-08-21 DIAGNOSIS — I10 ESSENTIAL (PRIMARY) HYPERTENSION: ICD-10-CM

## 2022-08-21 DIAGNOSIS — E87.8 OTHER DISORDERS OF ELECTROLYTE AND FLUID BALANCE, NOT ELSEWHERE CLASSIFIED: ICD-10-CM

## 2022-08-21 DIAGNOSIS — Z29.9 ENCOUNTER FOR PROPHYLACTIC MEASURES, UNSPECIFIED: ICD-10-CM

## 2022-08-21 DIAGNOSIS — E87.1 HYPO-OSMOLALITY AND HYPONATREMIA: ICD-10-CM

## 2022-08-21 DIAGNOSIS — D72.829 ELEVATED WHITE BLOOD CELL COUNT, UNSPECIFIED: ICD-10-CM

## 2022-08-21 DIAGNOSIS — M06.9 RHEUMATOID ARTHRITIS, UNSPECIFIED: ICD-10-CM

## 2022-08-21 DIAGNOSIS — B19.20 UNSPECIFIED VIRAL HEPATITIS C WITHOUT HEPATIC COMA: ICD-10-CM

## 2022-08-21 DIAGNOSIS — I95.1 ORTHOSTATIC HYPOTENSION: ICD-10-CM

## 2022-08-21 LAB
ANION GAP SERPL CALC-SCNC: 10 MMOL/L — SIGNIFICANT CHANGE UP (ref 5–17)
ANION GAP SERPL CALC-SCNC: 9 MMOL/L — SIGNIFICANT CHANGE UP (ref 5–17)
BUN SERPL-MCNC: 10 MG/DL — SIGNIFICANT CHANGE UP (ref 7–23)
BUN SERPL-MCNC: 11 MG/DL — SIGNIFICANT CHANGE UP (ref 7–23)
CALCIUM SERPL-MCNC: 7.6 MG/DL — LOW (ref 8.4–10.5)
CALCIUM SERPL-MCNC: 8 MG/DL — LOW (ref 8.4–10.5)
CHLORIDE SERPL-SCNC: 94 MMOL/L — LOW (ref 96–108)
CHLORIDE SERPL-SCNC: 97 MMOL/L — SIGNIFICANT CHANGE UP (ref 96–108)
CK SERPL-CCNC: 3840 U/L — HIGH (ref 30–200)
CO2 SERPL-SCNC: 25 MMOL/L — SIGNIFICANT CHANGE UP (ref 22–31)
CO2 SERPL-SCNC: 25 MMOL/L — SIGNIFICANT CHANGE UP (ref 22–31)
CREAT SERPL-MCNC: 0.81 MG/DL — SIGNIFICANT CHANGE UP (ref 0.5–1.3)
CREAT SERPL-MCNC: 0.85 MG/DL — SIGNIFICANT CHANGE UP (ref 0.5–1.3)
EGFR: 96 ML/MIN/1.73M2 — SIGNIFICANT CHANGE UP
EGFR: 97 ML/MIN/1.73M2 — SIGNIFICANT CHANGE UP
GLUCOSE SERPL-MCNC: 91 MG/DL — SIGNIFICANT CHANGE UP (ref 70–99)
GLUCOSE SERPL-MCNC: 98 MG/DL — SIGNIFICANT CHANGE UP (ref 70–99)
HCT VFR BLD CALC: 23.3 % — LOW (ref 39–50)
HGB BLD-MCNC: 8 G/DL — LOW (ref 13–17)
MAGNESIUM SERPL-MCNC: 1.9 MG/DL — SIGNIFICANT CHANGE UP (ref 1.6–2.6)
MAGNESIUM SERPL-MCNC: 2.2 MG/DL — SIGNIFICANT CHANGE UP (ref 1.6–2.6)
MCHC RBC-ENTMCNC: 33.5 PG — SIGNIFICANT CHANGE UP (ref 27–34)
MCHC RBC-ENTMCNC: 34.3 GM/DL — SIGNIFICANT CHANGE UP (ref 32–36)
MCV RBC AUTO: 97.5 FL — SIGNIFICANT CHANGE UP (ref 80–100)
NRBC # BLD: 0 /100 WBCS — SIGNIFICANT CHANGE UP (ref 0–0)
PHOSPHATE SERPL-MCNC: 1.8 MG/DL — LOW (ref 2.5–4.5)
PHOSPHATE SERPL-MCNC: 3.2 MG/DL — SIGNIFICANT CHANGE UP (ref 2.5–4.5)
PLATELET # BLD AUTO: 139 K/UL — LOW (ref 150–400)
POTASSIUM SERPL-MCNC: 3.3 MMOL/L — LOW (ref 3.5–5.3)
POTASSIUM SERPL-MCNC: 4.1 MMOL/L — SIGNIFICANT CHANGE UP (ref 3.5–5.3)
POTASSIUM SERPL-SCNC: 3.3 MMOL/L — LOW (ref 3.5–5.3)
POTASSIUM SERPL-SCNC: 4.1 MMOL/L — SIGNIFICANT CHANGE UP (ref 3.5–5.3)
RBC # BLD: 2.39 M/UL — LOW (ref 4.2–5.8)
RBC # FLD: 12.1 % — SIGNIFICANT CHANGE UP (ref 10.3–14.5)
SODIUM SERPL-SCNC: 129 MMOL/L — LOW (ref 135–145)
SODIUM SERPL-SCNC: 131 MMOL/L — LOW (ref 135–145)
WBC # BLD: 19.21 K/UL — HIGH (ref 3.8–10.5)
WBC # FLD AUTO: 19.21 K/UL — HIGH (ref 3.8–10.5)

## 2022-08-21 PROCEDURE — 99223 1ST HOSP IP/OBS HIGH 75: CPT

## 2022-08-21 RX ORDER — ROSUVASTATIN CALCIUM 5 MG/1
1 TABLET ORAL
Qty: 0 | Refills: 0 | DISCHARGE

## 2022-08-21 RX ORDER — METOPROLOL TARTRATE 50 MG
25 TABLET ORAL
Refills: 0 | Status: DISCONTINUED | OUTPATIENT
Start: 2022-08-21 | End: 2022-08-25

## 2022-08-21 RX ORDER — POTASSIUM CHLORIDE 20 MEQ
40 PACKET (EA) ORAL EVERY 4 HOURS
Refills: 0 | Status: COMPLETED | OUTPATIENT
Start: 2022-08-21 | End: 2022-08-21

## 2022-08-21 RX ORDER — SODIUM CHLORIDE 9 MG/ML
500 INJECTION INTRAMUSCULAR; INTRAVENOUS; SUBCUTANEOUS ONCE
Refills: 0 | Status: COMPLETED | OUTPATIENT
Start: 2022-08-21 | End: 2022-08-21

## 2022-08-21 RX ORDER — METOPROLOL TARTRATE 50 MG
25 TABLET ORAL ONCE
Refills: 0 | Status: COMPLETED | OUTPATIENT
Start: 2022-08-21 | End: 2022-08-21

## 2022-08-21 RX ORDER — MAGNESIUM SULFATE 500 MG/ML
1 VIAL (ML) INJECTION ONCE
Refills: 0 | Status: COMPLETED | OUTPATIENT
Start: 2022-08-21 | End: 2022-08-21

## 2022-08-21 RX ADMIN — ENOXAPARIN SODIUM 40 MILLIGRAM(S): 100 INJECTION SUBCUTANEOUS at 21:01

## 2022-08-21 RX ADMIN — OXYCODONE HYDROCHLORIDE 15 MILLIGRAM(S): 5 TABLET ORAL at 06:08

## 2022-08-21 RX ADMIN — METHOCARBAMOL 500 MILLIGRAM(S): 500 TABLET, FILM COATED ORAL at 15:09

## 2022-08-21 RX ADMIN — OXYCODONE HYDROCHLORIDE 15 MILLIGRAM(S): 5 TABLET ORAL at 00:38

## 2022-08-21 RX ADMIN — Medication 102 GRAM(S): at 11:25

## 2022-08-21 RX ADMIN — Medication 25 MILLIGRAM(S): at 21:01

## 2022-08-21 RX ADMIN — OXYCODONE HYDROCHLORIDE 15 MILLIGRAM(S): 5 TABLET ORAL at 16:11

## 2022-08-21 RX ADMIN — Medication 5 MILLIGRAM(S): at 06:08

## 2022-08-21 RX ADMIN — METHOCARBAMOL 500 MILLIGRAM(S): 500 TABLET, FILM COATED ORAL at 21:00

## 2022-08-21 RX ADMIN — OXYCODONE HYDROCHLORIDE 15 MILLIGRAM(S): 5 TABLET ORAL at 04:13

## 2022-08-21 RX ADMIN — OXYCODONE HYDROCHLORIDE 15 MILLIGRAM(S): 5 TABLET ORAL at 21:13

## 2022-08-21 RX ADMIN — ATORVASTATIN CALCIUM 20 MILLIGRAM(S): 80 TABLET, FILM COATED ORAL at 21:01

## 2022-08-21 RX ADMIN — TENOFOVIR DISOPROXIL FUMARATE 300 MILLIGRAM(S): 300 TABLET, FILM COATED ORAL at 11:26

## 2022-08-21 RX ADMIN — Medication 85 MILLIMOLE(S): at 12:34

## 2022-08-21 RX ADMIN — Medication 5 MILLIGRAM(S): at 17:18

## 2022-08-21 RX ADMIN — OXYCODONE HYDROCHLORIDE 15 MILLIGRAM(S): 5 TABLET ORAL at 08:55

## 2022-08-21 RX ADMIN — Medication 40 MILLIEQUIVALENT(S): at 15:09

## 2022-08-21 RX ADMIN — Medication 25 MILLIGRAM(S): at 10:27

## 2022-08-21 RX ADMIN — PANTOPRAZOLE SODIUM 40 MILLIGRAM(S): 20 TABLET, DELAYED RELEASE ORAL at 06:08

## 2022-08-21 RX ADMIN — OXYCODONE HYDROCHLORIDE 15 MILLIGRAM(S): 5 TABLET ORAL at 09:55

## 2022-08-21 RX ADMIN — OXYCODONE HYDROCHLORIDE 15 MILLIGRAM(S): 5 TABLET ORAL at 05:13

## 2022-08-21 RX ADMIN — SODIUM CHLORIDE 1000 MILLILITER(S): 9 INJECTION INTRAMUSCULAR; INTRAVENOUS; SUBCUTANEOUS at 10:00

## 2022-08-21 RX ADMIN — OXYCODONE HYDROCHLORIDE 15 MILLIGRAM(S): 5 TABLET ORAL at 15:11

## 2022-08-21 RX ADMIN — OXYCODONE HYDROCHLORIDE 15 MILLIGRAM(S): 5 TABLET ORAL at 20:13

## 2022-08-21 RX ADMIN — POLYETHYLENE GLYCOL 3350 17 GRAM(S): 17 POWDER, FOR SOLUTION ORAL at 17:19

## 2022-08-21 RX ADMIN — SENNA PLUS 2 TABLET(S): 8.6 TABLET ORAL at 21:00

## 2022-08-21 RX ADMIN — CHLORHEXIDINE GLUCONATE 1 APPLICATION(S): 213 SOLUTION TOPICAL at 12:18

## 2022-08-21 RX ADMIN — Medication 40 MILLIEQUIVALENT(S): at 10:28

## 2022-08-21 RX ADMIN — METHOCARBAMOL 500 MILLIGRAM(S): 500 TABLET, FILM COATED ORAL at 06:08

## 2022-08-21 RX ADMIN — OXYCODONE HYDROCHLORIDE 15 MILLIGRAM(S): 5 TABLET ORAL at 17:18

## 2022-08-21 RX ADMIN — OXYCODONE HYDROCHLORIDE 15 MILLIGRAM(S): 5 TABLET ORAL at 07:08

## 2022-08-21 RX ADMIN — OXYCODONE HYDROCHLORIDE 15 MILLIGRAM(S): 5 TABLET ORAL at 18:12

## 2022-08-21 NOTE — PROGRESS NOTE ADULT - ASSESSMENT
ASSESSMENT AND PLAN: 65 yo M with h/o HTN, RA, GERD, Hepatitis C, s/p prior lumbar decompression & fusion in 2015  c/o worsening of  back pain x 2 months,  unsteady when getting up ("crashes into walls")  numbness in his feet with difficulty with ADLs . Pt had neurology evaluation- s/p MRI lumbar spine revealed unspecified cord compression.    s/p Posterior L2 PSO, extension of fusion to T9, T10-L2 Decompression, w/ Plastics Closure 8/18/22    NEURO:   - Continue neuro checks q 4  - Continue to monitor drain outputs, drains are per Plastics (Dr. Pop)  - Continue pain control w/ Tylenol prn and Oxycodone prn, Oxycodone ER BID  - Continue Robaxin for muscle spasm  - Patient states he does not want to take Gabapentin as it makes him feel nauseas, discontinued   - Standing Xrays when able to   - PT/OT - Acute Inpatient Rehab, PMR - P    PULM:   - On room air, O2Sat>93%  - Incentive spirometry  - Continuous pulse ox  - 8/20 CXR - clear lungs    CV:  - -160  - Continue Metoprolol for HTN, halved dose this morning to 25mg BID with appropriate hold parameters  - Continue Lipitor for HLD    ENDO:   - HgbA1c 5.5, goal euglycemia no issues    HEME/ONC:             8/21 CBC - leukocytosis downtrending, patient is afebrile, H/H downtrend, will trend with AM CBC         DVT ppx: SCDs, LE Dopps - negative, SQL    RENAL:   - IVL due to hyponatremia from LR, will monitor Na  - Supplemented K3.3 with KCl 40meq x 2 doses, Mag 1.9 with Mag 1gIVPB, Phos 1.8 w/ Sodium Phos 30meq x1  - Will f/u BMP    ID:   - Afebrile   - Received post-op abx  - 8/15 COVID neg    GI:    - Continue oral diet  - Senna, miralax and dulcolax, mag hydroxide prn constipation, last BM 8/19  - Continue protonix for GERD  - Simethicone prn for heartburn    MISC:   - Continue Prednisone for RA  - Right PICC for access, confirmed on CXR    DISCHARGE PLANNING:   PT/OT - Acute Inpatient Rehab, PMR - P    Plan to be discussed w/ Dr. Conner  [id number]

## 2022-08-21 NOTE — CONSULT NOTE ADULT - PROBLEM SELECTOR RECOMMENDATION 4
likely reactive already downtrending.  afebrile with no signs/symptoms of acute infection  - monitor off abx  - if febrile, send infectious work-up

## 2022-08-21 NOTE — CONSULT NOTE ADULT - SUBJECTIVE AND OBJECTIVE BOX
DATE OF SERVICE: 08-21-22 @ 11:39    CHIEF COMPLAINT:Patient is a 66y old  Male who presents with a chief complaint of "I have back pain" Goal to reduce pain & improve ADL (10 Aug 2022 14:32)      HISTORY OF PRESENT ILLNESS:  67 yo M with h/o HTN, RA, GERD, Hepatitis C, s/p prior lumbar decompression & fusion in 2015  c/o worsening of  back pain x 2 months,  unsteady when getting up ("crashes into walls")  numbness in his feet with difficulty with ADLs . Pt had neurology evaluation- s/p MRI lumbar spine revealed unspecified cord compression. Pt scheduled for posterior approach, exploration of prior L2 pelvis fusion, T10-L2 decompression, T9 pelvis fusion on 8/18/22        PAST MEDICAL & SURGICAL HISTORY:  HTN (hypertension)      Rheumatoid arthritis      Chronic GERD      Degenerative lumbar spinal stenosis      History of hepatitis C      Lumbar cord compression      Fusion of spine, lumbar region  2015      Status post cervical spinal fusion  2014              MEDICATIONS:  enoxaparin Injectable 40 milliGRAM(s) SubCutaneous every 24 hours  metoprolol tartrate 25 milliGRAM(s) Oral two times a day    tenofovir disoproxil fumarate (VIREAD) 300 milliGRAM(s) Oral daily      acetaminophen     Tablet .. 650 milliGRAM(s) Oral every 6 hours PRN  methocarbamol 500 milliGRAM(s) Oral every 8 hours  ondansetron Injectable 4 milliGRAM(s) IV Push every 6 hours PRN  oxyCODONE    IR 10 milliGRAM(s) Oral every 4 hours PRN  oxyCODONE    IR 15 milliGRAM(s) Oral every 4 hours PRN  oxyCODONE  ER Tablet 15 milliGRAM(s) Oral every 12 hours    bisacodyl 5 milliGRAM(s) Oral every 12 hours  magnesium hydroxide Suspension 30 milliLiter(s) Oral every 12 hours PRN  pantoprazole    Tablet 40 milliGRAM(s) Oral before breakfast  polyethylene glycol 3350 17 Gram(s) Oral every 12 hours  senna 2 Tablet(s) Oral at bedtime  simethicone 80 milliGRAM(s) Chew every 12 hours PRN    atorvastatin 20 milliGRAM(s) Oral at bedtime  predniSONE   Tablet 5 milliGRAM(s) Oral daily    chlorhexidine 4% Liquid 1 Application(s) Topical <User Schedule>  chlorhexidine 4% Liquid 1 Application(s) Topical <User Schedule>  potassium chloride    Tablet ER 40 milliEquivalent(s) Oral every 4 hours  sodium chloride 0.9% Bolus 500 milliLiter(s) IV Bolus once  sodium chloride 0.9% lock flush 10 milliLiter(s) IV Push every 1 hour PRN  sodium chloride 0.9% lock flush 10 milliLiter(s) IV Push every 1 hour PRN  sodium phosphate IVPB 30 milliMole(s) IV Intermittent once      FAMILY HISTORY:  FH: emphysema (Father)        Non-contributory    SOCIAL HISTORY:    [ ] Tobacco- former smoker  [ ] Drugs  [ ] Alcohol    Allergies    gabapentin (Other)  shellfish (Anaphylaxis)    Intolerances    	    REVIEW OF SYSTEMS:  CONSTITUTIONAL: No fever  EYES: No eye pain, visual disturbances, or discharge  ENMT:  No difficulty hearing, tinnitus  NECK: No pain or stiffness  RESPIRATORY: No cough, wheezing,  CARDIOVASCULAR: No chest pain, palpitations, passing out, dizziness, or leg swelling  GASTROINTESTINAL:  No nausea, vomiting, diarrhea or constipation. No melena.  GENITOURINARY: No dysuria, hematuria  NEUROLOGICAL: No stroke like symptoms  SKIN: No burning or lesions   ENDOCRINE: No heat or cold intolerance  MUSCULOSKELETAL: No joint pain or swelling  PSYCHIATRIC: No  anxiety, mood swings  HEME/LYMPH: No bleeding gums  ALLERGY AND IMMUNOLOGIC: No hives or eczema	    All other ROS negative    PHYSICAL EXAM:  T(C): 37.1 (08-21-22 @ 06:00), Max: 37.3 (08-21-22 @ 00:08)  HR: 103 (08-21-22 @ 06:00) (93 - 103)  BP: 114/69 (08-21-22 @ 08:35) (101/60 - 124/83)  RR: 18 (08-21-22 @ 08:35) (18 - 18)  SpO2: 95% (08-21-22 @ 08:35) (93% - 95%)  Wt(kg): --  I&O's Summary    20 Aug 2022 07:01  -  21 Aug 2022 07:00  --------------------------------------------------------  IN: 720 mL / OUT: 1415 mL / NET: -695 mL    21 Aug 2022 07:01  -  21 Aug 2022 11:39  --------------------------------------------------------  IN: 480 mL / OUT: 0 mL / NET: 480 mL        Appearance: Normal	  HEENT:   Normal oral mucosa, EOMI	  Cardiovascular:  S1 S2, No JVD,    Respiratory: Lungs clear to auscultation	  Psychiatry: Alert  Gastrointestinal:  Soft, Non-tender, + BS	  Skin: No rashes   Neurologic: Non-focal  Extremities:  No edema  Vascular: Peripheral pulses palpable    	    	  	  CARDIAC MARKERS:  Labs personally reviewed by me                                  8.0    19.21 )-----------( 139      ( 21 Aug 2022 07:38 )             23.3     08-21    129<L>  |  94<L>  |  11  ----------------------------<  91  3.3<L>   |  25  |  0.85    Ca    7.6<L>      21 Aug 2022 07:34  Phos  1.8     08-21  Mg     1.9     08-21            EKG: Personally reviewed by me - SR with RBBB and PVCs  Radiology: Personally reviewed by me -     Xray Chest 1 View- PORTABLE-Urgent (Xray Chest 1 View- PORTABLE-Urgent .) (08.20.22 @ 15:47) >  IMPRESSION:  New right upper extremity PICC line with tip terminating distal SVC.    Clear lungs.    VA Duplex Lower Ext Vein Scan, Bilat (08.20.22 @ 14:19) >  IMPRESSION:  No evidence of deep venous thrombosis in either lower extremity.        Assessment /Plan:     Mr. Hernández is a 67 yo male with PMH of HTN, RA, GERD, Hep C, and previous back surgery who is s/p PSO L2, extension of fusion t9, T10-L2 decompression who had delayed emergence from anesthsia and ?delirium and now +orthostatic hypotension.    Problem/Plan -1  Problem: Orthostatic Hypotension  - Patient reports lightheadedness and dizziness upon trying to stand for the first time post-operatively  - Had delayed emergence from GA  - B/L LE negative for DVT  - CXR shows clear lungs  - ARB, HCTZ and CCB currently on hold  - BB reduced (now Metoprolol 25 mg PO BID)  - s/p IVF bolus   - Continue to monitor orthos daily  - Will check TTE    Problem/Plan -2  Problem: HTN  - + orthostatic as noted above  - ARB, HCTZ and CCB currently on hold  - c/w Metoprolol 25mg PO BID with hold parameters    Problem/Plan -3  Problem: HLD  - c/w stating    Problem/Plan -4  Problem: DVT PPX  - DVT neg on US  - c/w Lovenox SQ    Problem/Plan -5  Problem: s/p Spine surgery (PSO L2, extension of fusion t9, T10-L2 decompression)  - PT as tolerated  - Pain mgmt    Differential diagnosis and plan of care discussed with patient after the evaluation. Counseling on diet, nutritional counseling, weight management, exercise and medication compliance was done.   Advanced care planning/advanced directives discussed with patient/family. DNR status including forceful chest compressions to attempt to restart the heart, ventilator support/artificial breathing, electric shock, artificial nutrition, health care proxy, Molst form all discussed with pt. Pt wishes to consider. More than fifteen minutes spent on discussing advanced directives.     Leonarda Mendoza [facility]  David Westfall DO [facility]  Cardiovascular Medicine  [address], [address]  Office [phone]  Cell [phone]

## 2022-08-21 NOTE — CONSULT NOTE ADULT - PROBLEM SELECTOR RECOMMENDATION 7
now with post-op hypotension.  - hold home valsartan 320mg daily, HCTZ 25mg daily  - c/w reduced dose metoprolol tartrate 25mg BID with hold parameters  - monitor vitals

## 2022-08-21 NOTE — CONSULT NOTE ADULT - PROBLEM SELECTOR RECOMMENDATION 5
possibly 2/2 to aggressive IVF  in setting of elevated CPK  - agree with holding off further LR today  - NS bolus above  - hold HCTZ   - monitor Na on BMP closely

## 2022-08-21 NOTE — PROVIDER CONTACT NOTE (OTHER) - ACTION/TREATMENT ORDERED:
PA came to bedside, metoprolol 25mg, 500cc NS bolus, continuos pulse ox.
CT scan, covering surgical resident to bedside

## 2022-08-21 NOTE — CONSULT NOTE ADULT - PROBLEM SELECTOR RECOMMENDATION 9
s/p Posterior L2 PSO, extension of fusion to T9, T10-L2 Decompression, w/ Plastics Closure 8/18/22 with EBL 750cc. Post-op complicated by hypotension requiring pressors in NSCU  - post-op care as per neurosurgery team  - monitor hemovac output  - c/w oxycontin ER 15mg q12h (home dose)  - c/w multimodal PRN pain regimen as ordered - pain currently well controlled stable.  - c/w tenofovir 300mg daily

## 2022-08-21 NOTE — PROGRESS NOTE ADULT - SUBJECTIVE AND OBJECTIVE BOX
SUBJECTIVE: HPI:  65 yo M with h/o HTN, RA, GERD, Hepatitis C, s/p prior lumbar decompression & fusion in 2015  c/o worsening of  back pain x 2 months,  unsteady when getting up ("crashes into walls")  numbness in his feet with difficulty with ADLs . Pt had neurology evaluation- s/p MRI lumbar spine revealed unspecified cord compression. Pt scheduled for posterior approach, exploration of prior L2 pelvis fusion, T10-L2 decompression, T9 pelvis fusion on 8/18/22  **Pt denies any double vision, vision changes, speech changes, urinary or bowel incontinence, fever, chills, or sick contacts  **Covid 19 PCR on 8/15/22     (10 Aug 2022 14:32)      OVERNIGHT EVENTS: Patient's central line removed overnight as he had gotten his right PICC yesterday, gauze with tegaderm over exit site - C/D/I. Patient seen and evaluated sitting in chair today with his wife in the room. He states feeling well on the chair, it his first time this morning getting on the chair, he worked with PT and was tachycardic to the 130s - combination of pain and missed dose of Metoprolol (gets 50mg in the mornings that on review of chart he did not get, likely from low BP), found blisters on his chest that was causing him discomfort, placed cavalon and gauze, gave him an  bolus since his SBP 110s and gave him Metoprolol 25mg. On follow up in the afternoon HR much improved. C/o intermittent thigh numbness when I examined he said it resolved but it tends to come and go.     Vital Signs Last 24 Hrs  T(C): 36.7 (21 Aug 2022 12:55), Max: 37.3 (21 Aug 2022 00:08)  T(F): 98.1 (21 Aug 2022 12:55), Max: 99.1 (21 Aug 2022 00:08)  HR: 105 (21 Aug 2022 12:55) (94 - 105)  BP: 122/77 (21 Aug 2022 12:55) (101/60 - 122/77)  BP(mean): --  RR: 18 (21 Aug 2022 12:55) (18 - 18)  SpO2: 96% (21 Aug 2022 12:55) (93% - 96%)    Parameters below as of 21 Aug 2022 12:55  Patient On (Oxygen Delivery Method): room air      DRAINS: Left upper HMV 70cc/24hrs, Left lower HMV 15cc/24hrs, Right upper MALLIKA 55cc/24hrs, Right lower MALLIKA 75cc/24hrs - drains per Plastics    PHYSICAL EXAM:    Constitutional: No Acute Distress     Neurological: AOx3, Following Commands, Moving all Extremities     Motor exam: His exam is limited secondary to incision pain.          Upper extremity                         Delt     Bicep     Tricep    HG                                                 R         4+/5       4+5/5        4+/5       5/5                                               L          4+/5        4+/5        4+/5       5/5          Lower extremity                        HF         KF        KE       DF         PF                                                  R        4+/5        4+/5        4+/5       5/5         5/5                                               L         3/5        4+/5       4+/5       5/5          5/5                                                 Sensation: [] intact to light touch  [x] decreased: intermittent numbness on right thigh per the patient but on exam was intact, some numbness on his toes that he says comes and goes but per patient this has happened before surgery    Pulmonary: Clear to Auscultation, No rales, No rhonchi, No wheezes     Cardiovascular: S1, S2, Regular rate and rhythm     Gastrointestinal: Soft, Non-tender, Non-distended     Extremities: No calf tenderness     Incision: Aquacel dressing in place, C/D/I    LABS:                                   8.0    19.21 )-----------( 139      ( 21 Aug 2022 07:38 )             23.3     08-21    129<L>  |  94<L>  |  11  ----------------------------<  91  3.3<L>   |  25  |  0.85    Ca    7.6<L>      21 Aug 2022 07:34  Phos  1.8     08-21  Mg     1.9     08-21      MEDICATIONS:  Antibiotics:  tenofovir disoproxil fumarate (VIREAD) 300 milliGRAM(s) Oral daily    Neuro:  acetaminophen     Tablet .. 650 milliGRAM(s) Oral every 6 hours PRN Mild Pain (1 - 3)  gabapentin 300 milliGRAM(s) Oral two times a day  methocarbamol 500 milliGRAM(s) Oral every 8 hours  ondansetron Injectable 4 milliGRAM(s) IV Push every 6 hours PRN Nausea and/or Vomiting  oxyCODONE    IR 10 milliGRAM(s) Oral every 4 hours PRN Moderate Pain (4 - 6)  oxyCODONE    IR 15 milliGRAM(s) Oral every 4 hours PRN Severe Pain (7 - 10)  oxyCODONE  ER Tablet 15 milliGRAM(s) Oral every 12 hours    Cardiac:  metoprolol tartrate 50 milliGRAM(s) Oral two times a day    Pulm:    GI/:  bisacodyl 5 milliGRAM(s) Oral every 12 hours  magnesium hydroxide Suspension 30 milliLiter(s) Oral every 12 hours PRN Constipation  pantoprazole    Tablet 40 milliGRAM(s) Oral before breakfast  polyethylene glycol 3350 17 Gram(s) Oral daily  senna 2 Tablet(s) Oral at bedtime  simethicone 80 milliGRAM(s) Chew daily PRN Heartburn    Other:   atorvastatin 20 milliGRAM(s) Oral at bedtime  chlorhexidine 4% Liquid 1 Application(s) Topical <User Schedule>  enoxaparin Injectable 40 milliGRAM(s) SubCutaneous every 24 hours  lactated ringers. 1000 milliLiter(s) IV Continuous <Continuous>  predniSONE   Tablet 5 milliGRAM(s) Oral daily  sodium chloride 0.9% lock flush 10 milliLiter(s) IV Push every 1 hour PRN Pre/post blood products, medications, blood draw, and to maintain line patency    DIET: [x] Regular [] CCD [] Renal [] Puree [] Dysphagia [] Tube Feeds:     IMAGING:   < from: CT Lumbar Spine No Cont (08.19.22 @ 10:32) >  IMPRESSION: Posterior fusion T9-S1 with connectingrods and pedicle   screws. Vertebral plasty material T9 and L1. Interposition grafts L1 to   through L5-S1.    --- End of Report ---    LINNEA BARCLAY MD; Attending Radiologist  This document has been electronically signed. Aug 19 2022  1:38PM    < end of copied text >    < from: [facility] Duplex Lower Ext Vein Scan, Bilat (08.20.22 @ 14:19) >  IMPRESSION:  No evidence of deep venous thrombosis in either lower extremity.      --- End of Report ---      ABHAY XAVIER MD; Attending Radiologist  This document has been electronically signed. Aug 20 2022  3:28PM    < end of copied text >

## 2022-08-21 NOTE — CONSULT NOTE ADULT - SUBJECTIVE AND OBJECTIVE BOX
Esperanza Thomson MD  Division of Hospital Medicine  [facility]   Spectra: [phone]    PCP: Dr. Saman Yang    HPI:  67 yo M with h/o HTN, RA, GERD, Hepatitis C, s/p prior lumbar decompression & fusion in 2015 who presents with worsening back pain x 2 months associated with unsteadiness when getting up ("crashes into walls"), numbness in his feet, and difficulty performing ADLs. MRI Lumbar spine as outpatient revealed unspecified cord compression and presented to [facility] for scheduled spine surgery. Now s/p Posterior L2 PSO, extension of fusion to T9, T10-L2 Decompression, w/ Plastics Closure 8/18/22 with EBL 750cc. Post-op complicated by hypotension requiring pressors in NSCU. Transferred out of NSCU 8/20 with course further complicated by elevated CPK, leukocytosis, electrolyte derangements, and symptomatic orthostatic hypotension. Medicine consulted for co-management.    Interval History: patient seen and examined out of bed in chair. states this was his first time out of the bed since surgery and did have transient dizziness and nausea with standing. per neurosurgery PA, was tachycardic to 130-140s with standing. no fever, chills, chest pain, sob, abd pain, n/v, nor dysuria. last BM this AM.    PAST MEDICAL & SURGICAL HISTORY:  HTN (hypertension)  Rheumatoid arthritis  Chronic GERD  Degenerative lumbar spinal stenosis  History of hepatitis C  Lumbar cord compression  Fusion of spine, lumbar region  2015  Status post cervical spinal fusion  2014    Home Medications reviewed [x]:  cefuroxime 500 mg oral tablet: 1  orally once a day for URI (21 Aug 2022 12:10)  Crestor 5 mg oral tablet: 1 tab(s) orally once a day (at bedtime) (21 Aug 2022 12:10)  Endocet 10/325 oral tablet: 2  orally 2 times a day, As Needed (21 Aug 2022 12:10)  hydrochlorothiazide 25 mg oral tablet: 1 tab(s) orally once a day (21 Aug 2022 12:10)  Kevzara 200 mg/1.14 mL subcutaneous solution: subcutaneous every 2 weeks - held prior to surgery x arthritis (21 Aug 2022 12:10)  Metoprolol Tartrate 50 mg oral tablet: 1 tab(s) orally 2 times a day (21 Aug 2022 12:10)  omeprazole 20 mg oral delayed release capsule: 1 cap(s) orally once a day x acid refleux (21 Aug 2022 12:10)  OxyCONTIN 15 mg oral tablet, extended release: 1 tab(s) orally every 12 hours (21 Aug 2022 12:10)  potassium chloride 20 mEq oral tablet, extended release: 1 tab(s) orally once a day (21 Aug 2022 12:10)  predniSONE 5 mg oral tablet: 1 tab(s) orally once a day x arthritis tapering down (21 Aug 2022 12:10)  tenofovir disoproxil fumarate 300 mg oral tablet: 1 tab(s) orally once a day (21 Aug 2022 12:10)  valsartan 320 mg oral tablet: 1 tab(s) orally once a day (21 Aug 2022 12:10)    Review of Systems:   CONSTITUTIONAL: No fever, chills  HEENT: No sore throat, vision changes  RESPIRATORY: No cough, wheezing, shortness of breath  CARDIOVASCULAR: No chest pain, palpitations, leg edema  GASTROINTESTINAL: No abdominal pain, vomiting, diarrhea or constipation. No melena or hematochezia. +nausea with standing, resolved  GENITOURINARY: No dysuria, frequency, hematuria  NEUROLOGICAL: No headaches, memory loss, loss of strength, numbness, or tremors, +dizziness with standing  SKIN: No itching, burning, rashes, or lesions   MUSCULOSKELETAL: No joint pain or swelling; No muscle, back, or extremity pain  PSYCHIATRIC: No depression, anxiety  HEME/LYMPH: No easy bruising, or bleeding gums      Allergies  gabapentin (Other)  shellfish (Anaphylaxis)    Intolerances      Social History:   former smoker, smoked 1PPD x 40 years. quit 3 months ago  social EtOH use  denies IVDU  retired, used to work as   lives with wife Allie    FAMILY HISTORY:  FH: emphysema (Father)        MEDICATIONS  (STANDING):  atorvastatin 20 milliGRAM(s) Oral at bedtime  bisacodyl 5 milliGRAM(s) Oral every 12 hours  chlorhexidine 4% Liquid 1 Application(s) Topical <User Schedule>  chlorhexidine 4% Liquid 1 Application(s) Topical <User Schedule>  enoxaparin Injectable 40 milliGRAM(s) SubCutaneous every 24 hours  methocarbamol 500 milliGRAM(s) Oral every 8 hours  metoprolol tartrate 25 milliGRAM(s) Oral two times a day  oxyCODONE  ER Tablet 15 milliGRAM(s) Oral every 12 hours  pantoprazole    Tablet 40 milliGRAM(s) Oral before breakfast  polyethylene glycol 3350 17 Gram(s) Oral every 12 hours  potassium chloride    Tablet ER 40 milliEquivalent(s) Oral every 4 hours  predniSONE   Tablet 5 milliGRAM(s) Oral daily  senna 2 Tablet(s) Oral at bedtime  sodium chloride 0.9% Bolus 500 milliLiter(s) IV Bolus once  sodium phosphate IVPB 30 milliMole(s) IV Intermittent once  tenofovir disoproxil fumarate (VIREAD) 300 milliGRAM(s) Oral daily    MEDICATIONS  (PRN):  acetaminophen     Tablet .. 650 milliGRAM(s) Oral every 6 hours PRN Mild Pain (1 - 3)  magnesium hydroxide Suspension 30 milliLiter(s) Oral every 12 hours PRN Constipation  ondansetron Injectable 4 milliGRAM(s) IV Push every 6 hours PRN Nausea and/or Vomiting  oxyCODONE    IR 10 milliGRAM(s) Oral every 4 hours PRN Moderate Pain (4 - 6)  oxyCODONE    IR 15 milliGRAM(s) Oral every 4 hours PRN Severe Pain (7 - 10)  simethicone 80 milliGRAM(s) Chew every 12 hours PRN Heartburn  sodium chloride 0.9% lock flush 10 milliLiter(s) IV Push every 1 hour PRN Pre/post blood products, medications, blood draw, and to maintain line patency  sodium chloride 0.9% lock flush 10 milliLiter(s) IV Push every 1 hour PRN Pre/post blood products, medications, blood draw, and to maintain line patency        CAPILLARY BLOOD GLUCOSE        I&O's Summary    20 Aug 2022 07:01  -  21 Aug 2022 07:00  --------------------------------------------------------  IN: 720 mL / OUT: 1415 mL / NET: -695 mL    21 Aug 2022 07:01  -  21 Aug 2022 12:01  --------------------------------------------------------  IN: 480 mL / OUT: 0 mL / NET: 480 mL        Physical Exam:  Vital Signs Last 24 Hrs  T(C): 37.1 (21 Aug 2022 06:00), Max: 37.3 (21 Aug 2022 00:08)  T(F): 98.8 (21 Aug 2022 06:00), Max: 99.1 (21 Aug 2022 00:08)  HR: 103 (21 Aug 2022 06:00) (93 - 103)  BP: 114/69 (21 Aug 2022 08:35) (101/60 - 124/83)  BP(mean): --  RR: 18 (21 Aug 2022 08:35) (18 - 18)  SpO2: 95% (21 Aug 2022 08:35) (93% - 95%)    Parameters below as of 21 Aug 2022 08:35  Patient On (Oxygen Delivery Method): room air        CONSTITUTIONAL: NAD, well-developed, well-groomed  EYES: PERRLA; conjunctiva and sclera clear  ENMT: Moist oral mucosa, no pharyngeal injection or exudates; normal dentition  NECK: Supple, no palpable masses; no thyromegaly, +prior R IJ cordis site c/d/i  RESPIRATORY: Normal respiratory effort; lungs are clear to auscultation bilaterally  CARDIOVASCULAR: Regular rate and rhythm, normal S1 and S2, no murmur/rub/gallop; No lower extremity edema; Peripheral pulses are 2+ bilaterally  ABDOMEN: Soft, Nondistended,  Nontender to palpation, normoactive bowel sounds  MUSCULOSKELETAL:  No clubbing or cyanosis of digits; no joint swelling or tenderness to palpation  PSYCH: A+O to person, place, and time; affect appropriate  NEUROLOGY: CN 2-12 are intact and symmetric; no gross sensory deficits   SKIN: +surgical site dressing c/d/i, +hemovac x 2 with serosanguinous drainage, +RUE PICC c/d/i    LABS:                        8.0    19.21 )-----------( 139      ( 21 Aug 2022 07:38 )             23.3     08-21    129<L>  |  94<L>  |  11  ----------------------------<  91  3.3<L>   |  25  |  0.85    Ca    7.6<L>      21 Aug 2022 07:34  Phos  1.8     08-21  Mg     1.9     08-21      CARDIAC MARKERS ( 21 Aug 2022 07:34 )  x     / x     / 3840 U/L / x     / x      CARDIAC MARKERS ( 20 Aug 2022 10:33 )  x     / x     / 5410 U/L / x     / x      CARDIAC MARKERS ( 20 Aug 2022 06:54 )  x     / x     / 6321 U/L / x     / x            RADIOLOGY & ADDITIONAL TESTS:  Results Reviewed: leukocytosis downtrending, hyponatremia, hypokalemia, hypophosphatemia, hypomagnesemia - repleted  Imaging Personally Reviewed:  8/19/22 CT Thoracic/Lumbar Spine:  IMPRESSION: Posterior fusion T9-S1 with connecting rods and pedicle   screws. Vertebral plasty material T9 and L1. Interposition grafts L1 to   through L5-S1.    8/20/22 Duplex of LE:  FINDINGS:    RIGHT:  Normal compressibility of the RIGHT common femoral, femoral and popliteal   veins.  Doppler examination shows normal spontaneous and phasic flow.  No RIGHT calf vein thrombosis is detected.    LEFT:  Normal compressibility of the LEFT common femoral, femoral and popliteal   veins.  Doppler examination shows normal spontaneous and phasic flow.  No LEFT calf vein thrombosis is detected.    IMPRESSION:  No evidence of deep venous thrombosis in either lower extremity.    8/20/22 CXR with clear lungs    Electrocardiogram Personally Reviewed:    COORDINATION OF CARE:  Care Discussed with Consultants/Other Providers [Y]: Neurosurgery MINH Adams, [place] attending Dr. Westfall  Prior or Outpatient Records Reviewed [Y/N]:

## 2022-08-21 NOTE — CONSULT NOTE ADULT - PROBLEM SELECTOR RECOMMENDATION 8
receives kevzara injection [id number]. on hold given plan for OR  - c/w prednisone 5mg daily (recently tapered in anticipation of surgery)  - c/w pantoprazole while inpatient for GI ppx and known GERD - resume home omeprazole on discharge  - outpatient f/u with his rheumatologist

## 2022-08-21 NOTE — CONSULT NOTE ADULT - PROBLEM SELECTOR RECOMMENDATION 2
symptomatic with dizziness and nausea.  became tachycardic to 130s-140s with standing so meets criteria by HR.  - give 500cc NS bolus now  - repeat orthostatics tomorrow  - out of bed to chair daily as tolerated  - decreased home metoprolol tartrate to 25mg BID with hold parameters  - hold home anti-hypertensive regimen as below

## 2022-08-21 NOTE — CONSULT NOTE ADULT - NSCONSULTADDITIONALINFOA_GEN_ALL_CORE
.  Esperanza Thomson MD  Division of Hospital Medicine  [facility]   Spectra: [phone]    Plan discussed with patient, wife Allie bedside, Cardiology attending Dr. Westfall, and neurosurgery MINH Adams.

## 2022-08-21 NOTE — CONSULT NOTE ADULT - PROBLEM SELECTOR RECOMMENDATION 6
hypokalemia, hypophosphatemia, and hypomagnesemia.  - appropriate repletions ordered  - f/u repeat BMP, Mg, Ph in AM  - replete K to 4, Ph to 3, and Mg to 2

## 2022-08-21 NOTE — CONSULT NOTE ADULT - PROBLEM SELECTOR RECOMMENDATION 3
in setting of prolonged surgery  - CPK peaked at 8014 on 8/19 now downtrending   - s/p aggressive IVF  - agree with holding further LR today given hyponatremia and electrolyte derangements  - trend CPK  - will reassess need for further IVF tomorrow

## 2022-08-21 NOTE — CONSULT NOTE ADULT - ASSESSMENT
67 yo M with h/o HTN, RA, GERD, Hepatitis C, s/p prior lumbar decompression & fusion in 2015 who presents with worsening back pain x 2 months associated with unsteadiness when getting up ("crashes into walls") and numbness in his feet [facility]. Now s/p Posterior L2 PSO, extension of fusion to T9, T10-L2 Decompression, w/ Plastics Closure 8/18/22 with EBL 750cc. Post-op complicated by hypotension requiring pressors in NSCU. Transferred out of NSCU 8/20 with course further complicated by elevated CPK, leukocytosis, electrolyte derangements, and symptomatic orthostatic hypotension. Medicine consulted for co-management.

## 2022-08-21 NOTE — PROVIDER CONTACT NOTE (OTHER) - ASSESSMENT
pt from OR is unable to follow commands, speak comprehensible words, lethargic, and is not attending to voice; pt eyes have sclera and periorbital swelling
Pt sitting in reclining bedside chair post PT session, VSS except -140s. Re-assessed 5-10 mins later & pt remained 130-140 bpm. Pt had no c/o pain @ time, no discomforts, denied SOB, no chest pain, denied dizziness.

## 2022-08-21 NOTE — PROVIDER CONTACT NOTE (OTHER) - BACKGROUND
s/p posterior L2 PSO, extention of fusion to t9; t10-L2 decompression
pt s/p t10-l2 decompression and t9 to pelvis fusion with plastic closure

## 2022-08-22 ENCOUNTER — NON-APPOINTMENT (OUTPATIENT)
Age: 67
End: 2022-08-22

## 2022-08-22 LAB
ANION GAP SERPL CALC-SCNC: 15 MMOL/L
ANION GAP SERPL CALC-SCNC: 7 MMOL/L — SIGNIFICANT CHANGE UP (ref 5–17)
BASOPHILS # BLD AUTO: 0.03 K/UL
BASOPHILS # BLD AUTO: 0.04 K/UL
BASOPHILS NFR BLD AUTO: 0.3 %
BASOPHILS NFR BLD AUTO: 0.3 %
BUN SERPL-MCNC: 10 MG/DL — SIGNIFICANT CHANGE UP (ref 7–23)
BUN SERPL-MCNC: 13 MG/DL
CALCIUM SERPL-MCNC: 7.8 MG/DL — LOW (ref 8.4–10.5)
CALCIUM SERPL-MCNC: 9.9 MG/DL
CHLORIDE SERPL-SCNC: 97 MMOL/L — SIGNIFICANT CHANGE UP (ref 96–108)
CHLORIDE SERPL-SCNC: 99 MMOL/L
CK SERPL-CCNC: 2252 U/L — HIGH (ref 30–200)
CO2 SERPL-SCNC: 26 MMOL/L
CO2 SERPL-SCNC: 26 MMOL/L — SIGNIFICANT CHANGE UP (ref 22–31)
CREAT SERPL-MCNC: 0.83 MG/DL — SIGNIFICANT CHANGE UP (ref 0.5–1.3)
CREAT SERPL-MCNC: 1.05 MG/DL
EGFR: 78 ML/MIN/1.73M2
EGFR: 97 ML/MIN/1.73M2 — SIGNIFICANT CHANGE UP
EOSINOPHIL # BLD AUTO: 0.05 K/UL
EOSINOPHIL # BLD AUTO: 0.07 K/UL
EOSINOPHIL NFR BLD AUTO: 0.3 %
EOSINOPHIL NFR BLD AUTO: 0.6 %
GLUCOSE SERPL-MCNC: 88 MG/DL — SIGNIFICANT CHANGE UP (ref 70–99)
GLUCOSE SERPL-MCNC: 89 MG/DL
HCT VFR BLD CALC: 22.9 % — LOW (ref 39–50)
HCT VFR BLD CALC: 44.1 %
HCT VFR BLD CALC: 45.5 %
HGB BLD-MCNC: 14.3 G/DL
HGB BLD-MCNC: 14.4 G/DL
HGB BLD-MCNC: 7.8 G/DL — LOW (ref 13–17)
IMM GRANULOCYTES NFR BLD AUTO: 0.4 %
IMM GRANULOCYTES NFR BLD AUTO: 0.5 %
LYMPHOCYTES # BLD AUTO: 1.94 K/UL
LYMPHOCYTES # BLD AUTO: 1.98 K/UL
LYMPHOCYTES NFR BLD AUTO: 13.8 %
LYMPHOCYTES NFR BLD AUTO: 18 %
MAGNESIUM SERPL-MCNC: 2.1 MG/DL — SIGNIFICANT CHANGE UP (ref 1.6–2.6)
MAN DIFF?: NORMAL
MAN DIFF?: NORMAL
MCHC RBC-ENTMCNC: 31.4 GM/DL
MCHC RBC-ENTMCNC: 31.9 PG
MCHC RBC-ENTMCNC: 32.7 GM/DL
MCHC RBC-ENTMCNC: 32.7 PG
MCHC RBC-ENTMCNC: 33.5 PG — SIGNIFICANT CHANGE UP (ref 27–34)
MCHC RBC-ENTMCNC: 34.1 GM/DL — SIGNIFICANT CHANGE UP (ref 32–36)
MCV RBC AUTO: 100 FL
MCV RBC AUTO: 101.6 FL
MCV RBC AUTO: 98.3 FL — SIGNIFICANT CHANGE UP (ref 80–100)
MONOCYTES # BLD AUTO: 0.86 K/UL
MONOCYTES # BLD AUTO: 1.08 K/UL
MONOCYTES NFR BLD AUTO: 10 %
MONOCYTES NFR BLD AUTO: 6 %
NEUTROPHILS # BLD AUTO: 11.39 K/UL
NEUTROPHILS # BLD AUTO: 7.61 K/UL
NEUTROPHILS NFR BLD AUTO: 70.6 %
NEUTROPHILS NFR BLD AUTO: 79.2 %
NRBC # BLD: 0 /100 WBCS — SIGNIFICANT CHANGE UP (ref 0–0)
PHOSPHATE SERPL-MCNC: 2.5 MG/DL — SIGNIFICANT CHANGE UP (ref 2.5–4.5)
PLATELET # BLD AUTO: 175 K/UL — SIGNIFICANT CHANGE UP (ref 150–400)
PLATELET # BLD AUTO: 270 K/UL
PLATELET # BLD AUTO: 272 K/UL
POTASSIUM SERPL-MCNC: 3.9 MMOL/L — SIGNIFICANT CHANGE UP (ref 3.5–5.3)
POTASSIUM SERPL-SCNC: 3.9 MMOL/L — SIGNIFICANT CHANGE UP (ref 3.5–5.3)
POTASSIUM SERPL-SCNC: 4.5 MMOL/L
RBC # BLD: 2.33 M/UL — LOW (ref 4.2–5.8)
RBC # BLD: 4.41 M/UL
RBC # BLD: 4.48 M/UL
RBC # FLD: 12.2 % — SIGNIFICANT CHANGE UP (ref 10.3–14.5)
RBC # FLD: 12.4 %
RBC # FLD: 12.6 %
SODIUM SERPL-SCNC: 130 MMOL/L — LOW (ref 135–145)
SODIUM SERPL-SCNC: 140 MMOL/L
WBC # BLD: 15.07 K/UL — HIGH (ref 3.8–10.5)
WBC # FLD AUTO: 10.78 K/UL
WBC # FLD AUTO: 14.38 K/UL
WBC # FLD AUTO: 15.07 K/UL — HIGH (ref 3.8–10.5)

## 2022-08-22 PROCEDURE — 99222 1ST HOSP IP/OBS MODERATE 55: CPT

## 2022-08-22 PROCEDURE — 99233 SBSQ HOSP IP/OBS HIGH 50: CPT

## 2022-08-22 RX ORDER — CALCIUM GLUCONATE 100 MG/ML
1 VIAL (ML) INTRAVENOUS ONCE
Refills: 0 | Status: COMPLETED | OUTPATIENT
Start: 2022-08-22 | End: 2022-08-22

## 2022-08-22 RX ORDER — SODIUM CHLORIDE 9 MG/ML
500 INJECTION INTRAMUSCULAR; INTRAVENOUS; SUBCUTANEOUS ONCE
Refills: 0 | Status: COMPLETED | OUTPATIENT
Start: 2022-08-22 | End: 2022-08-22

## 2022-08-22 RX ADMIN — OXYCODONE HYDROCHLORIDE 15 MILLIGRAM(S): 5 TABLET ORAL at 06:22

## 2022-08-22 RX ADMIN — OXYCODONE HYDROCHLORIDE 15 MILLIGRAM(S): 5 TABLET ORAL at 14:45

## 2022-08-22 RX ADMIN — POLYETHYLENE GLYCOL 3350 17 GRAM(S): 17 POWDER, FOR SOLUTION ORAL at 18:01

## 2022-08-22 RX ADMIN — OXYCODONE HYDROCHLORIDE 15 MILLIGRAM(S): 5 TABLET ORAL at 19:30

## 2022-08-22 RX ADMIN — ATORVASTATIN CALCIUM 20 MILLIGRAM(S): 80 TABLET, FILM COATED ORAL at 21:28

## 2022-08-22 RX ADMIN — OXYCODONE HYDROCHLORIDE 15 MILLIGRAM(S): 5 TABLET ORAL at 05:22

## 2022-08-22 RX ADMIN — Medication 5 MILLIGRAM(S): at 05:23

## 2022-08-22 RX ADMIN — Medication 25 MILLIGRAM(S): at 05:23

## 2022-08-22 RX ADMIN — METHOCARBAMOL 500 MILLIGRAM(S): 500 TABLET, FILM COATED ORAL at 05:23

## 2022-08-22 RX ADMIN — METHOCARBAMOL 500 MILLIGRAM(S): 500 TABLET, FILM COATED ORAL at 21:28

## 2022-08-22 RX ADMIN — OXYCODONE HYDROCHLORIDE 15 MILLIGRAM(S): 5 TABLET ORAL at 09:29

## 2022-08-22 RX ADMIN — OXYCODONE HYDROCHLORIDE 15 MILLIGRAM(S): 5 TABLET ORAL at 18:36

## 2022-08-22 RX ADMIN — METHOCARBAMOL 500 MILLIGRAM(S): 500 TABLET, FILM COATED ORAL at 15:45

## 2022-08-22 RX ADMIN — OXYCODONE HYDROCHLORIDE 15 MILLIGRAM(S): 5 TABLET ORAL at 05:23

## 2022-08-22 RX ADMIN — CHLORHEXIDINE GLUCONATE 1 APPLICATION(S): 213 SOLUTION TOPICAL at 09:29

## 2022-08-22 RX ADMIN — Medication 5 MILLIGRAM(S): at 18:01

## 2022-08-22 RX ADMIN — OXYCODONE HYDROCHLORIDE 15 MILLIGRAM(S): 5 TABLET ORAL at 02:00

## 2022-08-22 RX ADMIN — PANTOPRAZOLE SODIUM 40 MILLIGRAM(S): 20 TABLET, DELAYED RELEASE ORAL at 05:23

## 2022-08-22 RX ADMIN — SODIUM CHLORIDE 1000 MILLILITER(S): 9 INJECTION INTRAMUSCULAR; INTRAVENOUS; SUBCUTANEOUS at 13:50

## 2022-08-22 RX ADMIN — OXYCODONE HYDROCHLORIDE 15 MILLIGRAM(S): 5 TABLET ORAL at 18:37

## 2022-08-22 RX ADMIN — OXYCODONE HYDROCHLORIDE 15 MILLIGRAM(S): 5 TABLET ORAL at 23:52

## 2022-08-22 RX ADMIN — OXYCODONE HYDROCHLORIDE 15 MILLIGRAM(S): 5 TABLET ORAL at 13:48

## 2022-08-22 RX ADMIN — OXYCODONE HYDROCHLORIDE 15 MILLIGRAM(S): 5 TABLET ORAL at 10:24

## 2022-08-22 RX ADMIN — SENNA PLUS 2 TABLET(S): 8.6 TABLET ORAL at 21:29

## 2022-08-22 RX ADMIN — ENOXAPARIN SODIUM 40 MILLIGRAM(S): 100 INJECTION SUBCUTANEOUS at 21:28

## 2022-08-22 RX ADMIN — Medication 5 MILLIGRAM(S): at 05:22

## 2022-08-22 RX ADMIN — Medication 25 MILLIGRAM(S): at 18:01

## 2022-08-22 RX ADMIN — TENOFOVIR DISOPROXIL FUMARATE 300 MILLIGRAM(S): 300 TABLET, FILM COATED ORAL at 12:16

## 2022-08-22 RX ADMIN — OXYCODONE HYDROCHLORIDE 15 MILLIGRAM(S): 5 TABLET ORAL at 01:01

## 2022-08-22 RX ADMIN — Medication 100 GRAM(S): at 09:28

## 2022-08-22 NOTE — PROGRESS NOTE ADULT - ASSESSMENT
HPI:  Patient is a 66 year old male with worsening back pain for two months and unsteadiness when getting up.  MRI done which showed cord compression.  Now presented for surgery.    PROCEDURE: s/p T10-L2 decompression, L2 pedicle subtraction osteotomy, and extension of fusion to T9 with plastics closure on 8/18/2022  POD#4    PLAN:  Neuro:   -q4 hour neuro checks  -oxycodone and oxycontin for pain control  -robaxin for muscle spasms  -continue hemovacs and jps, monitor outputs, management as per plastics  -standing xrays prior to discharge  -out of bed with assistance  -pt/ot/pm+r - acute rehab upon discharge    Respiratory:   -satting well on room air  -incentive spirometer for lung expansion    CV:  -keep sbp 100-140  -metoprolol for bp control  -hold hctz for orthostasis  -orthostatic vitals q12 hours  -bolus as needed  -atorvastatin for lipid control    Endocrine:   -maintain euglycemia    Heme/Onc:    -lovenox and scds for dvt prophylaxis  -acute post operative blood loss anemia, stable    Renal:   -voiding    ID:   -tenofovir for history of hep c  -afebrile    GI:   -regular diet  -senna, miralax, and dulcolax for bowel regimen  -protonix for gi prophylaxis      Spectra #[phone] HPI:  Patient is a 66 year old male with worsening back pain for two months and unsteadiness when getting up.  MRI done which showed cord compression.  Now presented for surgery.    PROCEDURE: s/p T10-L2 decompression, L2 pedicle subtraction osteotomy, and extension of fusion to T9 with plastics closure on 8/18/2022  POD#4    PLAN:  Neuro:   -q4 hour neuro checks  -oxycodone and oxycontin for pain control  -robaxin for muscle spasms  -continue hemovacs and jps, monitor outputs, management as per plastics  -standing xrays prior to discharge  -out of bed with assistance  -pt/ot/pm+r - acute rehab upon discharge    Respiratory:   -satting well on room air  -incentive spirometer for lung expansion    CV:  -keep sbp 100-140  -metoprolol for bp control  -hold hctz for orthostasis  -orthostatic vitals q12 hours  -bolus as needed  -atorvastatin for lipid control    Endocrine:   -maintain euglycemia    Heme/Onc:    -lovenox and scds for dvt prophylaxis  -acute post operative blood loss anemia, stable    Renal:   -voiding    ID:   -tenofovir for history of hep c  -leukocytosis, likely reactive from OR, now downtrending, has been afebrile, trend cbc    GI:   -regular diet  -senna, miralax, and dulcolax for bowel regimen  -protonix for gi prophylaxis      Spectra #[phone]

## 2022-08-22 NOTE — PROGRESS NOTE ADULT - SUBJECTIVE AND OBJECTIVE BOX
DATE OF SERVICE: 08-22-22      Patient is a 66y old  Male who presents with a chief complaint of spine surgery (21 Aug 2022 12:00)      INTERVAL HISTORY: feels ok       MEDICATIONS:  metoprolol tartrate 25 milliGRAM(s) Oral two times a day        PHYSICAL EXAM:  T(C): 36.7 (08-23-22 @ 05:08), Max: 37.4 (08-23-22 @ 00:15)  HR: 65 (08-23-22 @ 05:08) (65 - 117)  BP: 115/63 (08-23-22 @ 05:08) (115/63 - 149/71)  RR: 18 (08-23-22 @ 05:08) (18 - 18)  SpO2: 94% (08-23-22 @ 05:08) (94% - 94%)  Wt(kg): --  I&O's Summary    22 Aug 2022 07:01  -  23 Aug 2022 07:00  --------------------------------------------------------  IN: 1220 mL / OUT: 2095 mL / NET: -875 mL          Appearance: In no distress	  HEENT:    PERRL, EOMI	  Cardiovascular:  S1 S2, No JVD  Respiratory: Lungs clear to auscultation	  Gastrointestinal:  Soft, Non-tender, + BS	  Vascularature:  No edema of LE  Psychiatric: Appropriate affect   Neuro: no acute focal deficits                               7.3    11.50 )-----------( 217      ( 23 Aug 2022 07:40 )             21.8     08-23    132<L>  |  96  |  8   ----------------------------<  89  3.6   |  25  |  0.79    Ca    8.0<L>      23 Aug 2022 07:40  Phos  2.5     08-22  Mg     2.1     08-22          Labs personally reviewed      Assessment /Plan:     Mr. Hernández is a 65 yo male with PMH of HTN, RA, GERD, Hep C, and previous back surgery who is s/p PSO L2, extension of fusion t9, T10-L2 decompression who had delayed emergence from anesthsia and ?delirium and now +orthostatic hypotension.    Problem/Plan -1  Problem: Orthostatic Hypotension  - Patient reports lightheadedness and dizziness upon trying to stand for the first time post-operatively  - Had delayed emergence from GA  - B/L LE negative for DVT  - CXR shows clear lungs  - ARB, HCTZ and CCB currently on hold  - BB reduced (now Metoprolol 25 mg PO BID)  - s/p IVF bolus   - Continue to monitor orthos daily  - Will check TTE    Problem/Plan -2  Problem: HTN  - + orthostatic as noted above  - ARB, HCTZ and CCB currently on hold  - c/w Metoprolol 25mg PO BID with hold parameters    Problem/Plan -3  Problem: HLD  - c/w stating    Problem/Plan -4  Problem: DVT PPX  - DVT neg on US  - c/w Lovenox SQ          David Westfall DO [facility]  Cardiovascular Medicine  [address], [address]  Office: [phone]  Cell: [phone]

## 2022-08-22 NOTE — PROGRESS NOTE ADULT - PROBLEM SELECTOR PLAN 2
symptomatic with dizziness and nausea.  became tachycardic to 130s-140s with standing so meets criteria by HR.  - s/p 500cc NS bolus 8/21  - repeat orthostatics today  - out of bed to chair daily as tolerated  - decreased home metoprolol tartrate to 25mg BID with hold parameters  - hold home anti-hypertensive regimen as below symptomatic with dizziness and nausea.  became tachycardic to 130s-140s with standing so meets criteria by HR.  - s/p 500cc NS bolus 8/21  - repeat orthostatics pending for today but informed tachycardic again when out of bed to chair  - additional 500cc NS bolus x1  - add compression stockings b/l  - out of bed to chair daily as tolerated  - c/w decreased home metoprolol tartrate to 25mg BID with hold parameters  - hold home anti-hypertensive regimen as below

## 2022-08-22 NOTE — PROGRESS NOTE ADULT - SUBJECTIVE AND OBJECTIVE BOX
HPI:  Patient is a 66 year old male with worsening back pain for two months and unsteadiness when getting up.  MRI done which showed cord compression.  Now presented for surgery.    OVERNIGHT EVENTS:  Patient orthostatic when out of bed yesterday and today.  Otherwise feels well, incisional pain controlled on current regimen.  Tolerating diet.  Passing flatus but no bowel movement yet.    Vital Signs Last 24 Hrs  T(C): 37.1 (22 Aug 2022 08:27), Max: 37.2 (22 Aug 2022 04:54)  T(F): 98.7 (22 Aug 2022 08:27), Max: 99 (22 Aug 2022 04:54)  HR: 98 (22 Aug 2022 08:27) (96 - 107)  BP: 107/69 (22 Aug 2022 08:27) (101/63 - 128/78)  BP(mean): --  RR: 18 (22 Aug 2022 08:27) (18 - 18)  SpO2: 95% (22 Aug 2022 08:27) (93% - 96%)    Parameters below as of 22 Aug 2022 08:27  Patient On (Oxygen Delivery Method): room air        I&O's Detail    21 Aug 2022 07:01  -  22 Aug 2022 07:00  --------------------------------------------------------  IN:    Oral Fluid: 1360 mL  Total IN: 1360 mL    OUT:    Accordian (mL): 90 mL    Accordian (mL): 2 mL    Bulb (mL): 85 mL    Bulb (mL): 70 mL    Voided (mL): 550 mL  Total OUT: 797 mL    Total NET: 563 mL      22 Aug 2022 07:01  -  22 Aug 2022 12:01  --------------------------------------------------------  IN:    Oral Fluid: 480 mL  Total IN: 480 mL    OUT:    Voided (mL): 300 mL  Total OUT: 300 mL    Total NET: 180 mL        I&O's Summary    21 Aug 2022 07:01  -  22 Aug 2022 07:00  --------------------------------------------------------  IN: 1360 mL / OUT: 797 mL / NET: 563 mL    22 Aug 2022 07:01  -  22 Aug 2022 12:01  --------------------------------------------------------  IN: 480 mL / OUT: 300 mL / NET: 180 mL        PHYSICAL EXAM:  Neurological: awake, alert, oriented x3, follows commands, speech clear and fluent  RUE: 4+/5 delt, 4+/5 bicep/tricep, 5/5 hand   LUE: 4+/5 delt, 4+/5 bicep/tricep, 5/5 hand   RLE: 4/5 hf, 4+/5 kf/ke, 5/5 df/pf  LLE: 4/5 hf, 4+/5 kf/ke, 5/5 df/pf  sensation present, intact, equal throughout    Cardiovascular: +s1, s2  Respiratory: clear to auscultation b/l  Gastrointestinal: soft, non-distended, non-tender  Genitourinary: +voiding  Incision/Wound: posterior midline vertical incision dressing c/d/i w/ aquacel, hemovac x2, abby x2    TUBES/LINES:  [x] hemovac x2 (90cc and 2cc serosanguinous over 24 hours)  [x] abby x2 (85cc and 70cc serosanguinous over 24 hours)    DIET:  [x] regular      LABS:                        7.8    15.07 )-----------( 175      ( 22 Aug 2022 06:09 )             22.9     08-22    130<L>  |  97  |  10  ----------------------------<  88  3.9   |  26  |  0.83    Ca    7.8<L>      22 Aug 2022 06:09  Phos  2.5     08-22  Mg     2.1     08-22          CARDIAC MARKERS ( 22 Aug 2022 06:09 )  x     / x     / 2252 U/L / x     / x      CARDIAC MARKERS ( 21 Aug 2022 07:34 )  x     / x     / 3840 U/L / x     / x              Allergies    gabapentin (Other)  shellfish (Anaphylaxis)          MEDICATIONS:  Antibiotics:  tenofovir disoproxil fumarate (VIREAD) 300 milliGRAM(s) Oral daily    Neuro:  acetaminophen     Tablet .. 650 milliGRAM(s) Oral every 6 hours PRN  methocarbamol 500 milliGRAM(s) Oral every 8 hours  ondansetron Injectable 4 milliGRAM(s) IV Push every 6 hours PRN  oxyCODONE    IR 10 milliGRAM(s) Oral every 4 hours PRN  oxyCODONE    IR 15 milliGRAM(s) Oral every 4 hours PRN  oxyCODONE  ER Tablet 15 milliGRAM(s) Oral every 12 hours    Anticoagulation:  enoxaparin Injectable 40 milliGRAM(s) SubCutaneous every 24 hours    OTHER:  atorvastatin 20 milliGRAM(s) Oral at bedtime  bisacodyl 5 milliGRAM(s) Oral every 12 hours  chlorhexidine 4% Liquid 1 Application(s) Topical <User Schedule>  chlorhexidine 4% Liquid 1 Application(s) Topical <User Schedule>  magnesium hydroxide Suspension 30 milliLiter(s) Oral every 12 hours PRN  metoprolol tartrate 25 milliGRAM(s) Oral two times a day  pantoprazole    Tablet 40 milliGRAM(s) Oral before breakfast  polyethylene glycol 3350 17 Gram(s) Oral every 12 hours  predniSONE   Tablet 5 milliGRAM(s) Oral daily  senna 2 Tablet(s) Oral at bedtime  simethicone 80 milliGRAM(s) Chew every 12 hours PRN    IVF:  none    CULTURES:  none    RADIOLOGY & ADDITIONAL TESTS:  no new imaging

## 2022-08-22 NOTE — PROGRESS NOTE ADULT - ASSESSMENT
65 yo M with h/o HTN, RA, GERD, Hepatitis C, s/p prior lumbar decompression & fusion in 2015 who presents with worsening back pain x 2 months associated with unsteadiness when getting up ("crashes into walls") and numbness in his feet who presented to [facility] for scheduled spine surgery. Now s/p Posterior L2 PSO, extension of fusion to T9, T10-L2 Decompression, w/ Plastics Closure 8/18/22 with EBL 750cc. Post-op complicated by hypotension requiring pressors in NSCU. Transferred out of NSCU 8/20 with course further complicated by elevated CPK, leukocytosis, electrolyte derangements, and symptomatic orthostatic hypotension. Medicine consulted for co-management.    PCP: Dr. Saman Yang

## 2022-08-22 NOTE — PROGRESS NOTE ADULT - SUBJECTIVE AND OBJECTIVE BOX
Esperanza Thomson MD  Division of Hospital Medicine  [facility]  Spectra: [phone]      Patient is a 66y old  Male who presents with a chief complaint of spine surgery (21 Aug 2022 12:00)      SUBJECTIVE / OVERNIGHT EVENTS: no acute events overnight. more back and thigh pain this morning compared to day prior. had not been out of bed yet at time of my exam but plans to get out of bed to chair for lunch. no lightheadedness nor dizziness. denies fever, chills, chest pain, cough, dyspnea.   ADDITIONAL REVIEW OF SYSTEMS:    MEDICATIONS  (STANDING):  atorvastatin 20 milliGRAM(s) Oral at bedtime  bisacodyl 5 milliGRAM(s) Oral every 12 hours  chlorhexidine 4% Liquid 1 Application(s) Topical <User Schedule>  chlorhexidine 4% Liquid 1 Application(s) Topical <User Schedule>  enoxaparin Injectable 40 milliGRAM(s) SubCutaneous every 24 hours  methocarbamol 500 milliGRAM(s) Oral every 8 hours  metoprolol tartrate 25 milliGRAM(s) Oral two times a day  oxyCODONE  ER Tablet 15 milliGRAM(s) Oral every 12 hours  pantoprazole    Tablet 40 milliGRAM(s) Oral before breakfast  polyethylene glycol 3350 17 Gram(s) Oral every 12 hours  predniSONE   Tablet 5 milliGRAM(s) Oral daily  senna 2 Tablet(s) Oral at bedtime  tenofovir disoproxil fumarate (VIREAD) 300 milliGRAM(s) Oral daily    MEDICATIONS  (PRN):  acetaminophen     Tablet .. 650 milliGRAM(s) Oral every 6 hours PRN Mild Pain (1 - 3)  magnesium hydroxide Suspension 30 milliLiter(s) Oral every 12 hours PRN Constipation  ondansetron Injectable 4 milliGRAM(s) IV Push every 6 hours PRN Nausea and/or Vomiting  oxyCODONE    IR 10 milliGRAM(s) Oral every 4 hours PRN Moderate Pain (4 - 6)  oxyCODONE    IR 15 milliGRAM(s) Oral every 4 hours PRN Severe Pain (7 - 10)  simethicone 80 milliGRAM(s) Chew every 12 hours PRN Heartburn  sodium chloride 0.9% lock flush 10 milliLiter(s) IV Push every 1 hour PRN Pre/post blood products, medications, blood draw, and to maintain line patency  sodium chloride 0.9% lock flush 10 milliLiter(s) IV Push every 1 hour PRN Pre/post blood products, medications, blood draw, and to maintain line patency      CAPILLARY BLOOD GLUCOSE        I&O's Summary    21 Aug 2022 07:01  -  22 Aug 2022 07:00  --------------------------------------------------------  IN: 1360 mL / OUT: 797 mL / NET: 563 mL    22 Aug 2022 07:01  -  22 Aug 2022 11:53  --------------------------------------------------------  IN: 480 mL / OUT: 300 mL / NET: 180 mL        PHYSICAL EXAM:  Vital Signs Last 24 Hrs  T(C): 37.1 (22 Aug 2022 08:27), Max: 37.2 (22 Aug 2022 04:54)  T(F): 98.7 (22 Aug 2022 08:27), Max: 99 (22 Aug 2022 04:54)  HR: 98 (22 Aug 2022 08:27) (96 - 107)  BP: 107/69 (22 Aug 2022 08:27) (101/63 - 128/78)  BP(mean): --  RR: 18 (22 Aug 2022 08:27) (18 - 18)  SpO2: 95% (22 Aug 2022 08:27) (93% - 96%)    Parameters below as of 22 Aug 2022 08:27  Patient On (Oxygen Delivery Method): room air    CONSTITUTIONAL: NAD, well-developed, well-groomed  EYES: PERRLA; conjunctiva and sclera clear  ENMT: Moist oral mucosa, no pharyngeal injection or exudates; normal dentition  NECK: Supple, no palpable masses; no thyromegaly, +prior R IJ cordis site c/d/i  RESPIRATORY: Normal respiratory effort; lungs are clear to auscultation bilaterally  CARDIOVASCULAR: Regular rate and rhythm, normal S1 and S2, no murmur/rub/gallop; +trace pedal edema b/l; Peripheral pulses are 2+ bilaterally  ABDOMEN: Soft, Nondistended,  Nontender to palpation, normoactive bowel sounds  MUSCULOSKELETAL:  No clubbing or cyanosis of digits; no joint swelling or tenderness to palpation  PSYCH: A+O to person, place, and time; affect appropriate  NEUROLOGY: CN 2-12 are intact and symmetric; no gross sensory deficits   SKIN: +surgical site dressing c/d/i, +hemovac x 2 and MALLIKA x2with serosanguinous drainage, +RUE PICC c/d/i    LABS:                        7.8    15.07 )-----------( 175      ( 22 Aug 2022 06:09 )             22.9     08-22    130<L>  |  97  |  10  ----------------------------<  88  3.9   |  26  |  0.83    Ca    7.8<L>      22 Aug 2022 06:09  Phos  2.5     08-22  Mg     2.1     08-22      CARDIAC MARKERS ( 22 Aug 2022 06:09 )  x     / x     / 2252 U/L / x     / x      CARDIAC MARKERS ( 21 Aug 2022 07:34 )  x     / x     / 3840 U/L / x     / x          RADIOLOGY & ADDITIONAL TESTS:  Results Reviewed: leukocytosis downtrending, CPK downtrending, stable hyponatremia  Imaging Personally Reviewed:  Electrocardiogram Personally Reviewed:    COORDINATION OF CARE:  Care Discussed with Consultants/Other Providers [Y]: neurosurgery MINH Shell  Prior or Outpatient Records Reviewed [Y/N]:

## 2022-08-22 NOTE — CONSULT NOTE ADULT - SUBJECTIVE AND OBJECTIVE BOX
Patient is a 66y old  Male who presents with a chief complaint of spine surgery (21 Aug 2022 12:00)    Admission HPI:  65 yo M with h/o HTN, RA, GERD, Hepatitis C, s/p prior lumbar decompression & fusion in 2015  c/o worsening of  back pain x 2 months,  unsteady when getting up ("crashes into walls")  numbness in his feet with difficulty with ADLs . Pt had neurology evaluation- s/p MRI lumbar spine revealed unspecified cord compression. Pt scheduled for posterior approach, exploration of prior L2 pelvis fusion, T10-L2 decompression, T9 pelvis fusion on 8/18/22  **Pt denies any double vision, vision changes, speech changes, urinary or bowel incontinence, fever, chills, or sick contacts  **Covid 19 PCR on 8/15/22   (10 Aug 2022 14:32)    Interval History:  Patient went to OR on 8/18 for Posterior L2 PSO (pedicle subtraction osteotomy), extension of fusion to T9, T10-L2 Decompression with Plastics closure.   Post op course has been complicated by symptomatic orthostatic hypotension- meds adjusted by Medicine, elevated CPK - managed w IVF, elevated wbc ct- likely reactive, and hyponatremia.   Patient w persistent functional deficits.    REVIEW OF SYSTEMS: No chest pain, shortness of breath, nausea, vomiting or diarhea; other ROS neg     PAST MEDICAL & SURGICAL HISTORY  HTN (hypertension)  Rheumatoid arthritis  Chronic GERD  Degenerative lumbar spinal stenosis  History of hepatitis C  Lumbar cord compression  Fusion of spine, lumbar region  Status post cervical spinal fusion    FUNCTIONAL HISTORY:   Lives w family in home w 4 DAVIE and one Flight.  PTA Independent    CURRENT FUNCTIONAL STATUS:  Min A transfers, Max A gait.    FAMILY HISTORY   No pertinent family history in first degree relatives  FH: emphysema (Father)    MEDICATIONS   acetaminophen     Tablet .. 650 milliGRAM(s) Oral every 6 hours PRN  atorvastatin 20 milliGRAM(s) Oral at bedtime  bisacodyl 5 milliGRAM(s) Oral every 12 hours  calcium gluconate IVPB 1 Gram(s) IV Intermittent once  chlorhexidine 4% Liquid 1 Application(s) Topical <User Schedule>  chlorhexidine 4% Liquid 1 Application(s) Topical <User Schedule>  enoxaparin Injectable 40 milliGRAM(s) SubCutaneous every 24 hours  magnesium hydroxide Suspension 30 milliLiter(s) Oral every 12 hours PRN  methocarbamol 500 milliGRAM(s) Oral every 8 hours  metoprolol tartrate 25 milliGRAM(s) Oral two times a day  ondansetron Injectable 4 milliGRAM(s) IV Push every 6 hours PRN  oxyCODONE    IR 10 milliGRAM(s) Oral every 4 hours PRN  oxyCODONE    IR 15 milliGRAM(s) Oral every 4 hours PRN  oxyCODONE  ER Tablet 15 milliGRAM(s) Oral every 12 hours  pantoprazole    Tablet 40 milliGRAM(s) Oral before breakfast  polyethylene glycol 3350 17 Gram(s) Oral every 12 hours  predniSONE   Tablet 5 milliGRAM(s) Oral daily  senna 2 Tablet(s) Oral at bedtime  simethicone 80 milliGRAM(s) Chew every 12 hours PRN  sodium chloride 0.9% lock flush 10 milliLiter(s) IV Push every 1 hour PRN  sodium chloride 0.9% lock flush 10 milliLiter(s) IV Push every 1 hour PRN  tenofovir disoproxil fumarate (VIREAD) 300 milliGRAM(s) Oral daily    ALLERGIES  gabapentin (Other)  shellfish (Anaphylaxis)    VITALS  T(C): 37.1 (08-22-22 @ 08:27), Max: 37.2 (08-22-22 @ 04:54)  HR: 98 (08-22-22 @ 08:27) (96 - 107)  BP: 107/69 (08-22-22 @ 08:27) (101/63 - 128/78)  RR: 18 (08-22-22 @ 08:27) (18 - 18)  SpO2: 95% (08-22-22 @ 08:27) (93% - 96%)  Wt(kg): --    PHYSICAL EXAM  Constitutional - NAD, Comfortable  HEENT - NCAT, EOMI  Neck - Supple, No limited ROM  Chest - CTA bilaterally, No wheeze, No rhonchi, No crackles  Cardiovascular - RRR, S1S2, No murmurs  Abdomen - BS+, Soft, NTND  Extremities - No C/C/E, No calf tenderness   Neurologic Exam -                    Cognitive - Awake, Alert, AAO to self, place, date, year, situation     Communication - Fluent, No dysarthria, no aphasia     Cranial Nerves - CN 2-12 intact     Motor - No focal deficits      Sensory - Intact to LT     Reflexes - DTR Intact, No primitive reflexive  Psychiatric - Mood stable, Affect WNL    RECENT LABS/IMAGING  CBC Full  -  ( 22 Aug 2022 06:09 )  WBC Count : 15.07 K/uL  RBC Count : 2.33 M/uL  Hemoglobin : 7.8 g/dL  Hematocrit : 22.9 %  Platelet Count - Automated : 175 K/uL  Mean Cell Volume : 98.3 fl  Mean Cell Hemoglobin : 33.5 pg  Mean Cell Hemoglobin Concentration : 34.1 gm/dL  Auto Neutrophil # : x  Auto Lymphocyte # : x  Auto Monocyte # : x  Auto Eosinophil # : x  Auto Basophil # : x  Auto Neutrophil % : x  Auto Lymphocyte % : x  Auto Monocyte % : x  Auto Eosinophil % : x  Auto Basophil % : x    08-22    130<L>  |  97  |  10  ----------------------------<  88  3.9   |  26  |  0.83    Ca    7.8<L>      22 Aug 2022 06:09  Phos  2.5     08-22  Mg     2.1     08-22    Impression:  65 yo with functional deficits secondary to diagnosis of spinal cord compression.    Plan:  PT- ROM, Bed Mob, Transfers, Amb w AD and bracing as needed  OT- ADLs, bracing  Prec- Falls, Cardiac  DVT Prophylaxis- Lovenox  BP- Continue to monitor- meds adjusted.  Follow anemia and hyponatremia.  Skin- Turn q2 h  Dispo-  Patient is a 66y old  Male who presents with a chief complaint of spine surgery (21 Aug 2022 12:00)    Admission HPI:  67 yo M with h/o HTN, RA, GERD, Hepatitis C, s/p prior lumbar decompression & fusion in 2015  c/o worsening of  back pain x 2 months,  unsteady when getting up ("crashes into walls")  numbness in his feet with difficulty with ADLs . Pt had neurology evaluation- s/p MRI lumbar spine revealed unspecified cord compression. Pt scheduled for posterior approach, exploration of prior L2 pelvis fusion, T10-L2 decompression, T9 pelvis fusion on 8/18/22  **Pt denies any double vision, vision changes, speech changes, urinary or bowel incontinence, fever, chills, or sick contacts  **Covid 19 PCR on 8/15/22   (10 Aug 2022 14:32)    Interval History:  Patient went to OR on 8/18 for Posterior L2 PSO (pedicle subtraction osteotomy), extension of fusion to T9, T10-L2 Decompression with Plastics closure.   Post op course has been complicated by symptomatic orthostatic hypotension- meds adjusted by Medicine, elevated CPK - managed w IVF, elevated wbc ct- likely reactive, and hyponatremia.   Patient w persistent functional deficits.  Patient's wife at bedside.    REVIEW OF SYSTEMS: Back pain w some throbbing to legs, LE weakness, no B/B incontinence, No chest pain, shortness of breath, nausea, vomiting or diarhea; other ROS neg     PAST MEDICAL & SURGICAL HISTORY  HTN (hypertension)  Rheumatoid arthritis  Chronic GERD  Degenerative lumbar spinal stenosis  History of hepatitis C  Lumbar cord compression  Fusion of spine, lumbar region  Status post cervical spinal fusion    FUNCTIONAL HISTORY:   Lives w family in home w 4 DAVIE and one Flight.  PTA Independent    CURRENT FUNCTIONAL STATUS:  Min A transfers, Max A gait.    FAMILY HISTORY   No pertinent family history in first degree relatives  FH: emphysema (Father)    MEDICATIONS   acetaminophen     Tablet .. 650 milliGRAM(s) Oral every 6 hours PRN  atorvastatin 20 milliGRAM(s) Oral at bedtime  bisacodyl 5 milliGRAM(s) Oral every 12 hours  calcium gluconate IVPB 1 Gram(s) IV Intermittent once  chlorhexidine 4% Liquid 1 Application(s) Topical <User Schedule>  chlorhexidine 4% Liquid 1 Application(s) Topical <User Schedule>  enoxaparin Injectable 40 milliGRAM(s) SubCutaneous every 24 hours  magnesium hydroxide Suspension 30 milliLiter(s) Oral every 12 hours PRN  methocarbamol 500 milliGRAM(s) Oral every 8 hours  metoprolol tartrate 25 milliGRAM(s) Oral two times a day  ondansetron Injectable 4 milliGRAM(s) IV Push every 6 hours PRN  oxyCODONE    IR 10 milliGRAM(s) Oral every 4 hours PRN  oxyCODONE    IR 15 milliGRAM(s) Oral every 4 hours PRN  oxyCODONE  ER Tablet 15 milliGRAM(s) Oral every 12 hours  pantoprazole    Tablet 40 milliGRAM(s) Oral before breakfast  polyethylene glycol 3350 17 Gram(s) Oral every 12 hours  predniSONE   Tablet 5 milliGRAM(s) Oral daily  senna 2 Tablet(s) Oral at bedtime  simethicone 80 milliGRAM(s) Chew every 12 hours PRN  sodium chloride 0.9% lock flush 10 milliLiter(s) IV Push every 1 hour PRN  sodium chloride 0.9% lock flush 10 milliLiter(s) IV Push every 1 hour PRN  tenofovir disoproxil fumarate (VIREAD) 300 milliGRAM(s) Oral daily    ALLERGIES  gabapentin (Other)  shellfish (Anaphylaxis)    VITALS  T(C): 37.1 (08-22-22 @ 08:27), Max: 37.2 (08-22-22 @ 04:54)  HR: 98 (08-22-22 @ 08:27) (96 - 107)  BP: 107/69 (08-22-22 @ 08:27) (101/63 - 128/78)  RR: 18 (08-22-22 @ 08:27) (18 - 18)  SpO2: 95% (08-22-22 @ 08:27) (93% - 96%)  Wt(kg): --    PHYSICAL EXAM  Constitutional - NAD, Comfortable  HEENT - NCAT, EOMI  Neck - Supple, No limited ROM  Chest - CTA bilaterally, No wheeze, No rhonchi, No crackles  Cardiovascular - RRR, S1S2, No murmurs  Abdomen - BS+, Soft, NTND  Extremities - Trace edema,, No calf tenderness   Neurologic Exam -                 AAO x 3  Follows verbal instruction  Motor- bl LEs 3/5 HF, KE/ADF/APF 4/5; bl UE 5/5  Sens intact  No clonus    Psychiatric - Mood stable, Affect WNL    RECENT LABS/IMAGING  CBC Full  -  ( 22 Aug 2022 06:09 )  WBC Count : 15.07 K/uL  RBC Count : 2.33 M/uL  Hemoglobin : 7.8 g/dL  Hematocrit : 22.9 %  Platelet Count - Automated : 175 K/uL  Mean Cell Volume : 98.3 fl  Mean Cell Hemoglobin : 33.5 pg  Mean Cell Hemoglobin Concentration : 34.1 gm/dL  Auto Neutrophil # : x  Auto Lymphocyte # : x  Auto Monocyte # : x  Auto Eosinophil # : x  Auto Basophil # : x  Auto Neutrophil % : x  Auto Lymphocyte % : x  Auto Monocyte % : x  Auto Eosinophil % : x  Auto Basophil % : x    08-22    130<L>  |  97  |  10  ----------------------------<  88  3.9   |  26  |  0.83    Ca    7.8<L>      22 Aug 2022 06:09  Phos  2.5     08-22  Mg     2.1     08-22    Impression:  67 yo with functional deficits secondary to diagnosis of spinal cord compression.    Plan:  PT- ROM, Bed Mob, Transfers, Amb w AD and bracing as needed  OT- ADLs, bracing  Prec- Falls, Cardiac  DVT Prophylaxis- Lovenox  BP- Continue to monitor- meds adjusted.  Follow anemia and hyponatremia.  Pain- continue current meds; has some nerve pain but allergy to Gabapentin- if severe can try Cymbalta  Skin- Turn q2 h  Dispo- Acute Rehab- can tolerate 3h/d of therapies and requires daily physician visits  D/W patient and his wife

## 2022-08-23 LAB
ANION GAP SERPL CALC-SCNC: 11 MMOL/L — SIGNIFICANT CHANGE UP (ref 5–17)
ANION GAP SERPL CALC-SCNC: 7 MMOL/L — SIGNIFICANT CHANGE UP (ref 5–17)
BASOPHILS # BLD AUTO: 0.02 K/UL — SIGNIFICANT CHANGE UP (ref 0–0.2)
BASOPHILS NFR BLD AUTO: 0.2 % — SIGNIFICANT CHANGE UP (ref 0–2)
BUN SERPL-MCNC: 8 MG/DL — SIGNIFICANT CHANGE UP (ref 7–23)
BUN SERPL-MCNC: 8 MG/DL — SIGNIFICANT CHANGE UP (ref 7–23)
CALCIUM SERPL-MCNC: 8 MG/DL — LOW (ref 8.4–10.5)
CALCIUM SERPL-MCNC: 8.2 MG/DL — LOW (ref 8.4–10.5)
CHLORIDE SERPL-SCNC: 96 MMOL/L — SIGNIFICANT CHANGE UP (ref 96–108)
CHLORIDE SERPL-SCNC: 96 MMOL/L — SIGNIFICANT CHANGE UP (ref 96–108)
CO2 SERPL-SCNC: 25 MMOL/L — SIGNIFICANT CHANGE UP (ref 22–31)
CO2 SERPL-SCNC: 27 MMOL/L — SIGNIFICANT CHANGE UP (ref 22–31)
CREAT SERPL-MCNC: 0.77 MG/DL — SIGNIFICANT CHANGE UP (ref 0.5–1.3)
CREAT SERPL-MCNC: 0.79 MG/DL — SIGNIFICANT CHANGE UP (ref 0.5–1.3)
EGFR: 98 ML/MIN/1.73M2 — SIGNIFICANT CHANGE UP
EGFR: 99 ML/MIN/1.73M2 — SIGNIFICANT CHANGE UP
EOSINOPHIL # BLD AUTO: 0.14 K/UL — SIGNIFICANT CHANGE UP (ref 0–0.5)
EOSINOPHIL NFR BLD AUTO: 1.1 % — SIGNIFICANT CHANGE UP (ref 0–6)
GLUCOSE SERPL-MCNC: 111 MG/DL — HIGH (ref 70–99)
GLUCOSE SERPL-MCNC: 89 MG/DL — SIGNIFICANT CHANGE UP (ref 70–99)
HCT VFR BLD CALC: 21.8 % — LOW (ref 39–50)
HCT VFR BLD CALC: 23.4 % — LOW (ref 39–50)
HGB BLD-MCNC: 7.3 G/DL — LOW (ref 13–17)
HGB BLD-MCNC: 7.7 G/DL — LOW (ref 13–17)
IMM GRANULOCYTES NFR BLD AUTO: 1.2 % — SIGNIFICANT CHANGE UP (ref 0–1.5)
LYMPHOCYTES # BLD AUTO: 0.95 K/UL — LOW (ref 1–3.3)
LYMPHOCYTES # BLD AUTO: 7.3 % — LOW (ref 13–44)
MCHC RBC-ENTMCNC: 32.2 PG — SIGNIFICANT CHANGE UP (ref 27–34)
MCHC RBC-ENTMCNC: 32.7 PG — SIGNIFICANT CHANGE UP (ref 27–34)
MCHC RBC-ENTMCNC: 32.9 GM/DL — SIGNIFICANT CHANGE UP (ref 32–36)
MCHC RBC-ENTMCNC: 33.5 GM/DL — SIGNIFICANT CHANGE UP (ref 32–36)
MCV RBC AUTO: 97.8 FL — SIGNIFICANT CHANGE UP (ref 80–100)
MCV RBC AUTO: 97.9 FL — SIGNIFICANT CHANGE UP (ref 80–100)
MONOCYTES # BLD AUTO: 1.35 K/UL — HIGH (ref 0–0.9)
MONOCYTES NFR BLD AUTO: 10.4 % — SIGNIFICANT CHANGE UP (ref 2–14)
NEUTROPHILS # BLD AUTO: 10.34 K/UL — HIGH (ref 1.8–7.4)
NEUTROPHILS NFR BLD AUTO: 79.8 % — HIGH (ref 43–77)
NRBC # BLD: 0 /100 WBCS — SIGNIFICANT CHANGE UP (ref 0–0)
NRBC # BLD: 0 /100 WBCS — SIGNIFICANT CHANGE UP (ref 0–0)
PLATELET # BLD AUTO: 217 K/UL — SIGNIFICANT CHANGE UP (ref 150–400)
PLATELET # BLD AUTO: 228 K/UL — SIGNIFICANT CHANGE UP (ref 150–400)
POTASSIUM SERPL-MCNC: 3.6 MMOL/L — SIGNIFICANT CHANGE UP (ref 3.5–5.3)
POTASSIUM SERPL-MCNC: 4 MMOL/L — SIGNIFICANT CHANGE UP (ref 3.5–5.3)
POTASSIUM SERPL-SCNC: 3.6 MMOL/L — SIGNIFICANT CHANGE UP (ref 3.5–5.3)
POTASSIUM SERPL-SCNC: 4 MMOL/L — SIGNIFICANT CHANGE UP (ref 3.5–5.3)
RBC # BLD: 2.23 M/UL — LOW (ref 4.2–5.8)
RBC # BLD: 2.39 M/UL — LOW (ref 4.2–5.8)
RBC # FLD: 12.2 % — SIGNIFICANT CHANGE UP (ref 10.3–14.5)
RBC # FLD: 12.3 % — SIGNIFICANT CHANGE UP (ref 10.3–14.5)
SODIUM SERPL-SCNC: 130 MMOL/L — LOW (ref 135–145)
SODIUM SERPL-SCNC: 132 MMOL/L — LOW (ref 135–145)
WBC # BLD: 11.5 K/UL — HIGH (ref 3.8–10.5)
WBC # BLD: 12.95 K/UL — HIGH (ref 3.8–10.5)
WBC # FLD AUTO: 11.5 K/UL — HIGH (ref 3.8–10.5)
WBC # FLD AUTO: 12.95 K/UL — HIGH (ref 3.8–10.5)

## 2022-08-23 PROCEDURE — 99233 SBSQ HOSP IP/OBS HIGH 50: CPT

## 2022-08-23 RX ORDER — BACITRACIN ZINC 500 UNIT/G
1 OINTMENT IN PACKET (EA) TOPICAL
Refills: 0 | Status: DISCONTINUED | OUTPATIENT
Start: 2022-08-23 | End: 2022-08-26

## 2022-08-23 RX ORDER — POTASSIUM CHLORIDE 20 MEQ
40 PACKET (EA) ORAL ONCE
Refills: 0 | Status: COMPLETED | OUTPATIENT
Start: 2022-08-23 | End: 2022-08-23

## 2022-08-23 RX ORDER — CALCIUM GLUCONATE 100 MG/ML
1 VIAL (ML) INTRAVENOUS ONCE
Refills: 0 | Status: COMPLETED | OUTPATIENT
Start: 2022-08-23 | End: 2022-08-23

## 2022-08-23 RX ADMIN — Medication 5 MILLIGRAM(S): at 05:48

## 2022-08-23 RX ADMIN — OXYCODONE HYDROCHLORIDE 15 MILLIGRAM(S): 5 TABLET ORAL at 06:50

## 2022-08-23 RX ADMIN — OXYCODONE HYDROCHLORIDE 15 MILLIGRAM(S): 5 TABLET ORAL at 20:01

## 2022-08-23 RX ADMIN — OXYCODONE HYDROCHLORIDE 15 MILLIGRAM(S): 5 TABLET ORAL at 14:04

## 2022-08-23 RX ADMIN — Medication 5 MILLIGRAM(S): at 19:47

## 2022-08-23 RX ADMIN — Medication 1 APPLICATION(S): at 19:48

## 2022-08-23 RX ADMIN — Medication 100 GRAM(S): at 22:23

## 2022-08-23 RX ADMIN — METHOCARBAMOL 500 MILLIGRAM(S): 500 TABLET, FILM COATED ORAL at 05:48

## 2022-08-23 RX ADMIN — SENNA PLUS 2 TABLET(S): 8.6 TABLET ORAL at 22:23

## 2022-08-23 RX ADMIN — Medication 40 MILLIEQUIVALENT(S): at 09:57

## 2022-08-23 RX ADMIN — OXYCODONE HYDROCHLORIDE 15 MILLIGRAM(S): 5 TABLET ORAL at 14:25

## 2022-08-23 RX ADMIN — Medication 25 MILLIGRAM(S): at 19:46

## 2022-08-23 RX ADMIN — ATORVASTATIN CALCIUM 20 MILLIGRAM(S): 80 TABLET, FILM COATED ORAL at 22:23

## 2022-08-23 RX ADMIN — CHLORHEXIDINE GLUCONATE 1 APPLICATION(S): 213 SOLUTION TOPICAL at 09:22

## 2022-08-23 RX ADMIN — OXYCODONE HYDROCHLORIDE 15 MILLIGRAM(S): 5 TABLET ORAL at 05:48

## 2022-08-23 RX ADMIN — OXYCODONE HYDROCHLORIDE 15 MILLIGRAM(S): 5 TABLET ORAL at 19:46

## 2022-08-23 RX ADMIN — CHLORHEXIDINE GLUCONATE 1 APPLICATION(S): 213 SOLUTION TOPICAL at 09:02

## 2022-08-23 RX ADMIN — METHOCARBAMOL 500 MILLIGRAM(S): 500 TABLET, FILM COATED ORAL at 22:22

## 2022-08-23 RX ADMIN — OXYCODONE HYDROCHLORIDE 15 MILLIGRAM(S): 5 TABLET ORAL at 09:57

## 2022-08-23 RX ADMIN — Medication 25 MILLIGRAM(S): at 05:48

## 2022-08-23 RX ADMIN — OXYCODONE HYDROCHLORIDE 15 MILLIGRAM(S): 5 TABLET ORAL at 00:50

## 2022-08-23 RX ADMIN — ENOXAPARIN SODIUM 40 MILLIGRAM(S): 100 INJECTION SUBCUTANEOUS at 22:23

## 2022-08-23 RX ADMIN — OXYCODONE HYDROCHLORIDE 15 MILLIGRAM(S): 5 TABLET ORAL at 05:52

## 2022-08-23 RX ADMIN — OXYCODONE HYDROCHLORIDE 15 MILLIGRAM(S): 5 TABLET ORAL at 10:20

## 2022-08-23 RX ADMIN — OXYCODONE HYDROCHLORIDE 15 MILLIGRAM(S): 5 TABLET ORAL at 23:00

## 2022-08-23 RX ADMIN — OXYCODONE HYDROCHLORIDE 15 MILLIGRAM(S): 5 TABLET ORAL at 22:31

## 2022-08-23 RX ADMIN — POLYETHYLENE GLYCOL 3350 17 GRAM(S): 17 POWDER, FOR SOLUTION ORAL at 19:47

## 2022-08-23 RX ADMIN — PANTOPRAZOLE SODIUM 40 MILLIGRAM(S): 20 TABLET, DELAYED RELEASE ORAL at 05:48

## 2022-08-23 RX ADMIN — METHOCARBAMOL 500 MILLIGRAM(S): 500 TABLET, FILM COATED ORAL at 14:04

## 2022-08-23 RX ADMIN — TENOFOVIR DISOPROXIL FUMARATE 300 MILLIGRAM(S): 300 TABLET, FILM COATED ORAL at 12:40

## 2022-08-23 NOTE — PROGRESS NOTE ADULT - SUBJECTIVE AND OBJECTIVE BOX
HPI:  Patient is a 66 year old male with worsening back pain for two months and unsteadiness when getting up.  MRI done which showed cord compression.  Now presented for surgery.    OVERNIGHT EVENTS:  Patient orthostatic when out of bed. C/o incisional pain. AM CBC with downtrending H/H acute post op blood loss anemia- will transfuse 1U PRBC  Tolerating diet.  Passing flatus but no bowel movement yet.    Vital Signs Last 24 Hrs  T(C): 36.8 (23 Aug 2022 13:51), Max: 37.4 (23 Aug 2022 00:15)  T(F): 98.2 (23 Aug 2022 13:51), Max: 99.3 (23 Aug 2022 00:15)  HR: 90 (23 Aug 2022 13:51) (65 - 110)  BP: 122/79 (23 Aug 2022 13:51) (115/63 - 149/71)  BP(mean): --  RR: 18 (23 Aug 2022 13:51) (18 - 18)  SpO2: 96% (23 Aug 2022 13:51) (93% - 96%)    Parameters below as of 23 Aug 2022 13:51  Patient On (Oxygen Delivery Method): room air    PHYSICAL EXAM:  Neurological: awake, alert, oriented x3, follows commands, speech clear and fluent  RUE: 4+/5 delt, 4+/5 bicep/tricep, 5/5 hand   LUE: 4+/5 delt, 4+/5 bicep/tricep, 5/5 hand   RLE: 4/5 hf, 4+/5 kf/ke, 5/5 df/pf  LLE: 4/5 hf, 4+/5 kf/ke, 5/5 df/pf  sensation present, intact, equal throughout    Cardiovascular: +s1, s2  Respiratory: clear to auscultation b/l  Gastrointestinal: soft, non-distended, non-tender  Genitourinary: +voiding  Incision/Wound: posterior midline vertical incision dressing c/d/i w/ aquacel, hemovac x2, abby x2    TUBES/LINES:  [x] hemovac x2 (5cc and 35cc serosanguinous over 24 hours)  [x] abby x2 (60cc and 45cc serosanguinous over 24 hours)    DIET:  [x] regular    LABS:                              7.7    12.95 )-----------( 228      ( 23 Aug 2022 10:30 )             23.4   08-23    130<L>  |  96  |  8   ----------------------------<  111<H>  4.0   |  27  |  0.77    Ca    8.2<L>      23 Aug 2022 10:32  Phos  2.5     08-22  Mg     2.1     08-22         Allergies  gabapentin (Other)  shellfish (Anaphylaxis)    MEDICATIONS  (STANDING):  atorvastatin 20 milliGRAM(s) Oral at bedtime  BACItracin   Ointment 1 Application(s) Topical two times a day  bisacodyl 5 milliGRAM(s) Oral every 12 hours  calcium gluconate IVPB 1 Gram(s) IV Intermittent once  chlorhexidine 4% Liquid 1 Application(s) Topical <User Schedule>  chlorhexidine 4% Liquid 1 Application(s) Topical <User Schedule>  enoxaparin Injectable 40 milliGRAM(s) SubCutaneous every 24 hours  methocarbamol 500 milliGRAM(s) Oral every 8 hours  metoprolol tartrate 25 milliGRAM(s) Oral two times a day  oxyCODONE  ER Tablet 15 milliGRAM(s) Oral every 12 hours  pantoprazole    Tablet 40 milliGRAM(s) Oral before breakfast  polyethylene glycol 3350 17 Gram(s) Oral every 12 hours  predniSONE   Tablet 5 milliGRAM(s) Oral daily  senna 2 Tablet(s) Oral at bedtime  tenofovir disoproxil fumarate (VIREAD) 300 milliGRAM(s) Oral daily    MEDICATIONS  (PRN):  acetaminophen     Tablet .. 650 milliGRAM(s) Oral every 6 hours PRN Mild Pain (1 - 3)  magnesium hydroxide Suspension 30 milliLiter(s) Oral every 12 hours PRN Constipation  ondansetron Injectable 4 milliGRAM(s) IV Push every 6 hours PRN Nausea and/or Vomiting  oxyCODONE    IR 10 milliGRAM(s) Oral every 4 hours PRN Moderate Pain (4 - 6)  oxyCODONE    IR 15 milliGRAM(s) Oral every 4 hours PRN Severe Pain (7 - 10)  simethicone 80 milliGRAM(s) Chew every 12 hours PRN Heartburn  sodium chloride 0.9% lock flush 10 milliLiter(s) IV Push every 1 hour PRN Pre/post blood products, medications, blood draw, and to maintain line patency  sodium chloride 0.9% lock flush 10 milliLiter(s) IV Push every 1 hour PRN Pre/post blood products, medications, blood draw, and to maintain line patency    IVF:  none    CULTURES:  none    RADIOLOGY & ADDITIONAL TESTS:  no new imaging

## 2022-08-23 NOTE — PROGRESS NOTE ADULT - PROBLEM SELECTOR PLAN 2
symptomatic with dizziness and nausea.  became tachycardic to 130s-140s with standing so meets criteria by HR.  - s/p 500cc NS bolus 8/21 and 8/22  - overall improved, pending orthostatics today when get out of bed to chair  - being transfused 1u PRBC given symptomatic anemia  - compression stockings ordered - please ensure they are placed on patient  - out of bed to chair daily as tolerated  - c/w decreased home metoprolol tartrate to 25mg BID with hold parameters  - hold home anti-hypertensive regimen as below

## 2022-08-23 NOTE — PROGRESS NOTE ADULT - ASSESSMENT
HPI:  Patient is a 66 year old male with worsening back pain for two months and unsteadiness when getting up.  MRI done which showed cord compression.  Now presented for surgery.    PROCEDURE: s/p T10-L2 decompression, L2 pedicle subtraction osteotomy, and extension of fusion to T9 with plastics closure on 8/18/2022.     PLAN:  Neuro:   -q4 hour neuro checks  -oxycodone and oxycontin for pain control  -robaxin for muscle spasms  -continue hemovacs and jps, monitor outputs, management as per plastics  -standing xrays prior to discharge  -out of bed with assistance  -pt/ot/pm+r - acute rehab upon discharge    Respiratory:   -satting well on room air  -incentive spirometer for lung expansion    CV:  -keep sbp 100-140  -metoprolol for bp control  -hold hctz for orthostasis  -orthostatic vitals q12 hours  -bolus as needed  -atorvastatin for lipid control    Endocrine:   -maintain euglycemia    Heme/Onc:    -lovenox and scds for dvt prophylaxis  - transfusion of 1U PRBCs for acute post op blood loss anemia, f/u am labs    Renal:   -voiding    ID:   -tenofovir for history of hep c  -leukocytosis, likely reactive from OR, now downtrending, has been afebrile, trend cbc    GI:   -regular diet  -senna, miralax, and dulcolax for bowel regimen  -protonix for gi prophylaxis    family updated at bedside  will discuss with Dr Dalila Hines #[id number]

## 2022-08-23 NOTE — PROGRESS NOTE ADULT - SUBJECTIVE AND OBJECTIVE BOX
PLASTIC SURGERY PROGRESS NOTE    Patient resting comfortably without complaints     NAD, alert  Back aquacell changed  Nylons in place with no evidence of dehiscence or fluid collections  No surrounding erythema  Drains with SS output: 60/45/5/35/ 24 hours    66M PMHx HTN, RA, GERD, Hep C s/p posterior L2 PSO, extension of fusion to T9, T10-L2 decompression, and surgical closure of back with PRS on 8/18.     Plan:  - Aquacell to be changed every three days and PRN for soilage  - Will remove right and left upper drains today  - Remainder care per primary   -patient can follow up in the office with Dr. Pop upon discharge for suture removal     Plastic Surgery   P# [phone]

## 2022-08-23 NOTE — PROGRESS NOTE ADULT - SUBJECTIVE AND OBJECTIVE BOX
Esperanza Thomson MD  Division of Hospital Medicine  [facility]  Spectra: [phone]      Patient is a 66y old  Male who presents with a chief complaint of spine surgery (21 Aug 2022 12:00)      SUBJECTIVE / OVERNIGHT EVENTS: no acute events overnight. pain better controlled this AM. has not gotten out of bed yet as being transfused 1 u PRBC but states no dizziness nor lightheadedness yesterday when he moved from bed to chair. no fever, chills, chest pain, sob, abd pain, n/v, dysuria. had small BM last night but still feels need to defecate more and feels his abd "feels tight"  ADDITIONAL REVIEW OF SYSTEMS:    MEDICATIONS  (STANDING):  atorvastatin 20 milliGRAM(s) Oral at bedtime  BACItracin   Ointment 1 Application(s) Topical two times a day  bisacodyl 5 milliGRAM(s) Oral every 12 hours  calcium gluconate IVPB 1 Gram(s) IV Intermittent once  chlorhexidine 4% Liquid 1 Application(s) Topical <User Schedule>  chlorhexidine 4% Liquid 1 Application(s) Topical <User Schedule>  enoxaparin Injectable 40 milliGRAM(s) SubCutaneous every 24 hours  methocarbamol 500 milliGRAM(s) Oral every 8 hours  metoprolol tartrate 25 milliGRAM(s) Oral two times a day  oxyCODONE  ER Tablet 15 milliGRAM(s) Oral every 12 hours  pantoprazole    Tablet 40 milliGRAM(s) Oral before breakfast  polyethylene glycol 3350 17 Gram(s) Oral every 12 hours  predniSONE   Tablet 5 milliGRAM(s) Oral daily  senna 2 Tablet(s) Oral at bedtime  tenofovir disoproxil fumarate (VIREAD) 300 milliGRAM(s) Oral daily    MEDICATIONS  (PRN):  acetaminophen     Tablet .. 650 milliGRAM(s) Oral every 6 hours PRN Mild Pain (1 - 3)  magnesium hydroxide Suspension 30 milliLiter(s) Oral every 12 hours PRN Constipation  ondansetron Injectable 4 milliGRAM(s) IV Push every 6 hours PRN Nausea and/or Vomiting  oxyCODONE    IR 10 milliGRAM(s) Oral every 4 hours PRN Moderate Pain (4 - 6)  oxyCODONE    IR 15 milliGRAM(s) Oral every 4 hours PRN Severe Pain (7 - 10)  simethicone 80 milliGRAM(s) Chew every 12 hours PRN Heartburn  sodium chloride 0.9% lock flush 10 milliLiter(s) IV Push every 1 hour PRN Pre/post blood products, medications, blood draw, and to maintain line patency  sodium chloride 0.9% lock flush 10 milliLiter(s) IV Push every 1 hour PRN Pre/post blood products, medications, blood draw, and to maintain line patency      CAPILLARY BLOOD GLUCOSE        I&O's Summary    22 Aug 2022 07:01  -  23 Aug 2022 07:00  --------------------------------------------------------  IN: 1220 mL / OUT: 2095 mL / NET: -875 mL        PHYSICAL EXAM:  Vital Signs Last 24 Hrs  T(C): 36.9 (23 Aug 2022 10:55), Max: 37.4 (23 Aug 2022 00:15)  T(F): 98.5 (23 Aug 2022 10:55), Max: 99.3 (23 Aug 2022 00:15)  HR: 93 (23 Aug 2022 10:55) (65 - 110)  BP: 147/87 (23 Aug 2022 10:55) (115/63 - 149/71)  BP(mean): --  RR: 18 (23 Aug 2022 10:55) (18 - 18)  SpO2: 93% (23 Aug 2022 10:55) (93% - 94%)    Parameters below as of 23 Aug 2022 10:55  Patient On (Oxygen Delivery Method): room air    CONSTITUTIONAL: NAD, well-developed, well-groomed  EYES: PERRLA; conjunctiva and sclera clear  ENMT: Moist oral mucosa, no pharyngeal injection or exudates; normal dentition  NECK: Supple, no palpable masses; no thyromegaly, +prior R IJ cordis site c/d/i  RESPIRATORY: Normal respiratory effort; lungs are clear to auscultation bilaterally  CARDIOVASCULAR: Regular rate and rhythm, normal S1 and S2, no murmur/rub/gallop; +trace pedal edema b/l; Peripheral pulses are 2+ bilaterally  ABDOMEN: Soft, Nondistended,  Nontender to palpation, normoactive bowel sounds  MUSCULOSKELETAL:  No clubbing or cyanosis of digits; no joint swelling or tenderness to palpation  PSYCH: A+O to person, place, and time; affect appropriate  NEUROLOGY: CN 2-12 are intact and symmetric; no gross sensory deficits   SKIN: +surgical site dressing c/d/i, +hemovac x 2 and MALLIKA x2with serosanguinous drainage, +RUE PICC c/d/i, +anterior chest with skin tear from popped blister    LABS:                        7.7    12.95 )-----------( 228      ( 23 Aug 2022 10:30 )             23.4     08-23    130<L>  |  96  |  8   ----------------------------<  111<H>  4.0   |  27  |  0.77    Ca    8.2<L>      23 Aug 2022 10:32  Phos  2.5     08-22  Mg     2.1     08-22      CARDIAC MARKERS ( 22 Aug 2022 06:09 )  x     / x     / 2252 U/L / x     / x            RADIOLOGY & ADDITIONAL TESTS:  Results Reviewed:   Imaging Personally Reviewed:  Electrocardiogram Personally Reviewed:    COORDINATION OF CARE:  Care Discussed with Consultants/Other Providers [Y]: neurosurgery [facility]  Prior or Outpatient Records Reviewed [Y/N]:

## 2022-08-23 NOTE — PROGRESS NOTE ADULT - SUBJECTIVE AND OBJECTIVE BOX
DATE OF SERVICE: 08-23-22 @ 16:15    Patient is a 66y old  Male who presents with a chief complaint of spine surgery (21 Aug 2022 12:00)      INTERVAL HISTORY: Feels ok.     REVIEW OF SYSTEMS:  CONSTITUTIONAL: No weakness  EYES/ENT: No visual changes;  No throat pain   NECK: No pain or stiffness  RESPIRATORY: No cough, wheezing; No shortness of breath  CARDIOVASCULAR: No chest pain or palpitations  GASTROINTESTINAL: No abdominal  pain. No nausea, vomiting, or hematemesis  GENITOURINARY: No dysuria, frequency or hematuria  NEUROLOGICAL: No stroke like symptoms  SKIN: No rashes    	  MEDICATIONS:  metoprolol tartrate 25 milliGRAM(s) Oral two times a day        PHYSICAL EXAM:  T(C): 36.8 (08-23-22 @ 13:51), Max: 37.4 (08-23-22 @ 00:15)  HR: 90 (08-23-22 @ 13:51) (65 - 110)  BP: 122/79 (08-23-22 @ 13:51) (115/63 - 149/71)  RR: 18 (08-23-22 @ 13:51) (18 - 18)  SpO2: 96% (08-23-22 @ 13:51) (93% - 96%)  Wt(kg): --  I&O's Summary    22 Aug 2022 07:01  -  23 Aug 2022 07:00  --------------------------------------------------------  IN: 1220 mL / OUT: 2095 mL / NET: -875 mL    23 Aug 2022 07:01  -  23 Aug 2022 16:15  --------------------------------------------------------  IN: 0 mL / OUT: 800 mL / NET: -800 mL          Appearance: In no distress	  HEENT:    PERRL, EOMI	  Cardiovascular:  S1 S2, No JVD  Respiratory: Lungs clear to auscultation	  Gastrointestinal:  Soft, Non-tender, + BS	  Vascularature:  No edema of LE  Psychiatric: Appropriate affect   Neuro: no acute focal deficits                               7.7    12.95 )-----------( 228      ( 23 Aug 2022 10:30 )             23.4     08-23    130<L>  |  96  |  8   ----------------------------<  111<H>  4.0   |  27  |  0.77    Ca    8.2<L>      23 Aug 2022 10:32  Phos  2.5     08-22  Mg     2.1     08-22          Labs personally reviewed      ASSESSMENT/PLAN: 	    Mr. Hernández is a 65 yo male with PMH of HTN, RA, GERD, Hep C, and previous back surgery who is s/p PSO L2, extension of fusion t9, T10-L2 decompression who had delayed emergence from anesthsia and ?delirium and now +orthostatic hypotension.    Problem/Plan -1  Problem: Orthostatic Hypotension  - Patient reports lightheadedness and dizziness upon trying to stand for the first time post-operatively  - Had delayed emergence from GA  - B/L LE negative for DVT  - CXR shows clear lungs  - ARB, HCTZ and CCB currently on hold  - BB reduced (now Metoprolol 25 mg PO BID)  - s/p PRBC for symptomatic anemia  - Continue to monitor orthos daily  - Will check TTE  - c/w compression stocking when OOB    Problem/Plan -2  Problem: HTN  - + orthostatic as noted above  - ARB, HCTZ and CCB currently on hold  - c/w Metoprolol 25mg PO BID with hold parameters    Problem/Plan -3  Problem: HLD  - c/w stating    Problem/Plan -4  Problem: DVT PPX  - DVT neg on US  - c/w Lovenox SQ              Leonarda Mendoza, AG-NP   David Westfall DO [facility]  Cardiovascular Medicine  [address] [facility], [address]  Office: [phone]  Cell: [phone] DATE OF SERVICE: 08-23-22 @ 16:15    Patient is a 66y old  Male who presents with a chief complaint of spine surgery (21 Aug 2022 12:00)      INTERVAL HISTORY: Feels ok.     REVIEW OF SYSTEMS:  CONSTITUTIONAL: No weakness  EYES/ENT: No visual changes;  No throat pain   NECK: No pain or stiffness  RESPIRATORY: No cough, wheezing; No shortness of breath  CARDIOVASCULAR: No chest pain or palpitations  GASTROINTESTINAL: No abdominal  pain. No nausea, vomiting, or hematemesis  GENITOURINARY: No dysuria, frequency or hematuria  NEUROLOGICAL: No stroke like symptoms  SKIN: No rashes    	  MEDICATIONS:  metoprolol tartrate 25 milliGRAM(s) Oral two times a day        PHYSICAL EXAM:  T(C): 36.8 (08-23-22 @ 13:51), Max: 37.4 (08-23-22 @ 00:15)  HR: 90 (08-23-22 @ 13:51) (65 - 110)  BP: 122/79 (08-23-22 @ 13:51) (115/63 - 149/71)  RR: 18 (08-23-22 @ 13:51) (18 - 18)  SpO2: 96% (08-23-22 @ 13:51) (93% - 96%)  Wt(kg): --  I&O's Summary    22 Aug 2022 07:01  -  23 Aug 2022 07:00  --------------------------------------------------------  IN: 1220 mL / OUT: 2095 mL / NET: -875 mL    23 Aug 2022 07:01  -  23 Aug 2022 16:15  --------------------------------------------------------  IN: 0 mL / OUT: 800 mL / NET: -800 mL          Appearance: In no distress	  HEENT:    PERRL, EOMI	  Cardiovascular:  S1 S2, No JVD  Respiratory: Lungs clear to auscultation	  Gastrointestinal:  Soft, Non-tender, + BS	  Vascularature:  No edema of LE  Psychiatric: Appropriate affect   Neuro: no acute focal deficits                               7.7    12.95 )-----------( 228      ( 23 Aug 2022 10:30 )             23.4     08-23    130<L>  |  96  |  8   ----------------------------<  111<H>  4.0   |  27  |  0.77    Ca    8.2<L>      23 Aug 2022 10:32  Phos  2.5     08-22  Mg     2.1     08-22          Labs personally reviewed      ASSESSMENT/PLAN: 	    Mr. Hernández is a 65 yo male with PMH of HTN, RA, GERD, Hep C, and previous back surgery who is s/p PSO L2, extension of fusion t9, T10-L2 decompression who had delayed emergence from anesthsia and ?delirium and now +orthostatic hypotension.    Problem/Plan -1  Problem: Orthostatic Hypotension  - Patient reports lightheadedness and dizziness upon trying to stand for the first time post-operatively  - Had delayed emergence from GA  - B/L LE negative for DVT  - CXR shows clear lungs  - ARB, HCTZ and CCB currently on hold  - BB reduced (now Metoprolol 25 mg PO BID)  - BP well controlled off meds  - Will check TTE  - c/w compression stocking when OOB    Problem/Plan -2  Problem: HTN  - + orthostatic as noted above  - ARB, HCTZ and CCB currently on hold  - c/w Metoprolol 25mg PO BID with hold parameters  - BP well controlled off meds    Problem/Plan -3  Problem: HLD  - c/w stating    Problem/Plan -4  Problem: DVT PPX  - DVT neg on US  - c/w Lovenox SQ              Leonarda Mendoza, AG-NP   David Westfall DO [facility]  Cardiovascular Medicine  [address] [place] [address], [address]  Office: [phone]  Cell: [phone]

## 2022-08-23 NOTE — PROGRESS NOTE ADULT - ASSESSMENT
67 yo M with h/o HTN, RA, GERD, Hepatitis C, s/p prior lumbar decompression & fusion in 2015 who presents with worsening back pain x 2 months associated with unsteadiness when getting up ("crashes into walls") and numbness in his feet who presented to [facility] for scheduled spine surgery. Now s/p Posterior L2 PSO, extension of fusion to T9, T10-L2 Decompression, w/ Plastics Closure 8/18/22 with EBL 750cc. Post-op complicated by hypotension requiring pressors in NSCU. Transferred out of NSCU 8/20 with course further complicated by elevated CPK, leukocytosis, electrolyte derangements, and symptomatic orthostatic hypotension. Medicine consulted for co-management.    PCP: Dr. Saman Yang

## 2022-08-24 LAB
ANION GAP SERPL CALC-SCNC: 7 MMOL/L — SIGNIFICANT CHANGE UP (ref 5–17)
BUN SERPL-MCNC: 7 MG/DL — SIGNIFICANT CHANGE UP (ref 7–23)
CALCIUM SERPL-MCNC: 8.3 MG/DL — LOW (ref 8.4–10.5)
CHLORIDE SERPL-SCNC: 96 MMOL/L — SIGNIFICANT CHANGE UP (ref 96–108)
CK SERPL-CCNC: 691 U/L — HIGH (ref 30–200)
CO2 SERPL-SCNC: 29 MMOL/L — SIGNIFICANT CHANGE UP (ref 22–31)
CREAT SERPL-MCNC: 0.74 MG/DL — SIGNIFICANT CHANGE UP (ref 0.5–1.3)
EGFR: 100 ML/MIN/1.73M2 — SIGNIFICANT CHANGE UP
GLUCOSE SERPL-MCNC: 117 MG/DL — HIGH (ref 70–99)
HCT VFR BLD CALC: 26.1 % — LOW (ref 39–50)
HGB BLD-MCNC: 8.9 G/DL — LOW (ref 13–17)
MAGNESIUM SERPL-MCNC: 1.8 MG/DL — SIGNIFICANT CHANGE UP (ref 1.6–2.6)
MCHC RBC-ENTMCNC: 32.7 PG — SIGNIFICANT CHANGE UP (ref 27–34)
MCHC RBC-ENTMCNC: 34.1 GM/DL — SIGNIFICANT CHANGE UP (ref 32–36)
MCV RBC AUTO: 96 FL — SIGNIFICANT CHANGE UP (ref 80–100)
NRBC # BLD: 0 /100 WBCS — SIGNIFICANT CHANGE UP (ref 0–0)
PHOSPHATE SERPL-MCNC: 3 MG/DL — SIGNIFICANT CHANGE UP (ref 2.5–4.5)
PLATELET # BLD AUTO: 296 K/UL — SIGNIFICANT CHANGE UP (ref 150–400)
POTASSIUM SERPL-MCNC: 4.1 MMOL/L — SIGNIFICANT CHANGE UP (ref 3.5–5.3)
POTASSIUM SERPL-SCNC: 4.1 MMOL/L — SIGNIFICANT CHANGE UP (ref 3.5–5.3)
RBC # BLD: 2.72 M/UL — LOW (ref 4.2–5.8)
RBC # FLD: 12.6 % — SIGNIFICANT CHANGE UP (ref 10.3–14.5)
SODIUM SERPL-SCNC: 132 MMOL/L — LOW (ref 135–145)
WBC # BLD: 12.54 K/UL — HIGH (ref 3.8–10.5)
WBC # FLD AUTO: 12.54 K/UL — HIGH (ref 3.8–10.5)

## 2022-08-24 PROCEDURE — 99233 SBSQ HOSP IP/OBS HIGH 50: CPT

## 2022-08-24 RX ORDER — MAGNESIUM SULFATE 500 MG/ML
2 VIAL (ML) INJECTION ONCE
Refills: 0 | Status: COMPLETED | OUTPATIENT
Start: 2022-08-24 | End: 2022-08-24

## 2022-08-24 RX ADMIN — OXYCODONE HYDROCHLORIDE 15 MILLIGRAM(S): 5 TABLET ORAL at 12:28

## 2022-08-24 RX ADMIN — METHOCARBAMOL 500 MILLIGRAM(S): 500 TABLET, FILM COATED ORAL at 05:59

## 2022-08-24 RX ADMIN — Medication 1 APPLICATION(S): at 06:01

## 2022-08-24 RX ADMIN — Medication 25 MILLIGRAM(S): at 17:03

## 2022-08-24 RX ADMIN — OXYCODONE HYDROCHLORIDE 15 MILLIGRAM(S): 5 TABLET ORAL at 21:04

## 2022-08-24 RX ADMIN — Medication 1 APPLICATION(S): at 18:54

## 2022-08-24 RX ADMIN — ATORVASTATIN CALCIUM 20 MILLIGRAM(S): 80 TABLET, FILM COATED ORAL at 21:04

## 2022-08-24 RX ADMIN — Medication 650 MILLIGRAM(S): at 12:28

## 2022-08-24 RX ADMIN — OXYCODONE HYDROCHLORIDE 15 MILLIGRAM(S): 5 TABLET ORAL at 17:30

## 2022-08-24 RX ADMIN — Medication 5 MILLIGRAM(S): at 17:02

## 2022-08-24 RX ADMIN — Medication 5 MILLIGRAM(S): at 05:58

## 2022-08-24 RX ADMIN — Medication 650 MILLIGRAM(S): at 13:00

## 2022-08-24 RX ADMIN — OXYCODONE HYDROCHLORIDE 15 MILLIGRAM(S): 5 TABLET ORAL at 08:27

## 2022-08-24 RX ADMIN — METHOCARBAMOL 500 MILLIGRAM(S): 500 TABLET, FILM COATED ORAL at 14:28

## 2022-08-24 RX ADMIN — CHLORHEXIDINE GLUCONATE 1 APPLICATION(S): 213 SOLUTION TOPICAL at 08:27

## 2022-08-24 RX ADMIN — OXYCODONE HYDROCHLORIDE 15 MILLIGRAM(S): 5 TABLET ORAL at 09:00

## 2022-08-24 RX ADMIN — OXYCODONE HYDROCHLORIDE 15 MILLIGRAM(S): 5 TABLET ORAL at 05:57

## 2022-08-24 RX ADMIN — PANTOPRAZOLE SODIUM 40 MILLIGRAM(S): 20 TABLET, DELAYED RELEASE ORAL at 05:58

## 2022-08-24 RX ADMIN — METHOCARBAMOL 500 MILLIGRAM(S): 500 TABLET, FILM COATED ORAL at 21:04

## 2022-08-24 RX ADMIN — Medication 25 GRAM(S): at 17:04

## 2022-08-24 RX ADMIN — OXYCODONE HYDROCHLORIDE 15 MILLIGRAM(S): 5 TABLET ORAL at 13:00

## 2022-08-24 RX ADMIN — TENOFOVIR DISOPROXIL FUMARATE 300 MILLIGRAM(S): 300 TABLET, FILM COATED ORAL at 12:27

## 2022-08-24 RX ADMIN — ENOXAPARIN SODIUM 40 MILLIGRAM(S): 100 INJECTION SUBCUTANEOUS at 21:05

## 2022-08-24 RX ADMIN — OXYCODONE HYDROCHLORIDE 15 MILLIGRAM(S): 5 TABLET ORAL at 17:04

## 2022-08-24 RX ADMIN — OXYCODONE HYDROCHLORIDE 15 MILLIGRAM(S): 5 TABLET ORAL at 03:20

## 2022-08-24 RX ADMIN — OXYCODONE HYDROCHLORIDE 15 MILLIGRAM(S): 5 TABLET ORAL at 02:50

## 2022-08-24 RX ADMIN — SENNA PLUS 2 TABLET(S): 8.6 TABLET ORAL at 21:04

## 2022-08-24 RX ADMIN — OXYCODONE HYDROCHLORIDE 15 MILLIGRAM(S): 5 TABLET ORAL at 21:34

## 2022-08-24 RX ADMIN — OXYCODONE HYDROCHLORIDE 15 MILLIGRAM(S): 5 TABLET ORAL at 17:01

## 2022-08-24 RX ADMIN — Medication 25 MILLIGRAM(S): at 05:58

## 2022-08-24 NOTE — PROGRESS NOTE ADULT - PROBLEM SELECTOR PLAN 2
symptomatic with dizziness and nausea.  became tachycardic to 130s-140s with standing so meets criteria by HR.  - s/p 500cc NS bolus 8/21 and 8/22  - resolved. orthostatics negative (though unable to get standing BP given pain)  - s/p 1u PRBC given symptomatic anemia - improved now  - compression stockings ordered - please ensure they are placed on patient daily  - out of bed to chair daily as tolerated  - c/w decreased home metoprolol tartrate to 25mg BID with hold parameters  - hold home anti-hypertensive regimen as below with plan to re-introduce one by one if needed  - no need for inpatient TTE (discussed with Cards attending Dr. Westfall)

## 2022-08-24 NOTE — PROGRESS NOTE ADULT - SUBJECTIVE AND OBJECTIVE BOX
Esperanza Thomson MD  Division of Hospital Medicine  [facility]  Spectra: [phone]      Patient is a 66y old  Male who presents with a chief complaint of spine surgery (21 Aug 2022 12:00)      SUBJECTIVE / OVERNIGHT EVENTS: no acute events overnight. ambulated to bathroom with walker with no lightheadedness nor dizziness though did exacerbation his back pain. otherwise doing okay. no fever, chills, chest pain, nor dyspnea. constipation resolved with adequate BM this morning.  ADDITIONAL REVIEW OF SYSTEMS:    MEDICATIONS  (STANDING):  atorvastatin 20 milliGRAM(s) Oral at bedtime  BACItracin   Ointment 1 Application(s) Topical two times a day  bisacodyl 5 milliGRAM(s) Oral every 12 hours  chlorhexidine 4% Liquid 1 Application(s) Topical <User Schedule>  chlorhexidine 4% Liquid 1 Application(s) Topical <User Schedule>  enoxaparin Injectable 40 milliGRAM(s) SubCutaneous every 24 hours  magnesium sulfate  IVPB 2 Gram(s) IV Intermittent once  methocarbamol 500 milliGRAM(s) Oral every 8 hours  metoprolol tartrate 25 milliGRAM(s) Oral two times a day  oxyCODONE  ER Tablet 15 milliGRAM(s) Oral every 12 hours  pantoprazole    Tablet 40 milliGRAM(s) Oral before breakfast  polyethylene glycol 3350 17 Gram(s) Oral every 12 hours  predniSONE   Tablet 5 milliGRAM(s) Oral daily  senna 2 Tablet(s) Oral at bedtime  tenofovir disoproxil fumarate (VIREAD) 300 milliGRAM(s) Oral daily    MEDICATIONS  (PRN):  acetaminophen     Tablet .. 650 milliGRAM(s) Oral every 6 hours PRN Mild Pain (1 - 3)  magnesium hydroxide Suspension 30 milliLiter(s) Oral every 12 hours PRN Constipation  ondansetron Injectable 4 milliGRAM(s) IV Push every 6 hours PRN Nausea and/or Vomiting  oxyCODONE    IR 10 milliGRAM(s) Oral every 4 hours PRN Moderate Pain (4 - 6)  oxyCODONE    IR 15 milliGRAM(s) Oral every 4 hours PRN Severe Pain (7 - 10)  simethicone 80 milliGRAM(s) Chew every 12 hours PRN Heartburn  sodium chloride 0.9% lock flush 10 milliLiter(s) IV Push every 1 hour PRN Pre/post blood products, medications, blood draw, and to maintain line patency  sodium chloride 0.9% lock flush 10 milliLiter(s) IV Push every 1 hour PRN Pre/post blood products, medications, blood draw, and to maintain line patency      CAPILLARY BLOOD GLUCOSE        I&O's Summary    23 Aug 2022 07:01  -  24 Aug 2022 07:00  --------------------------------------------------------  IN: 840 mL / OUT: 1960 mL / NET: -1120 mL    24 Aug 2022 07:01  -  24 Aug 2022 14:11  --------------------------------------------------------  IN: 480 mL / OUT: 450 mL / NET: 30 mL        PHYSICAL EXAM:  Vital Signs Last 24 Hrs  T(C): 36.8 (24 Aug 2022 12:42), Max: 37.2 (24 Aug 2022 05:29)  T(F): 98.3 (24 Aug 2022 12:42), Max: 99 (24 Aug 2022 05:29)  HR: 103 (24 Aug 2022 12:42) (91 - 103)  BP: 118/74 (24 Aug 2022 12:42) (118/74 - 131/76)  BP(mean): --  RR: 18 (24 Aug 2022 12:42) (18 - 18)  SpO2: 96% (24 Aug 2022 12:42) (94% - 96%)    Parameters below as of 24 Aug 2022 12:42  Patient On (Oxygen Delivery Method): room air    CONSTITUTIONAL: NAD, well-developed, well-groomed  EYES: PERRLA; conjunctiva and sclera clear  ENMT: Moist oral mucosa, no pharyngeal injection or exudates; normal dentition  NECK: Supple, no palpable masses; no thyromegaly, +prior R IJ cordis site c/d/i  RESPIRATORY: Normal respiratory effort; lungs are clear to auscultation bilaterally  CARDIOVASCULAR: Regular rate and rhythm, normal S1 and S2, no murmur/rub/gallop; +trace pedal edema b/l; Peripheral pulses are 2+ bilaterally  ABDOMEN: Soft, Nondistended,  Nontender to palpation, normoactive bowel sounds  MUSCULOSKELETAL:  No clubbing or cyanosis of digits; no joint swelling or tenderness to palpation  PSYCH: A+O to person, place, and time; affect appropriate  NEUROLOGY: CN 2-12 are intact and symmetric; no gross sensory deficits   SKIN: +surgical site dressing c/d/i, +hemovac x 2 and MALLIKA x2with serosanguinous drainage, +RUE PICC c/d/i, +anterior chest with skin tear from popped blister    LABS:                        8.9    12.54 )-----------( 296      ( 24 Aug 2022 09:55 )             26.1     08-24    132<L>  |  96  |  7   ----------------------------<  117<H>  4.1   |  29  |  0.74    Ca    8.3<L>      24 Aug 2022 09:55  Phos  3.0     08-24  Mg     1.8     08-24        CARDIAC MARKERS ( 24 Aug 2022 09:55 )  x     / x     / 691 U/L / x     / x            RADIOLOGY & ADDITIONAL TESTS:  Results Reviewed:   Imaging Personally Reviewed:  Electrocardiogram Personally Reviewed:    COORDINATION OF CARE:  Care Discussed with Consultants/Other Providers [Y]: neurosurgery MINH Loomis  Prior or Outpatient Records Reviewed [Y/N]:

## 2022-08-24 NOTE — PROGRESS NOTE ADULT - SUBJECTIVE AND OBJECTIVE BOX
HPI:  Patient is a 66 year old male with worsening back pain for two months and unsteadiness when getting up.  MRI done which showed cord compression.  Now presented for surgery.    OVERNIGHT EVENTS:  Patient orthostatic when out of bed reportedly a few days ago, unable to complete full readings today as unable to stand earlier without too uh pain  8/23 s/p transfuion of 1U PRBC with improved acute post op blood loss anemia    Vital Signs Last 24 Hrs  T(C): 36.9 (08-24-22 @ 16:07), Max: 37.2 (08-24-22 @ 05:29)  T(F): 98.4 (08-24-22 @ 16:07), Max: 99 (08-24-22 @ 05:29)  HR: 91 (08-24-22 @ 16:07) (91 - 103)  BP: 122/74 (08-24-22 @ 16:07) (118/74 - 131/76)  BP(mean): --  RR: 16 (08-24-22 @ 16:07) (16 - 18)  SpO2: 95% (08-24-22 @ 16:07) (94% - 96%)    Orthostatic VS  08-24-22 @ 09:33  Lying BP: 120/75 HR: --  Sitting BP: 115/77 HR: --  Standing BP: --/-- HR: --  Site: --  Mode: --    PHYSICAL EXAM:  Neurological: awake, alert, oriented x3, follows commands, speech clear and fluent  RUE: 4+/5 delt, 4+/5 bicep/tricep, 5/5 hand   LUE: 4+/5 delt, 4+/5 bicep/tricep, 5/5 hand   RLE: 4/5 hf, 4+/5 kf/ke, 5/5 df/pf  LLE: 4/5 hf, 4+/5 kf/ke, 5/5 df/pf  sensation present, intact, equal throughout    Cardiovascular: +s1, s2  Respiratory: clear to auscultation b/l  Gastrointestinal: soft, non-distended, non-tender  Genitourinary: +voiding  Incision/Wound: posterior midline vertical incision dressing c/d/i w/ aquacel, hemovac x1, abby x1    TUBES/LINES:  [x] left hemovac x1 (30cc serosanguinous over 24 hours)  [x] right abby x1 (80cc serosanguinous over 24 hours)    DIET:  [x] regular    LABS:                              8.9    12.54 )-----------( 296      ( 24 Aug 2022 09:55 )             26.1   08-24    132<L>  |  96  |  7   ----------------------------<  117<H>  4.1   |  29  |  0.74    Ca    8.3<L>      24 Aug 2022 09:55  Phos  3.0     08-24  Mg     1.8     08-24           Allergies  gabapentin (Other)  shellfish (Anaphylaxis)    MEDICATIONS  (STANDING):  atorvastatin 20 milliGRAM(s) Oral at bedtime  BACItracin   Ointment 1 Application(s) Topical two times a day  bisacodyl 5 milliGRAM(s) Oral every 12 hours  calcium gluconate IVPB 1 Gram(s) IV Intermittent once  chlorhexidine 4% Liquid 1 Application(s) Topical <User Schedule>  chlorhexidine 4% Liquid 1 Application(s) Topical <User Schedule>  enoxaparin Injectable 40 milliGRAM(s) SubCutaneous every 24 hours  methocarbamol 500 milliGRAM(s) Oral every 8 hours  metoprolol tartrate 25 milliGRAM(s) Oral two times a day  oxyCODONE  ER Tablet 15 milliGRAM(s) Oral every 12 hours  pantoprazole    Tablet 40 milliGRAM(s) Oral before breakfast  polyethylene glycol 3350 17 Gram(s) Oral every 12 hours  predniSONE   Tablet 5 milliGRAM(s) Oral daily  senna 2 Tablet(s) Oral at bedtime  tenofovir disoproxil fumarate (VIREAD) 300 milliGRAM(s) Oral daily    MEDICATIONS  (PRN):  acetaminophen     Tablet .. 650 milliGRAM(s) Oral every 6 hours PRN Mild Pain (1 - 3)  magnesium hydroxide Suspension 30 milliLiter(s) Oral every 12 hours PRN Constipation  ondansetron Injectable 4 milliGRAM(s) IV Push every 6 hours PRN Nausea and/or Vomiting  oxyCODONE    IR 10 milliGRAM(s) Oral every 4 hours PRN Moderate Pain (4 - 6)  oxyCODONE    IR 15 milliGRAM(s) Oral every 4 hours PRN Severe Pain (7 - 10)  simethicone 80 milliGRAM(s) Chew every 12 hours PRN Heartburn  sodium chloride 0.9% lock flush 10 milliLiter(s) IV Push every 1 hour PRN Pre/post blood products, medications, blood draw, and to maintain line patency  sodium chloride 0.9% lock flush 10 milliLiter(s) IV Push every 1 hour PRN Pre/post blood products, medications, blood draw, and to maintain line patency    IVF:  none    CULTURES:  none    RADIOLOGY & ADDITIONAL TESTS:  no new imaging

## 2022-08-24 NOTE — PROGRESS NOTE ADULT - SUBJECTIVE AND OBJECTIVE BOX
DATE OF SERVICE: 08-24-22 @ 11:25    Patient is a 66y old  Male who presents with a chief complaint of spine surgery (21 Aug 2022 12:00)      INTERVAL HISTORY: Feeling better today. Ambulated to the bathroom with walker. Did not experience dizziness or lightheadedness.     REVIEW OF SYSTEMS:  CONSTITUTIONAL: No weakness  EYES/ENT: No visual changes;  No throat pain   NECK: No pain or stiffness  RESPIRATORY: No cough, wheezing; No shortness of breath  CARDIOVASCULAR: No chest pain or palpitations  GASTROINTESTINAL: No abdominal  pain. No nausea, vomiting, or hematemesis  GENITOURINARY: No dysuria, frequency or hematuria  NEUROLOGICAL: No stroke like symptoms  SKIN: No rashes    	  MEDICATIONS:  metoprolol tartrate 25 milliGRAM(s) Oral two times a day        PHYSICAL EXAM:  T(C): 36.8 (08-24-22 @ 09:33), Max: 37.2 (08-24-22 @ 05:29)  HR: 99 (08-24-22 @ 05:29) (90 - 99)  BP: 125/72 (08-24-22 @ 05:29) (119/74 - 145/84)  RR: 18 (08-24-22 @ 09:33) (18 - 18)  SpO2: 94% (08-24-22 @ 09:33) (94% - 96%)  Wt(kg): --  I&O's Summary    23 Aug 2022 07:01  -  24 Aug 2022 07:00  --------------------------------------------------------  IN: 840 mL / OUT: 1960 mL / NET: -1120 mL    24 Aug 2022 07:01  -  24 Aug 2022 11:25  --------------------------------------------------------  IN: 480 mL / OUT: 450 mL / NET: 30 mL          Appearance: In no distress	  HEENT:    PERRL, EOMI	  Cardiovascular:  S1 S2, No JVD  Respiratory: Lungs clear to auscultation	  Gastrointestinal:  Soft, Non-tender, + BS	  Vascularature:  No edema of LE  Psychiatric: Appropriate affect   Neuro: no acute focal deficits                               8.9    12.54 )-----------( 296      ( 24 Aug 2022 09:55 )             26.1     08-24    132<L>  |  96  |  7   ----------------------------<  117<H>  4.1   |  29  |  0.74    Ca    8.3<L>      24 Aug 2022 09:55  Phos  3.0     08-24  Mg     1.8     08-24          Labs personally reviewed      ASSESSMENT/PLAN: 	      Mr. Hernández is a 67 yo male with PMH of HTN, RA, GERD, Hep C, and previous back surgery who is s/p PSO L2, extension of fusion t9, T10-L2 decompression who had delayed emergence from anesthsia and ?delirium and now +orthostatic hypotension.    Problem/Plan -1  Problem: Orthostatic Hypotension  - Patient reports lightheadedness and dizziness upon trying to stand for the first time post-operatively  - Had delayed emergence from GA  - B/L LE negative for DVT  - CXR shows clear lungs  - ARB, HCTZ and CCB currently on hold  - BB reduced (now Metoprolol 25 mg PO BID)  - Will check TTE  - c/w compression stocking when OOB  - Ortho VS BID    Problem/Plan -2  Problem: HTN  - + orthostatic as noted above  - ARB, HCTZ and CCB currently on hold  - c/w Metoprolol 25mg PO BID with hold parameters  - BP well controlled on BB    Problem/Plan -3  Problem: HLD  - c/w statin    Problem/Plan -4  Problem: DVT PPX  - DVT neg on US  - c/w Lovenox SQ        Leonarda Mendoza, AG-NP   David Westfall DO [facility]  Cardiovascular Medicine  [address] [facility], [address]  Office: [phone]  Cell: [phone] DATE OF SERVICE: 08-24-22 @ 11:25    Patient is a 66y old  Male who presents with a chief complaint of spine surgery (21 Aug 2022 12:00)      INTERVAL HISTORY: Feeling better today. Ambulated to the bathroom with walker. Did not experience dizziness or lightheadedness.     REVIEW OF SYSTEMS:  CONSTITUTIONAL: No weakness  EYES/ENT: No visual changes;  No throat pain   NECK: No pain or stiffness  RESPIRATORY: No cough, wheezing; No shortness of breath  CARDIOVASCULAR: No chest pain or palpitations  GASTROINTESTINAL: No abdominal  pain. No nausea, vomiting, or hematemesis  GENITOURINARY: No dysuria, frequency or hematuria  NEUROLOGICAL: No stroke like symptoms  SKIN: No rashes    	  MEDICATIONS:  metoprolol tartrate 25 milliGRAM(s) Oral two times a day        PHYSICAL EXAM:  T(C): 36.8 (08-24-22 @ 09:33), Max: 37.2 (08-24-22 @ 05:29)  HR: 99 (08-24-22 @ 05:29) (90 - 99)  BP: 125/72 (08-24-22 @ 05:29) (119/74 - 145/84)  RR: 18 (08-24-22 @ 09:33) (18 - 18)  SpO2: 94% (08-24-22 @ 09:33) (94% - 96%)  Wt(kg): --  I&O's Summary    23 Aug 2022 07:01  -  24 Aug 2022 07:00  --------------------------------------------------------  IN: 840 mL / OUT: 1960 mL / NET: -1120 mL    24 Aug 2022 07:01  -  24 Aug 2022 11:25  --------------------------------------------------------  IN: 480 mL / OUT: 450 mL / NET: 30 mL          Appearance: In no distress	  HEENT:    PERRL, EOMI	  Cardiovascular:  S1 S2, No JVD  Respiratory: Lungs clear to auscultation	  Gastrointestinal:  Soft, Non-tender, + BS	  Vascularature:  No edema of LE  Psychiatric: Appropriate affect   Neuro: no acute focal deficits                               8.9    12.54 )-----------( 296      ( 24 Aug 2022 09:55 )             26.1     08-24    132<L>  |  96  |  7   ----------------------------<  117<H>  4.1   |  29  |  0.74    Ca    8.3<L>      24 Aug 2022 09:55  Phos  3.0     08-24  Mg     1.8     08-24          Labs personally reviewed      ASSESSMENT/PLAN: 	      Mr. Hernández is a 67 yo male with PMH of HTN, RA, GERD, Hep C, and previous back surgery who is s/p PSO L2, extension of fusion t9, T10-L2 decompression who had delayed emergence from anesthsia and ?delirium and now +orthostatic hypotension.    Problem/Plan -1  Problem: Orthostatic Hypotension  - Patient reports lightheadedness and dizziness upon trying to stand for the first time post-operatively  - Had delayed emergence from GA  - B/L LE negative for DVT  - CXR shows clear lungs  - ARB, HCTZ and CCB currently on hold  - BB reduced (now Metoprolol 25 mg PO BID)  - c/w compression stocking when OOB  - Ortho VS BID    Problem/Plan -2  Problem: HTN  - + orthostatic as noted above  - ARB, HCTZ and CCB currently on hold  - c/w Metoprolol 25mg PO BID with hold parameters  - BP well controlled on BB    Problem/Plan -3  Problem: HLD  - c/w statin    Problem/Plan -4  Problem: DVT PPX  - DVT neg on US  - c/w Lovenox SQ        Leonarda Mendoza, AG-NP   David Westfall DO [facility]  Cardiovascular Medicine  [address] [facility], [address]  Office: [phone]  Cell: [phone]

## 2022-08-24 NOTE — PROGRESS NOTE ADULT - ASSESSMENT
67 yo M with h/o HTN, RA, GERD, Hepatitis C, s/p prior lumbar decompression & fusion in 2015 who presents with worsening back pain x 2 months associated with unsteadiness when getting up ("crashes into walls") and numbness in his feet who presented to [facility] for scheduled spine surgery. Now s/p Posterior L2 PSO, extension of fusion to T9, T10-L2 Decompression, w/ Plastics Closure 8/18/22 with EBL 750cc. Post-op complicated by hypotension requiring pressors in NSCU. Transferred out of NSCU 8/20 with course further complicated by elevated CPK, leukocytosis, electrolyte derangements, and symptomatic orthostatic hypotension. Medicine consulted for co-management.    PCP: Dr. Saman Yang

## 2022-08-24 NOTE — PROGRESS NOTE ADULT - ASSESSMENT
HPI:  Patient is a 66 year old male with worsening back pain for two months and unsteadiness when getting up.  MRI done which showed cord compression.  Now presented for surgery.    PROCEDURE: s/p T10-L2 decompression, L2 pedicle subtraction osteotomy, and extension of fusion to T9 with plastics closure on 8/18/2022.     PLAN:  Neuro:   -q4 hour neuro checks  -oxycodone and oxycontin for pain control  -robaxin for muscle spasms  -continue hemovac and abby, monitor outputs, management as per plastics  -standing xrays prior to discharge  -out of bed with assistance  -pt/ot/pm+r - acute rehab upon discharge, accepted at [facility]    Respiratory:   -satting well on room air  -incentive spirometer for lung expansion    CV:  -keep sbp 100-140  -metoprolol for bp control  -hold hctz for orthostasis  -orthostatic vitals q12 hours  -bolus as needed  -atorvastatin for lipid control    Endocrine:   -maintain euglycemia    Heme/Onc:    -lovenox and scds for dvt prophylaxis  -8/23 transfusion of 1U PRBCs for acute post op blood loss anemia, f/u am labs    Renal:   -voiding    ID:   -tenofovir for history of hep c  -leukocytosis, likely reactive from OR, now downtrending, has been afebrile, trend cbc    GI:   -regular diet  -senna, miralax, and dulcolax for bowel regimen  -protonix for gi prophylaxis    family updated at bedside  discussed with Dr Dalila Hines #[id number]

## 2022-08-25 LAB
ANION GAP SERPL CALC-SCNC: 8 MMOL/L — SIGNIFICANT CHANGE UP (ref 5–17)
BASOPHILS # BLD AUTO: 0 K/UL — SIGNIFICANT CHANGE UP (ref 0–0.2)
BASOPHILS NFR BLD AUTO: 0 % — SIGNIFICANT CHANGE UP (ref 0–2)
BUN SERPL-MCNC: 6 MG/DL — LOW (ref 7–23)
CALCIUM SERPL-MCNC: 8.1 MG/DL — LOW (ref 8.4–10.5)
CHLORIDE SERPL-SCNC: 96 MMOL/L — SIGNIFICANT CHANGE UP (ref 96–108)
CO2 SERPL-SCNC: 30 MMOL/L — SIGNIFICANT CHANGE UP (ref 22–31)
CREAT SERPL-MCNC: 0.79 MG/DL — SIGNIFICANT CHANGE UP (ref 0.5–1.3)
EGFR: 98 ML/MIN/1.73M2 — SIGNIFICANT CHANGE UP
EOSINOPHIL # BLD AUTO: 0 K/UL — SIGNIFICANT CHANGE UP (ref 0–0.5)
EOSINOPHIL NFR BLD AUTO: 0 % — SIGNIFICANT CHANGE UP (ref 0–6)
GLUCOSE SERPL-MCNC: 89 MG/DL — SIGNIFICANT CHANGE UP (ref 70–99)
HCT VFR BLD CALC: 25.5 % — LOW (ref 39–50)
HGB BLD-MCNC: 8.6 G/DL — LOW (ref 13–17)
LYMPHOCYTES # BLD AUTO: 1.73 K/UL — SIGNIFICANT CHANGE UP (ref 1–3.3)
LYMPHOCYTES # BLD AUTO: 16.5 % — SIGNIFICANT CHANGE UP (ref 13–44)
MANUAL SMEAR VERIFICATION: SIGNIFICANT CHANGE UP
MCHC RBC-ENTMCNC: 32.8 PG — SIGNIFICANT CHANGE UP (ref 27–34)
MCHC RBC-ENTMCNC: 33.7 GM/DL — SIGNIFICANT CHANGE UP (ref 32–36)
MCV RBC AUTO: 97.3 FL — SIGNIFICANT CHANGE UP (ref 80–100)
MONOCYTES # BLD AUTO: 1.73 K/UL — HIGH (ref 0–0.9)
MONOCYTES NFR BLD AUTO: 16.5 % — HIGH (ref 2–14)
MYELOCYTES NFR BLD: 0.9 % — HIGH (ref 0–0)
NEUTROPHILS # BLD AUTO: 6.91 K/UL — SIGNIFICANT CHANGE UP (ref 1.8–7.4)
NEUTROPHILS NFR BLD AUTO: 66.1 % — SIGNIFICANT CHANGE UP (ref 43–77)
PLAT MORPH BLD: NORMAL — SIGNIFICANT CHANGE UP
PLATELET # BLD AUTO: 345 K/UL — SIGNIFICANT CHANGE UP (ref 150–400)
POTASSIUM SERPL-MCNC: 3.6 MMOL/L — SIGNIFICANT CHANGE UP (ref 3.5–5.3)
POTASSIUM SERPL-SCNC: 3.6 MMOL/L — SIGNIFICANT CHANGE UP (ref 3.5–5.3)
RBC # BLD: 2.62 M/UL — LOW (ref 4.2–5.8)
RBC # FLD: 12.6 % — SIGNIFICANT CHANGE UP (ref 10.3–14.5)
RBC BLD AUTO: SIGNIFICANT CHANGE UP
SARS-COV-2 RNA SPEC QL NAA+PROBE: SIGNIFICANT CHANGE UP
SODIUM SERPL-SCNC: 134 MMOL/L — LOW (ref 135–145)
WBC # BLD: 10.46 K/UL — SIGNIFICANT CHANGE UP (ref 3.8–10.5)
WBC # FLD AUTO: 10.46 K/UL — SIGNIFICANT CHANGE UP (ref 3.8–10.5)

## 2022-08-25 PROCEDURE — 99232 SBSQ HOSP IP/OBS MODERATE 35: CPT

## 2022-08-25 RX ORDER — CYCLOBENZAPRINE HYDROCHLORIDE 10 MG/1
5 TABLET, FILM COATED ORAL THREE TIMES A DAY
Refills: 0 | Status: DISCONTINUED | OUTPATIENT
Start: 2022-08-25 | End: 2022-08-26

## 2022-08-25 RX ORDER — METOPROLOL TARTRATE 50 MG
50 TABLET ORAL
Refills: 0 | Status: DISCONTINUED | OUTPATIENT
Start: 2022-08-25 | End: 2022-08-26

## 2022-08-25 RX ORDER — POTASSIUM CHLORIDE 20 MEQ
40 PACKET (EA) ORAL ONCE
Refills: 0 | Status: COMPLETED | OUTPATIENT
Start: 2022-08-25 | End: 2022-08-25

## 2022-08-25 RX ORDER — CALCIUM GLUCONATE 100 MG/ML
1 VIAL (ML) INTRAVENOUS ONCE
Refills: 0 | Status: COMPLETED | OUTPATIENT
Start: 2022-08-25 | End: 2022-08-25

## 2022-08-25 RX ADMIN — METHOCARBAMOL 500 MILLIGRAM(S): 500 TABLET, FILM COATED ORAL at 22:32

## 2022-08-25 RX ADMIN — Medication 5 MILLIGRAM(S): at 05:15

## 2022-08-25 RX ADMIN — METHOCARBAMOL 500 MILLIGRAM(S): 500 TABLET, FILM COATED ORAL at 13:34

## 2022-08-25 RX ADMIN — CYCLOBENZAPRINE HYDROCHLORIDE 5 MILLIGRAM(S): 10 TABLET, FILM COATED ORAL at 21:26

## 2022-08-25 RX ADMIN — OXYCODONE HYDROCHLORIDE 15 MILLIGRAM(S): 5 TABLET ORAL at 14:20

## 2022-08-25 RX ADMIN — Medication 50 MILLIGRAM(S): at 18:06

## 2022-08-25 RX ADMIN — SENNA PLUS 2 TABLET(S): 8.6 TABLET ORAL at 21:26

## 2022-08-25 RX ADMIN — ATORVASTATIN CALCIUM 20 MILLIGRAM(S): 80 TABLET, FILM COATED ORAL at 21:26

## 2022-08-25 RX ADMIN — Medication 40 MILLIEQUIVALENT(S): at 09:23

## 2022-08-25 RX ADMIN — OXYCODONE HYDROCHLORIDE 15 MILLIGRAM(S): 5 TABLET ORAL at 10:00

## 2022-08-25 RX ADMIN — OXYCODONE HYDROCHLORIDE 15 MILLIGRAM(S): 5 TABLET ORAL at 05:16

## 2022-08-25 RX ADMIN — OXYCODONE HYDROCHLORIDE 15 MILLIGRAM(S): 5 TABLET ORAL at 05:14

## 2022-08-25 RX ADMIN — OXYCODONE HYDROCHLORIDE 15 MILLIGRAM(S): 5 TABLET ORAL at 01:46

## 2022-08-25 RX ADMIN — OXYCODONE HYDROCHLORIDE 15 MILLIGRAM(S): 5 TABLET ORAL at 05:46

## 2022-08-25 RX ADMIN — METHOCARBAMOL 500 MILLIGRAM(S): 500 TABLET, FILM COATED ORAL at 05:15

## 2022-08-25 RX ADMIN — Medication 25 MILLIGRAM(S): at 05:15

## 2022-08-25 RX ADMIN — PANTOPRAZOLE SODIUM 40 MILLIGRAM(S): 20 TABLET, DELAYED RELEASE ORAL at 05:14

## 2022-08-25 RX ADMIN — TENOFOVIR DISOPROXIL FUMARATE 300 MILLIGRAM(S): 300 TABLET, FILM COATED ORAL at 12:35

## 2022-08-25 RX ADMIN — OXYCODONE HYDROCHLORIDE 15 MILLIGRAM(S): 5 TABLET ORAL at 18:06

## 2022-08-25 RX ADMIN — Medication 1 APPLICATION(S): at 18:07

## 2022-08-25 RX ADMIN — OXYCODONE HYDROCHLORIDE 15 MILLIGRAM(S): 5 TABLET ORAL at 22:32

## 2022-08-25 RX ADMIN — OXYCODONE HYDROCHLORIDE 15 MILLIGRAM(S): 5 TABLET ORAL at 13:34

## 2022-08-25 RX ADMIN — OXYCODONE HYDROCHLORIDE 15 MILLIGRAM(S): 5 TABLET ORAL at 18:50

## 2022-08-25 RX ADMIN — OXYCODONE HYDROCHLORIDE 15 MILLIGRAM(S): 5 TABLET ORAL at 01:16

## 2022-08-25 RX ADMIN — Medication 1 APPLICATION(S): at 05:20

## 2022-08-25 RX ADMIN — Medication 100 GRAM(S): at 09:24

## 2022-08-25 RX ADMIN — CHLORHEXIDINE GLUCONATE 1 APPLICATION(S): 213 SOLUTION TOPICAL at 09:25

## 2022-08-25 RX ADMIN — OXYCODONE HYDROCHLORIDE 15 MILLIGRAM(S): 5 TABLET ORAL at 09:23

## 2022-08-25 RX ADMIN — ENOXAPARIN SODIUM 40 MILLIGRAM(S): 100 INJECTION SUBCUTANEOUS at 21:26

## 2022-08-25 NOTE — PROGRESS NOTE ADULT - ASSESSMENT
67 yo M with h/o HTN, RA, GERD, Hepatitis C, s/p prior lumbar decompression & fusion in 2015 who presents with worsening back pain x 2 months associated with unsteadiness when getting up ("crashes into walls") and numbness in his feet who presented to [facility] for scheduled spine surgery. Now s/p Posterior L2 PSO, extension of fusion to T9, T10-L2 Decompression, w/ Plastics Closure 8/18/22 with EBL 750cc. Post-op complicated by hypotension requiring pressors in NSCU. Transferred out of NSCU 8/20 with course further complicated by elevated CPK, leukocytosis, electrolyte derangements, and symptomatic orthostatic hypotension. Medicine consulted for co-management.    PCP: Dr. Saman Yang

## 2022-08-25 NOTE — PROGRESS NOTE ADULT - SUBJECTIVE AND OBJECTIVE BOX
Esperanza Thomson MD  Division of Hospital Medicine  [facility]  Spectra: [phone]      Patient is a 66y old  Male who presents with a chief complaint of spine surgery (21 Aug 2022 12:00)      SUBJECTIVE / OVERNIGHT EVENTS: no acute events overnight. already out of bed and in the chair at time of my exam. no lightheadedness nor dizziness when got out of bed to chair. feels overall well. has trouble finding comfortable positioning given back pain but states current regimen is adequately controlling his pain  ADDITIONAL REVIEW OF SYSTEMS:    MEDICATIONS  (STANDING):  atorvastatin 20 milliGRAM(s) Oral at bedtime  BACItracin   Ointment 1 Application(s) Topical two times a day  bisacodyl 5 milliGRAM(s) Oral every 12 hours  chlorhexidine 4% Liquid 1 Application(s) Topical <User Schedule>  chlorhexidine 4% Liquid 1 Application(s) Topical <User Schedule>  enoxaparin Injectable 40 milliGRAM(s) SubCutaneous every 24 hours  methocarbamol 500 milliGRAM(s) Oral every 8 hours  metoprolol tartrate 50 milliGRAM(s) Oral two times a day  oxyCODONE  ER Tablet 15 milliGRAM(s) Oral every 12 hours  pantoprazole    Tablet 40 milliGRAM(s) Oral before breakfast  polyethylene glycol 3350 17 Gram(s) Oral every 12 hours  predniSONE   Tablet 5 milliGRAM(s) Oral daily  senna 2 Tablet(s) Oral at bedtime  tenofovir disoproxil fumarate (VIREAD) 300 milliGRAM(s) Oral daily    MEDICATIONS  (PRN):  acetaminophen     Tablet .. 650 milliGRAM(s) Oral every 6 hours PRN Mild Pain (1 - 3)  magnesium hydroxide Suspension 30 milliLiter(s) Oral every 12 hours PRN Constipation  ondansetron Injectable 4 milliGRAM(s) IV Push every 6 hours PRN Nausea and/or Vomiting  oxyCODONE    IR 10 milliGRAM(s) Oral every 4 hours PRN Moderate Pain (4 - 6)  oxyCODONE    IR 15 milliGRAM(s) Oral every 4 hours PRN Severe Pain (7 - 10)  simethicone 80 milliGRAM(s) Chew every 12 hours PRN Heartburn  sodium chloride 0.9% lock flush 10 milliLiter(s) IV Push every 1 hour PRN Pre/post blood products, medications, blood draw, and to maintain line patency  sodium chloride 0.9% lock flush 10 milliLiter(s) IV Push every 1 hour PRN Pre/post blood products, medications, blood draw, and to maintain line patency      CAPILLARY BLOOD GLUCOSE        I&O's Summary    24 Aug 2022 07:01  -  25 Aug 2022 07:00  --------------------------------------------------------  IN: 680 mL / OUT: 2398 mL / NET: -1718 mL        PHYSICAL EXAM:  Vital Signs Last 24 Hrs  T(C): 36.8 (25 Aug 2022 04:30), Max: 37 (24 Aug 2022 20:18)  T(F): 98.2 (25 Aug 2022 04:30), Max: 98.6 (24 Aug 2022 20:18)  HR: 100 (25 Aug 2022 04:30) (91 - 103)  BP: 145/76 (25 Aug 2022 04:30) (118/74 - 145/76)  BP(mean): --  RR: 16 (25 Aug 2022 04:30) (16 - 18)  SpO2: 93% (25 Aug 2022 04:30) (92% - 97%)    Parameters below as of 25 Aug 2022 04:30  Patient On (Oxygen Delivery Method): room air      CONSTITUTIONAL: NAD, well-developed, well-groomed  EYES: PERRLA; conjunctiva and sclera clear  ENMT: Moist oral mucosa, no pharyngeal injection or exudates; normal dentition  NECK: Supple, no palpable masses; no thyromegaly, +prior R IJ cordis site c/d/i  RESPIRATORY: Normal respiratory effort; lungs are clear to auscultation bilaterally  CARDIOVASCULAR: Regular rate and rhythm, normal S1 and S2, no murmur/rub/gallop; +trace pedal edema b/l; Peripheral pulses are 2+ bilaterally  ABDOMEN: Soft, Nondistended,  Nontender to palpation, normoactive bowel sounds  MUSCULOSKELETAL:  No clubbing or cyanosis of digits; no joint swelling or tenderness to palpation  PSYCH: A+O to person, place, and time; affect appropriate  NEUROLOGY: CN 2-12 are intact and symmetric; no gross sensory deficits   SKIN: +surgical site dressing c/d/i, +hemovac x 1 and MALLIKA x 1with serosanguinous drainage, +RUE PICC c/d/i, +anterior chest with skin tear from popped blister healing well with no evidence of infecton  LABS:                        8.6    10.46 )-----------( 345      ( 25 Aug 2022 07:36 )             25.5     08-25    134<L>  |  96  |  6<L>  ----------------------------<  89  3.6   |  30  |  0.79    Ca    8.1<L>      25 Aug 2022 07:29  Phos  3.0     08-24  Mg     1.8     08-24        CARDIAC MARKERS ( 24 Aug 2022 09:55 )  x     / x     / 691 U/L / x     / x              RADIOLOGY & ADDITIONAL TESTS:  Results Reviewed: stable H/H. mild hypokalemia - repleted  Imaging Personally Reviewed:  Electrocardiogram Personally Reviewed:    COORDINATION OF CARE:  Care Discussed with Consultants/Other Providers [Y]: neurosurgery MINH Shell  Prior or Outpatient Records Reviewed [Y/N]:

## 2022-08-25 NOTE — PROGRESS NOTE ADULT - ASSESSMENT
HPI:  Patient is a 66 year old male with worsening back pain for two months and unsteadiness when getting up.  MRI done which showed cord compression.  Now presented for surgery.    PROCEDURE: s/p T10-L2 decompression, L2 pedicle subtraction osteotomy, and extension of fusion to T9 with plastics closure on 8/18/2022  POD#7    PLAN:  Neuro:   -q4 hour neuro checks  -oxycodone and oxycontin for pain control  -robaxin for muscle spasms  -continue hemovac and abby, monitor outputs, management as per plastics  -standing xrays prior to discharge  -out of bed with assistance  -pt/ot/pm+r - acute rehab upon discharge    Respiratory:   -satting well on room air  -incentive spirometer for lung expansion    CV:  -keep sbp 100-140  -metoprolol for bp control  -hold hctz for orthostasis  -atorvastatin for lipid control  -hospitalist following    Endocrine:   -maintain euglycemia    Heme/Onc:    -lovenox and scds for dvt prophylaxis  -acute post operative blood loss anemia, s/p 1u prbc on 8/23, hgb stable    Renal:   -voiding    ID:   -tenofovir for history of hep c  -leukocytosis resolved    GI:   -regular diet  -senna, miralax, magnesium hydroxide, and dulcolax for bowel regimen  -protonix for gi prophylaxis      Spectra #[phone] Yes

## 2022-08-25 NOTE — PROGRESS NOTE ADULT - PROBLEM SELECTOR PLAN 2
symptomatic with dizziness and nausea.  became tachycardic to 130s-140s with standing so meets criteria by HR.  - s/p 500cc NS bolus 8/21 and 8/22  - resolved. orthostatics negative (though unable to get standing BP given pain)  - s/p 1u PRBC given symptomatic anemia - improved now  - compression stockings ordered - please ensure they are placed on patient daily  - out of bed to chair daily as tolerated  - anti-hypertensive regimen adjustments as below  - no need for inpatient TTE (discussed with Cards attending Dr. Westfall)

## 2022-08-25 NOTE — PROGRESS NOTE ADULT - SUBJECTIVE AND OBJECTIVE BOX
HPI:  Patient is a 66 year old male with worsening back pain for two months and unsteadiness when getting up.  MRI done which showed cord compression.  Now presented for surgery.    OVERNIGHT EVENTS:  No acute events overnight.  Incisional pain better controlled today.  Has been out of bed to chair.  Tolerating diet.    Vital Signs Last 24 Hrs  T(C): 36.8 (25 Aug 2022 04:30), Max: 37 (24 Aug 2022 20:18)  T(F): 98.2 (25 Aug 2022 04:30), Max: 98.6 (24 Aug 2022 20:18)  HR: 100 (25 Aug 2022 04:30) (91 - 103)  BP: 145/76 (25 Aug 2022 04:30) (118/74 - 145/76)  BP(mean): --  RR: 16 (25 Aug 2022 04:30) (16 - 18)  SpO2: 93% (25 Aug 2022 04:30) (92% - 97%)    Parameters below as of 25 Aug 2022 04:30  Patient On (Oxygen Delivery Method): room air        I&O's Detail    24 Aug 2022 07:01  -  25 Aug 2022 07:00  --------------------------------------------------------  IN:    Oral Fluid: 680 mL  Total IN: 680 mL    OUT:    Accordian (mL): 8 mL    Bulb (mL): 120 mL    Voided (mL): 2270 mL  Total OUT: 2398 mL    Total NET: -1718 mL        I&O's Summary    24 Aug 2022 07:01  -  25 Aug 2022 07:00  --------------------------------------------------------  IN: 680 mL / OUT: 2398 mL / NET: -1718 mL        PHYSICAL EXAM:  Neurological: awake, alert, oriented x3, follows commands, speech clear and fluent  RUE: 4+/5 delt, 4+/5 bicep/tricep, 5/5 hand   LUE: 4+/5 delt, 4+/5 bicep/tricep, 5/5 hand   RLE: 4/5 hf, 4+/5 kf/ke, 5/5 df/pf  LLE: 4/5 hf, 4+/5 kf/ke, 5/5 df/pf  sensation present, intact, equal throughout    Cardiovascular: +s1, s2  Respiratory: clear to auscultation b/l  Gastrointestinal: soft, non-distended, non-tender  Genitourinary: +voiding  Incision/Wound: posterior midline vertical incision dressing c/d/i w/ aquacel, hemovac x1, abby x1    TUBES/LINES:  [x] hemovac x1 (8cc serosanguinous over 24 hours)  [x] abby x1 (120cc serosanguinous over 24 hours)    DIET:  [x] regular      LABS:                        8.6    10.46 )-----------( 345      ( 25 Aug 2022 07:36 )             25.5     08-25    134<L>  |  96  |  6<L>  ----------------------------<  89  3.6   |  30  |  0.79    Ca    8.1<L>      25 Aug 2022 07:29  Phos  3.0     08-24  Mg     1.8     08-24          CARDIAC MARKERS ( 24 Aug 2022 09:55 )  x     / x     / 691 U/L / x     / x              Allergies    gabapentin (Other)  shellfish (Anaphylaxis)          MEDICATIONS:  Antibiotics:  tenofovir disoproxil fumarate (VIREAD) 300 milliGRAM(s) Oral daily    Neuro:  acetaminophen     Tablet .. 650 milliGRAM(s) Oral every 6 hours PRN  methocarbamol 500 milliGRAM(s) Oral every 8 hours  ondansetron Injectable 4 milliGRAM(s) IV Push every 6 hours PRN  oxyCODONE    IR 10 milliGRAM(s) Oral every 4 hours PRN  oxyCODONE    IR 15 milliGRAM(s) Oral every 4 hours PRN  oxyCODONE  ER Tablet 15 milliGRAM(s) Oral every 12 hours    Anticoagulation:  enoxaparin Injectable 40 milliGRAM(s) SubCutaneous every 24 hours    OTHER:  atorvastatin 20 milliGRAM(s) Oral at bedtime  BACItracin   Ointment 1 Application(s) Topical two times a day  bisacodyl 5 milliGRAM(s) Oral every 12 hours  chlorhexidine 4% Liquid 1 Application(s) Topical <User Schedule>  chlorhexidine 4% Liquid 1 Application(s) Topical <User Schedule>  magnesium hydroxide Suspension 30 milliLiter(s) Oral every 12 hours PRN  metoprolol tartrate 50 milliGRAM(s) Oral two times a day  pantoprazole    Tablet 40 milliGRAM(s) Oral before breakfast  polyethylene glycol 3350 17 Gram(s) Oral every 12 hours  predniSONE   Tablet 5 milliGRAM(s) Oral daily  senna 2 Tablet(s) Oral at bedtime  simethicone 80 milliGRAM(s) Chew every 12 hours PRN    IVF:  none    CULTURES:  none    RADIOLOGY & ADDITIONAL TESTS:  no new imaging

## 2022-08-26 ENCOUNTER — INPATIENT (INPATIENT)
Facility: HOSPITAL | Age: 67
LOS: 12 days | Discharge: HOME HEALTH | DRG: 949 | End: 2022-09-08
Attending: SPECIALIST | Admitting: SPECIALIST
Payer: MEDICARE

## 2022-08-26 ENCOUNTER — TRANSCRIPTION ENCOUNTER (OUTPATIENT)
Age: 67
End: 2022-08-26

## 2022-08-26 VITALS
HEART RATE: 96 BPM | DIASTOLIC BLOOD PRESSURE: 76 MMHG | OXYGEN SATURATION: 92 % | SYSTOLIC BLOOD PRESSURE: 148 MMHG | RESPIRATION RATE: 16 BRPM | HEIGHT: 74 IN | WEIGHT: 219.8 LBS | TEMPERATURE: 98 F

## 2022-08-26 VITALS
TEMPERATURE: 98 F | OXYGEN SATURATION: 94 % | SYSTOLIC BLOOD PRESSURE: 125 MMHG | RESPIRATION RATE: 18 BRPM | DIASTOLIC BLOOD PRESSURE: 76 MMHG | HEART RATE: 86 BPM

## 2022-08-26 DIAGNOSIS — Z98.1 ARTHRODESIS STATUS: Chronic | ICD-10-CM

## 2022-08-26 DIAGNOSIS — G95.20 UNSPECIFIED CORD COMPRESSION: ICD-10-CM

## 2022-08-26 DIAGNOSIS — M43.26 FUSION OF SPINE, LUMBAR REGION: Chronic | ICD-10-CM

## 2022-08-26 LAB
ANION GAP SERPL CALC-SCNC: 7 MMOL/L — SIGNIFICANT CHANGE UP (ref 5–17)
BUN SERPL-MCNC: 7 MG/DL — SIGNIFICANT CHANGE UP (ref 7–23)
CALCIUM SERPL-MCNC: 8.6 MG/DL — SIGNIFICANT CHANGE UP (ref 8.4–10.5)
CHLORIDE SERPL-SCNC: 98 MMOL/L — SIGNIFICANT CHANGE UP (ref 96–108)
CO2 SERPL-SCNC: 30 MMOL/L — SIGNIFICANT CHANGE UP (ref 22–31)
CREAT SERPL-MCNC: 0.91 MG/DL — SIGNIFICANT CHANGE UP (ref 0.5–1.3)
EGFR: 93 ML/MIN/1.73M2 — SIGNIFICANT CHANGE UP
GLUCOSE SERPL-MCNC: 90 MG/DL — SIGNIFICANT CHANGE UP (ref 70–99)
POTASSIUM SERPL-MCNC: 3.8 MMOL/L — SIGNIFICANT CHANGE UP (ref 3.5–5.3)
POTASSIUM SERPL-SCNC: 3.8 MMOL/L — SIGNIFICANT CHANGE UP (ref 3.5–5.3)
SODIUM SERPL-SCNC: 135 MMOL/L — SIGNIFICANT CHANGE UP (ref 135–145)

## 2022-08-26 PROCEDURE — 76000 FLUOROSCOPY <1 HR PHYS/QHP: CPT

## 2022-08-26 PROCEDURE — 72128 CT CHEST SPINE W/O DYE: CPT

## 2022-08-26 PROCEDURE — 83880 ASSAY OF NATRIURETIC PEPTIDE: CPT

## 2022-08-26 PROCEDURE — 82803 BLOOD GASES ANY COMBINATION: CPT

## 2022-08-26 PROCEDURE — U0003: CPT

## 2022-08-26 PROCEDURE — 36415 COLL VENOUS BLD VENIPUNCTURE: CPT

## 2022-08-26 PROCEDURE — 83735 ASSAY OF MAGNESIUM: CPT

## 2022-08-26 PROCEDURE — 82435 ASSAY OF BLOOD CHLORIDE: CPT

## 2022-08-26 PROCEDURE — 97166 OT EVAL MOD COMPLEX 45 MIN: CPT

## 2022-08-26 PROCEDURE — 82565 ASSAY OF CREATININE: CPT

## 2022-08-26 PROCEDURE — 84443 ASSAY THYROID STIM HORMONE: CPT

## 2022-08-26 PROCEDURE — 80053 COMPREHEN METABOLIC PANEL: CPT

## 2022-08-26 PROCEDURE — 84295 ASSAY OF SERUM SODIUM: CPT

## 2022-08-26 PROCEDURE — C1713: CPT

## 2022-08-26 PROCEDURE — 82550 ASSAY OF CK (CPK): CPT

## 2022-08-26 PROCEDURE — U0005: CPT

## 2022-08-26 PROCEDURE — 97110 THERAPEUTIC EXERCISES: CPT

## 2022-08-26 PROCEDURE — 99223 1ST HOSP IP/OBS HIGH 75: CPT | Mod: GC

## 2022-08-26 PROCEDURE — 99232 SBSQ HOSP IP/OBS MODERATE 35: CPT

## 2022-08-26 PROCEDURE — C1751: CPT

## 2022-08-26 PROCEDURE — 85027 COMPLETE CBC AUTOMATED: CPT

## 2022-08-26 PROCEDURE — 84100 ASSAY OF PHOSPHORUS: CPT

## 2022-08-26 PROCEDURE — 85730 THROMBOPLASTIN TIME PARTIAL: CPT

## 2022-08-26 PROCEDURE — 85025 COMPLETE CBC W/AUTO DIFF WBC: CPT

## 2022-08-26 PROCEDURE — 80048 BASIC METABOLIC PNL TOTAL CA: CPT

## 2022-08-26 PROCEDURE — 36430 TRANSFUSION BLD/BLD COMPNT: CPT

## 2022-08-26 PROCEDURE — 84484 ASSAY OF TROPONIN QUANT: CPT

## 2022-08-26 PROCEDURE — 83036 HEMOGLOBIN GLYCOSYLATED A1C: CPT

## 2022-08-26 PROCEDURE — 82330 ASSAY OF CALCIUM: CPT

## 2022-08-26 PROCEDURE — C1769: CPT

## 2022-08-26 PROCEDURE — 72131 CT LUMBAR SPINE W/O DYE: CPT

## 2022-08-26 PROCEDURE — 97116 GAIT TRAINING THERAPY: CPT

## 2022-08-26 PROCEDURE — 80061 LIPID PANEL: CPT

## 2022-08-26 PROCEDURE — 80307 DRUG TEST PRSMV CHEM ANLYZR: CPT

## 2022-08-26 PROCEDURE — 97530 THERAPEUTIC ACTIVITIES: CPT

## 2022-08-26 PROCEDURE — 93970 EXTREMITY STUDY: CPT

## 2022-08-26 PROCEDURE — 85014 HEMATOCRIT: CPT

## 2022-08-26 PROCEDURE — 70450 CT HEAD/BRAIN W/O DYE: CPT

## 2022-08-26 PROCEDURE — 84480 ASSAY TRIIODOTHYRONINE (T3): CPT

## 2022-08-26 PROCEDURE — C9803: CPT

## 2022-08-26 PROCEDURE — 85018 HEMOGLOBIN: CPT

## 2022-08-26 PROCEDURE — 71045 X-RAY EXAM CHEST 1 VIEW: CPT

## 2022-08-26 PROCEDURE — 36569 INSJ PICC 5 YR+ W/O IMAGING: CPT

## 2022-08-26 PROCEDURE — 97162 PT EVAL MOD COMPLEX 30 MIN: CPT

## 2022-08-26 PROCEDURE — 82962 GLUCOSE BLOOD TEST: CPT

## 2022-08-26 PROCEDURE — 82947 ASSAY GLUCOSE BLOOD QUANT: CPT

## 2022-08-26 PROCEDURE — 84132 ASSAY OF SERUM POTASSIUM: CPT

## 2022-08-26 PROCEDURE — 83605 ASSAY OF LACTIC ACID: CPT

## 2022-08-26 PROCEDURE — 85610 PROTHROMBIN TIME: CPT

## 2022-08-26 PROCEDURE — P9016: CPT

## 2022-08-26 PROCEDURE — 84550 ASSAY OF BLOOD/URIC ACID: CPT

## 2022-08-26 PROCEDURE — C1889: CPT

## 2022-08-26 PROCEDURE — 72100 X-RAY EXAM L-S SPINE 2/3 VWS: CPT

## 2022-08-26 PROCEDURE — 84436 ASSAY OF TOTAL THYROXINE: CPT

## 2022-08-26 PROCEDURE — 97112 NEUROMUSCULAR REEDUCATION: CPT

## 2022-08-26 PROCEDURE — 86923 COMPATIBILITY TEST ELECTRIC: CPT

## 2022-08-26 RX ORDER — OXYCODONE HYDROCHLORIDE 5 MG/1
1 TABLET ORAL
Qty: 0 | Refills: 0 | DISCHARGE
Start: 2022-08-26

## 2022-08-26 RX ORDER — METOPROLOL TARTRATE 50 MG
1 TABLET ORAL
Qty: 0 | Refills: 0 | DISCHARGE
Start: 2022-08-26

## 2022-08-26 RX ORDER — CEFUROXIME AXETIL 250 MG
1 TABLET ORAL
Qty: 0 | Refills: 0 | DISCHARGE

## 2022-08-26 RX ORDER — SARILUMAB 150 MG/1.14ML
0 INJECTION, SOLUTION SUBCUTANEOUS
Qty: 0 | Refills: 0 | DISCHARGE

## 2022-08-26 RX ORDER — METOPROLOL TARTRATE 50 MG
50 TABLET ORAL
Refills: 0 | Status: DISCONTINUED | OUTPATIENT
Start: 2022-08-26 | End: 2022-09-08

## 2022-08-26 RX ORDER — SIMETHICONE 80 MG/1
1 TABLET, CHEWABLE ORAL
Qty: 0 | Refills: 0 | DISCHARGE
Start: 2022-08-26

## 2022-08-26 RX ORDER — METHOCARBAMOL 500 MG/1
750 TABLET, FILM COATED ORAL EVERY 8 HOURS
Refills: 0 | Status: DISCONTINUED | OUTPATIENT
Start: 2022-08-26 | End: 2022-09-08

## 2022-08-26 RX ORDER — OXYCODONE HYDROCHLORIDE 5 MG/1
10 TABLET ORAL EVERY 4 HOURS
Refills: 0 | Status: DISCONTINUED | OUTPATIENT
Start: 2022-08-26 | End: 2022-08-26

## 2022-08-26 RX ORDER — OXYCODONE HYDROCHLORIDE 5 MG/1
10 TABLET ORAL EVERY 4 HOURS
Refills: 0 | Status: DISCONTINUED | OUTPATIENT
Start: 2022-08-26 | End: 2022-08-31

## 2022-08-26 RX ORDER — TENOFOVIR DISOPROXIL FUMARATE 300 MG/1
1 TABLET, FILM COATED ORAL
Qty: 0 | Refills: 0 | DISCHARGE
Start: 2022-08-26

## 2022-08-26 RX ORDER — ENOXAPARIN SODIUM 100 MG/ML
40 INJECTION SUBCUTANEOUS
Qty: 0 | Refills: 0 | DISCHARGE
Start: 2022-08-26

## 2022-08-26 RX ORDER — SENNA PLUS 8.6 MG/1
2 TABLET ORAL AT BEDTIME
Refills: 0 | Status: DISCONTINUED | OUTPATIENT
Start: 2022-08-26 | End: 2022-09-08

## 2022-08-26 RX ORDER — ACETAMINOPHEN 500 MG
2 TABLET ORAL
Qty: 0 | Refills: 0 | DISCHARGE
Start: 2022-08-26

## 2022-08-26 RX ORDER — POLYETHYLENE GLYCOL 3350 17 G/17G
17 POWDER, FOR SOLUTION ORAL
Refills: 0 | Status: DISCONTINUED | OUTPATIENT
Start: 2022-08-26 | End: 2022-09-08

## 2022-08-26 RX ORDER — OXYCODONE HYDROCHLORIDE 5 MG/1
15 TABLET ORAL EVERY 4 HOURS
Refills: 0 | Status: DISCONTINUED | OUTPATIENT
Start: 2022-08-26 | End: 2022-08-26

## 2022-08-26 RX ORDER — METHOCARBAMOL 500 MG/1
750 TABLET, FILM COATED ORAL EVERY 8 HOURS
Refills: 0 | Status: DISCONTINUED | OUTPATIENT
Start: 2022-08-26 | End: 2022-08-26

## 2022-08-26 RX ORDER — ACETAMINOPHEN 500 MG
650 TABLET ORAL EVERY 6 HOURS
Refills: 0 | Status: DISCONTINUED | OUTPATIENT
Start: 2022-08-26 | End: 2022-09-08

## 2022-08-26 RX ORDER — POTASSIUM CHLORIDE 20 MEQ
1 PACKET (EA) ORAL
Qty: 0 | Refills: 0 | DISCHARGE

## 2022-08-26 RX ORDER — TENOFOVIR DISOPROXIL FUMARATE 300 MG/1
1 TABLET, FILM COATED ORAL
Qty: 0 | Refills: 0 | DISCHARGE

## 2022-08-26 RX ORDER — ATORVASTATIN CALCIUM 80 MG/1
20 TABLET, FILM COATED ORAL AT BEDTIME
Refills: 0 | Status: DISCONTINUED | OUTPATIENT
Start: 2022-08-26 | End: 2022-09-08

## 2022-08-26 RX ORDER — POLYETHYLENE GLYCOL 3350 17 G/17G
17 POWDER, FOR SOLUTION ORAL
Qty: 0 | Refills: 0 | DISCHARGE
Start: 2022-08-26

## 2022-08-26 RX ORDER — PANTOPRAZOLE SODIUM 20 MG/1
40 TABLET, DELAYED RELEASE ORAL
Refills: 0 | Status: DISCONTINUED | OUTPATIENT
Start: 2022-08-27 | End: 2022-09-08

## 2022-08-26 RX ORDER — OXYCODONE HYDROCHLORIDE 5 MG/1
15 TABLET ORAL EVERY 12 HOURS
Refills: 0 | Status: DISCONTINUED | OUTPATIENT
Start: 2022-08-26 | End: 2022-08-26

## 2022-08-26 RX ORDER — OXYCODONE HYDROCHLORIDE 5 MG/1
15 TABLET ORAL EVERY 12 HOURS
Refills: 0 | Status: DISCONTINUED | OUTPATIENT
Start: 2022-08-26 | End: 2022-08-31

## 2022-08-26 RX ORDER — OXYCODONE HYDROCHLORIDE 5 MG/1
1 TABLET ORAL
Qty: 0 | Refills: 0 | DISCHARGE

## 2022-08-26 RX ORDER — TENOFOVIR DISOPROXIL FUMARATE 300 MG/1
300 TABLET, FILM COATED ORAL DAILY
Refills: 0 | Status: DISCONTINUED | OUTPATIENT
Start: 2022-08-27 | End: 2022-09-08

## 2022-08-26 RX ORDER — ENOXAPARIN SODIUM 100 MG/ML
40 INJECTION SUBCUTANEOUS EVERY 24 HOURS
Refills: 0 | Status: DISCONTINUED | OUTPATIENT
Start: 2022-08-26 | End: 2022-09-08

## 2022-08-26 RX ORDER — CYCLOBENZAPRINE HYDROCHLORIDE 10 MG/1
1 TABLET, FILM COATED ORAL
Qty: 0 | Refills: 0 | DISCHARGE
Start: 2022-08-26

## 2022-08-26 RX ORDER — PANTOPRAZOLE SODIUM 20 MG/1
1 TABLET, DELAYED RELEASE ORAL
Qty: 0 | Refills: 0 | DISCHARGE
Start: 2022-08-26

## 2022-08-26 RX ORDER — SENNA PLUS 8.6 MG/1
2 TABLET ORAL
Qty: 0 | Refills: 0 | DISCHARGE
Start: 2022-08-26

## 2022-08-26 RX ORDER — BACITRACIN ZINC 500 UNIT/G
1 OINTMENT IN PACKET (EA) TOPICAL
Qty: 0 | Refills: 0 | DISCHARGE
Start: 2022-08-26

## 2022-08-26 RX ORDER — CYCLOBENZAPRINE HYDROCHLORIDE 10 MG/1
5 TABLET, FILM COATED ORAL THREE TIMES A DAY
Refills: 0 | Status: DISCONTINUED | OUTPATIENT
Start: 2022-08-26 | End: 2022-08-26

## 2022-08-26 RX ORDER — VALSARTAN 80 MG/1
1 TABLET ORAL
Qty: 0 | Refills: 0 | DISCHARGE

## 2022-08-26 RX ORDER — OMEPRAZOLE 10 MG/1
1 CAPSULE, DELAYED RELEASE ORAL
Qty: 0 | Refills: 0 | DISCHARGE

## 2022-08-26 RX ORDER — METHOCARBAMOL 500 MG/1
1 TABLET, FILM COATED ORAL
Qty: 0 | Refills: 0 | DISCHARGE
Start: 2022-08-26

## 2022-08-26 RX ADMIN — OXYCODONE HYDROCHLORIDE 15 MILLIGRAM(S): 5 TABLET ORAL at 12:29

## 2022-08-26 RX ADMIN — Medication 1 APPLICATION(S): at 05:37

## 2022-08-26 RX ADMIN — Medication 5 MILLIGRAM(S): at 05:32

## 2022-08-26 RX ADMIN — OXYCODONE HYDROCHLORIDE 15 MILLIGRAM(S): 5 TABLET ORAL at 06:00

## 2022-08-26 RX ADMIN — TENOFOVIR DISOPROXIL FUMARATE 300 MILLIGRAM(S): 300 TABLET, FILM COATED ORAL at 14:25

## 2022-08-26 RX ADMIN — ENOXAPARIN SODIUM 40 MILLIGRAM(S): 100 INJECTION SUBCUTANEOUS at 19:21

## 2022-08-26 RX ADMIN — POLYETHYLENE GLYCOL 3350 17 GRAM(S): 17 POWDER, FOR SOLUTION ORAL at 19:20

## 2022-08-26 RX ADMIN — OXYCODONE HYDROCHLORIDE 15 MILLIGRAM(S): 5 TABLET ORAL at 13:10

## 2022-08-26 RX ADMIN — CYCLOBENZAPRINE HYDROCHLORIDE 5 MILLIGRAM(S): 10 TABLET, FILM COATED ORAL at 08:28

## 2022-08-26 RX ADMIN — OXYCODONE HYDROCHLORIDE 15 MILLIGRAM(S): 5 TABLET ORAL at 05:33

## 2022-08-26 RX ADMIN — PANTOPRAZOLE SODIUM 40 MILLIGRAM(S): 20 TABLET, DELAYED RELEASE ORAL at 05:34

## 2022-08-26 RX ADMIN — Medication 50 MILLIGRAM(S): at 05:37

## 2022-08-26 RX ADMIN — Medication 5 MILLIGRAM(S): at 05:34

## 2022-08-26 RX ADMIN — METHOCARBAMOL 750 MILLIGRAM(S): 500 TABLET, FILM COATED ORAL at 14:27

## 2022-08-26 RX ADMIN — METHOCARBAMOL 750 MILLIGRAM(S): 500 TABLET, FILM COATED ORAL at 21:43

## 2022-08-26 RX ADMIN — CYCLOBENZAPRINE HYDROCHLORIDE 5 MILLIGRAM(S): 10 TABLET, FILM COATED ORAL at 19:20

## 2022-08-26 RX ADMIN — OXYCODONE HYDROCHLORIDE 15 MILLIGRAM(S): 5 TABLET ORAL at 19:20

## 2022-08-26 RX ADMIN — CHLORHEXIDINE GLUCONATE 1 APPLICATION(S): 213 SOLUTION TOPICAL at 08:29

## 2022-08-26 RX ADMIN — OXYCODONE HYDROCHLORIDE 10 MILLIGRAM(S): 5 TABLET ORAL at 16:48

## 2022-08-26 RX ADMIN — OXYCODONE HYDROCHLORIDE 10 MILLIGRAM(S): 5 TABLET ORAL at 19:19

## 2022-08-26 RX ADMIN — CYCLOBENZAPRINE HYDROCHLORIDE 5 MILLIGRAM(S): 10 TABLET, FILM COATED ORAL at 12:28

## 2022-08-26 RX ADMIN — OXYCODONE HYDROCHLORIDE 15 MILLIGRAM(S): 5 TABLET ORAL at 02:38

## 2022-08-26 RX ADMIN — METHOCARBAMOL 500 MILLIGRAM(S): 500 TABLET, FILM COATED ORAL at 05:33

## 2022-08-26 RX ADMIN — ATORVASTATIN CALCIUM 20 MILLIGRAM(S): 80 TABLET, FILM COATED ORAL at 21:43

## 2022-08-26 RX ADMIN — OXYCODONE HYDROCHLORIDE 15 MILLIGRAM(S): 5 TABLET ORAL at 09:24

## 2022-08-26 RX ADMIN — Medication 5 MILLIGRAM(S): at 21:43

## 2022-08-26 RX ADMIN — OXYCODONE HYDROCHLORIDE 15 MILLIGRAM(S): 5 TABLET ORAL at 08:28

## 2022-08-26 RX ADMIN — SENNA PLUS 2 TABLET(S): 8.6 TABLET ORAL at 21:43

## 2022-08-26 RX ADMIN — Medication 50 MILLIGRAM(S): at 20:31

## 2022-08-26 NOTE — H&P ADULT - ATTENDING COMMENTS
Chart reviewed. Patient seen at bedside . Case discussed with resident .   66 year old male with history as stated in HPI, prior lumbar spine surgery ( L1/L2 level per patient) now admitted to rehab after recent revision surgery   Patient reports progressive difficulty with walking, pain , falls, and need of assistive device for ambulation  On exam aaox3,   pleasant co-operative with exam   Back incision with sutures , aquacell cell dressing + ( new applied as prior was peeling off ) , 3 healed drain sites, MALLIKA drain + with serosanginuous fluid in bulb. Some edema noted on back and patient also with pedal edema ( was on diuretics- stopped recently and also uses compression socks)   Multiple healing abrasions in LE, also with medical device induced stage 2 and stage 3 on sternum  Motor strength UE 5/5, LE anti-gravity strength , LT intact, proprioception impaired both toes.     Begin full rehab program   On lOvenox for DVT prophylaxis  Pain management as stated above in resident note  Monitor MALLIKA drain volume. If less 20 cc with d/w Primary Plastics    2. H/o HTN : Monitor on current meds as prior with post op hypotension     3. Hospitalist consult for management of medical co-morbidities

## 2022-08-26 NOTE — PROGRESS NOTE ADULT - PROBLEM SELECTOR PLAN 6
hypokalemia, hypophosphatemia, hypomagnesemia, and  hypocalcemia.  - improved s/p repletions  - monitor BMP, Mg, Ph periodically  - replete K to 4, Ph to 3, and Mg to 2
possibly 2/2 to aggressive IVF in setting of elevated CPK  - overall stable/improved  - hold HCTZ   - monitor Na on BMP closely
hypokalemia, hypophosphatemia, and hypomagnesemia.  - appropriate repletions ordered  - f/u repeat BMP, Mg, Ph in AM  - replete K to 4, Ph to 3, and Mg to 2
possibly 2/2 to aggressive IVF in setting of elevated CPK  - overall stable/improved  - hold HCTZ   - monitor Na on BMP closely
hypokalemia, hypophosphatemia, hypomagnesemia, and  hypocalcemia.  - improved s/p repletions  - monitor BMP, Mg, Ph peroidically  - replete K to 4, Ph to 3, and Mg to 2

## 2022-08-26 NOTE — DISCHARGE NOTE PROVIDER - NSDCCPCAREPLAN_GEN_ALL_CORE_FT
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    Dr Conner- neurosurgeon- please call office for appointment upon dishcarge from rehab.   Dr Pop- plastic surgeon- please call office to confirm drain removal once output <20cc/24hrs and for appointment upon dishcarge from rehab.   PCP upon discharge from rehab.      SECONDARY DISCHARGE DIAGNOSES  Diagnosis: Orthostatic hypotension  Assessment and Plan of Treatment: hold home valsartan 320mg daily, HCTZ 25mg daily  - c/w home dose metoprolol tartrate 50mg BID with hold parameters  - at rehab: would re-introduce one by one if needed at rehab. next would be lower dose valsartan (then titration back to home dose) last would be HCTZ given recent hyponatremia  Please follow up with PCP upon dishcarge from rehab.       Diagnosis: Hepatitis C  Assessment and Plan of Treatment: Please continue medications and follow up with your primary care physician upon discharge from rehab.    Diagnosis: Rheumatoid arthritis  Assessment and Plan of Treatment: Please continue medications and follow up with your primary care physician and/or rheumatologist upon discharge from rehab.    Diagnosis: Benign essential HTN  Assessment and Plan of Treatment: Continue with current medications and resume home meds as above as blood pressure tolerates.

## 2022-08-26 NOTE — DISCHARGE NOTE PROVIDER - PROVIDER TOKENS
PROVIDER:[TOKEN:[[id number]]],PROVIDER:[TOKEN:[[id number]]],PROVIDER:[TOKEN:[[id number]]]

## 2022-08-26 NOTE — PATIENT PROFILE ADULT - FALL HARM RISK - HARM RISK INTERVENTIONS
Surgery entered in [facility].  Message was sent to Elisabeth regarding referral.    Assistance with ambulation/Assistance OOB with selected safe patient handling equipment/Communicate Risk of Fall with Harm to all staff/Discuss with provider need for PT consult/Monitor gait and stability/Provide patient with walking aids - walker, cane, crutches/Reinforce activity limits and safety measures with patient and family/Tailored Fall Risk Interventions/Visual Cue: Yellow wristband and red socks/Bed in lowest position, wheels locked, appropriate side rails in place/Call bell, personal items and telephone in reach/Instruct patient to call for assistance before getting out of bed or chair/Non-slip footwear when patient is out of bed/[place] to call system/Physically safe environment - no spills, clutter or unnecessary equipment/Purposeful Proactive Rounding/Room/bathroom lighting operational, light cord in reach

## 2022-08-26 NOTE — PROGRESS NOTE ADULT - ASSESSMENT
HPI:  Patient is a 66 year old male with worsening back pain for two months and unsteadiness when getting up.  MRI done which showed cord compression.  Now presented for surgery.    PROCEDURE: s/p T10-L2 decompression, L2 pedicle subtraction osteotomy, and extension of fusion to T9 with plastics closure on 8/18/2022    PLAN:  Neuro:   -q4 hour neuro checks  -oxycodone and oxycontin for pain control  -robaxin for muscle spasms  -continue abby, monitor outputs, management as per plastics- remove once <20cc/24hrs; hemovac removed by plastics  -standing xrays prior to discharge  -out of bed with assistance  -pt/ot/pm+r - acute rehab upon discharge    Respiratory:   -satting well on room air  -incentive spirometer for lung expansion    CV:  -keep sbp 100-140  -metoprolol for bp control  -hold hctz for orthostasis- to resume if BP improves  -atorvastatin for lipid control  -hospitalist following    Endocrine:   -maintain euglycemia    Heme/Onc:    -lovenox and scds for dvt prophylaxis  -acute post operative blood loss anemia, s/p 1u prbc on 8/23, hgb stable    Renal:   -voiding    ID:   -tenofovir for history of hep c  -leukocytosis resolved    GI:   -regular diet  -senna, miralax, magnesium hydroxide, and dulcolax for bowel regimen  -protonix for gi prophylaxis    PICC removed without difficulty  d/c to rehab today  will obtain standing xrays     discussed with Dr Dalila Hines #[id number]

## 2022-08-26 NOTE — H&P ADULT - NSHPLABSRESULTS_GEN_ALL_CORE
ACC: [id number] EXAM:  CT THORACIC SPINE                        ACC: [id number] EXAM:  CT LUMBAR SPINE                          PROCEDURE DATE:  08/19/2022          INTERPRETATION:  Clinical indication: Thoracic lumbar fusion    Serial thin sections alla multi slice scanner were obtained through the   thoracic and lumbosacral spine from the T1 to the S4-S5 level in a   stacked axial fashion reformatted at 1.25 mm sections with sagittal and   coronal computer generated reconstructed views.    There is generalized osteopenia. At the edge of the examination there is   evidence of prior cervical fusion from C5 to C7.        The thoracic vertebral bodies are normal in height without evidence of   acute fracture. At there is vertebroplasty material in the T9 vertebral   body. Transpedicular screws are identified T9-T12 bilaterally with   connecting rods. There has been a laminectomy at T10-T11 and T12. There   is vertebroplasty material in the L1 vertebral body with bilateral   pedicle screws. There is a compression deformity of the L2 vertebral   body. There is an interposition graft at L1-2. Interposition grafts at   L2-3 L3-4 L4-5 and L5-S1 are identified. There has been a laminectomy   from L1 through L5. Posterior element screws in the lumbar region are   identified bilaterally with connecting rods and mature bony fusion mass.    A drain is present.    There is no lucency surrounding the screws to suggest loosening or   infection.    IMPRESSION: Posterior fusion T9-S1 with connectingrods and pedicle   screws. Vertebral plasty material T9 and L1. Interposition grafts L1 to   through L5-S1.    --- End of Report ---            LINNEA BARCLAY MD; Attending Radiologist  This document has been electronically signed. Aug 19 2022  1:38PM ACC: [id number] EXAM:  CT THORACIC SPINE                        ACC: [id number] EXAM:  CT LUMBAR SPINE                          PROCEDURE DATE:  08/19/2022          INTERPRETATION:  Clinical indication: Thoracic lumbar fusion    Serial thin sections alla multi slice scanner were obtained through the   thoracic and lumbosacral spine from the T1 to the S4-S5 level in a   stacked axial fashion reformatted at 1.25 mm sections with sagittal and   coronal computer generated reconstructed views.    There is generalized osteopenia. At the edge of the examination there is   evidence of prior cervical fusion from C5 to C7.    The thoracic vertebral bodies are normal in height without evidence of   acute fracture. At there is vertebroplasty material in the T9 vertebral   body. Transpedicular screws are identified T9-T12 bilaterally with   connecting rods. There has been a laminectomy at T10-T11 and T12. There   is vertebroplasty material in the L1 vertebral body with bilateral   pedicle screws. There is a compression deformity of the L2 vertebral   body. There is an interposition graft at L1-2. Interposition grafts at   L2-3 L3-4 L4-5 and L5-S1 are identified. There has been a laminectomy   from L1 through L5. Posterior element screws in the lumbar region are   identified bilaterally with connecting rods and mature bony fusion mass.    A drain is present.    There is no lucency surrounding the screws to suggest loosening or   infection.    IMPRESSION: Posterior fusion T9-S1 with connectingrods and pedicle   screws. Vertebral plasty material T9 and L1. Interposition grafts L1 to   through L5-S1.    --- End of Report ---            LINNEA BARCLAY MD; Attending Radiologist  This document has been electronically signed. Aug 19 2022  1:38PM

## 2022-08-26 NOTE — PROGRESS NOTE ADULT - PROVIDER SPECIALTY LIST ADULT
Cardiology
Plastic Surgery
Cardiology
NSICU
NSICU
Neurosurgery
Neurosurgery
Plastic Surgery
Plastic Surgery
NSICU
Neurosurgery
Hospitalist

## 2022-08-26 NOTE — DISCHARGE NOTE NURSING/CASE MANAGEMENT/SOCIAL WORK - PATIENT PORTAL LINK FT
You can access the FollowMyHealth Patient Portal offered by [facility] by registering at the following website: [email or web address]. By joining [employer]’s FollowMyHealth portal, you will also be able to view your health information using other applications (apps) compatible with our system.

## 2022-08-26 NOTE — PROGRESS NOTE ADULT - PROBLEM SELECTOR PLAN 3
c/b post-op hypotension.  - hold home valsartan 320mg daily, HCTZ 25mg daily  - c/w home dose metoprolol tartrate 50mg BID with hold parameters  - would discharge on current regimen. his other anti-hypertensives can be re-introduced as needed at rehab  - at rehab: would re-introduce one by one if needed at rehab. next would be lower dose valsartan (then titration back to home dose) last would be HCTZ given recent hyponatremia  - monitor vitals
in setting of prolonged surgery  - CPK peaked at 8014 on 8/19 now downtrending   - s/p aggressive IVF  - agree with holding further LR given hyponatremia and electrolyte derangements  - CPK downtrending appropriately
c/b post-op hypotension.  - hold home valsartan 320mg daily, HCTZ 25mg daily  - increased metoprolol tartrate back to home dose of 50mg BID with hold parameters on 8/25  - plan to re-introduce one by one if needed. next would be lower dose valsartan (then titration back to home dose) last would be HCTZ given recent hyponatremia  - monitor vitals
in setting of prolonged surgery  - CPK peaked at 8014 on 8/19 now downtrending   - s/p aggressive IVF  - CPK downtrending appropriately
in setting of prolonged surgery  - CPK peaked at 8014 on 8/19 now downtrending now 691  - s/p aggressive IVF  - CPK downtrending appropriately. no need to further trend.

## 2022-08-26 NOTE — DISCHARGE NOTE PROVIDER - NSDCFUSCHEDAPPT_GEN_ALL_CORE_FT
Saman Yang  [facility] Physician [facility], [facility]  CARDIOLOGY [address]   Scheduled Appointment: 09/01/2022

## 2022-08-26 NOTE — PROGRESS NOTE ADULT - PROBLEM SELECTOR PLAN 7
hypokalemia, hypophosphatemia, hypomagnesemia, and  hypocalcemia.  - improved s/p repletions  - monitor BMP, Mg, Ph periodically  - replete K to 4, Ph to 3, and Mg to 2
c/b post-op hypotension.  - hold home valsartan 320mg daily, HCTZ 25mg daily  - c/w reduced dose metoprolol tartrate 25mg BID with hold parameters  - monitor vitals
c/b post-op hypotension.  - hold home valsartan 320mg daily, HCTZ 25mg daily  - c/w reduced dose metoprolol tartrate 25mg BID with hold parameters  - monitor vitals
hypokalemia, hypophosphatemia, hypomagnesemia, and  hypocalcemia.  - improved s/p repletions  - monitor BMP, Mg, Ph periodically  - replete K to 4, Ph to 3, and Mg to 2
c/b post-op hypotension.  - hold home valsartan 320mg daily, HCTZ 25mg daily  - c/w reduced dose metoprolol tartrate 25mg BID with hold parameters  - plan to re-introduce one by one if needed. would first increase metop back to home dose 50mg BID, next would be low dose valsartan. last would be HCTZ given hyponatremia  - monitor vitals

## 2022-08-26 NOTE — PROGRESS NOTE ADULT - ASSESSMENT
a/p: This patient is now postop from spine surgery and back closure with muscle flaps.     plan:   -f/u drain output, ok to discharge with drains and have them removed in my office from my standpoint  -f/u with dr. kc after discharge, call office below to make appointment after discharge  -call or text with any questions or concerns, [phone]    Ventura Kc  [facility]  Office: [phone]  Cell: [phone]

## 2022-08-26 NOTE — PROGRESS NOTE ADULT - NSPROGADDITIONALINFOA_GEN_ALL_CORE
.  Esperanza Thomson MD  Division of Hospital Medicine  [facility]   Spectra: [phone]    DCP to Acute Rehab.    Plan discussed with patient and neurosurgery MINH Shell.
.  Esperanza Thomson MD  Division of Hospital Medicine  [facility]   Spectra: [phone]    DCP to Acute Rehab.    Plan discussed with patient, wife Allie pimentel, Cardiology attending Dr. Westfall, and neurosurgery MINH Loomis.
.  Esperanza Thomson MD  Division of Hospital Medicine  [facility]   Spectra: [phone]    Medically clear for discharge to rehab.  Medicine will sign off at this time. Please call [phone] if any new questions/concerns arise.    Plan discussed with patient, wife Allie via speakerphone, and neurosurgery MINH Loomis.
.  Esperanza Thomson MD  Division of Hospital Medicine  [facility]   Spectra: [phone]    Plan discussed with patient, wife Allie bedside, and neurosurgery MINH Loomis.
.  Esperanza Thomson MD  Division of Hospital Medicine  [facility]   Spectra: [phone]    Plan discussed with patient, wife Allie bedside, and neurosurgery MINH Shell.

## 2022-08-26 NOTE — PROGRESS NOTE ADULT - PROBLEM SELECTOR PLAN 8
receives kevzara injection [id number]. on hold given plan for OR  - c/w prednisone 5mg daily (recently tapered in anticipation of surgery)  - c/w pantoprazole while inpatient for GI ppx and known GERD - resume home omeprazole on discharge  - outpatient f/u with his rheumatologist
receives kevzara injection [id number]. on hold given plan for OR  - c/w prednisone 5mg daily (recently tapered in anticipation of surgery)  - c/w pantoprazole while inpatient for GI ppx and known GERD - resume home omeprazole on discharge  - outpatient f/u with his rheumatologist
receives kevzara injection [id number]. on hold given plan for OR  - c/w prednisone 5mg daily (recently tapered in anticipation of surgery)  - c/w pantoprazole while inpatient for GI ppx and known GERD - resume home omeprazole on discharge  - outpatient f/u with his rheumatologist
receives kevzara injection [id number]. on hold given plan for OR  - c/w prednisone 5mg daily (recently tapered in anticipation of surgery)  - c/w pantoprazole while inpatient for GI ppx and known GERD - resume home omeprazole on discharge  - outpatient f/u with his rheumatologist
receives kevzara injection [id number]. on hold given plan for OR  - c/w prednisone 5mg daily (recently tapered in anticipation of surgery)  - c/w pantoprazole while inpatient for GI ppx and known GERD - resume home omeprazole on discharge  - outpatient f/u with his rheumatologist

## 2022-08-26 NOTE — PROGRESS NOTE ADULT - SUBJECTIVE AND OBJECTIVE BOX
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     (26 Aug 2022 13:27)      INTERVAL HISTORY: Feels ok.     REVIEW OF SYSTEMS:  CONSTITUTIONAL: No weakness  EYES/ENT: No visual changes;  No throat pain   NECK: No pain or stiffness  RESPIRATORY: No cough, wheezing; No shortness of breath  CARDIOVASCULAR: No chest pain or palpitations  GASTROINTESTINAL: No abdominal  pain. No nausea, vomiting, or hematemesis  GENITOURINARY: No dysuria, frequency or hematuria  NEUROLOGICAL: No stroke like symptoms  SKIN: No rashes      	  MEDICATIONS:  metoprolol tartrate 50 milliGRAM(s) Oral two times a day        PHYSICAL EXAM:  T(C): 36.6 (08-26-22 @ 14:41), Max: 37.2 (08-26-22 @ 05:26)  HR: 86 (08-26-22 @ 14:41) (86 - 100)  BP: 125/76 (08-26-22 @ 14:41) (106/69 - 134/79)  RR: 18 (08-26-22 @ 14:41) (16 - 18)  SpO2: 94% (08-26-22 @ 14:41) (93% - 95%)  Wt(kg): --  I&O's Summary    25 Aug 2022 07:01  -  26 Aug 2022 07:00  --------------------------------------------------------  IN: 650 mL / OUT: 2160 mL / NET: -1510 mL    26 Aug 2022 07:01  -  26 Aug 2022 15:16  --------------------------------------------------------  IN: 240 mL / OUT: 770 mL / NET: -530 mL          Appearance: In no distress	  HEENT:    PERRL, EOMI	  Cardiovascular:  S1 S2, No JVD  Respiratory: Lungs clear to auscultation	  Gastrointestinal:  Soft, Non-tender, + BS	  Vascularature:  No edema of LE  Psychiatric: Appropriate affect   Neuro: no acute focal deficits                               8.6    10.46 )-----------( 345      ( 25 Aug 2022 07:36 )             25.5     08-26    135  |  98  |  7   ----------------------------<  90  3.8   |  30  |  0.91    Ca    8.6      26 Aug 2022 07:32          Labs personally reviewed      ASSESSMENT/PLAN: 	    Mr. Hernández is a 67 yo male with PMH of HTN, RA, GERD, Hep C, and previous back surgery who is s/p PSO L2, extension of fusion t9, T10-L2 decompression who had delayed emergence from anesthsia and ?delirium and now +orthostatic hypotension.    Problem/Plan -1  Problem: Orthostatic Hypotension  - Patient reports lightheadedness and dizziness upon trying to stand for the first time post-operatively  - Had delayed emergence from GA  - B/L LE negative for DVT  - CXR shows clear lungs  - ARB, HCTZ and CCB currently on hold  - BB reduced (now Metoprolol 25 mg PO BID)  - c/w compression stocking when OOB  - Ortho VS negative    Problem/Plan -2  Problem: HTN  - + orthostatic as noted above  - ARB, HCTZ and CCB currently on hold  - c/w Metoprolol 25mg PO BID with hold parameters  - BP well controlled on BB    Problem/Plan -3  Problem: HLD  - c/w statin    Problem/Plan -4  Problem: DVT PPX  - DVT neg on US  - c/w Lovenox SQ        Leonarda Mendoza, AG-NP   David Westfall DO [facility]  Cardiovascular Medicine  [address], [address]  Office: [phone]  Cell: [phone]

## 2022-08-26 NOTE — PROGRESS NOTE ADULT - SUBJECTIVE AND OBJECTIVE BOX
Esperanza Thomson MD  Division of Hospital Medicine  [facility]  Spectra: [phone]      Patient is a 66y old  Male who presents with a chief complaint of spine surgery (21 Aug 2022 12:00)      SUBJECTIVE / OVERNIGHT EVENTS: no acute events overnight. no lightheadedness, dizziness, chest pain, palpitations, nor dyspnea. still with "pressure" like pain in back with movement/exertion, but overall he feels is controlled on current regimen. no fever, chills.   ADDITIONAL REVIEW OF SYSTEMS:    MEDICATIONS  (STANDING):  atorvastatin 20 milliGRAM(s) Oral at bedtime  BACItracin   Ointment 1 Application(s) Topical two times a day  bisacodyl 5 milliGRAM(s) Oral every 12 hours  chlorhexidine 4% Liquid 1 Application(s) Topical <User Schedule>  chlorhexidine 4% Liquid 1 Application(s) Topical <User Schedule>  enoxaparin Injectable 40 milliGRAM(s) SubCutaneous every 24 hours  methocarbamol 750 milliGRAM(s) Oral every 8 hours  metoprolol tartrate 50 milliGRAM(s) Oral two times a day  oxyCODONE  ER Tablet 15 milliGRAM(s) Oral every 12 hours  pantoprazole    Tablet 40 milliGRAM(s) Oral before breakfast  polyethylene glycol 3350 17 Gram(s) Oral every 12 hours  predniSONE   Tablet 5 milliGRAM(s) Oral daily  senna 2 Tablet(s) Oral at bedtime  tenofovir disoproxil fumarate (VIREAD) 300 milliGRAM(s) Oral daily    MEDICATIONS  (PRN):  acetaminophen     Tablet .. 650 milliGRAM(s) Oral every 6 hours PRN Mild Pain (1 - 3)  cyclobenzaprine 5 milliGRAM(s) Oral three times a day PRN Muscle Spasm  magnesium hydroxide Suspension 30 milliLiter(s) Oral every 12 hours PRN Constipation  ondansetron Injectable 4 milliGRAM(s) IV Push every 6 hours PRN Nausea and/or Vomiting  oxyCODONE    IR 15 milliGRAM(s) Oral every 4 hours PRN Severe Pain (7 - 10)  oxyCODONE    IR 10 milliGRAM(s) Oral every 4 hours PRN Moderate Pain (4 - 6)  simethicone 80 milliGRAM(s) Chew every 12 hours PRN Heartburn  sodium chloride 0.9% lock flush 10 milliLiter(s) IV Push every 1 hour PRN Pre/post blood products, medications, blood draw, and to maintain line patency  sodium chloride 0.9% lock flush 10 milliLiter(s) IV Push every 1 hour PRN Pre/post blood products, medications, blood draw, and to maintain line patency      CAPILLARY BLOOD GLUCOSE        I&O's Summary    25 Aug 2022 07:01  -  26 Aug 2022 07:00  --------------------------------------------------------  IN: 650 mL / OUT: 2160 mL / NET: -1510 mL    26 Aug 2022 07:01  -  26 Aug 2022 12:17  --------------------------------------------------------  IN: 0 mL / OUT: 270 mL / NET: -270 mL        PHYSICAL EXAM:  Vital Signs Last 24 Hrs  T(C): 36.4 (26 Aug 2022 09:30), Max: 37.2 (26 Aug 2022 05:26)  T(F): 97.6 (26 Aug 2022 09:30), Max: 98.9 (26 Aug 2022 05:26)  HR: 100 (26 Aug 2022 05:26) (91 - 100)  BP: 123/80 (26 Aug 2022 05:26) (106/69 - 134/79)  BP(mean): --  RR: 16 (26 Aug 2022 05:26) (16 - 16)  SpO2: 94% (26 Aug 2022 05:26) (93% - 95%)    Parameters below as of 26 Aug 2022 05:26  Patient On (Oxygen Delivery Method): room air      CONSTITUTIONAL: NAD, well-developed, well-groomed  EYES: PERRLA; conjunctiva and sclera clear  ENMT: Moist oral mucosa, no pharyngeal injection or exudates; normal dentition  NECK: Supple, no palpable masses; no thyromegaly, +prior R IJ cordis site c/d/i  RESPIRATORY: Normal respiratory effort; lungs are clear to auscultation bilaterally  CARDIOVASCULAR: Regular rate and rhythm, normal S1 and S2, no murmur/rub/gallop; +trace pedal edema b/l; Peripheral pulses are 2+ bilaterally  ABDOMEN: Soft, Nondistended,  Nontender to palpation, normoactive bowel sounds  MUSCULOSKELETAL:  No clubbing or cyanosis of digits; no joint swelling or tenderness to palpation  PSYCH: A+O to person, place, and time; affect appropriate  NEUROLOGY: CN 2-12 are intact and symmetric; no gross sensory deficits   SKIN: +surgical site dressing c/d/i, +hemovac x 1 with serosanguinous drainage, +RUE PICC c/d/i, +anterior chest with skin tear from popped blister healing well with no evidence of infection    LABS:                        8.6    10.46 )-----------( 345      ( 25 Aug 2022 07:36 )             25.5     08-26    135  |  98  |  7   ----------------------------<  90  3.8   |  30  |  0.91    Ca    8.6      26 Aug 2022 07:32            RADIOLOGY & ADDITIONAL TESTS:  Results Reviewed:   Imaging Personally Reviewed:  Electrocardiogram Personally Reviewed:    COORDINATION OF CARE:  Care Discussed with Consultants/Other Providers [Y]: neurosurgery MINH Loomis  Prior or Outpatient Records Reviewed [Y/N]:

## 2022-08-26 NOTE — DISCHARGE NOTE PROVIDER - CARE PROVIDER_API CALL
Sanjeev Conner)  Neurosurgery  [address], [address]  [place] [address]  Phone: [phone]  Fax: [phone]  Follow Up Time:     David Westfall (DO)  Cardiovascular Disease; Internal Medicine; Nuclear Cardiology  [address], [address]  [place] [address]  Phone: [phone]  Fax: [phone]  Follow Up Time:     Ventura Pop)  Plastic Surgery Surgery  [address]  [place] [address]  Phone: [phone]  Fax: [phone]  Follow Up Time:

## 2022-08-26 NOTE — DISCHARGE NOTE PROVIDER - NSDCMRMEDTOKEN_GEN_ALL_CORE_FT
cefuroxime 500 mg oral tablet: 1  orally once a day for URI  Crestor 5 mg oral tablet: 1 tab(s) orally once a day (at bedtime)  Endocet 10/325 oral tablet: 2  orally 2 times a day, As Needed  hydrochlorothiazide 25 mg oral tablet: 1 tab(s) orally once a day  Kevzara 200 mg/1.14 mL subcutaneous solution: subcutaneous every 2 weeks - held prior to surgery x arthritis  Metoprolol Tartrate 50 mg oral tablet: 1 tab(s) orally 2 times a day  omeprazole 20 mg oral delayed release capsule: 1 cap(s) orally once a day x acid refleux  OxyCONTIN 15 mg oral tablet, extended release: 1 tab(s) orally every 12 hours  potassium chloride 20 mEq oral tablet, extended release: 1 tab(s) orally once a day  predniSONE 5 mg oral tablet: 1 tab(s) orally once a day x arthritis tapering down  tenofovir disoproxil fumarate 300 mg oral tablet: 1 tab(s) orally once a day  valsartan 320 mg oral tablet: 1 tab(s) orally once a day   acetaminophen 325 mg oral tablet: 2 tab(s) orally every 6 hours, As needed, Mild Pain (1 - 3)  bacitracin 500 units/g topical ointment: 1 application topically 2 times a day to chest abrasions  bisacodyl 5 mg oral delayed release tablet: 1 tab(s) orally every 12 hours  Crestor 5 mg oral tablet: 1 tab(s) orally once a day (at bedtime)  cyclobenzaprine 5 mg oral tablet: 1 tab(s) orally 3 times a day, As needed, Muscle Spasm  enoxaparin: 40 milligram(s) subcutaneous once a day (at bedtime)  methocarbamol 750 mg oral tablet: 1 tab(s) orally every 8 hours  metoprolol tartrate 50 mg oral tablet: 1 tab(s) orally 2 times a day  oxyCODONE 10 mg oral tablet: 1 tab(s) orally every 4 hours, As needed, Moderate Pain (4 - 6)  oxyCODONE 15 mg oral tablet: 1 tab(s) orally every 4 hours, As needed, Severe Pain (7 - 10)  oxyCODONE 15 mg oral tablet, extended release: 1 tab(s) orally every 12 hours  pantoprazole 40 mg oral delayed release tablet: 1 tab(s) orally once a day (before a meal)  polyethylene glycol 3350 oral powder for reconstitution: 17 gram(s) orally every 12 hours  predniSONE 5 mg oral tablet: 1 tab(s) orally once a day  senna leaf extract oral tablet: 2 tab(s) orally once a day (at bedtime)  simethicone 80 mg oral tablet, chewable: 1 tab(s) orally every 12 hours, As needed, Heartburn  tenofovir disoproxil fumarate 300 mg oral tablet: 1 tab(s) orally once a day

## 2022-08-26 NOTE — DISCHARGE NOTE PROVIDER - REASON FOR ADMISSION
s/p T10-L2 decompression, L2 pedicle subtraction osteotomy, and extension of fusion T9 to pelvis with plastics closure on 8/18/2022 for cord compression.     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

## 2022-08-26 NOTE — H&P ADULT - NSHPSOCIALHISTORY_GEN_ALL_CORE
FUNCTIONAL HISTORY:   Lives w family in home w 4 DAVIE and one Flight.  PTA Independent, used cane    CURRENT FUNCTIONAL STATUS:  Min A transfers, Max bed mobility, Max A gait. Quit smoking 3 months ago   Likes fishing  On disability maybe 10 years? 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     FUNCTIONAL HISTORY:   Lives w family in home w 4-5 DAVIE and one Flight. A bathroom on first floor, bedroom up 1 flight.   PTA Independent, used cane    CURRENT FUNCTIONAL STATUS:  Min A transfers, Max bed mobility, Max A gait.    Prior to surgery using walker at home, telescopic cane, wife helped with bathing (used shower chair).

## 2022-08-26 NOTE — PROGRESS NOTE ADULT - PROBLEM SELECTOR PLAN 4
likely reactive already downtrending.  afebrile with no signs/symptoms of acute infection  - monitor off abx  - if febrile, send infectious work-up
likely reactive already downtrending.  afebrile with no signs/symptoms of acute infection  - monitor off abx  - if febrile, send infectious work-up
in setting of prolonged surgery  - CPK peaked at 8014 on 8/19 now downtrending now 691  - s/p aggressive IVF  - CPK downtrending appropriately. no need to further trend.
in setting of prolonged surgery  - CPK peaked at 8014 on 8/19 now downtrending now 691  - s/p aggressive IVF  - CPK downtrending appropriately. no need to further trend.
likely reactive already downtrending.  afebrile with no signs/symptoms of acute infection  - monitor off abx  - if febrile, send infectious work-up

## 2022-08-26 NOTE — H&P ADULT - HISTORY OF PRESENT ILLNESS
65 yo RH male with PMHx of HTN, RA (on prednisone), lumbar fusion/chronic back pain to [facility] for 2 month history of increased difficulty in ambulation, veering to left and numbness on the bottom of feet. Prior imaging of L spine in 2020 showed degenerative changes L1-L2, severe high grade stenosis which is progression from imaging done in 2015. S/p MRI lumbar spine w/unspecified cord compression. Pt was scheduled for posterior approach, exploration of prior L2 pelvis fusion, T10-L2 decompression, T9 pelvis fusion as per neurosurgery recs. OR on 8/18 for Posterior L2 PSO (pedicle subtraction osteotomy), extension of fusion to T9, T10-L2 Decompression with Plastics closure. Post op course complicated by orthostatic hypotension- meds adjusted by Cardiology. Medicine following for elevated CPK/WBC - managed w IVF, elevated wbc- likely reactive. Electrolyte abnormalities corrected. US LEs neg DVT 8/20. CXR clear. Pain management. PMR consulted and acute rehab recommended. Cleared for dc on 8/26.         65 yo RH male with PMHx of HTN, RA (on prednisone), lumbar fusion/chronic back pain to [facility] for 2 month history of increased difficulty in ambulation, veering to left and numbness on the bottom of feet. Prior imaging of L spine in 2020 showed degenerative changes L1-L2, severe high grade stenosis which is progression from imaging done in 2015. S/p MRI lumbar spine w/unspecified cord compression. Pt was scheduled for posterior approach, exploration of prior L2 pelvis fusion, T10-L2 decompression, T9 pelvis fusion as per neurosurgery recs. OR on 8/18 for Posterior L2 PSO (pedicle subtraction osteotomy), extension of fusion to T9, T10-L2 Decompression with Plastics closure. S/p PRBC 8/23 for anemia. Post op course also complicated by orthostatic hypotension- meds adjusted by Cardiology. Medicine following for elevated CPK/WBC - managed w IVF, elevated wbc- likely reactive. Electrolyte abnormalities corrected. US LEs neg DVT 8/20. CXR clear. Pain management. PMR consulted and acute rehab recommended. Cleared for dc on 8/26.         65 yo RH male with PMHx of HTN, RA (on prednisone), lumbar fusion/chronic back pain with 2 month history of increased difficulty in ambulation, veering to left and numbness on the bottom of feet. Prior imaging of L spine in 2020 showed degenerative changes L1-L2, severe high grade stenosis which is progression from imaging done in 2015.  MRI lumbar spine w/unspecified cord compression.   Patient admitted 8/1/8 for  OR  for Posterior L2 PSO (pedicle subtraction osteotomy), extension of fusion to T9, T10-L2 Decompression with Plastics closure. S/p PRBC 8/23 for anemia. Post op course also complicated by orthostatic hypotension- meds adjusted by Cardiology. Medicine following for elevated CPK/WBC - managed w IVF, elevated wbc- likely reactive. Electrolyte abnormalities corrected. US LEs neg DVT 8/20. CXR clear. Pain management. PMR consulted and acute rehab recommended. Cleared for dc on 8/26.

## 2022-08-26 NOTE — PROGRESS NOTE ADULT - SUBJECTIVE AND OBJECTIVE BOX
HPI:  Patient is a 66 year old male with worsening back pain for two months and unsteadiness when getting up.  MRI done which showed cord compression.  Now presented for surgery.    OVERNIGHT EVENTS:  No acute events overnight.  Incisional pain better controlled today.  Has been out of bed to chair.  Tolerating diet.  unable to tolerate standing xrays    Vital Signs Last 24 Hrs  T(C): 36.6 (26 Aug 2022 14:41), Max: 37.2 (26 Aug 2022 05:26)  T(F): 97.8 (26 Aug 2022 14:41), Max: 98.9 (26 Aug 2022 05:26)  HR: 86 (26 Aug 2022 14:41) (86 - 100)  BP: 125/76 (26 Aug 2022 14:41) (106/69 - 125/76)  BP(mean): --  RR: 18 (26 Aug 2022 14:41) (16 - 18)  SpO2: 94% (26 Aug 2022 14:41) (93% - 95%)    Parameters below as of 26 Aug 2022 14:41  Patient On (Oxygen Delivery Method): room air    HMVC 95cc/hr  ABBY 40cc/hr    PHYSICAL EXAM:  Neurological: awake, alert, oriented x3, follows commands, speech clear and fluent  RUE: 4+/5 delt, 4+/5 bicep/tricep, 5/5 hand   LUE: 4+/5 delt, 4+/5 bicep/tricep, 5/5 hand   RLE: 4/5 hf, 4+/5 kf/ke, 5/5 df/pf  LLE: 4/5 hf, 4+/5 kf/ke, 5/5 df/pf  sensation present, intact, equal throughout  Cardiovascular: +s1, s2  Respiratory: clear to auscultation b/l  Gastrointestinal: soft, non-distended, non-tender  Genitourinary: +voiding  Incision/Wound: posterior midline vertical incision dressing c/d/i w/ aquacel, hemovac x1, abby x1    DIET:  [x] regular      LABS:                                             8.6    10.46 )-----------( 345      ( 25 Aug 2022 07:36 )             25.5   08-26    135  |  98  |  7   ----------------------------<  90  3.8   |  30  |  0.91    Ca    8.6      26 Aug 2022 07:32    Allergies  gabapentin (Other)  shellfish (Anaphylaxis)    MEDICATIONS:  Antibiotics:  tenofovir disoproxil fumarate (VIREAD) 300 milliGRAM(s) Oral daily    Neuro:  acetaminophen     Tablet .. 650 milliGRAM(s) Oral every 6 hours PRN  methocarbamol 500 milliGRAM(s) Oral every 8 hours  ondansetron Injectable 4 milliGRAM(s) IV Push every 6 hours PRN  oxyCODONE    IR 10 milliGRAM(s) Oral every 4 hours PRN  oxyCODONE    IR 15 milliGRAM(s) Oral every 4 hours PRN  oxyCODONE  ER Tablet 15 milliGRAM(s) Oral every 12 hours    Anticoagulation:  enoxaparin Injectable 40 milliGRAM(s) SubCutaneous every 24 hours    OTHER:  atorvastatin 20 milliGRAM(s) Oral at bedtime  BACItracin   Ointment 1 Application(s) Topical two times a day  bisacodyl 5 milliGRAM(s) Oral every 12 hours  chlorhexidine 4% Liquid 1 Application(s) Topical <User Schedule>  chlorhexidine 4% Liquid 1 Application(s) Topical <User Schedule>  magnesium hydroxide Suspension 30 milliLiter(s) Oral every 12 hours PRN  metoprolol tartrate 50 milliGRAM(s) Oral two times a day  pantoprazole    Tablet 40 milliGRAM(s) Oral before breakfast  polyethylene glycol 3350 17 Gram(s) Oral every 12 hours  predniSONE   Tablet 5 milliGRAM(s) Oral daily  senna 2 Tablet(s) Oral at bedtime  simethicone 80 milliGRAM(s) Chew every 12 hours PRN    IVF:  none    CULTURES:  none    RADIOLOGY & ADDITIONAL TESTS:  no new imaging

## 2022-08-26 NOTE — DISCHARGE NOTE NURSING/CASE MANAGEMENT/SOCIAL WORK - NSDCPEFALRISK_GEN_ALL_CORE
For information on Fall & Injury Prevention, visit: [email or web address] OR  [email or web address] OR  https://www.cdc.gov/steadi/patient.html

## 2022-08-26 NOTE — PROGRESS NOTE ADULT - THIS PATIENT HAS THE FOLLOWING CONDITION(S)/DIAGNOSES ON THIS ADMISSION:
None
acute post operative blood loss anemia/Acute Blood Loss Anemia
acute post operative blood loss anemia- improved post transfusion/Acute Blood Loss Anemia
acute post operative blood loss anemia/Acute Blood Loss Anemia
post-op anemia/Acute Blood Loss Anemia
acute post operative blood loss anemia/Acute Blood Loss Anemia
h/h 6.9/21.2
None
None
acute post operative blood loss anemia/Acute Blood Loss Anemia
post-op anemia/Acute Blood Loss Anemia
Encephalopathy

## 2022-08-26 NOTE — H&P ADULT - ASSESSMENT
65 yo M with h/o HTN, RA, GERD, Hepatitis C, s/p prior lumbar decompression & fusion in 2015 who presents with worsening back pain x 2 months associated with unsteadiness when getting up ("crashes into walls") and numbness in his feet who presented to [facility] for scheduled spine surgery. Now s/p Posterior L2 PSO, extension of fusion to T9, T10-L2 Decompression, w/ Plastics Closure 8/18/22 with EBL 750cc. Post-op complicated by hypotension requiring pressors in NSCU. Transferred out of NSCU 8/20 with course further complicated by elevated CPK, leukocytosis, electrolyte derangements, and symptomatic orthostatic hypotension.    Gait Instability, ADL impairments and Functional impairments: start Comprehensive Rehab Program of PT/OT     # cord compression.   - s/p Posterior L2 PSO, extension of fusion to T9, T10-L2 Decompression, w/ Plastics Closure 8/18/22 with EBL 750cc.   - c/w oxycontin ER 15mg q12h (home dose)  - c/w multimodal PRN pain regimen and bowel regimen  -start PT OT evaluations  -mobilize  -NSx follow up post rehab    #Orthostatic hypotension with dizziness and nausea.  -s/p bolus IVF in acute setting  -follow ortho BP daily, fall precautions  - hold on home anti-hypertensive regimen, hospitalist to follow up  -TEDs    # Hx Elevated CPK.   - CPK peaked at 8014 on 8/19 now downtrending   - s/p aggressive IVF    # Leukocytosis.   -deemed likely reactive  -afebrile with no signs/symptoms of acute infection  - monitor off abx, CXR neg  - if febrile, send infectious work-up.    # Hyponatremia. Hypokalemia. Hypomagnesemia  -corrected  - monitor BMP closely.    # Hypertension.   - now with post-op hypotension.  - hold home valsartan 320mg daily, HCTZ 25mg daily  - c/w reduced dose metoprolol tartrate  - monitor vitals. hospitalist to follow    #Rheumatoid arthritis.   - kevzara injection [id number]. on hold given plan for OR  - c/w prednisone 5mg daily  - c/w pantoprazole while inpatient for GI ppx and known GERD  - outpatient f/u with rheumatologist.    # Hepatitis C.   - c/w tenofovir 300mg daily.    Pain Mgmt - Tylenol Oxycodone PRN  GI/Bowel Mgmt - Senna,  Miralax PRN  /Bladder Mgmt - Voiding independently, PVRx1      Precautions / PROPHYLAXIS:   - Falls, Cardiac, Spinal  - Ortho: Weight bearing status: WBAT   - Lungs: Aspiration, Incentive Spirometer   - Pressure injury/Skin: Turn Q2hrs while in bed, OOB to Chair, PT/OT    - DVT: Lovenox    MEDICAL PROGNOSIS: GOOD            REHAB POTENTIAL: GOOD             ESTIMATED DISPOSITION: HOME WITH HOME CARE            ELOS: 10-14 Days   EXPECTED THERAPY:     P.T. 2hr/day       O.T. 1hr/day      S.L.P. na    P&O Unnecessary     EXP FREQUENCY: 5 days per 7 day period     PRESCREEN COMPARISON   I have reviewed the prescreen information and I have found no relevant changes between the preadmission screening and my post admission evaluation     RATIONALE FOR INPATIENT ADMISSION - Patient demonstrates the following: (check all that apply)  [X] Medically appropriate for rehabilitation admission  [X] Has attainable rehab goals with an appropriate initial discharge plan  [X] Has rehabilitation potential (expected to make a significant improvement within a reasonable period of time)   [X] Requires close medical management by a rehab physician, rehab nursing care, Hospitalist and comprehensive interdisciplinary team (including PT, OT, & or SLP, Prosthetics and Orthotics)     67 yo M with h/o HTN, RA, GERD, Hepatitis C, s/p prior lumbar decompression & fusion in 2015 who presents with worsening back pain x 2 months associated with unsteadiness when getting up ("crashes into walls") and numbness in his feet who presented to [facility] for scheduled spine surgery. Now s/p Posterior L2 PSO, extension of fusion to T9, T10-L2 Decompression, w/ Plastics Closure 8/18/22 with EBL 750cc. Post-op complicated by hypotension requiring pressors in NSCU. Transferred out of NSCU 8/20 with course further complicated by elevated CPK, leukocytosis, electrolyte derangements, and symptomatic orthostatic hypotension.    Gait Instability, ADL impairments and Functional impairments: start Comprehensive Rehab Program of PT/OT     # cord compression.   - s/p Posterior L2 PSO, extension of fusion to T9, T10-L2 Decompression w/Dr Conner, w/ Plastics Closure 8/18/22 with EBL 750cc.   -c/w hemovac/MALLIKA, monitor output each shift, Dr Pop  - c/w oxycontin ER 15mg q12h (home dose)  - c/w multimodal PRN pain regimen and bowel regimen  -start PT OT evaluations  -mobilize, IS  -NSx follow up post rehab    #Orthostatic hypotension with dizziness and nausea.  -s/p bolus IVF in acute setting  -follow ortho BP daily, fall precautions  - hold on home anti-hypertensive regimen, hospitalist to follow up  -TEDs    # Hx Elevated CPK.   - CPK peaked at 8014 on 8/19 now downtrending   - s/p aggressive IVF    # Leukocytosis.   -deemed likely reactive  -afebrile with no signs/symptoms of acute infection  - monitor off abx, CXR neg  - if febrile, send infectious work-up.    # Hyponatremia. Hypokalemia. Hypomagnesemia  -corrected  - monitor BMP closely.    # Hypertension.   - now with post-op hypotension.  - hold home valsartan 320mg daily, HCTZ 25mg daily  - c/w reduced dose metoprolol tartrate  - monitor vitals. hospitalist to follow    #Rheumatoid arthritis.   - kevzara injection [id number]. on hold given plan for OR  - c/w prednisone 5mg daily  - c/w pantoprazole while inpatient for GI ppx and known GERD  - outpatient f/u with rheumatologist.    # Hepatitis C.   - c/w tenofovir 300mg daily.    Pain Mgmt - Tylenol Oxycodone PRN  GI/Bowel Mgmt - Senna,  Miralax PRN  /Bladder Mgmt - Voiding independently, PVRx1      Precautions / PROPHYLAXIS:   - Falls, Cardiac, Spinal  - Ortho: Weight bearing status: WBAT   - Lungs: Aspiration, Incentive Spirometer   - Pressure injury/Skin: Turn Q2hrs while in bed, OOB to Chair, PT/OT    - DVT: Lovenox    MEDICAL PROGNOSIS: GOOD            REHAB POTENTIAL: GOOD             ESTIMATED DISPOSITION: HOME WITH HOME CARE            ELOS: 10-14 Days   EXPECTED THERAPY:     P.T. 2hr/day       O.T. 1hr/day      S.L.P. na    P&O Unnecessary     EXP FREQUENCY: 5 days per 7 day period     PRESCREEN COMPARISON   I have reviewed the prescreen information and I have found no relevant changes between the preadmission screening and my post admission evaluation     RATIONALE FOR INPATIENT ADMISSION - Patient demonstrates the following: (check all that apply)  [X] Medically appropriate for rehabilitation admission  [X] Has attainable rehab goals with an appropriate initial discharge plan  [X] Has rehabilitation potential (expected to make a significant improvement within a reasonable period of time)   [X] Requires close medical management by a rehab physician, rehab nursing care, Hospitalist and comprehensive interdisciplinary team (including PT, OT, & or SLP, Prosthetics and Orthotics)     65 yo M with h/o HTN, RA, GERD, Hepatitis C, s/p prior lumbar decompression & fusion in 2015 who presents with worsening back pain x 2 months associated with unsteadiness when getting up ("crashes into walls") and numbness in his feet who presented to [facility] for scheduled spine surgery. Now s/p Posterior L2 PSO, extension of fusion to T9, T10-L2 Decompression, w/ Plastics Closure 8/18/22 with EBL 750cc. Post-op complicated by hypotension requiring pressors in NSCU. Transferred out of NSCU 8/20 with course further complicated by elevated CPK, leukocytosis, electrolyte derangements, and symptomatic orthostatic hypotension.    Gait Instability, ADL impairments and Functional impairments: start Comprehensive Rehab Program of PT/OT     # cord compression.   - s/p Posterior L2 PSO, extension of fusion to T9, T10-L2 Decompression w/Dr Conner, w/ Plastics Closure 8/18/22 with EBL 750cc.   -c/w hemovac/MALLIKA, monitor output each shift, Dr Pop  - c/w oxycontin ER 15mg q12h (home dose)  - c/w multimodal PRN pain regimen and bowel regimen  -start PT OT evaluations  -mobilize, IS  -NSx follow up post rehab    #Orthostatic hypotension with dizziness and nausea.  -s/p bolus IVF in acute setting  -follow ortho BP daily, fall precautions  - hold on home anti-hypertensive regimen, hospitalist to follow up  -TEDs or home compression socks    # Hx Elevated CPK.   - CPK peaked at 8014 on 8/19 now downtrending   - s/p aggressive IVF    # Leukocytosis.   -deemed likely reactive  -afebrile with no signs/symptoms of acute infection  - monitor off abx, CXR neg  - if febrile, send infectious work-up.    # Hyponatremia. Hypokalemia. Hypomagnesemia  -corrected  - monitor BMP closely.    # Hypertension.   - now with post-op hypotension.  - hold home valsartan 320mg daily, HCTZ 25mg daily  - c/w reduced dose metoprolol tartrate  - monitor vitals. hospitalist to follow    #Rheumatoid arthritis.   - kevzara injection [id number]. held from OR.  Schedule for next week?  - c/w prednisone 5mg daily  - c/w pantoprazole while inpatient for GI ppx and known GERD  - outpatient f/u with rheumatologist.    # Hepatitis C.   - c/w tenofovir 300mg daily.    Pain Mgmt - Tylenol Oxycodone PRN  GI/Bowel Mgmt - Senna,  Miralax PRN  /Bladder Mgmt - Voiding independently, PVRx1      Precautions / PROPHYLAXIS:   - Falls, Cardiac, Spinal  - Ortho: Weight bearing status: WBAT   - Lungs: Aspiration, Incentive Spirometer   - Pressure injury/Skin: Turn Q2hrs while in bed, OOB to Chair, PT/OT    - DVT: Lovenox    MEDICAL PROGNOSIS: GOOD            REHAB POTENTIAL: GOOD             ESTIMATED DISPOSITION: HOME WITH HOME CARE            ELOS: 10-14 Days   EXPECTED THERAPY:     P.T. 2hr/day       O.T. 1hr/day      S.L.P. na    P&O Unnecessary     EXP FREQUENCY: 5 days per 7 day period     PRESCREEN COMPARISON   I have reviewed the prescreen information and I have found no relevant changes between the preadmission screening and my post admission evaluation     RATIONALE FOR INPATIENT ADMISSION - Patient demonstrates the following: (check all that apply)  [X] Medically appropriate for rehabilitation admission  [X] Has attainable rehab goals with an appropriate initial discharge plan  [X] Has rehabilitation potential (expected to make a significant improvement within a reasonable period of time)   [X] Requires close medical management by a rehab physician, rehab nursing care, Hospitalist and comprehensive interdisciplinary team (including PT, OT, & or SLP, Prosthetics and Orthotics)     65 yo M with h/o HTN, RA, GERD, Hepatitis C, s/p prior lumbar decompression & fusion in 2015 who presents with worsening back pain x 2 months associated with unsteadiness when getting up ("crashes into walls") and numbness in his feet who presented to [facility] for scheduled spine surgery. Now s/p Posterior L2 PSO, extension of fusion to T9, T10-L2 Decompression, w/ Plastics Closure 8/18/22 with EBL 750cc. Post-op complicated by hypotension requiring pressors, elevated CPK, leukocytosis, electrolyte derangements, and symptomatic orthostatic hypotension.    Gait Instability, ADL impairments and Functional impairments: start Comprehensive Rehab Program of PT/OT - Total of 3 hrs/day 5 days/week     # cord compression.   - s/p Posterior L2 PSO, extension of fusion to T9, T10-L2 Decompression w/Dr Conner, w/ Plastics Closure 8/18/22 with EBL 750cc.   -c/w hemovac/MALLIKA, monitor output each shift. - May be removed if output < 20 ml for 24 hrs if ok with Plastics Dr. Pop  - c/w oxycontin ER 15mg q12h (home dose)  - c/w multimodal PRN pain regimen and bowel regimen  --mobilize, IS  -Surgery follow up post rehab    #Orthostatic hypotension:  -s/p bolus IVF in acute setting  - hold on home anti-hypertensive regimen, hospitalist to follow up  -TEDs or home compression socks    # Hx Elevated CPK.   - CPK peaked at 8014 on 8/19 now downtrending   - s/p aggressive IVF    # Leukocytosis.   -deemed likely reactive  -afebrile with no signs/symptoms of acute infection  - monitor off abx, CXR neg  - if febrile, send infectious work-up.    # Hyponatremia. Hypokalemia. Hypomagnesemia  -corrected  - monitor BMP closely.    # Hypertension.   - now with post-op hypotension.  - hold home valsartan 320mg daily, HCTZ 25mg daily  - c/w reduced dose metoprolol tartrate  - monitor vitals. hospitalist to follow    #Rheumatoid arthritis.   - kevzara injection [id number]. held from OR.  Schedule for next week?  - c/w prednisone 5mg daily  - c/w pantoprazole while inpatient for GI ppx and known GERD  - outpatient f/u with rheumatologist.    # Hepatitis C.   - c/w tenofovir 300mg daily.    Pain Mgmt - Tylenol Oxycodone PRN, methocarbamol for spasms    GI/Bowel Mgmt - Senna,  Miralax PRN    /Bladder Mgmt - Voiding independently, PVRx1      Precautions / PROPHYLAXIS:   - Falls, Spinal  - Ortho: Weight bearing status: WBAT   - Lungs: Aspiration, Incentive Spirometer   - Pressure injury/Skin: Turn Q2hrs while in bed, OOB to Chair, PT/OT    - DVT: Lovenox    MEDICAL PROGNOSIS: GOOD            REHAB POTENTIAL: GOOD             ESTIMATED DISPOSITION: HOME WITH HOME CARE            ELOS: 10-14 Days   EXPECTED THERAPY:     P.T. 2hr/day       O.T. 1hr/day      S.L.P. na    P&O Unnecessary     EXP FREQUENCY: 5 days per 7 day period     PRESCREEN COMPARISON   I have reviewed the prescreen information and I have found no relevant changes between the preadmission screening and my post admission evaluation     RATIONALE FOR INPATIENT ADMISSION - Patient demonstrates the following: (check all that apply)  [X] Medically appropriate for rehabilitation admission  [X] Has attainable rehab goals with an appropriate initial discharge plan  [X] Has rehabilitation potential (expected to make a significant improvement within a reasonable period of time)   [X] Requires close medical management by a rehab physician, rehab nursing care, Hospitalist and comprehensive interdisciplinary team (including PT, OT, & or SLP, Prosthetics and Orthotics)

## 2022-08-26 NOTE — PROGRESS NOTE ADULT - ASSESSMENT
65 yo M with h/o HTN, RA, GERD, Hepatitis C, s/p prior lumbar decompression & fusion in 2015 who presents with worsening back pain x 2 months associated with unsteadiness when getting up ("crashes into walls") and numbness in his feet who presented to [facility] for scheduled spine surgery. Now s/p Posterior L2 PSO, extension of fusion to T9, T10-L2 Decompression, w/ Plastics Closure 8/18/22 with EBL 750cc. Post-op complicated by hypotension requiring pressors in NSCU. Transferred out of NSCU 8/20 with course further complicated by elevated CPK, leukocytosis, electrolyte derangements, and symptomatic orthostatic hypotension. Medicine consulted for co-management.    PCP: Dr. Saman Yang

## 2022-08-26 NOTE — PROGRESS NOTE ADULT - PROBLEM SELECTOR PLAN 1
s/p Posterior L2 PSO, extension of fusion to T9, T10-L2 Decompression, w/ Plastics Closure 8/18/22 with EBL 750cc. Post-op complicated by hypotension requiring pressors in NSCU  - post-op care as per neurosurgery team  - monitor hemovac output  - c/w oxycontin ER 15mg q12h (home dose)  - c/w multimodal PRN pain regimen as ordered - pain currently well controlled
s/p Posterior L2 PSO, extension of fusion to T9, T10-L2 Decompression, w/ Plastics Closure 8/18/22 with EBL 750cc. Post-op complicated by hypotension requiring pressors in NSCU  - post-op care as per neurosurgery team  - monitor hemovac output  - c/w oxycontin ER 15mg q12h (home dose)  - c/w multimodal PRN pain regimen as ordered - pain currently well controlled  - added bacitracin ointment for skin tear/popped blister on anterior chest
s/p Posterior L2 PSO, extension of fusion to T9, T10-L2 Decompression, w/ Plastics Closure 8/18/22 with EBL 750cc. Post-op complicated by hypotension requiring pressors in NSCU  - post-op care as per neurosurgery team  - monitor hemovac output - to be removed by plastics today prior to d/c  - c/w oxycontin ER 15mg q12h (home dose)  - c/w multimodal PRN pain regimen as ordered - pain currently well controlled  - added bacitracin ointment for skin tear/popped blister on anterior chest

## 2022-08-26 NOTE — H&P ADULT - NSHPREVIEWOFSYSTEMS_GEN_ALL_CORE
REVIEW OF SYSTEMS:  CONSTITUTIONAL: No fever, weight loss, or fatigue  EYES: No eye pain, visual disturbances, or discharge  ENMT:  No difficulty hearing, tinnitus, vertigo; No sinus or throat pain  NECK: No pain or stiffness  BREASTS: No pain, masses, or nipple discharge  RESPIRATORY: No cough, wheezing, chills or hemoptysis; No shortness of breath  CARDIOVASCULAR: No chest pain, palpitations, dizziness, or leg swelling  GASTROINTESTINAL: No abdominal or epigastric pain. No nausea, vomiting, or hematemesis; No diarrhea or constipation. No melena or hematochezia.  GENITOURINARY: No dysuria, frequency, hematuria, or incontinence  NEUROLOGICAL: No headaches, memory loss, loss of strength, numbness, or tremors  SKIN: No itching, burning, rashes, or lesions   LYMPH NODES: No enlarged glands  ENDOCRINE: No heat or cold intolerance; No hair loss  MUSCULOSKELETAL: No joint pain or swelling; No muscle, back, or extremity pain  PSYCHIATRIC: No depression, anxiety, mood swings, or difficulty sleeping  HEME/LYMPH: No easy bruising, or bleeding gums  ALLERY AND IMMUNOLOGIC: No hives or eczema REVIEW OF SYSTEMS:  CONSTITUTIONAL: No fever, weight loss, or fatigue  EYES: No eye pain, visual disturbances, or discharge (wears reading glasses)  ENMT:  No difficulty hearing, tinnitus, vertigo; No sinus or throat pain  NECK: No pain or stiffness  BREASTS: No pain, masses, or nipple discharge  RESPIRATORY: No cough, wheezing, chills or hemoptysis; No shortness of breath  CARDIOVASCULAR: No chest pain, palpitations, dizziness, or leg swelling  GASTROINTESTINAL: No abdominal or epigastric pain. No nausea, vomiting, or hematemesis; No diarrhea or constipation. No melena or hematochezia.  +BM 8/26  GENITOURINARY: No dysuria, frequency, hematuria, or incontinence  NEUROLOGICAL: No headaches, memory loss, or tremors.  +weakness LE, +numbness toes  SKIN: No itching, burning, rashes  LYMPH NODES: No enlarged glands  ENDOCRINE: No heat or cold intolerance; No hair loss  MUSCULOSKELETAL: No joint pain or swelling; +appropriate back pain   PSYCHIATRIC: No depression, anxiety, mood swings, or difficulty sleeping  HEME/LYMPH: No easy bruising, or bleeding gums  ALLERY AND IMMUNOLOGIC: No hives or eczema REVIEW OF SYSTEMS:  CONSTITUTIONAL: No fever, weight loss, or fatigue  EYES: No eye pain, visual disturbances, or discharge (wears reading glasses)  ENMT:  No difficulty hearing,   NECK: No pain or stiffness  BREASTS: No pain, masses, or nipple discharge  RESPIRATORY: No cough, ; No shortness of breath  CARDIOVASCULAR: No chest pain, palpitations, dizziness, or leg swelling  GASTROINTESTINAL: No abdominal or epigastric pain. No nausea, vomiting, or hematemesis; No diarrhea or constipation. No melena or hematochezia.  +BM 8/26  GENITOURINARY: No dysuria, frequency, hematuria, or incontinence  NEUROLOGICAL: No headaches, memory loss, or tremors.  +weakness LE, +numbness toes  SKIN: No itching, burning, rashes  ENDOCRINE: No heat or cold intolerance; No hair loss  MUSCULOSKELETAL: No joint pain or swelling; +appropriate back pain   PSYCHIATRIC: No depression, anxiety,   ALLERY AND IMMUNOLOGIC: No hives or eczema

## 2022-08-26 NOTE — DISCHARGE NOTE PROVIDER - NSDCFUADDAPPT_GEN_ALL_CORE_FT
Monitor daily MALLIKA output and may remove when <20cc/24hrs after confirming with Dr Pop- plastic surgeon. You may remove aquacel AG dressing in 5 days and leave open to air. Suture/staple removal in office follow up  with Dr Pop.     Please resume home anti hypertensives as written in care plan.

## 2022-08-26 NOTE — PROGRESS NOTE ADULT - PROBLEM SELECTOR PLAN 9
stable.  - c/w tenofovir 300mg daily

## 2022-08-26 NOTE — PROGRESS NOTE ADULT - PROBLEM SELECTOR PLAN 10
DVT ppx: lovenox  screening duplex negative for DVT on 8/20    Dispo: Acute Rehab

## 2022-08-26 NOTE — PROGRESS NOTE ADULT - SUBJECTIVE AND OBJECTIVE BOX
recent events: feels ok. waiting for drains to come out.    exam: breathing comfortably  drains s/s  dressing c/d/i  soft, no sign of bleeding on back

## 2022-08-26 NOTE — PROGRESS NOTE ADULT - PROBLEM SELECTOR PLAN 5
likely reactive already downtrending.  afebrile with no signs/symptoms of acute infection  - monitor off abx  - if febrile, send infectious work-up
likely reactive already downtrending.  afebrile with no signs/symptoms of acute infection  - monitor off abx  - if febrile, send infectious work-up
possibly 2/2 to aggressive IVF  in setting of elevated CPK  - agree with holding off further LR today  - NS bolus above  - hold HCTZ   - monitor Na on BMP closely
possibly 2/2 to aggressive IVF in setting of elevated CPK  - overall stable  - hold HCTZ   - monitor Na on BMP closely
possibly 2/2 to aggressive IVF in setting of elevated CPK  - overall stable  - hold HCTZ   - monitor Na on BMP closely

## 2022-08-26 NOTE — PROGRESS NOTE ADULT - PROBLEM SELECTOR PROBLEM 1
Unspecified cord compression

## 2022-08-26 NOTE — DISCHARGE NOTE PROVIDER - HOSPITAL COURSE
HPI:  67 yo M with h/o HTN, RA, GERD, Hepatitis C, s/p prior lumbar decompression & fusion in 2015  c/o worsening of  back pain x 2 months,  unsteady when getting up ("crashes into walls")  numbness in his feet with difficulty with ADLs . Pt had neurology evaluation- s/p MRI lumbar spine revealed unspecified cord compression. Pt scheduled for posterior approach, exploration of prior L2 pelvis fusion, T10-L2 decompression, T9to pelvis fusion on 8/18/22.   **Pt denies any double vision, vision changes, speech changes, urinary or bowel incontinence, fever, chills, or sick contacts  **Covid 19 PCR on 8/15/22 (10 Aug 2022 14:32)    Patient admitted via [facility] and underwent s/p T10-L2 decompression, L2 pedicle subtraction osteotomy, and extension of fusion T9 to pelvis with plastics closure on 8/18/2022 for cord compression. Had routine post operative care in [facility] with course complicated by hypotension requiring pressors. ransferred out of [facility] 8/20 with course further complicated by elevated CPK, leukocytosis, electrolyte derangements, and symptomatic orthostatic hypotension. Medicine consulted for co-management.   HPI:  67 yo M with h/o HTN, RA, GERD, Hepatitis C, s/p prior lumbar decompression & fusion in 2015  c/o worsening of  back pain x 2 months,  unsteady when getting up ("crashes into walls")  numbness in his feet with difficulty with ADLs . Pt had neurology evaluation- s/p MRI lumbar spine revealed unspecified cord compression. Pt scheduled for posterior approach, exploration of prior L2 pelvis fusion, T10-L2 decompression, T9to pelvis fusion on 8/18/22.   **Pt denies any double vision, vision changes, speech changes, urinary or bowel incontinence, fever, chills, or sick contacts  **Covid 19 PCR on 8/15/22 (10 Aug 2022 14:32)    Patient admitted via [facility] and underwent s/p T10-L2 decompression, L2 pedicle subtraction osteotomy, and extension of fusion T9 to pelvis with plastics closure on 8/18/2022 for cord compression. Had routine post operative care in [facility] with course complicated by hypotension requiring pressors. Subsequently transferred out of [facility] 8/20 with course further complicated by elevated CPK- improved after hydration, leukocytosis- normalized at time of discharge and remained afebrile so likely reactive, electrolyte derangements- improved after supplementation, and symptomatic orthostatic hypotension- which improved with hydration, TEDs, and holding home meds. Medicine consulted for co-management.  8/23/22 s/p 1 U PRBCs for acute post operative blood loss anemia with good results. Surgical drains (hemovacs x 2 and MALLIKA x 1) removed prior to discharge and JPx 1 remains to be removed in rehab. PT/OT?PMR- evaluated him and recommended acute rehab. PICC placed on 8/20 for access was removed prior to discharge. On the day of discharge he is medically and neurologically stable for discharge to rehab.

## 2022-08-26 NOTE — PROGRESS NOTE ADULT - PROBLEM SELECTOR PROBLEM 8
Rheumatoid arthritis

## 2022-08-27 LAB
ALBUMIN SERPL ELPH-MCNC: SIGNIFICANT CHANGE UP G/DL (ref 3.3–5)
ALP SERPL-CCNC: 50 U/L — SIGNIFICANT CHANGE UP (ref 40–120)
ALT FLD-CCNC: 34 U/L — SIGNIFICANT CHANGE UP (ref 10–45)
ANION GAP SERPL CALC-SCNC: 8 MMOL/L — SIGNIFICANT CHANGE UP (ref 5–17)
AST SERPL-CCNC: 44 U/L — HIGH (ref 10–40)
BILIRUB SERPL-MCNC: 0.3 MG/DL — SIGNIFICANT CHANGE UP (ref 0.2–1.2)
BUN SERPL-MCNC: SIGNIFICANT CHANGE UP MG/DL (ref 7–23)
CALCIUM SERPL-MCNC: SIGNIFICANT CHANGE UP MG/DL (ref 8.4–10.5)
CHLORIDE SERPL-SCNC: 98 MMOL/L — SIGNIFICANT CHANGE UP (ref 96–108)
CO2 SERPL-SCNC: 22 MMOL/L — SIGNIFICANT CHANGE UP (ref 22–31)
CREAT SERPL-MCNC: SIGNIFICANT CHANGE UP MG/DL (ref 0.5–1.3)
EOSINOPHIL NFR BLD AUTO: 3 % — SIGNIFICANT CHANGE UP (ref 0–6)
GLUCOSE SERPL-MCNC: SIGNIFICANT CHANGE UP MG/DL (ref 70–99)
HCT VFR BLD CALC: 27.8 % — LOW (ref 39–50)
HGB BLD-MCNC: 9.2 G/DL — LOW (ref 13–17)
HYPOCHROMIA BLD QL: SIGNIFICANT CHANGE UP
LYMPHOCYTES # BLD AUTO: 19 % — SIGNIFICANT CHANGE UP (ref 13–44)
MCHC RBC-ENTMCNC: 32.6 PG — SIGNIFICANT CHANGE UP (ref 27–34)
MCHC RBC-ENTMCNC: 33.1 GM/DL — SIGNIFICANT CHANGE UP (ref 32–36)
MCV RBC AUTO: 98.6 FL — SIGNIFICANT CHANGE UP (ref 80–100)
MONOCYTES NFR BLD AUTO: 14 % — SIGNIFICANT CHANGE UP (ref 2–14)
MYELOCYTES NFR BLD: 1 % — HIGH (ref 0–0)
NEUTROPHILS NFR BLD AUTO: 62 % — SIGNIFICANT CHANGE UP (ref 43–77)
NEUTS BAND # BLD: 1 % — SIGNIFICANT CHANGE UP (ref 0–8)
OSMOLALITY SERPL: 272 MOSMOL/KG — LOW (ref 280–301)
PLAT MORPH BLD: NORMAL — SIGNIFICANT CHANGE UP
PLATELET # BLD AUTO: 525 K/UL — HIGH (ref 150–400)
POLYCHROMASIA BLD QL SMEAR: SIGNIFICANT CHANGE UP
POTASSIUM SERPL-MCNC: 4.3 MMOL/L — SIGNIFICANT CHANGE UP (ref 3.5–5.3)
POTASSIUM SERPL-SCNC: 4.3 MMOL/L — SIGNIFICANT CHANGE UP (ref 3.5–5.3)
PROT SERPL-MCNC: 5.8 G/DL — LOW (ref 6–8.3)
RBC # BLD: 2.82 M/UL — LOW (ref 4.2–5.8)
RBC # FLD: 12.7 % — SIGNIFICANT CHANGE UP (ref 10.3–14.5)
RBC BLD AUTO: ABNORMAL
SODIUM SERPL-SCNC: 128 MMOL/L — LOW (ref 135–145)
WBC # BLD: 10.85 K/UL — HIGH (ref 3.8–10.5)
WBC # FLD AUTO: 10.85 K/UL — HIGH (ref 3.8–10.5)

## 2022-08-27 PROCEDURE — 99232 SBSQ HOSP IP/OBS MODERATE 35: CPT

## 2022-08-27 PROCEDURE — 99223 1ST HOSP IP/OBS HIGH 75: CPT

## 2022-08-27 RX ORDER — MUPIROCIN 20 MG/G
1 OINTMENT TOPICAL
Refills: 0 | Status: DISCONTINUED | OUTPATIENT
Start: 2022-08-27 | End: 2022-09-08

## 2022-08-27 RX ORDER — CYCLOBENZAPRINE HYDROCHLORIDE 10 MG/1
5 TABLET, FILM COATED ORAL THREE TIMES A DAY
Refills: 0 | Status: DISCONTINUED | OUTPATIENT
Start: 2022-08-27 | End: 2022-09-08

## 2022-08-27 RX ADMIN — OXYCODONE HYDROCHLORIDE 10 MILLIGRAM(S): 5 TABLET ORAL at 21:15

## 2022-08-27 RX ADMIN — POLYETHYLENE GLYCOL 3350 17 GRAM(S): 17 POWDER, FOR SOLUTION ORAL at 17:52

## 2022-08-27 RX ADMIN — METHOCARBAMOL 750 MILLIGRAM(S): 500 TABLET, FILM COATED ORAL at 13:59

## 2022-08-27 RX ADMIN — OXYCODONE HYDROCHLORIDE 15 MILLIGRAM(S): 5 TABLET ORAL at 18:44

## 2022-08-27 RX ADMIN — POLYETHYLENE GLYCOL 3350 17 GRAM(S): 17 POWDER, FOR SOLUTION ORAL at 05:25

## 2022-08-27 RX ADMIN — PANTOPRAZOLE SODIUM 40 MILLIGRAM(S): 20 TABLET, DELAYED RELEASE ORAL at 06:01

## 2022-08-27 RX ADMIN — OXYCODONE HYDROCHLORIDE 10 MILLIGRAM(S): 5 TABLET ORAL at 22:10

## 2022-08-27 RX ADMIN — OXYCODONE HYDROCHLORIDE 10 MILLIGRAM(S): 5 TABLET ORAL at 15:51

## 2022-08-27 RX ADMIN — OXYCODONE HYDROCHLORIDE 15 MILLIGRAM(S): 5 TABLET ORAL at 17:52

## 2022-08-27 RX ADMIN — OXYCODONE HYDROCHLORIDE 10 MILLIGRAM(S): 5 TABLET ORAL at 01:08

## 2022-08-27 RX ADMIN — TENOFOVIR DISOPROXIL FUMARATE 300 MILLIGRAM(S): 300 TABLET, FILM COATED ORAL at 13:59

## 2022-08-27 RX ADMIN — OXYCODONE HYDROCHLORIDE 10 MILLIGRAM(S): 5 TABLET ORAL at 09:30

## 2022-08-27 RX ADMIN — Medication 10 MILLIGRAM(S): at 09:44

## 2022-08-27 RX ADMIN — ENOXAPARIN SODIUM 40 MILLIGRAM(S): 100 INJECTION SUBCUTANEOUS at 17:53

## 2022-08-27 RX ADMIN — OXYCODONE HYDROCHLORIDE 15 MILLIGRAM(S): 5 TABLET ORAL at 05:24

## 2022-08-27 RX ADMIN — METHOCARBAMOL 750 MILLIGRAM(S): 500 TABLET, FILM COATED ORAL at 21:16

## 2022-08-27 RX ADMIN — Medication 50 MILLIGRAM(S): at 05:24

## 2022-08-27 RX ADMIN — ATORVASTATIN CALCIUM 20 MILLIGRAM(S): 80 TABLET, FILM COATED ORAL at 21:16

## 2022-08-27 RX ADMIN — Medication 50 MILLIGRAM(S): at 17:52

## 2022-08-27 RX ADMIN — METHOCARBAMOL 750 MILLIGRAM(S): 500 TABLET, FILM COATED ORAL at 05:25

## 2022-08-27 RX ADMIN — CYCLOBENZAPRINE HYDROCHLORIDE 5 MILLIGRAM(S): 10 TABLET, FILM COATED ORAL at 17:53

## 2022-08-27 RX ADMIN — OXYCODONE HYDROCHLORIDE 15 MILLIGRAM(S): 5 TABLET ORAL at 06:24

## 2022-08-27 RX ADMIN — OXYCODONE HYDROCHLORIDE 10 MILLIGRAM(S): 5 TABLET ORAL at 08:30

## 2022-08-27 RX ADMIN — OXYCODONE HYDROCHLORIDE 10 MILLIGRAM(S): 5 TABLET ORAL at 16:30

## 2022-08-27 RX ADMIN — Medication 5 MILLIGRAM(S): at 05:24

## 2022-08-27 NOTE — CONSULT NOTE ADULT - ASSESSMENT
67 yo M with h/o HTN, RA, GERD, Hepatitis C, s/p prior lumbar decompression & fusion in 2015 presented to [facility] with worsening back pain x 2 months associated with unsteadiness and numbness in his feet for scheduled spine surgery. s/p Posterior L2 PSO, extension of fusion to T9, T10-L2 Decompression, w/ Plastics Closure 8/18/22 with EBL 750cc. Post-op complicated by hypotension requiring pressors, elevated CPK, leukocytosis, electrolyte derangements, and symptomatic orthostatic hypotension. Now admitted to [place] inpatient rehab.    #Cord compression  - s/p Posterior L2 PSO, extension of fusion to T9, T10-L2 Decompression w/Dr Conner, w/ Plastics Closure 8/18/22 with EBL 750cc.   - Continue hemovac/MALLIKA, monitor output. Per [facility] discharge, may be removed if output < 20 mL for 24 hrs after checking with Plastics Dr. Pop  - Pain and bowel regimen per primary  - Follow up with Surgery after post rehab    #Orthostatic hypotension  #Hypertension  - s/p IVF and pressors for hypotension, 1 u pRBCs for symptomatic anemia in [facility]  - Was started back on home med Toprol 50 mg BID on 8/25  - BP trends now acceptable, continue to hold rest of home anti-hypertensive regimen  - If BP trends up, can reintroduce Valsartan at lower dose (was on 320 mg QD). Would avoid HCTZ due to hyponatremia at [facility]  - TEDs or home compression socks  - Encourage PO hydration  - monitor BP and symptoms    #Hx Elevated CPK  - CPK peaked at 8014 on 8/19 then downtrended to 691  - s/p aggressive IVF  - No need to recheck CPK at this time    #Leukocytosis  - deemed likely reactive  - afebrile with no signs/symptoms of acute infection, negative CXR  - monitor off antibiotics  - if febrile, send infectious work-up.    #Hyponatremia  - Previously attributed to IVF  - Ordered serum and urine osm and urine Na  - Will hold off on fluid restriction at this time due to resolving elevated CPK  - Hold HCTZ  - monitor BMP closely  - Primary team to consult with nephrology    #Rheumatoid Arthritis  - Receives Kevzara injection [id number], was held for OR. Can check with patient's rheumatologist regarding resuming  - Continue prednisone 5mg daily  - Continue pantoprazole while inpatient for GI ppx and known GERD  - outpatient f/u with rheumatologist    #Hepatitis C  - Continue tenofovir 300mg daily    #DVT PPx  - Lovenox 65 yo M with h/o HTN, RA, GERD, Hepatitis C, s/p prior lumbar decompression & fusion in 2015 presented to [facility] with worsening back pain x 2 months associated with unsteadiness and numbness in his feet for scheduled spine surgery. s/p Posterior L2 PSO, extension of fusion to T9, T10-L2 Decompression, w/ Plastics Closure 8/18/22 with EBL 750cc. Post-op complicated by hypotension requiring pressors, elevated CPK, leukocytosis, electrolyte derangements, and symptomatic orthostatic hypotension. Now admitted to [place] inpatient rehab.    #Cord compression  - s/p Posterior L2 PSO, extension of fusion to T9, T10-L2 Decompression w/Dr Conner, w/ Plastics Closure 8/18/22 with EBL 750cc.   - Continue hemovac/MALLIKA, monitor output. Per [facility] discharge, may be removed if output < 20 mL for 24 hrs after checking with Plastics Dr. Pop  - Pain and bowel regimen per primary  - Follow up with Surgery after post rehab    #Orthostatic hypotension  #Hypertension  - s/p IVF and pressors for hypotension, 1 u pRBCs for symptomatic anemia in [facility]  - Was started back on home med Toprol 50 mg BID on 8/25  - BP trends now acceptable, continue to hold rest of home anti-hypertensive regimen  - If BP trends up, can reintroduce Valsartan at lower dose (was on 320 mg QD). Would avoid HCTZ due to hyponatremia at [facility]  - TEDs or home compression socks  - Encourage PO hydration  - monitor BP and symptoms    #Hx Elevated CPK  - CPK peaked at 8014 on 8/19 then downtrended to 691  - s/p aggressive IVF  - No need to recheck CPK at this time    #Leukocytosis  - deemed likely reactive  - afebrile with no signs/symptoms of acute infection, negative CXR  - monitor off antibiotics  - if febrile, send infectious work-up.    #Hyponatremia  - Previously attributed to IVF  - Ordered serum and urine osm and urine Na  - Will hold off on fluid restriction at this time due to resolving elevated CPK  - Hold HCTZ  - monitor BMP closely    #Rheumatoid Arthritis  - Receives Kevzara injection [id number], was held for OR. Can check with patient's rheumatologist regarding resuming  - Continue prednisone 5mg daily  - Continue pantoprazole while inpatient for GI ppx and known GERD  - outpatient f/u with rheumatologist    #Hepatitis C  - Continue tenofovir 300mg daily    #DVT PPx  - Lovenox

## 2022-08-27 NOTE — PROGRESS NOTE ADULT - ASSESSMENT
67 yo M with h/o HTN, RA, GERD, Hepatitis C, s/p prior lumbar decompression & fusion in 2015 who presents with worsening back pain x 2 months associated with unsteadiness when getting up and numbness in his feet who presented to [facility] for scheduled spine surgery. Now s/p Posterior L2 PSO, extension of fusion to T9, T10-L2 Decompression, w/ Plastics Closure 8/18/22. Post-op complicated by hypotension requiring pressors, elevated CPK, leukocytosis, electrolyte derangements, and symptomatic orthostatic hypotension.    Gait Instability, ADL impairments and Functional impairments: start Comprehensive Rehab Program of PT/OT - Total of 3 hrs/day 5 days/week     # cord compression.   - s/p Posterior L2 PSO, extension of fusion to T9, T10-L2 Decompression w/Dr Conner, w/ Plastics Closure 8/18/22 .   -c/w hemovac/MALLIKA, monitor output each shift. -  - c/w oxycontin ER 15mg q12h (home dose)  - c/w multimodal PRN pain regimen and bowel regimen  --mobilize, IS  -Surgery follow up post rehab    Labs with hyponatremia: noted intermittently through hospital stay. Osmolarity- serum and urine spot sodium ordered.      Orthostatic hypotension:Monitor   --TEDs or home compression socks      Leukocytosis. : Stable WBC , likely reactive  -- monitor off abx, CXR neg     Hypertension:on low dose metoprolol    hold home valsartan 320mg daily, HCTZ 25mg daily     #Rheumatoid arthritis.   - kevzara injection [id number]. held from OR.  Schedule for next week?, - c/w prednisone 5mg daily  - c/w pantoprazole while inpatient for GI ppx and known GERD  - outpatient f/u with rheumatologist.     # Hepatitis C.   - c/w tenofovir 300mg daily.    Pain Mgmt - Tylenol Oxycodone PRN, methocarbamol for spasms    GI/Bowel Mgmt - Senna,  Miralax PRN, Daily dulocolax supp added prn     /Bladder Mgmt - Toileting prn. Using urinal     Hospitalist fu noted

## 2022-08-28 DIAGNOSIS — D72.829 ELEVATED WHITE BLOOD CELL COUNT, UNSPECIFIED: ICD-10-CM

## 2022-08-28 DIAGNOSIS — R74.8 ABNORMAL LEVELS OF OTHER SERUM ENZYMES: ICD-10-CM

## 2022-08-28 DIAGNOSIS — I10 ESSENTIAL (PRIMARY) HYPERTENSION: ICD-10-CM

## 2022-08-28 DIAGNOSIS — Z98.1 ARTHRODESIS STATUS: ICD-10-CM

## 2022-08-28 DIAGNOSIS — Z86.19 PERSONAL HISTORY OF OTHER INFECTIOUS AND PARASITIC DISEASES: ICD-10-CM

## 2022-08-28 DIAGNOSIS — K21.9 GASTRO-ESOPHAGEAL REFLUX DISEASE WITHOUT ESOPHAGITIS: ICD-10-CM

## 2022-08-28 DIAGNOSIS — M48.061 SPINAL STENOSIS, LUMBAR REGION WITHOUT NEUROGENIC CLAUDICATION: ICD-10-CM

## 2022-08-28 DIAGNOSIS — M06.9 RHEUMATOID ARTHRITIS, UNSPECIFIED: ICD-10-CM

## 2022-08-28 DIAGNOSIS — E87.1 HYPO-OSMOLALITY AND HYPONATREMIA: ICD-10-CM

## 2022-08-28 DIAGNOSIS — G95.20 UNSPECIFIED CORD COMPRESSION: ICD-10-CM

## 2022-08-28 DIAGNOSIS — I95.1 ORTHOSTATIC HYPOTENSION: ICD-10-CM

## 2022-08-28 LAB
ALBUMIN SERPL ELPH-MCNC: 1.8 G/DL — LOW (ref 3.3–5)
ALP SERPL-CCNC: 48 U/L — SIGNIFICANT CHANGE UP (ref 40–120)
ALT FLD-CCNC: 33 U/L — SIGNIFICANT CHANGE UP (ref 10–45)
ANION GAP SERPL CALC-SCNC: 11 MMOL/L — SIGNIFICANT CHANGE UP (ref 5–17)
AST SERPL-CCNC: 39 U/L — SIGNIFICANT CHANGE UP (ref 10–40)
BILIRUB SERPL-MCNC: 0.3 MG/DL — SIGNIFICANT CHANGE UP (ref 0.2–1.2)
BUN SERPL-MCNC: 6 MG/DL — LOW (ref 7–23)
CALCIUM SERPL-MCNC: 8.5 MG/DL — SIGNIFICANT CHANGE UP (ref 8.4–10.5)
CHLORIDE SERPL-SCNC: 99 MMOL/L — SIGNIFICANT CHANGE UP (ref 96–108)
CO2 SERPL-SCNC: 24 MMOL/L — SIGNIFICANT CHANGE UP (ref 22–31)
CREAT SERPL-MCNC: 0.98 MG/DL — SIGNIFICANT CHANGE UP (ref 0.5–1.3)
EGFR: 85 ML/MIN/1.73M2 — SIGNIFICANT CHANGE UP
GLUCOSE SERPL-MCNC: 73 MG/DL — SIGNIFICANT CHANGE UP (ref 70–99)
HCV AB S/CO SERPL IA: 10.81 S/CO — HIGH (ref 0–0.99)
HCV AB SERPL-IMP: REACTIVE
POTASSIUM SERPL-MCNC: 4.2 MMOL/L — SIGNIFICANT CHANGE UP (ref 3.5–5.3)
POTASSIUM SERPL-SCNC: 4.2 MMOL/L — SIGNIFICANT CHANGE UP (ref 3.5–5.3)
PROT SERPL-MCNC: 5.5 G/DL — LOW (ref 6–8.3)
SODIUM SERPL-SCNC: 134 MMOL/L — LOW (ref 135–145)
SODIUM UR-SCNC: 37 MMOL/L — SIGNIFICANT CHANGE UP

## 2022-08-28 PROCEDURE — 99232 SBSQ HOSP IP/OBS MODERATE 35: CPT

## 2022-08-28 RX ORDER — SIMETHICONE 80 MG/1
80 TABLET, CHEWABLE ORAL THREE TIMES A DAY
Refills: 0 | Status: DISCONTINUED | OUTPATIENT
Start: 2022-08-28 | End: 2022-09-08

## 2022-08-28 RX ADMIN — TENOFOVIR DISOPROXIL FUMARATE 300 MILLIGRAM(S): 300 TABLET, FILM COATED ORAL at 11:41

## 2022-08-28 RX ADMIN — METHOCARBAMOL 750 MILLIGRAM(S): 500 TABLET, FILM COATED ORAL at 21:53

## 2022-08-28 RX ADMIN — CYCLOBENZAPRINE HYDROCHLORIDE 5 MILLIGRAM(S): 10 TABLET, FILM COATED ORAL at 01:49

## 2022-08-28 RX ADMIN — Medication 5 MILLIGRAM(S): at 06:01

## 2022-08-28 RX ADMIN — METHOCARBAMOL 750 MILLIGRAM(S): 500 TABLET, FILM COATED ORAL at 06:02

## 2022-08-28 RX ADMIN — OXYCODONE HYDROCHLORIDE 10 MILLIGRAM(S): 5 TABLET ORAL at 12:30

## 2022-08-28 RX ADMIN — ATORVASTATIN CALCIUM 20 MILLIGRAM(S): 80 TABLET, FILM COATED ORAL at 21:53

## 2022-08-28 RX ADMIN — Medication 50 MILLIGRAM(S): at 17:49

## 2022-08-28 RX ADMIN — CYCLOBENZAPRINE HYDROCHLORIDE 5 MILLIGRAM(S): 10 TABLET, FILM COATED ORAL at 17:48

## 2022-08-28 RX ADMIN — Medication 650 MILLIGRAM(S): at 01:49

## 2022-08-28 RX ADMIN — Medication 50 MILLIGRAM(S): at 06:01

## 2022-08-28 RX ADMIN — OXYCODONE HYDROCHLORIDE 15 MILLIGRAM(S): 5 TABLET ORAL at 17:48

## 2022-08-28 RX ADMIN — POLYETHYLENE GLYCOL 3350 17 GRAM(S): 17 POWDER, FOR SOLUTION ORAL at 17:48

## 2022-08-28 RX ADMIN — METHOCARBAMOL 750 MILLIGRAM(S): 500 TABLET, FILM COATED ORAL at 14:24

## 2022-08-28 RX ADMIN — OXYCODONE HYDROCHLORIDE 15 MILLIGRAM(S): 5 TABLET ORAL at 18:14

## 2022-08-28 RX ADMIN — MUPIROCIN 1 APPLICATION(S): 20 OINTMENT TOPICAL at 06:01

## 2022-08-28 RX ADMIN — PANTOPRAZOLE SODIUM 40 MILLIGRAM(S): 20 TABLET, DELAYED RELEASE ORAL at 06:09

## 2022-08-28 RX ADMIN — Medication 650 MILLIGRAM(S): at 02:20

## 2022-08-28 RX ADMIN — OXYCODONE HYDROCHLORIDE 15 MILLIGRAM(S): 5 TABLET ORAL at 06:00

## 2022-08-28 RX ADMIN — OXYCODONE HYDROCHLORIDE 10 MILLIGRAM(S): 5 TABLET ORAL at 11:41

## 2022-08-28 RX ADMIN — ENOXAPARIN SODIUM 40 MILLIGRAM(S): 100 INJECTION SUBCUTANEOUS at 17:48

## 2022-08-28 RX ADMIN — SENNA PLUS 2 TABLET(S): 8.6 TABLET ORAL at 21:54

## 2022-08-28 NOTE — PROGRESS NOTE ADULT - SUBJECTIVE AND OBJECTIVE BOX
Patient is a 66y old  Male who presents with a chief complaint of non-speciifed spinal cord compression  s/p decompression      HPI:  67 yo RH male with PMHx of HTN, RA (on prednisone), lumbar fusion/chronic back pain with 2 month history of increased difficulty in ambulation, veering to left and numbness on the bottom of feet. Prior imaging of L spine in 2020 showed degenerative changes L1-L2, severe high grade stenosis which is progression from imaging done in 2015.  MRI lumbar spine w/unspecified cord compression.   Patient admitted 8/1/8 for  OR  for Posterior L2 PSO (pedicle subtraction osteotomy), extension of fusion to T9, T10-L2 Decompression with Plastics closure. S/p PRBC 8/23 for anemia. Post op course also complicated by orthostatic hypotension- meds adjusted by Cardiology. Medicine following for elevated CPK/WBC - managed w IVF, elevated wbc- likely reactive. Electrolyte abnormalities corrected. US LEs neg DVT 8/20. CXR clear. Pain management. PMR consulted and acute rehab recommended and transferred to IR[facility] 8/26        TODAY'S SUBJECTIVE & REVIEW OF SYMPTOMS  Patient seen at bedside this am   Slept better   Constipation improved with suppository   Pain fairly controlled with current regimen   Serosanginuous drain output      Denies HA/dizziness  Denies CP/palpitations  Denies abdominal pain or nausea  Denies dysuria      PHYSICAL EXAM  Vital Signs Last 24 Hrs  T(C): 37.2 (28 Aug 2022 07:33), Max: 37.4 (28 Aug 2022 05:55)  T(F): 99 (28 Aug 2022 07:33), Max: 99.4 (28 Aug 2022 05:55)  HR: 82 (28 Aug 2022 07:33) (82 - 102)  BP: 100/61 (28 Aug 2022 07:33) (100/61 - 133/83)  BP(mean): 90 (28 Aug 2022 05:55) (84 - 90)  RR: 16 (28 Aug 2022 07:33) (15 - 19)  SpO2: 95% (28 Aug 2022 07:33) (94% - 96%)    Parameters below as of 28 Aug 2022 07:33  Patient On (Oxygen Delivery Method): room air    Drain output 85ml -8/26-8/27 8/27 - 8/28 : 165     Constitutional - NAD, Comfortable  Chest - breathing comfortably  Cardiovascular - well perfused.   Abdomen - BS+, Soft, NTND  Extremities - No C/C/E, No calf tenderness   Anti gravity strength  Skin: Back incision with aquacell dressing, MALLIKA drain   Psychiatric - Mood stable, Affect WNL      MEDICATIONS  (STANDING):  atorvastatin 20 milliGRAM(s) Oral at bedtime  enoxaparin Injectable 40 milliGRAM(s) SubCutaneous every 24 hours  methocarbamol 750 milliGRAM(s) Oral every 8 hours  metoprolol tartrate 50 milliGRAM(s) Oral two times a day  mupirocin 2% Ointment 1 Application(s) Topical <User Schedule>  oxyCODONE  ER Tablet 15 milliGRAM(s) Oral every 12 hours  pantoprazole    Tablet 40 milliGRAM(s) Oral before breakfast  polyethylene glycol 3350 17 Gram(s) Oral two times a day  predniSONE   Tablet 5 milliGRAM(s) Oral daily  senna 2 Tablet(s) Oral at bedtime  tenofovir disoproxil fumarate (VIREAD) 300 milliGRAM(s) Oral daily    MEDICATIONS  (PRN):  acetaminophen     Tablet .. 650 milliGRAM(s) Oral every 6 hours PRN Moderate Pain (4 - 6)  bisacodyl Suppository 10 milliGRAM(s) Rectal daily PRN Constipation  cyclobenzaprine 5 milliGRAM(s) Oral three times a day PRN Muscle Spasm  oxyCODONE    IR 10 milliGRAM(s) Oral every 4 hours PRN Severe Pain (7 - 10)  simethicone 80 milliGRAM(s) Chew three times a day PRN Gas        RECENT LABS/IMAGING                        9.2    10.85 )-----------( 525      ( 27 Aug 2022 06:30 )             27.8     08-28    134<L>  |  99  |  6<L>  ----------------------------<  73  4.2   |  24  |  0.98    Ca    8.5      28 Aug 2022 05:50    TPro  5.5<L>  /  Alb  1.8<L>  /  TBili  0.3  /  DBili  x   /  AST  39  /  ALT  33  /  AlkPhos  48  08-28

## 2022-08-28 NOTE — PROGRESS NOTE ADULT - SUBJECTIVE AND OBJECTIVE BOX
Patient is a 66y old  Male who presents with a chief complaint of SCC s/p decompression (27 Aug 2022 18:51)    Patient seen and examined at bedside. No acute overnight events. Patient denies chest pain, shortness of breath, abdominal pain, nausea or vomiting. Declined bowel regimen today due to having more BMs than regular (s/p suppository yesterday).    ALLERGIES:  gabapentin (Other)  shellfish (Anaphylaxis)    MEDICATIONS  (STANDING):  atorvastatin 20 milliGRAM(s) Oral at bedtime  enoxaparin Injectable 40 milliGRAM(s) SubCutaneous every 24 hours  methocarbamol 750 milliGRAM(s) Oral every 8 hours  metoprolol tartrate 50 milliGRAM(s) Oral two times a day  mupirocin 2% Ointment 1 Application(s) Topical <User Schedule>  oxyCODONE  ER Tablet 15 milliGRAM(s) Oral every 12 hours  pantoprazole    Tablet 40 milliGRAM(s) Oral before breakfast  polyethylene glycol 3350 17 Gram(s) Oral two times a day  predniSONE   Tablet 5 milliGRAM(s) Oral daily  senna 2 Tablet(s) Oral at bedtime  tenofovir disoproxil fumarate (VIREAD) 300 milliGRAM(s) Oral daily    MEDICATIONS  (PRN):  acetaminophen     Tablet .. 650 milliGRAM(s) Oral every 6 hours PRN Moderate Pain (4 - 6)  bisacodyl Suppository 10 milliGRAM(s) Rectal daily PRN Constipation  cyclobenzaprine 5 milliGRAM(s) Oral three times a day PRN Muscle Spasm  oxyCODONE    IR 10 milliGRAM(s) Oral every 4 hours PRN Severe Pain (7 - 10)  simethicone 80 milliGRAM(s) Chew three times a day PRN Gas    Vital Signs Last 24 Hrs  T(F): 99 (28 Aug 2022 07:33), Max: 99.4 (28 Aug 2022 05:55)  HR: 82 (28 Aug 2022 07:33) (82 - 102)  BP: 100/61 (28 Aug 2022 07:33) (100/61 - 133/83)  RR: 16 (28 Aug 2022 07:33) (15 - 19)  SpO2: 95% (28 Aug 2022 07:33) (94% - 96%)    I&O's Summary    27 Aug 2022 07:01  -  28 Aug 2022 07:00  --------------------------------------------------------  IN: 0 mL / OUT: 165 mL / NET: -165 mL      BMI (kg/m2): 28.2 (08-26-22 @ 16:25)    PHYSICAL EXAM:  GENERAL: NAD  HEENT: Anicteric, moist mucous membranes, no pharyngeal exudates  EYES: Conjunctiva clear, no scleral icterus  CHEST/LUNG: Clear to auscultation bilaterally, breathing nonlabored, no rales, wheezes, or rhonchi  HEART: S1 S2, regular rate and rhythm  ABDOMEN: BS+, soft, nontender, nondistended  BACK: Spine incision site dressing c/d/i, drain in place  EXTREMITIES: No edema, cyanosis, or calf tenderness  NERVOUS SYSTEM: A/O x3, moves all extremities  PSYCH: Answers questions and follows commands appropriately  SKIN: As above, area of previous skin tear on anterior chest wall, right lateral knee skin tear, healing scabs at shins, inner thighs and L wrist    LABS:                        9.2    10.85 )-----------( 525      ( 27 Aug 2022 06:30 )             27.8       08-28    134  |  99  |  6   ----------------------------<  73  4.2   |  24  |  0.98    Ca    8.5      28 Aug 2022 05:50    TPro  5.5  /  Alb  1.8  /  TBili  0.3  /  DBili  x   /  AST  39  /  ALT  33  /  AlkPhos  48  08-28 08-18 Chol 124 mg/dL LDL -- HDL 45 mg/dL Trig 80 mg/dL    COVID-19 PCR: NotDetec (08-25-22 @ 07:26)  COVID-19 PCR: Elijahtec (08-15-22 @ 16:40)      RADIOLOGY & ADDITIONAL TESTS:     Care Discussed with Consultants/Other Providers: PM&R

## 2022-08-28 NOTE — PROGRESS NOTE ADULT - ASSESSMENT
65 yo M with h/o HTN, RA, GERD, Hepatitis C, s/p prior lumbar decompression & fusion in 2015 who presents with worsening back pain x 2 months associated with unsteadiness when getting up and numbness in his feet who presented to [facility] for scheduled spine surgery. Now s/p Posterior L2 PSO, extension of fusion to T9, T10-L2 Decompression, w/ Plastics Closure 8/18/22. Post-op complicated by hypotension requiring pressors, elevated CPK, leukocytosis, electrolyte derangements, and symptomatic orthostatic hypotension.    Gait Instability, ADL impairments and Functional impairments: Continue Comprehensive Rehab Program of PT/OT - Total of 3 hrs/day 5 days/week     # cord compression.   - s/p Posterior L2 PSO, extension of fusion to T9, T10-L2 Decompression w/Dr Conner, w/ Plastics Closure 8/18/22 .   -c/w hemovac/MALLIKA, monitor output each shift. -  - c/w oxycontin ER 15mg q12h (home dose) and - PRN pain regimen and bowel regimen  --mobilize, IS    Labs with hyponatremia: noted intermittently through hospital stay. Labs today with improved sodium      Orthostatic hypotension:Monitor   --TEDs or home compression socks      Leukocytosis. : Stable WBC , likely reactive  -- monitor off abx, CXR neg     Hypertension:on low dose metoprolol    hold home valsartan 320mg daily, HCTZ 25mg daily     #Rheumatoid arthritis.   - kevzara injection [id number]. held from OR.  Schedule for next week?, - c/w prednisone 5mg daily  - c/w pantoprazole while inpatient for GI ppx and known GERD  - outpatient f/u with rheumatologist.     # Hepatitis C.   - c/w tenofovir 300mg daily.    Pain Mgmt - Tylenol Oxycodone PRN, methocarbamol for spasms    GI/Bowel Mgmt - Senna,  Miralax PRN, Daily dulocolax supp added prn     /Bladder Mgmt - Toileting prn. Using urinal     Hospitalist fu noted     Labs in am     Update given to wife at bedside  Patient wants to be moved to a different room - d/w nursing    sudden onset

## 2022-08-28 NOTE — PROGRESS NOTE ADULT - ASSESSMENT
67 yo M with h/o HTN, RA, GERD, Hepatitis C, s/p prior lumbar decompression & fusion in 2015 presented to [facility] with worsening back pain x 2 months associated with unsteadiness and numbness in his feet for scheduled spine surgery. s/p Posterior L2 PSO, extension of fusion to T9, T10-L2 Decompression, w/ Plastics Closure 8/18/22 with EBL 750cc. Post-op complicated by hypotension requiring pressors, elevated CPK, leukocytosis, electrolyte derangements, and symptomatic orthostatic hypotension. Now admitted to [facility] inpatient rehab.    #Cord compression  - s/p Posterior L2 PSO, extension of fusion to T9, T10-L2 Decompression w/ Dr Conner, w/ Plastics Closure 8/18/22 with EBL 750cc.   - Continue hemovac/MALLIKA, monitor output. Per [facility] discharge, may be removed if output < 20 mL for 24 hrs after checking with Plastics Dr. Pop  - Pain and bowel regimen per primary  - Follow up with Surgery after post rehab    #Orthostatic hypotension  #Hypertension  - s/p IVF and pressors for hypotension, 1 u pRBCs for symptomatic anemia in [facility]  - Was started back on home med Toprol 50 mg BID on 8/25  - BP trends now acceptable, continue to hold rest of home anti-hypertensive regimen  - If BP trends up, can reintroduce Valsartan at lower dose (was on 320 mg QD). Would avoid HCTZ due to hyponatremia at [facility]  - TEDs or home compression socks  - Encourage PO hydration  - monitor BP and symptoms    #Hx Elevated CPK  - CPK peaked at 8014 on 8/19 then downtrended to 691  - s/p aggressive IVF  - No need to recheck CPK at this time    #Leukocytosis  - deemed likely reactive  - afebrile with no signs/symptoms of acute infection, negative CXR  - monitor off antibiotics  - if febrile, send infectious work-up.    #Hyponatremia  - Previously attributed to IVF, improved today  - Ordered serum and urine osmolality and urine Na, urine osm pending  - Will hold off on fluid restriction at this time due to resolving elevated CPK  - Hold HCTZ  - monitor BMP closely    #Rheumatoid Arthritis  - Receives Kevzara injection [id number], was held for OR. Can check with patient's rheumatologist regarding resuming  - Continue prednisone 5mg daily  - Continue pantoprazole while inpatient for GI ppx and known GERD  - outpatient f/u with rheumatologist    #Hepatitis C  - Continue tenofovir 300mg daily    #DVT PPx  - Lovenox

## 2022-08-29 ENCOUNTER — NON-APPOINTMENT (OUTPATIENT)
Age: 67
End: 2022-08-29

## 2022-08-29 LAB
ALBUMIN SERPL ELPH-MCNC: 2 G/DL — LOW (ref 3.3–5)
ALP SERPL-CCNC: 58 U/L — SIGNIFICANT CHANGE UP (ref 40–120)
ALT FLD-CCNC: 36 U/L — SIGNIFICANT CHANGE UP (ref 10–45)
ANION GAP SERPL CALC-SCNC: 7 MMOL/L — SIGNIFICANT CHANGE UP (ref 5–17)
AST SERPL-CCNC: 38 U/L — SIGNIFICANT CHANGE UP (ref 10–40)
BILIRUB SERPL-MCNC: 0.2 MG/DL — SIGNIFICANT CHANGE UP (ref 0.2–1.2)
BUN SERPL-MCNC: 7 MG/DL — SIGNIFICANT CHANGE UP (ref 7–23)
CALCIUM SERPL-MCNC: 8.5 MG/DL — SIGNIFICANT CHANGE UP (ref 8.4–10.5)
CHLORIDE SERPL-SCNC: 101 MMOL/L — SIGNIFICANT CHANGE UP (ref 96–108)
CO2 SERPL-SCNC: 29 MMOL/L — SIGNIFICANT CHANGE UP (ref 22–31)
CREAT SERPL-MCNC: 0.93 MG/DL — SIGNIFICANT CHANGE UP (ref 0.5–1.3)
EGFR: 91 ML/MIN/1.73M2 — SIGNIFICANT CHANGE UP
GLUCOSE SERPL-MCNC: 94 MG/DL — SIGNIFICANT CHANGE UP (ref 70–99)
HCT VFR BLD CALC: 27.9 % — LOW (ref 39–50)
HGB BLD-MCNC: 9.4 G/DL — LOW (ref 13–17)
MCHC RBC-ENTMCNC: 33.2 PG — SIGNIFICANT CHANGE UP (ref 27–34)
MCHC RBC-ENTMCNC: 33.7 GM/DL — SIGNIFICANT CHANGE UP (ref 32–36)
MCV RBC AUTO: 98.6 FL — SIGNIFICANT CHANGE UP (ref 80–100)
NRBC # BLD: 0 /100 WBCS — SIGNIFICANT CHANGE UP (ref 0–0)
OSMOLALITY UR: 296 MOSM/KG — SIGNIFICANT CHANGE UP (ref 50–1200)
PLATELET # BLD AUTO: 624 K/UL — HIGH (ref 150–400)
POTASSIUM SERPL-MCNC: 4.2 MMOL/L — SIGNIFICANT CHANGE UP (ref 3.5–5.3)
POTASSIUM SERPL-SCNC: 4.2 MMOL/L — SIGNIFICANT CHANGE UP (ref 3.5–5.3)
PROT SERPL-MCNC: 5.5 G/DL — LOW (ref 6–8.3)
RBC # BLD: 2.83 M/UL — LOW (ref 4.2–5.8)
RBC # FLD: 12.5 % — SIGNIFICANT CHANGE UP (ref 10.3–14.5)
SODIUM SERPL-SCNC: 137 MMOL/L — SIGNIFICANT CHANGE UP (ref 135–145)
WBC # BLD: 10.25 K/UL — SIGNIFICANT CHANGE UP (ref 3.8–10.5)
WBC # FLD AUTO: 10.25 K/UL — SIGNIFICANT CHANGE UP (ref 3.8–10.5)

## 2022-08-29 PROCEDURE — 99232 SBSQ HOSP IP/OBS MODERATE 35: CPT | Mod: GC

## 2022-08-29 PROCEDURE — 99232 SBSQ HOSP IP/OBS MODERATE 35: CPT

## 2022-08-29 RX ADMIN — CYCLOBENZAPRINE HYDROCHLORIDE 5 MILLIGRAM(S): 10 TABLET, FILM COATED ORAL at 09:36

## 2022-08-29 RX ADMIN — OXYCODONE HYDROCHLORIDE 10 MILLIGRAM(S): 5 TABLET ORAL at 10:30

## 2022-08-29 RX ADMIN — Medication 50 MILLIGRAM(S): at 18:05

## 2022-08-29 RX ADMIN — METHOCARBAMOL 750 MILLIGRAM(S): 500 TABLET, FILM COATED ORAL at 05:38

## 2022-08-29 RX ADMIN — POLYETHYLENE GLYCOL 3350 17 GRAM(S): 17 POWDER, FOR SOLUTION ORAL at 05:30

## 2022-08-29 RX ADMIN — OXYCODONE HYDROCHLORIDE 10 MILLIGRAM(S): 5 TABLET ORAL at 05:31

## 2022-08-29 RX ADMIN — METHOCARBAMOL 750 MILLIGRAM(S): 500 TABLET, FILM COATED ORAL at 22:15

## 2022-08-29 RX ADMIN — METHOCARBAMOL 750 MILLIGRAM(S): 500 TABLET, FILM COATED ORAL at 12:44

## 2022-08-29 RX ADMIN — OXYCODONE HYDROCHLORIDE 15 MILLIGRAM(S): 5 TABLET ORAL at 18:04

## 2022-08-29 RX ADMIN — CYCLOBENZAPRINE HYDROCHLORIDE 5 MILLIGRAM(S): 10 TABLET, FILM COATED ORAL at 18:05

## 2022-08-29 RX ADMIN — OXYCODONE HYDROCHLORIDE 10 MILLIGRAM(S): 5 TABLET ORAL at 07:00

## 2022-08-29 RX ADMIN — OXYCODONE HYDROCHLORIDE 15 MILLIGRAM(S): 5 TABLET ORAL at 05:28

## 2022-08-29 RX ADMIN — SENNA PLUS 2 TABLET(S): 8.6 TABLET ORAL at 22:15

## 2022-08-29 RX ADMIN — POLYETHYLENE GLYCOL 3350 17 GRAM(S): 17 POWDER, FOR SOLUTION ORAL at 18:05

## 2022-08-29 RX ADMIN — OXYCODONE HYDROCHLORIDE 15 MILLIGRAM(S): 5 TABLET ORAL at 19:15

## 2022-08-29 RX ADMIN — TENOFOVIR DISOPROXIL FUMARATE 300 MILLIGRAM(S): 300 TABLET, FILM COATED ORAL at 12:44

## 2022-08-29 RX ADMIN — OXYCODONE HYDROCHLORIDE 10 MILLIGRAM(S): 5 TABLET ORAL at 09:34

## 2022-08-29 RX ADMIN — Medication 5 MILLIGRAM(S): at 05:30

## 2022-08-29 RX ADMIN — ENOXAPARIN SODIUM 40 MILLIGRAM(S): 100 INJECTION SUBCUTANEOUS at 18:13

## 2022-08-29 RX ADMIN — ATORVASTATIN CALCIUM 20 MILLIGRAM(S): 80 TABLET, FILM COATED ORAL at 22:15

## 2022-08-29 RX ADMIN — MUPIROCIN 1 APPLICATION(S): 20 OINTMENT TOPICAL at 07:55

## 2022-08-29 RX ADMIN — OXYCODONE HYDROCHLORIDE 15 MILLIGRAM(S): 5 TABLET ORAL at 06:00

## 2022-08-29 RX ADMIN — Medication 50 MILLIGRAM(S): at 05:28

## 2022-08-29 RX ADMIN — PANTOPRAZOLE SODIUM 40 MILLIGRAM(S): 20 TABLET, DELAYED RELEASE ORAL at 05:28

## 2022-08-29 NOTE — DIETITIAN INITIAL EVALUATION ADULT - NS FNS DIET ORDER
Regular Diet (IDDSI Level 7) w/ Thin Liquids (IDDSI Level 0)  Education Provided on Proper Nutrition

## 2022-08-29 NOTE — PROGRESS NOTE ADULT - ASSESSMENT
67 yo M with h/o HTN, RA, GERD, Hepatitis C, s/p prior lumbar decompression & fusion in 2015 who presents with worsening back pain x 2 months associated with unsteadiness when getting up and numbness in his feet who presented to [facility] for scheduled spine surgery. Now s/p Posterior L2 PSO, extension of fusion to T9, T10-L2 Decompression, w/ Plastics Closure 8/18/22. Post-op complicated by hypotension requiring pressors, elevated CPK, leukocytosis, electrolyte derangements, and symptomatic orthostatic hypotension.    Gait Instability, ADL impairments and Functional impairments: Continue Comprehensive Rehab Program of PT/OT - Total of 3 hrs/day 5 days/week     # cord compression.   - s/p Posterior L2 PSO, extension of fusion to T9, T10-L2 Decompression w/Dr Conner, w/ Plastics Closure 8/18/22 .   -c/w hemovac/MALLIKA, monitor output each shift. -  - c/w oxycontin ER 15mg q12h (home dose) and   - PRN pain regimen and bowel regimen  --mobilize, IS    Labs with hyponatremia: noted intermittently through hospital stay. Labs with improved sodium last 2 days     Orthostatic hypotension: Monitor   --TEDs or home compression socks      Leukocytosis. : Stable WBC , likely reactive  -- monitor off abx, CXR neg     Hypertension:on low dose metoprolol    hold home valsartan 320mg daily, HCTZ 25mg daily     #Rheumatoid arthritis.   - kevzara injection [id number]. held from OR.  Schedule?  - c/w prednisone 5mg daily  - c/w pantoprazole while inpatient for GI ppx and known GERD  - outpatient f/u with rheumatologist.     # Hepatitis C.   - c/w tenofovir 300mg daily.    Pain Mgmt - Tylenol Oxycodone PRN, methocarbamol for spasms    GI/Bowel Mgmt - Senna,  Miralax qd, dulocolax supp added prn. PRN simethicone for indigestion.     /Bladder Mgmt - Toileting prn. Using urinal     Hospitalist fu noted     Labs in am     Update given to wife at bedside over weekend  Patient wants to be moved to a different room - previously d/w nursing.

## 2022-08-29 NOTE — DIETITIAN INITIAL EVALUATION ADULT - PERTINENT LABORATORY DATA
08-29    137  |  101  |  7   ----------------------------<  94  4.2   |  29  |  0.93    Ca    8.5      29 Aug 2022 06:51    TPro  5.5<L>  /  Alb  2.0<L>  /  TBili  0.2  /  DBili  x   /  AST  38  /  ALT  36  /  AlkPhos  58  08-29  A1C with Estimated Average Glucose Result: 5.5 % (08-18-22 @ 23:11)

## 2022-08-29 NOTE — DIETITIAN INITIAL EVALUATION ADULT - OTHER INFO
Initial Nutrition Assessment   66yr Old Male   States Shrimp Food Allergy(Not Shellfish)/Intolerance  Tolerates Diet Well - No Chewing/Swallowing Complications (Per Patient)  Consumed % of 1st Meal (as Per Documentation)  Surgical Incision on  T-Spine & No Pressure Ulcers (as Per Nursing Flow Sheets)  +1 Edema Noted to Feet (as Per Nursing Flow Sheets)  Recent Nausea/Vomiting/Diarrhea & Some Recent Constipation (as Per Patient)

## 2022-08-29 NOTE — PROGRESS NOTE ADULT - ATTENDING COMMENTS
Post dated noted for 8/29/22  Patient seen at bedside with resident   Patient states that he feels well, and pain controlled with current regimen  Moving bowels  Denies any urinary issues     MALLIKA drain with serosanginuous fluid    2. Labs stable and with improved sodium

## 2022-08-29 NOTE — DIETITIAN INITIAL EVALUATION ADULT - PERTINENT MEDS FT
MEDICATIONS  (STANDING):  atorvastatin 20 milliGRAM(s) Oral at bedtime  enoxaparin Injectable 40 milliGRAM(s) SubCutaneous every 24 hours  methocarbamol 750 milliGRAM(s) Oral every 8 hours  metoprolol tartrate 50 milliGRAM(s) Oral two times a day  mupirocin 2% Ointment 1 Application(s) Topical <User Schedule>  oxyCODONE  ER Tablet 15 milliGRAM(s) Oral every 12 hours  pantoprazole    Tablet 40 milliGRAM(s) Oral before breakfast  polyethylene glycol 3350 17 Gram(s) Oral two times a day  predniSONE   Tablet 5 milliGRAM(s) Oral daily  senna 2 Tablet(s) Oral at bedtime  tenofovir disoproxil fumarate (VIREAD) 300 milliGRAM(s) Oral daily    MEDICATIONS  (PRN):  acetaminophen     Tablet .. 650 milliGRAM(s) Oral every 6 hours PRN Moderate Pain (4 - 6)  bisacodyl Suppository 10 milliGRAM(s) Rectal daily PRN Constipation  cyclobenzaprine 5 milliGRAM(s) Oral three times a day PRN Muscle Spasm  oxyCODONE    IR 10 milliGRAM(s) Oral every 4 hours PRN Severe Pain (7 - 10)  simethicone 80 milliGRAM(s) Chew three times a day PRN Gas

## 2022-08-29 NOTE — PROGRESS NOTE ADULT - ASSESSMENT
67 yo M with h/o HTN, RA, GERD, Hepatitis C, s/p prior lumbar decompression & fusion in 2015 presented to [facility] with worsening back pain x 2 months associated with unsteadiness and numbness in his feet for scheduled spine surgery. s/p Posterior L2 PSO, extension of fusion to T9, T10-L2 Decompression, w/ Plastics Closure 8/18/22 with EBL 750cc. Post-op complicated by hypotension requiring pressors, elevated CPK, leukocytosis, electrolyte derangements, and symptomatic orthostatic hypotension. Now admitted to [place] inpatient rehab.    #Cord compression  - s/p Posterior L2 PSO, extension of fusion to T9, T10-L2 Decompression w/ Dr Conner, w/ Plastics Closure 8/18/22 with EBL 750cc.   - Continue hemovac/MALLIKA, monitor output. Per [facility] discharge, may be removed if output < 20 mL for 24 hrs after checking with Plastics Dr. Pop  - Pain and bowel regimen per primary  - Follow up with Surgery after post rehab    #Orthostatic hypotension  #Hypertension  - s/p IVF and pressors for hypotension, 1 u pRBCs for symptomatic anemia in [facility]  - Was started back on home med Toprol 50 mg BID on 8/25  - BP trends now acceptable, continue to hold rest of home anti-hypertensive regimen  - If BP trends up, can reintroduce Valsartan at lower dose (was on 320 mg QD). Would avoid HCTZ due to hyponatremia at [facility]  - TEDs or home compression socks  - Encourage PO intake  - monitor BP and symptoms    #Hx Elevated CPK  - CPK peaked at 8014 on 8/19 then downtrended to 691  - s/p aggressive IVF  - No need to recheck CPK at this time    #Leukocytosis  - deemed likely reactive  - afebrile with no signs/symptoms of acute infection, negative CXR  - monitor off antibiotics  - if febrile, send infectious work-up.    #Hyponatremia  - Previously attributed to IVF, resolved  - Srum and urine osmolality and urine Na values noted  - Will hold off on fluid restriction at this time due to resolving elevated CPK  - Hold HCTZ  - monitor BMP    #Rheumatoid Arthritis  - Receives Kevzara injection [id number], was held for OR. Can check with patient's rheumatologist regarding resuming  - Continue prednisone 5mg daily  - Continue pantoprazole while inpatient for GI ppx and known GERD  - outpatient f/u with rheumatologist    #Hepatitis C  - Continue tenofovir 300mg daily    #DVT PPx  - Lovenox 65 yo M with h/o HTN, RA, GERD, Hepatitis C, s/p prior lumbar decompression & fusion in 2015 presented to [facility] with worsening back pain x 2 months associated with unsteadiness and numbness in his feet for scheduled spine surgery. s/p Posterior L2 PSO, extension of fusion to T9, T10-L2 Decompression, w/ Plastics Closure 8/18/22 with EBL 750cc. Post-op complicated by hypotension requiring pressors, elevated CPK, leukocytosis, electrolyte derangements, and symptomatic orthostatic hypotension. Now admitted to [place] inpatient rehab.    #Cord compression  - s/p Posterior L2 PSO, extension of fusion to T9, T10-L2 Decompression w/ Dr Conner, w/ Plastics Closure 8/18/22 with EBL 750cc.   - Continue hemovac/MALLIKA, monitor output. Per [facility] discharge, may be removed if output < 20 mL for 24 hrs after checking with Plastics Dr. Pop  - Pain and bowel regimen per primary  - Follow up with Surgery after post rehab    #Orthostatic hypotension  #Hypertension  - s/p IVF and pressors for hypotension, 1 u pRBCs for symptomatic anemia in [facility]  - Was started back on home med Toprol 50 mg BID on 8/25  - BP trends now acceptable, continue to hold rest of home anti-hypertensive regimen  - If BP trends up, can reintroduce Valsartan at lower dose (was on 320 mg QD). Would avoid HCTZ due to hyponatremia at [facility]  - TEDs or home compression socks  - Encourage PO intake  - monitor BP and symptoms    #Anemia  - Normal Hb at PST 8/10, anemia may postoperative s/p spine surgery  - Received 1 u pRBCs 8/23  - Can check anemia panel and ferritin with next set of labs    #Thrombocytosis  - More likely reactive in origin  - Monitor CBC    #Leukocytosis  - Was deemed likely reactive, now resolved  - afebrile with no signs/symptoms of acute infection, negative CXR  - monitor off antibiotics  - if febrile, send infectious work-up.    #Hyponatremia  - Previously attributed to IVF, resolved  - Srum and urine osmolality and urine Na values noted  - Will hold off on fluid restriction at this time due to resolving elevated CPK  - Hold HCTZ  - monitor BMP    #Hx Elevated CPK  - CPK peaked at 8014 on 8/19 then downtrended to 691  - s/p aggressive IVF  - No need to recheck CPK at this time    #Rheumatoid Arthritis  - Receives Kevzara injection [id number], was held for OR. Can check with patient's rheumatologist regarding resuming  - Continue prednisone 5mg daily  - Continue pantoprazole while inpatient for GI ppx and known GERD  - outpatient f/u with rheumatologist    #Hepatitis C  - Continue tenofovir 300mg daily    #DVT PPx  - Lovenox

## 2022-08-29 NOTE — PROGRESS NOTE ADULT - SUBJECTIVE AND OBJECTIVE BOX
Patient is a 66y old  Male who presents with a chief complaint of non-speciifed spinal cord compression  s/p decompression    HPI:  67 yo RH male with PMHx of HTN, RA (on prednisone), lumbar fusion/chronic back pain with 2 month history of increased difficulty in ambulation, veering to left and numbness on the bottom of feet. Prior imaging of L spine in 2020 showed degenerative changes L1-L2, severe high grade stenosis which is progression from imaging done in 2015.  MRI lumbar spine w/unspecified cord compression.   Patient admitted 8/1/8 for  OR  for Posterior L2 PSO (pedicle subtraction osteotomy), extension of fusion to T9, T10-L2 Decompression with Plastics closure. S/p PRBC 8/23 for anemia. Post op course also complicated by orthostatic hypotension- meds adjusted by Cardiology. Medicine following for elevated CPK/WBC - managed w IVF, elevated wbc- likely reactive. Electrolyte abnormalities corrected. US LEs neg DVT 8/20. CXR clear. Pain management. PMR consulted and acute rehab recommended and transferred to IRF 8/26      Subj: Patient seen and examined sitting in WC at bedside. Alert, conversational, good spirits.     ROS  Denies HA/dizziness  Denies CP/palpitations  Denies abdominal pain or nausea  Denies dysuria  +BM      PHYSICAL EXAM  Vital Signs Last 24 Hrs  T(C): 37.2 (28 Aug 2022 07:33), Max: 37.4 (28 Aug 2022 05:55)  T(F): 99 (28 Aug 2022 07:33), Max: 99.4 (28 Aug 2022 05:55)  HR: 82 (28 Aug 2022 07:33) (82 - 102)  BP: 100/61 (28 Aug 2022 07:33) (100/61 - 133/83)  BP(mean): 90 (28 Aug 2022 05:55) (84 - 90)  RR: 16 (28 Aug 2022 07:33) (15 - 19)  SpO2: 95% (28 Aug 2022 07:33) (94% - 96%)    Parameters below as of 28 Aug 2022 07:33  Patient On (Oxygen Delivery Method): room air    Drain output   85ml -8/26-8/27 8/27 - 8/28 : 165   8/28- 8/29 67.5 ml    functional status TBD    Constitutional - NAD, Comfortable  Chest - breathing comfortably  Cardiovascular - well perfused.   Abdomen - Soft, NTND  Extremities - No C/C/E, No calf tenderness   Anti gravity strength  Skin: Back incision with aquacell dressing, MALLIKA drain   Psychiatric - Mood stable, Affect WNL      RECENT LABS                        9.4    10.25 )-----------( 624      ( 29 Aug 2022 06:51 )             27.9     08-29    137  |  101  |  7   ----------------------------<  94  4.2   |  29  |  0.93    Ca    8.5      29 Aug 2022 06:51    TPro  5.5<L>  /  Alb  2.0<L>  /  TBili  0.2  /  DBili  x   /  AST  38  /  ALT  36  /  AlkPhos  58  08-29      CAPILLARY BLOOD GLUCOSE      MEDICATIONS  (STANDING):  atorvastatin 20 milliGRAM(s) Oral at bedtime  enoxaparin Injectable 40 milliGRAM(s) SubCutaneous every 24 hours  methocarbamol 750 milliGRAM(s) Oral every 8 hours  metoprolol tartrate 50 milliGRAM(s) Oral two times a day  mupirocin 2% Ointment 1 Application(s) Topical <User Schedule>  oxyCODONE  ER Tablet 15 milliGRAM(s) Oral every 12 hours  pantoprazole    Tablet 40 milliGRAM(s) Oral before breakfast  polyethylene glycol 3350 17 Gram(s) Oral two times a day  predniSONE   Tablet 5 milliGRAM(s) Oral daily  senna 2 Tablet(s) Oral at bedtime  tenofovir disoproxil fumarate (VIREAD) 300 milliGRAM(s) Oral daily    MEDICATIONS  (PRN):  acetaminophen     Tablet .. 650 milliGRAM(s) Oral every 6 hours PRN Moderate Pain (4 - 6)  bisacodyl Suppository 10 milliGRAM(s) Rectal daily PRN Constipation  cyclobenzaprine 5 milliGRAM(s) Oral three times a day PRN Muscle Spasm  oxyCODONE    IR 10 milliGRAM(s) Oral every 4 hours PRN Severe Pain (7 - 10)  simethicone 80 milliGRAM(s) Chew three times a day PRN Gas

## 2022-08-29 NOTE — PROGRESS NOTE ADULT - SUBJECTIVE AND OBJECTIVE BOX
Patient is a 66y old  Male who presents with a chief complaint of SCC s/p decompression (29 Aug 2022 14:04)    Patient seen and examined at bedside. No acute overnight events.     ALLERGIES:  gabapentin (Other)  Shrimp (Unknown)    MEDICATIONS  (STANDING):  atorvastatin 20 milliGRAM(s) Oral at bedtime  enoxaparin Injectable 40 milliGRAM(s) SubCutaneous every 24 hours  methocarbamol 750 milliGRAM(s) Oral every 8 hours  metoprolol tartrate 50 milliGRAM(s) Oral two times a day  mupirocin 2% Ointment 1 Application(s) Topical <User Schedule>  oxyCODONE  ER Tablet 15 milliGRAM(s) Oral every 12 hours  pantoprazole    Tablet 40 milliGRAM(s) Oral before breakfast  polyethylene glycol 3350 17 Gram(s) Oral two times a day  predniSONE   Tablet 5 milliGRAM(s) Oral daily  senna 2 Tablet(s) Oral at bedtime  tenofovir disoproxil fumarate (VIREAD) 300 milliGRAM(s) Oral daily    MEDICATIONS  (PRN):  acetaminophen     Tablet .. 650 milliGRAM(s) Oral every 6 hours PRN Moderate Pain (4 - 6)  bisacodyl Suppository 10 milliGRAM(s) Rectal daily PRN Constipation  cyclobenzaprine 5 milliGRAM(s) Oral three times a day PRN Muscle Spasm  oxyCODONE    IR 10 milliGRAM(s) Oral every 4 hours PRN Severe Pain (7 - 10)  simethicone 80 milliGRAM(s) Chew three times a day PRN Gas    Vital Signs Last 24 Hrs  T(F): 97.9 (29 Aug 2022 08:35), Max: 98.6 (29 Aug 2022 05:54)  HR: 102 (29 Aug 2022 08:35) (76 - 102)  BP: 126/85 (29 Aug 2022 08:35) (113/72 - 126/85)  RR: 14 (29 Aug 2022 08:35) (14 - 15)  SpO2: 94% (29 Aug 2022 08:35) (94% - 100%)    I&O's Summary    28 Aug 2022 07:01  -  29 Aug 2022 07:00  --------------------------------------------------------  IN: 420 mL / OUT: 567.5 mL / NET: -147.5 mL    29 Aug 2022 07:01  -  29 Aug 2022 14:31  --------------------------------------------------------  IN: 0 mL / OUT: 60 mL / NET: -60 mL      BMI (kg/m2): 28.2 (08-26-22 @ 16:25)    PHYSICAL EXAM:  GENERAL: NAD  HEENT: Anicteric, moist mucous membranes, no pharyngeal exudates  EYES: Conjunctiva clear, no scleral icterus  CHEST/LUNG: Clear to auscultation bilaterally, breathing nonlabored, no rales, wheezes, or rhonchi  HEART: S1 S2, regular rate and rhythm  ABDOMEN: BS+, soft, nontender, nondistended  BACK: Spine incision site dressing c/d/i, drain in place  EXTREMITIES: No edema, cyanosis, or calf tenderness  NERVOUS SYSTEM: A/O x3, moves all extremities  PSYCH: Answers questions and follows commands appropriately  SKIN: As above, area of previous skin tear on anterior chest wall, right lateral knee skin tear, healing scabs at shins, inner thighs and L wrist    LABS:                        9.4    10.25 )-----------( 624      ( 29 Aug 2022 06:51 )             27.9       08-29    137  |  101  |  7   ----------------------------<  94  4.2   |  29  |  0.93    Ca    8.5      29 Aug 2022 06:51    TPro  5.5  /  Alb  2.0  /  TBili  0.2  /  DBili  x   /  AST  38  /  ALT  36  /  AlkPhos  58  08-29 08-18 Chol 124 mg/dL LDL -- HDL 45 mg/dL Trig 80 mg/dL                      COVID-19 PCR: NotDetec (08-25-22 @ 07:26)  COVID-19 PCR: NotDetec (08-15-22 @ 16:40)      RADIOLOGY & ADDITIONAL TESTS:     Care Discussed with Consultants/Other Providers: PM&R

## 2022-08-29 NOTE — DIETITIAN INITIAL EVALUATION ADULT - ORAL INTAKE PTA/DIET HISTORY
Patient Does Not Follow Diet @Home  Consumes 2 Meals a Day   Wife Usually Cooks For Patient   Does Take Vitamin/Supplements @Home (Vitamin D, Vitamin E, MVI)

## 2022-08-30 PROCEDURE — 99232 SBSQ HOSP IP/OBS MODERATE 35: CPT | Mod: GC

## 2022-08-30 PROCEDURE — 99233 SBSQ HOSP IP/OBS HIGH 50: CPT

## 2022-08-30 RX ADMIN — METHOCARBAMOL 750 MILLIGRAM(S): 500 TABLET, FILM COATED ORAL at 07:29

## 2022-08-30 RX ADMIN — OXYCODONE HYDROCHLORIDE 15 MILLIGRAM(S): 5 TABLET ORAL at 08:00

## 2022-08-30 RX ADMIN — SENNA PLUS 2 TABLET(S): 8.6 TABLET ORAL at 22:13

## 2022-08-30 RX ADMIN — OXYCODONE HYDROCHLORIDE 10 MILLIGRAM(S): 5 TABLET ORAL at 09:45

## 2022-08-30 RX ADMIN — Medication 50 MILLIGRAM(S): at 07:29

## 2022-08-30 RX ADMIN — Medication 5 MILLIGRAM(S): at 07:31

## 2022-08-30 RX ADMIN — MUPIROCIN 1 APPLICATION(S): 20 OINTMENT TOPICAL at 07:03

## 2022-08-30 RX ADMIN — OXYCODONE HYDROCHLORIDE 15 MILLIGRAM(S): 5 TABLET ORAL at 17:55

## 2022-08-30 RX ADMIN — POLYETHYLENE GLYCOL 3350 17 GRAM(S): 17 POWDER, FOR SOLUTION ORAL at 17:25

## 2022-08-30 RX ADMIN — PANTOPRAZOLE SODIUM 40 MILLIGRAM(S): 20 TABLET, DELAYED RELEASE ORAL at 07:30

## 2022-08-30 RX ADMIN — OXYCODONE HYDROCHLORIDE 10 MILLIGRAM(S): 5 TABLET ORAL at 05:45

## 2022-08-30 RX ADMIN — POLYETHYLENE GLYCOL 3350 17 GRAM(S): 17 POWDER, FOR SOLUTION ORAL at 08:32

## 2022-08-30 RX ADMIN — ENOXAPARIN SODIUM 40 MILLIGRAM(S): 100 INJECTION SUBCUTANEOUS at 22:13

## 2022-08-30 RX ADMIN — Medication 50 MILLIGRAM(S): at 17:26

## 2022-08-30 RX ADMIN — ATORVASTATIN CALCIUM 20 MILLIGRAM(S): 80 TABLET, FILM COATED ORAL at 22:13

## 2022-08-30 RX ADMIN — OXYCODONE HYDROCHLORIDE 10 MILLIGRAM(S): 5 TABLET ORAL at 04:59

## 2022-08-30 RX ADMIN — OXYCODONE HYDROCHLORIDE 10 MILLIGRAM(S): 5 TABLET ORAL at 10:15

## 2022-08-30 RX ADMIN — METHOCARBAMOL 750 MILLIGRAM(S): 500 TABLET, FILM COATED ORAL at 13:39

## 2022-08-30 RX ADMIN — OXYCODONE HYDROCHLORIDE 15 MILLIGRAM(S): 5 TABLET ORAL at 17:25

## 2022-08-30 RX ADMIN — OXYCODONE HYDROCHLORIDE 15 MILLIGRAM(S): 5 TABLET ORAL at 07:03

## 2022-08-30 RX ADMIN — TENOFOVIR DISOPROXIL FUMARATE 300 MILLIGRAM(S): 300 TABLET, FILM COATED ORAL at 13:39

## 2022-08-30 RX ADMIN — METHOCARBAMOL 750 MILLIGRAM(S): 500 TABLET, FILM COATED ORAL at 22:13

## 2022-08-30 NOTE — PROGRESS NOTE ADULT - ASSESSMENT
67 yo M with h/o HTN, RA, GERD, Hepatitis C, s/p prior lumbar decompression & fusion in 2015 presented to [facility] with worsening back pain x 2 months associated with unsteadiness and numbness in his feet for scheduled spine surgery. s/p Posterior L2 PSO, extension of fusion to T9, T10-L2 Decompression, w/ Plastics Closure 8/18/22 with EBL 750cc. Post-op complicated by hypotension requiring pressors, elevated CPK, leukocytosis, electrolyte derangements, and symptomatic orthostatic hypotension. Now admitted to [place] inpatient rehab.    #Cord compression  - s/p Posterior L2 PSO, extension of fusion to T9, T10-L2 Decompression w/ Dr Conner, w/ Plastics Closure 8/18/22 with EBL 750cc.   - Continue hemovac/MALLIKA, monitor output. Per [facility] discharge, may be removed if output < 20 mL for 24 hrs after checking with Plastics Dr. Pop  - Pain and bowel regimen per primary  - Follow up with Surgery after post rehab    #Orthostatic hypotension  #Hypertension  - s/p IVF and pressors for hypotension, 1 u pRBCs for symptomatic anemia in [facility]  - Was started back on home med Toprol 50 mg BID on 8/25  - BP trends now acceptable, continue to hold rest of home anti-hypertensive regimen  - If BP trends up, can reintroduce Valsartan at lower dose (was on 320 mg QD). Would avoid HCTZ due to hyponatremia at [facility]  - TEDs or home compression socks  - Encourage PO intake  - monitor BP and symptoms    #Anemia  - Normal Hb at PST 8/10, anemia may postoperative s/p spine surgery  - Received 1 u pRBCs 8/23  - Hb stable    #Thrombocytosis  - More likely reactive in origin  - Monitor CBC    #Hx Elevated CPK  - CPK peaked at 8014 on 8/19 then downtrended to 691  - s/p aggressive IVF    #Rheumatoid Arthritis  - Receives Kevzara injection [id number], was held for OR. Can check with patient's rheumatologist regarding resuming  - Continue prednisone 5mg daily  - Continue pantoprazole while inpatient for GI ppx and known GERD  - outpatient f/u with rheumatologist    #Hepatitis C  - Continue tenofovir 300mg daily    #DVT PPx  - Lovenox

## 2022-08-30 NOTE — PROGRESS NOTE ADULT - ATTENDING COMMENTS
Patient seen at bedside this am   No new issues oversight  Pain- felt like a spasm at back after am OT session. Discussed ice during PT session     2. Continue rehab program     3. Hospitalist anjali noted

## 2022-08-30 NOTE — PROGRESS NOTE ADULT - SUBJECTIVE AND OBJECTIVE BOX
Patient is a 66y old  Male who presents with a chief complaint of non-speciifed spinal cord compression  s/p decompression    HPI:  67 yo RH male with PMHx of HTN, RA (on prednisone), lumbar fusion/chronic back pain with 2 month history of increased difficulty in ambulation, veering to left and numbness on the bottom of feet. Prior imaging of L spine in 2020 showed degenerative changes L1-L2, severe high grade stenosis which is progression from imaging done in 2015.  MRI lumbar spine w/unspecified cord compression.   Patient admitted 8/1/8 for  OR  for Posterior L2 PSO (pedicle subtraction osteotomy), extension of fusion to T9, T10-L2 Decompression with Plastics closure. S/p PRBC 8/23 for anemia. Post op course also complicated by orthostatic hypotension- meds adjusted by Cardiology. Medicine following for elevated CPK/WBC - managed w IVF, elevated wbc- likely reactive. Electrolyte abnormalities corrected. US LEs neg DVT 8/20. CXR clear. Pain management. PMR consulted and acute rehab recommended and transferred to IRF 8/26      Subj: Patient seen and examined sitting in WC at bedside. Reports pain that feels like a "knot that won't let go." Therapist at bedside about to stretch and ice. Reports eating well and sleeping well in new room (changed per patient request for noise level).     ROS  Denies HA/dizziness  Denies CP/palpitations  Denies abdominal pain or nausea  Denies dysuria  +BM 8/29    PHYSICAL EXAM    Vital Signs Last 24 Hrs  T(C): 36.6 (30 Aug 2022 08:34), Max: 36.6 (30 Aug 2022 08:34)  T(F): 97.9 (30 Aug 2022 08:34), Max: 97.9 (30 Aug 2022 08:34)  HR: 106 (30 Aug 2022 08:34) (88 - 106)  BP: 111/72 (30 Aug 2022 08:34) (110/80 - 111/72)  RR: 18 (30 Aug 2022 08:34) (18 - 18)  SpO2: 95% (30 Aug 2022 08:34) (95% - 98%)    Parameters below as of 30 Aug 2022 08:34  Patient On (Oxygen Delivery Method): room air    24 hour drain output: 80cc    functional status TBD    Constitutional - NAD, appears uncomfortable intermittently when cramp flares  Chest - breathing comfortably  Cardiovascular - well perfused  Abdomen - Soft, NTND  Extremities - No C/C/E, No calf tenderness   Anti gravity strength  Skin: Back incision with aquacell dressing, MALLIKA drain   Psychiatric - Mood stable, Affect WNL      RECENT LABS               9.4    10.25 )-----------( 624      ( 29 Aug 2022 06:51 )             27.9     08-29    137  |  101  |  7   ----------------------------<  94  4.2   |  29  |  0.93    Ca    8.5      29 Aug 2022 06:51    TPro  5.5<L>  /  Alb  2.0<L>  /  TBili  0.2  /  DBili  x   /  AST  38  /  ALT  36  /  AlkPhos  58  08-29    CAPILLARY BLOOD GLUCOSE    MEDICATIONS  (STANDING):  atorvastatin 20 milliGRAM(s) Oral at bedtime  enoxaparin Injectable 40 milliGRAM(s) SubCutaneous every 24 hours  methocarbamol 750 milliGRAM(s) Oral every 8 hours  metoprolol tartrate 50 milliGRAM(s) Oral two times a day  mupirocin 2% Ointment 1 Application(s) Topical <User Schedule>  oxyCODONE  ER Tablet 15 milliGRAM(s) Oral every 12 hours  pantoprazole    Tablet 40 milliGRAM(s) Oral before breakfast  polyethylene glycol 3350 17 Gram(s) Oral two times a day  predniSONE   Tablet 5 milliGRAM(s) Oral daily  senna 2 Tablet(s) Oral at bedtime  tenofovir disoproxil fumarate (VIREAD) 300 milliGRAM(s) Oral daily    MEDICATIONS  (PRN):  acetaminophen     Tablet .. 650 milliGRAM(s) Oral every 6 hours PRN Moderate Pain (4 - 6)  bisacodyl Suppository 10 milliGRAM(s) Rectal daily PRN Constipation  cyclobenzaprine 5 milliGRAM(s) Oral three times a day PRN Muscle Spasm  oxyCODONE    IR 10 milliGRAM(s) Oral every 4 hours PRN Severe Pain (7 - 10)  simethicone 80 milliGRAM(s) Chew three times a day PRN Gas

## 2022-08-30 NOTE — PROGRESS NOTE ADULT - ASSESSMENT
65 yo M with h/o HTN, RA, GERD, Hepatitis C, s/p prior lumbar decompression & fusion in 2015 who presents with worsening back pain x 2 months associated with unsteadiness when getting up and numbness in his feet who presented to [facility] for scheduled spine surgery. Now s/p Posterior L2 PSO, extension of fusion to T9, T10-L2 Decompression, w/ Plastics Closure 8/18/22. Post-op complicated by hypotension requiring pressors, elevated CPK, leukocytosis, electrolyte derangements, and symptomatic orthostatic hypotension.    Gait Instability, ADL impairments, and Functional impairments: Continue Comprehensive Rehab Program of PT/OT - Total of 3 hrs/day 5 days/week     # cord compression.   - s/p Posterior L2 PSO, extension of fusion to T9, T10-L2 Decompression w/Dr Conner, w/ Plastics Closure 8/18/22 .   - c/w hemovac/MALLIKA, monitor output each shift. -  - c/w oxycontin ER 15mg q12h (home dose) and   - PRN pain regimen and bowel regimen  --mobilize, IS    Labs with hyponatremia: noted intermittently through hospital stay. Labs with improved sodium.     Orthostatic hypotension: Monitor   --TEDs or home compression socks      Leukocytosis. : Stable WBC , likely reactive  -- monitor off abx, CXR neg     Hypertension:on low dose metoprolol    hold home valsartan 320mg daily     #Rheumatoid arthritis.   - kevzara injection [id number]. held from OR.    - c/w prednisone 5mg daily  - c/w pantoprazole while inpatient for GI ppx and known GERD  - outpatient f/u with rheumatologist.     # Hepatitis C.   - c/w tenofovir 300mg daily.    Pain Mgmt - Tylenol Oxycodone PRN, methocarbamol for spasms    GI/Bowel Mgmt - Senna, Miralax qd, dulcolax supp added prn. PRN simethicone for indigestion.     /Bladder Mgmt - Toileting prn. Using urinal     Hospitalist fu noted     Labs M/Th    Update given to wife at bedside over weekend  Patient moved to new room, reports sleeping better. Noise complaint of previous room.   67 yo M with h/o HTN, RA, GERD, Hepatitis C, s/p prior lumbar decompression & fusion in 2015 who presents with worsening back pain x 2 months associated with unsteadiness when getting up and numbness in his feet who presented to [facility] for scheduled spine surgery. Now s/p Posterior L2 PSO, extension of fusion to T9, T10-L2 Decompression, w/ Plastics Closure 8/18/22. Post-op complicated by hypotension requiring pressors, elevated CPK, leukocytosis, electrolyte derangements, and symptomatic orthostatic hypotension.    Gait Instability, ADL impairments, and Functional impairments: Continue Comprehensive Rehab Program of PT/OT - Total of 3 hrs/day 5 days/week     # cord compression.   - s/p Posterior L2 PSO, extension of fusion to T9, T10-L2 Decompression w/Dr Conner, w/ Plastics Closure 8/18/22 .   - c/w MALLIKA, monitor output each shift. -  - c/w oxycontin ER 15mg q12h (home dose) and   - PRN pain regimen and bowel regimen  --mobilize, IS    Labs with hyponatremia: noted intermittently through hospital stay. Labs with improved sodium.     Orthostatic hypotension: Monitor   --TEDs or home compression socks      Leukocytosis. : Stable WBC , likely reactive  -- monitor off abx, CXR neg     Hypertension:on low dose metoprolol    hold home valsartan 320mg daily     #Rheumatoid arthritis.   - kevzara injection [id number]. held from OR.    - c/w prednisone 5mg daily  - c/w pantoprazole while inpatient for GI ppx and known GERD  - outpatient f/u with rheumatologist.     # Hepatitis C.   - c/w tenofovir 300mg daily.    Pain Mgmt - Tylenol Oxycodone PRN, methocarbamol for spasms    GI/Bowel Mgmt - Senna, Miralax qd, dulcolax supp added prn. PRN simethicone for indigestion.     /Bladder Mgmt - Toileting prn. Using urinal     Hospitalist fu noted     Labs M/Th    Update given to wife at bedside over weekend  Patient moved to new room, reports sleeping better. Noise complaint of previous room.

## 2022-08-30 NOTE — PROGRESS NOTE ADULT - SUBJECTIVE AND OBJECTIVE BOX
Patient is a 66y old  Male who presents with a chief complaint of SCC s/p decompression (30 Aug 2022 10:46)      INTERVAL History of Present Illness/OVERNIGHT EVENTS: no new issues reported    MEDICATIONS  (STANDING):  atorvastatin 20 milliGRAM(s) Oral at bedtime  enoxaparin Injectable 40 milliGRAM(s) SubCutaneous every 24 hours  methocarbamol 750 milliGRAM(s) Oral every 8 hours  metoprolol tartrate 50 milliGRAM(s) Oral two times a day  mupirocin 2% Ointment 1 Application(s) Topical <User Schedule>  oxyCODONE  ER Tablet 15 milliGRAM(s) Oral every 12 hours  pantoprazole    Tablet 40 milliGRAM(s) Oral before breakfast  polyethylene glycol 3350 17 Gram(s) Oral two times a day  predniSONE   Tablet 5 milliGRAM(s) Oral daily  senna 2 Tablet(s) Oral at bedtime  tenofovir disoproxil fumarate (VIREAD) 300 milliGRAM(s) Oral daily    MEDICATIONS  (PRN):  acetaminophen     Tablet .. 650 milliGRAM(s) Oral every 6 hours PRN Moderate Pain (4 - 6)  bisacodyl Suppository 10 milliGRAM(s) Rectal daily PRN Constipation  cyclobenzaprine 5 milliGRAM(s) Oral three times a day PRN Muscle Spasm  oxyCODONE    IR 10 milliGRAM(s) Oral every 4 hours PRN Severe Pain (7 - 10)  simethicone 80 milliGRAM(s) Chew three times a day PRN Gas      Allergies    gabapentin (Other)  Shrimp (Unknown)    Intolerances        REVIEW OF SYSTEMS:  Negative unless otherwise specified above.    Vital Signs Last 24 Hrs  T(C): 36.6 (30 Aug 2022 08:34), Max: 36.6 (30 Aug 2022 08:34)  T(F): 97.9 (30 Aug 2022 08:34), Max: 97.9 (30 Aug 2022 08:34)  HR: 106 (30 Aug 2022 08:34) (88 - 106)  BP: 111/72 (30 Aug 2022 08:34) (110/80 - 111/72)  BP(mean): --  RR: 18 (30 Aug 2022 08:34) (18 - 18)  SpO2: 95% (30 Aug 2022 08:34) (95% - 98%)    Parameters below as of 30 Aug 2022 08:34  Patient On (Oxygen Delivery Method): room air            PHYSICAL EXAM:  GENERAL: No apparent distress, appears stated age  HEAD:  Atraumatic, Normocephalic  EYES: Conjunctiva and sclera clear, no discharge  ENMT: Moist mucous membranes, no nasal discharge  NECK: Supple, no JVD  CHEST/LUNG: Clear to auscultation bilaterally, no wheeze or rales  HEART: Regular rhythm, no rubs or gallops  ABDOMEN: Soft, Nontender, Nondistended  EXTREMITIES:  No clubbing, cyanosis or edema  SKIN: No rash, no new discoloration      LABS:      Ca    8.5        29 Aug 2022 06:51          CAPILLARY BLOOD GLUCOSE          RADIOLOGY & ADDITIONAL TESTS:      Images reviewed personally    Consultant Notes Reviewed and Care Discussed with relevant Consultants.

## 2022-08-31 PROCEDURE — 99232 SBSQ HOSP IP/OBS MODERATE 35: CPT

## 2022-08-31 RX ORDER — OXYCODONE HYDROCHLORIDE 5 MG/1
15 TABLET ORAL EVERY 12 HOURS
Refills: 0 | Status: DISCONTINUED | OUTPATIENT
Start: 2022-08-31 | End: 2022-09-06

## 2022-08-31 RX ORDER — OXYCODONE HYDROCHLORIDE 5 MG/1
10 TABLET ORAL EVERY 4 HOURS
Refills: 0 | Status: DISCONTINUED | OUTPATIENT
Start: 2022-08-31 | End: 2022-09-06

## 2022-08-31 RX ADMIN — OXYCODONE HYDROCHLORIDE 15 MILLIGRAM(S): 5 TABLET ORAL at 18:24

## 2022-08-31 RX ADMIN — ATORVASTATIN CALCIUM 20 MILLIGRAM(S): 80 TABLET, FILM COATED ORAL at 21:23

## 2022-08-31 RX ADMIN — METHOCARBAMOL 750 MILLIGRAM(S): 500 TABLET, FILM COATED ORAL at 21:23

## 2022-08-31 RX ADMIN — METHOCARBAMOL 750 MILLIGRAM(S): 500 TABLET, FILM COATED ORAL at 13:15

## 2022-08-31 RX ADMIN — MUPIROCIN 1 APPLICATION(S): 20 OINTMENT TOPICAL at 13:15

## 2022-08-31 RX ADMIN — PANTOPRAZOLE SODIUM 40 MILLIGRAM(S): 20 TABLET, DELAYED RELEASE ORAL at 06:37

## 2022-08-31 RX ADMIN — Medication 5 MILLIGRAM(S): at 06:36

## 2022-08-31 RX ADMIN — OXYCODONE HYDROCHLORIDE 10 MILLIGRAM(S): 5 TABLET ORAL at 10:49

## 2022-08-31 RX ADMIN — METHOCARBAMOL 750 MILLIGRAM(S): 500 TABLET, FILM COATED ORAL at 06:40

## 2022-08-31 RX ADMIN — TENOFOVIR DISOPROXIL FUMARATE 300 MILLIGRAM(S): 300 TABLET, FILM COATED ORAL at 13:12

## 2022-08-31 RX ADMIN — OXYCODONE HYDROCHLORIDE 15 MILLIGRAM(S): 5 TABLET ORAL at 06:40

## 2022-08-31 RX ADMIN — Medication 50 MILLIGRAM(S): at 18:02

## 2022-08-31 RX ADMIN — OXYCODONE HYDROCHLORIDE 15 MILLIGRAM(S): 5 TABLET ORAL at 07:45

## 2022-08-31 RX ADMIN — OXYCODONE HYDROCHLORIDE 10 MILLIGRAM(S): 5 TABLET ORAL at 11:45

## 2022-08-31 RX ADMIN — SENNA PLUS 2 TABLET(S): 8.6 TABLET ORAL at 21:23

## 2022-08-31 RX ADMIN — OXYCODONE HYDROCHLORIDE 15 MILLIGRAM(S): 5 TABLET ORAL at 18:02

## 2022-08-31 RX ADMIN — CYCLOBENZAPRINE HYDROCHLORIDE 5 MILLIGRAM(S): 10 TABLET, FILM COATED ORAL at 18:05

## 2022-08-31 RX ADMIN — Medication 50 MILLIGRAM(S): at 06:36

## 2022-08-31 RX ADMIN — ENOXAPARIN SODIUM 40 MILLIGRAM(S): 100 INJECTION SUBCUTANEOUS at 18:47

## 2022-08-31 NOTE — PROGRESS NOTE ADULT - ASSESSMENT
65 yo M with h/o HTN, RA, GERD, Hepatitis C, s/p prior lumbar decompression & fusion in 2015 presented to [facility] with worsening back pain x 2 months associated with unsteadiness and numbness in his feet for scheduled spine surgery. s/p Posterior L2 PSO, extension of fusion to T9, T10-L2 Decompression, w/ Plastics Closure 8/18/22 with EBL 750cc. Post-op complicated by hypotension requiring pressors, elevated CPK, leukocytosis, electrolyte derangements, and symptomatic orthostatic hypotension. Now admitted to [place] inpatient rehab.    #Cord compression  - s/p Posterior L2 PSO, extension of fusion to T9, T10-L2 Decompression w/ Dr Conner, w/ Plastics Closure 8/18/22 with EBL 750cc.   - Continue hemovac/MALLIKA, monitor output. Per [facility] discharge, may be removed if output < 20 mL for 24 hrs after checking with Plastics Dr. Pop  - Pain and bowel regimen per primary  - Follow up with Surgery after post rehab    #Hypertension  Blood pressure meds with hold parameters     #Anemia  - Normal Hb at PST 8/10, anemia may postoperative s/p spine surgery  - Received 1 u pRBCs 8/23  - Hb stable    #Thrombocytosis  - More likely reactive in origin  - Monitor CBC    #Hx Elevated CPK  - CPK peaked at 8014 on 8/19 then downtrended to 691  - s/p aggressive IVF  - check CPK in AM    #Rheumatoid Arthritis  - Receives Kevzara injection [id number], was held for OR. Can check with patient's rheumatologist regarding resuming  - Continue prednisone 5mg daily  - Continue pantoprazole while inpatient for GI ppx and known GERD  - outpatient f/u with rheumatologist    #Hepatitis C  - Continue tenofovir 300mg daily    #DVT PPx  - Lovenox

## 2022-08-31 NOTE — PROGRESS NOTE ADULT - SUBJECTIVE AND OBJECTIVE BOX
Patient is a 66y old  Male who presents with a chief complaint of SCC s/p decompression (30 Aug 2022 13:20)      INTERVAL History of Present Illness/OVERNIGHT EVENTS: participating in PT/OT     MEDICATIONS  (STANDING):  atorvastatin 20 milliGRAM(s) Oral at bedtime  enoxaparin Injectable 40 milliGRAM(s) SubCutaneous every 24 hours  methocarbamol 750 milliGRAM(s) Oral every 8 hours  metoprolol tartrate 50 milliGRAM(s) Oral two times a day  mupirocin 2% Ointment 1 Application(s) Topical <User Schedule>  oxyCODONE  ER Tablet 15 milliGRAM(s) Oral every 12 hours  pantoprazole    Tablet 40 milliGRAM(s) Oral before breakfast  polyethylene glycol 3350 17 Gram(s) Oral two times a day  predniSONE   Tablet 5 milliGRAM(s) Oral daily  senna 2 Tablet(s) Oral at bedtime  tenofovir disoproxil fumarate (VIREAD) 300 milliGRAM(s) Oral daily    MEDICATIONS  (PRN):  acetaminophen     Tablet .. 650 milliGRAM(s) Oral every 6 hours PRN Moderate Pain (4 - 6)  bisacodyl Suppository 10 milliGRAM(s) Rectal daily PRN Constipation  cyclobenzaprine 5 milliGRAM(s) Oral three times a day PRN Muscle Spasm  oxyCODONE    IR 10 milliGRAM(s) Oral every 4 hours PRN Severe Pain (7 - 10)  simethicone 80 milliGRAM(s) Chew three times a day PRN Gas      Allergies    gabapentin (Other)  Shrimp (Unknown)    Intolerances        REVIEW OF SYSTEMS:  Other systems currently egative unless otherwise specified above.    Vital Signs Last 24 Hrs  T(C): 36.9 (31 Aug 2022 08:34), Max: 36.9 (31 Aug 2022 08:34)  T(F): 98.5 (31 Aug 2022 08:34), Max: 98.5 (31 Aug 2022 08:34)  HR: 91 (31 Aug 2022 08:34) (70 - 92)  BP: 126/80 (31 Aug 2022 08:34) (120/74 - 135/77)  BP(mean): --  RR: 16 (31 Aug 2022 08:34) (16 - 18)  SpO2: 95% (31 Aug 2022 08:34) (94% - 97%)    Parameters below as of 31 Aug 2022 08:34  Patient On (Oxygen Delivery Method): room air            PHYSICAL EXAM:  GENERAL: No apparent distress, appears stated age  EYES: Conjunctiva and sclera clear, no discharge  ENMT: Moist mucous membranes, no nasal discharge  CHEST/LUNG: Clear to auscultation bilaterally, no wheeze or rales  HEART: Regular rhythm, no rubs or gallops  ABDOMEN: Soft, Nontender, Nondistended  EXTREMITIES:  No clubbing, cyanosis or edema  SKIN: No rash, no new discoloration      LABS:              CAPILLARY BLOOD GLUCOSE            RADIOLOGY & ADDITIONAL TESTS:      Images reviewed personally    Consultant Notes Reviewed and Care Discussed with relevant Consultants.

## 2022-08-31 NOTE — PROGRESS NOTE ADULT - SUBJECTIVE AND OBJECTIVE BOX
Patient is a 66y old  Male who presents with a chief complaint of non-speciifed spinal cord compression  s/p decompression    Subj: Patient seen and examined sitting this am. Pain control still an issue. Would like additional medications as he is in therapy and may have more pain.   Denies bladder or bowel issues   Long thread blood tinged tissue expressed out from tubing by nursing staff yesterday . MALLIKA with serous fluid       ROS  Denies HA/dizziness  Denies CP/palpitations  Denies abdominal pain or nausea  Denies dysuria  +BM 8/31    PHYSICAL EXAM    Vital Signs Last 24 Hrs  T(C): 36.9 (31 Aug 2022 08:34), Max: 36.9 (31 Aug 2022 08:34)  T(F): 98.5 (31 Aug 2022 08:34), Max: 98.5 (31 Aug 2022 08:34)  HR: 91 (31 Aug 2022 08:34) (70 - 92)  BP: 126/80 (31 Aug 2022 08:34) (120/74 - 135/77)  BP(mean): --  RR: 16 (31 Aug 2022 08:34) (16 - 18)  SpO2: 95% (31 Aug 2022 08:34) (94% - 97%)    Parameters below as of 31 Aug 2022 08:34  Patient On (Oxygen Delivery Method): room air    MALLIKA drain 60 ml 8/30- 8/31 am   24 hour drain output: 80cc      Constitutional - NAD,   Chest - breathing comfortably  Cardiovascular - well perfused  Abdomen - Soft, NTND  Extremities - No C/C/E, No calf tenderness   Anti gravity strength  Skin: Back incision with aquacell dressing, sutures +  MALLIKA drain   Psychiatric - Mood stable, Affect WNL    Function:  Min assist/CS with transfers/ambulation with walker       MEDICATIONS  (STANDING):  atorvastatin 20 milliGRAM(s) Oral at bedtime  enoxaparin Injectable 40 milliGRAM(s) SubCutaneous every 24 hours  methocarbamol 750 milliGRAM(s) Oral every 8 hours  metoprolol tartrate 50 milliGRAM(s) Oral two times a day  mupirocin 2% Ointment 1 Application(s) Topical <User Schedule>  oxyCODONE  ER Tablet 15 milliGRAM(s) Oral every 12 hours  pantoprazole    Tablet 40 milliGRAM(s) Oral before breakfast  polyethylene glycol 3350 17 Gram(s) Oral two times a day  predniSONE   Tablet 5 milliGRAM(s) Oral daily  senna 2 Tablet(s) Oral at bedtime  tenofovir disoproxil fumarate (VIREAD) 300 milliGRAM(s) Oral daily    MEDICATIONS  (PRN):  acetaminophen     Tablet .. 650 milliGRAM(s) Oral every 6 hours PRN Moderate Pain (4 - 6)  bisacodyl Suppository 10 milliGRAM(s) Rectal daily PRN Constipation  cyclobenzaprine 5 milliGRAM(s) Oral three times a day PRN Muscle Spasm  oxyCODONE    IR 10 milliGRAM(s) Oral every 4 hours PRN Severe Pain (7 - 10)  simethicone 80 milliGRAM(s) Chew three times a day PRN Gas

## 2022-08-31 NOTE — PROGRESS NOTE ADULT - ASSESSMENT
67 yo M with h/o HTN, RA, GERD, Hepatitis C, s/p prior lumbar decompression & fusion in 2015 who presents with worsening back pain x 2 months associated with unsteadiness when getting up and numbness in his feet who presented to [facility] for scheduled spine surgery. Now s/p Posterior L2 PSO, extension of fusion to T9, T10-L2 Decompression, w/ Plastics Closure 8/18/22. Post-op complicated by hypotension requiring pressors, elevated CPK, leukocytosis, electrolyte derangements, and symptomatic orthostatic hypotension.    Gait Instability, ADL impairments, and Functional impairments: Continue Comprehensive Rehab Program of PT/OT - Total of 3 hrs/day 5 days/week     # cord compression.   - s/p Posterior L2 PSO, extension of fusion to T9, T10-L2 Decompression w/Dr Conner, w/ Plastics Closure 8/18/22 .   - c/w MALLIKA, monitor output each shift. -  - c/w oxycontin ER 15mg q12h (home dose) and - PRN pain regimen  --mobilize, IS    Labs with hyponatremia: noted intermittently through hospital stay. Now improved      Orthostatic hypotension: Improved. No episodes reported in therapy      Leukocytosis. : Stable WBC , likely reactive  -- monitor off abx, CXR neg     Hypertension:on low dose metoprolol    hold home valsartan 320mg daily     #Rheumatoid arthritis.   - kevzara injection [id number]. held from OR.    - c/w prednisone 5mg daily  - c/w pantoprazole while inpatient for GI ppx and known GERD  - outpatient f/u with rheumatologist.     # Hepatitis C. :- c/w tenofovir 300mg daily.    Pain Mgmt - Tylenol Oxycodone PRN, methocarbamol for spasms    GI/Bowel Mgmt - Senna, Miralax qd, dulcolax supp added prn. PRN simethicone for indigestion.     /Bladder Mgmt - Toileting prn. Using urinal     Hospitalist fu noted

## 2022-09-01 LAB
ALBUMIN SERPL ELPH-MCNC: 2.1 G/DL — LOW (ref 3.3–5)
ALP SERPL-CCNC: 63 U/L — SIGNIFICANT CHANGE UP (ref 40–120)
ALT FLD-CCNC: 25 U/L — SIGNIFICANT CHANGE UP (ref 10–45)
ANION GAP SERPL CALC-SCNC: 6 MMOL/L — SIGNIFICANT CHANGE UP (ref 5–17)
AST SERPL-CCNC: 24 U/L — SIGNIFICANT CHANGE UP (ref 10–40)
BILIRUB SERPL-MCNC: 0.2 MG/DL — SIGNIFICANT CHANGE UP (ref 0.2–1.2)
BUN SERPL-MCNC: 6 MG/DL — LOW (ref 7–23)
CALCIUM SERPL-MCNC: 8.6 MG/DL — SIGNIFICANT CHANGE UP (ref 8.4–10.5)
CHLORIDE SERPL-SCNC: 101 MMOL/L — SIGNIFICANT CHANGE UP (ref 96–108)
CK SERPL-CCNC: 58 U/L — SIGNIFICANT CHANGE UP (ref 30–200)
CO2 SERPL-SCNC: 31 MMOL/L — SIGNIFICANT CHANGE UP (ref 22–31)
CREAT SERPL-MCNC: 0.86 MG/DL — SIGNIFICANT CHANGE UP (ref 0.5–1.3)
EGFR: 96 ML/MIN/1.73M2 — SIGNIFICANT CHANGE UP
GLUCOSE SERPL-MCNC: 89 MG/DL — SIGNIFICANT CHANGE UP (ref 70–99)
HCT VFR BLD CALC: 26.5 % — LOW (ref 39–50)
HGB BLD-MCNC: 8.8 G/DL — LOW (ref 13–17)
MCHC RBC-ENTMCNC: 32.4 PG — SIGNIFICANT CHANGE UP (ref 27–34)
MCHC RBC-ENTMCNC: 33.2 GM/DL — SIGNIFICANT CHANGE UP (ref 32–36)
MCV RBC AUTO: 97.4 FL — SIGNIFICANT CHANGE UP (ref 80–100)
NRBC # BLD: 0 /100 WBCS — SIGNIFICANT CHANGE UP (ref 0–0)
PLATELET # BLD AUTO: 740 K/UL — HIGH (ref 150–400)
POTASSIUM SERPL-MCNC: 3.2 MMOL/L — LOW (ref 3.5–5.3)
POTASSIUM SERPL-SCNC: 3.2 MMOL/L — LOW (ref 3.5–5.3)
PROT SERPL-MCNC: 5.4 G/DL — LOW (ref 6–8.3)
RBC # BLD: 2.72 M/UL — LOW (ref 4.2–5.8)
RBC # FLD: 12.5 % — SIGNIFICANT CHANGE UP (ref 10.3–14.5)
SODIUM SERPL-SCNC: 138 MMOL/L — SIGNIFICANT CHANGE UP (ref 135–145)
WBC # BLD: 9.48 K/UL — SIGNIFICANT CHANGE UP (ref 3.8–10.5)
WBC # FLD AUTO: 9.48 K/UL — SIGNIFICANT CHANGE UP (ref 3.8–10.5)

## 2022-09-01 PROCEDURE — 99232 SBSQ HOSP IP/OBS MODERATE 35: CPT

## 2022-09-01 RX ORDER — POTASSIUM CHLORIDE 20 MEQ
20 PACKET (EA) ORAL ONCE
Refills: 0 | Status: COMPLETED | OUTPATIENT
Start: 2022-09-01 | End: 2022-09-01

## 2022-09-01 RX ADMIN — METHOCARBAMOL 750 MILLIGRAM(S): 500 TABLET, FILM COATED ORAL at 05:42

## 2022-09-01 RX ADMIN — MUPIROCIN 1 APPLICATION(S): 20 OINTMENT TOPICAL at 05:43

## 2022-09-01 RX ADMIN — OXYCODONE HYDROCHLORIDE 15 MILLIGRAM(S): 5 TABLET ORAL at 18:30

## 2022-09-01 RX ADMIN — OXYCODONE HYDROCHLORIDE 15 MILLIGRAM(S): 5 TABLET ORAL at 18:27

## 2022-09-01 RX ADMIN — METHOCARBAMOL 750 MILLIGRAM(S): 500 TABLET, FILM COATED ORAL at 22:20

## 2022-09-01 RX ADMIN — Medication 50 MILLIGRAM(S): at 18:26

## 2022-09-01 RX ADMIN — OXYCODONE HYDROCHLORIDE 15 MILLIGRAM(S): 5 TABLET ORAL at 05:43

## 2022-09-01 RX ADMIN — ENOXAPARIN SODIUM 40 MILLIGRAM(S): 100 INJECTION SUBCUTANEOUS at 18:28

## 2022-09-01 RX ADMIN — Medication 50 MILLIGRAM(S): at 05:43

## 2022-09-01 RX ADMIN — TENOFOVIR DISOPROXIL FUMARATE 300 MILLIGRAM(S): 300 TABLET, FILM COATED ORAL at 12:20

## 2022-09-01 RX ADMIN — Medication 5 MILLIGRAM(S): at 05:42

## 2022-09-01 RX ADMIN — SENNA PLUS 2 TABLET(S): 8.6 TABLET ORAL at 22:20

## 2022-09-01 RX ADMIN — PANTOPRAZOLE SODIUM 40 MILLIGRAM(S): 20 TABLET, DELAYED RELEASE ORAL at 05:42

## 2022-09-01 RX ADMIN — OXYCODONE HYDROCHLORIDE 15 MILLIGRAM(S): 5 TABLET ORAL at 07:29

## 2022-09-01 RX ADMIN — ATORVASTATIN CALCIUM 20 MILLIGRAM(S): 80 TABLET, FILM COATED ORAL at 22:20

## 2022-09-01 RX ADMIN — CYCLOBENZAPRINE HYDROCHLORIDE 5 MILLIGRAM(S): 10 TABLET, FILM COATED ORAL at 12:20

## 2022-09-01 RX ADMIN — METHOCARBAMOL 750 MILLIGRAM(S): 500 TABLET, FILM COATED ORAL at 14:03

## 2022-09-01 RX ADMIN — OXYCODONE HYDROCHLORIDE 10 MILLIGRAM(S): 5 TABLET ORAL at 14:06

## 2022-09-01 RX ADMIN — Medication 20 MILLIEQUIVALENT(S): at 12:19

## 2022-09-01 NOTE — PROGRESS NOTE ADULT - ASSESSMENT
65 yo M with h/o HTN, RA, GERD, Hepatitis C, s/p prior lumbar decompression & fusion in 2015 presented to [facility] with worsening back pain x 2 months associated with unsteadiness and numbness in his feet for scheduled spine surgery. s/p Posterior L2 PSO, extension of fusion to T9, T10-L2 Decompression, w/ Plastics Closure 8/18/22 with EBL 750cc. Post-op complicated by hypotension requiring pressors, elevated CPK, leukocytosis, electrolyte derangements, and symptomatic orthostatic hypotension. Now admitted to [facility] inpatient rehab.    #Cord compression  - s/p Posterior L2 PSO, extension of fusion to T9, T10-L2 Decompression w/ Dr Conner, w/ Plastics Closure 8/18/22 with EBL 750cc.   - Continue hemovac/MALLIKA, monitor output. Per [facility] discharge, may be removed if output < 20 mL for 24 hrs after checking with Plastics Dr. Pop  - Pain and bowel regimen per primary  - Follow up with Surgery after post rehab    #Hypertension  #Orthostatic hypotension - improved  - Toprol 50 mg BID  - Continue to hold Valsartan    #Anemia  - Normal Hb at PST 8/10, anemia may postoperative s/p spine surgery  - Received 1 u pRBCs 8/23  - Hb stable    #Thrombocytosis  - More likely reactive in origin  - Monitor CBC    #Hx Elevated CPK  - CPK peaked at 8014 on 8/19 then downtrended to 691  - s/p aggressive IVF  - repeat CPK normal    #Rheumatoid Arthritis  - Receives Kevzara injection d2rhtna, was held for OR. Can check with patient's rheumatologist regarding resuming  - Continue prednisone 5mg daily  - Continue pantoprazole while inpatient for GI ppx and known GERD  - outpatient f/u with rheumatologist    #Hepatitis C  - Continue tenofovir 300mg daily    #DVT PPx  - Lovenox

## 2022-09-01 NOTE — PROGRESS NOTE ADULT - SUBJECTIVE AND OBJECTIVE BOX
Patient is a 66y old Male who presents with a chief complaint of SCC s/p decompression (31 Aug 2022 16:13)    Patient seen and examined at bedside. No acute overnight events. Patient feels well, denies complaints. Feels he is getting stronger with therapy.    ALLERGIES:  gabapentin (Other)  Shrimp (Unknown)    MEDICATIONS  (STANDING):  atorvastatin 20 milliGRAM(s) Oral at bedtime  enoxaparin Injectable 40 milliGRAM(s) SubCutaneous every 24 hours  methocarbamol 750 milliGRAM(s) Oral every 8 hours  metoprolol tartrate 50 milliGRAM(s) Oral two times a day  mupirocin 2% Ointment 1 Application(s) Topical <User Schedule>  oxyCODONE  ER Tablet 15 milliGRAM(s) Oral every 12 hours  pantoprazole    Tablet 40 milliGRAM(s) Oral before breakfast  polyethylene glycol 3350 17 Gram(s) Oral two times a day  predniSONE   Tablet 5 milliGRAM(s) Oral daily  senna 2 Tablet(s) Oral at bedtime  tenofovir disoproxil fumarate (VIREAD) 300 milliGRAM(s) Oral daily    MEDICATIONS  (PRN):  acetaminophen     Tablet .. 650 milliGRAM(s) Oral every 6 hours PRN Moderate Pain (4 - 6)  bisacodyl Suppository 10 milliGRAM(s) Rectal daily PRN Constipation  cyclobenzaprine 5 milliGRAM(s) Oral three times a day PRN Muscle Spasm  oxyCODONE    IR 10 milliGRAM(s) Oral every 4 hours PRN Severe Pain (7 - 10)  simethicone 80 milliGRAM(s) Chew three times a day PRN Gas    Vital Signs Last 24 Hrs  T(F): 98.5 (01 Sep 2022 07:50), Max: 98.6 (31 Aug 2022 21:22)  HR: 93 (01 Sep 2022 07:50) (88 - 98)  BP: 127/77 (01 Sep 2022 07:50) (115/75 - 127/77)  RR: 16 (01 Sep 2022 07:50) (16 - 16)  SpO2: 95% (01 Sep 2022 07:50) (95% - 95%)    I&O's Summary    31 Aug 2022 07:01  -  01 Sep 2022 07:00  --------------------------------------------------------  IN: 0 mL / OUT: 45 mL / NET: -45 mL          PHYSICAL EXAM:  GENERAL: NAD  HEENT: Anicteric, moist mucous membranes, no pharyngeal exudates  EYES: Conjunctiva clear, no scleral icterus  CHEST/LUNG: Clear to auscultation bilaterally, breathing nonlabored, no rales, wheezes, or rhonchi  HEART: S1 S2, regular rate and rhythm  ABDOMEN: BS+, soft, nontender, nondistended  BACK: Spine incision site dressing c/d/i, drain in place  EXTREMITIES: No edema, cyanosis, or calf tenderness  NERVOUS SYSTEM: A/O x3, moves all extremities  PSYCH: Answers questions and follows commands appropriately  SKIN: As above    LABS:                        8.8    9.48  )-----------( 740      ( 01 Sep 2022 06:15 )             26.5       09-01    138  |  101  |  6   ----------------------------<  89  3.2   |  31  |  0.86    Ca    8.6      01 Sep 2022 06:15    TPro  5.4  /  Alb  2.1  /  TBili  0.2  /  DBili  x   /  AST  24  /  ALT  25  /  AlkPhos  63  09-01            CARDIAC MARKERS ( 01 Sep 2022 06:15 )  x     / x     / 58 U/L / x     / x          08-18 Chol 124 mg/dL LDL -- HDL 45 mg/dL Trig 80 mg/dL                      COVID-19 PCR: NotDetec (08-25-22 @ 07:26)  COVID-19 PCR: NotDetec (08-15-22 @ 16:40)      RADIOLOGY & ADDITIONAL TESTS:     Care Discussed with Consultants/Other Providers: PM&R

## 2022-09-01 NOTE — PROGRESS NOTE ADULT - SUBJECTIVE AND OBJECTIVE BOX
Patient is a 66y old  Male who presents with a chief complaint of non-speciifed spinal cord compression  s/p decompression    Subj: Patient seen and examined sitting this am. Lying in bed. NAD. Reports BM daily, eating well, sleeping better in this room. Endorses that pain is much improved from a few days ago. States that ice helped with therapies. MALLIKA drain present with serosang output.       ROS  Denies HA/dizziness  Denies CP/palpitations  Denies abdominal pain or nausea  Denies dysuria  +BM 9/1    PHYSICAL EXAM  Vital Signs Last 24 Hrs  T(C): 36.9 (01 Sep 2022 07:50), Max: 37 (31 Aug 2022 21:22)  T(F): 98.5 (01 Sep 2022 07:50), Max: 98.6 (31 Aug 2022 21:22)  HR: 93 (01 Sep 2022 07:50) (88 - 98)  BP: 127/77 (01 Sep 2022 07:50) (115/75 - 127/77)  RR: 16 (01 Sep 2022 07:50) (16 - 16)  SpO2: 95% (01 Sep 2022 07:50) (95% - 95%)    Parameters below as of 01 Sep 2022 07:50  Patient On (Oxygen Delivery Method): room air    24 hours (8/31-9/1) 45cc output    Constitutional - NAD,   Chest - breathing comfortably  Cardiovascular - well perfused  Abdomen - Soft, NTND  Extremities - No C/C/E, No calf tenderness   Anti gravity strength  Skin: Back incision with aquacell dressing, sutures +  MALLIKA drain serosang output. Chest abrasion healing well. R leg dressing changed AM.   Psychiatric - Mood stable, Affect WNL    Function:  Min assist/CS with transfers/ambulation with walker     MEDICATIONS  (STANDING):  atorvastatin 20 milliGRAM(s) Oral at bedtime  enoxaparin Injectable 40 milliGRAM(s) SubCutaneous every 24 hours  methocarbamol 750 milliGRAM(s) Oral every 8 hours  metoprolol tartrate 50 milliGRAM(s) Oral two times a day  mupirocin 2% Ointment 1 Application(s) Topical <User Schedule>  oxyCODONE  ER Tablet 15 milliGRAM(s) Oral every 12 hours  pantoprazole    Tablet 40 milliGRAM(s) Oral before breakfast  polyethylene glycol 3350 17 Gram(s) Oral two times a day  predniSONE   Tablet 5 milliGRAM(s) Oral daily  senna 2 Tablet(s) Oral at bedtime  tenofovir disoproxil fumarate (VIREAD) 300 milliGRAM(s) Oral daily    MEDICATIONS  (PRN):  acetaminophen     Tablet .. 650 milliGRAM(s) Oral every 6 hours PRN Moderate Pain (4 - 6)  bisacodyl Suppository 10 milliGRAM(s) Rectal daily PRN Constipation  cyclobenzaprine 5 milliGRAM(s) Oral three times a day PRN Muscle Spasm  oxyCODONE    IR 10 milliGRAM(s) Oral every 4 hours PRN Severe Pain (7 - 10)  simethicone 80 milliGRAM(s) Chew three times a day PRN Gas

## 2022-09-02 PROCEDURE — 99232 SBSQ HOSP IP/OBS MODERATE 35: CPT

## 2022-09-02 PROCEDURE — 99232 SBSQ HOSP IP/OBS MODERATE 35: CPT | Mod: GC

## 2022-09-02 RX ADMIN — METHOCARBAMOL 750 MILLIGRAM(S): 500 TABLET, FILM COATED ORAL at 21:51

## 2022-09-02 RX ADMIN — PANTOPRAZOLE SODIUM 40 MILLIGRAM(S): 20 TABLET, DELAYED RELEASE ORAL at 05:15

## 2022-09-02 RX ADMIN — OXYCODONE HYDROCHLORIDE 15 MILLIGRAM(S): 5 TABLET ORAL at 18:38

## 2022-09-02 RX ADMIN — OXYCODONE HYDROCHLORIDE 10 MILLIGRAM(S): 5 TABLET ORAL at 12:10

## 2022-09-02 RX ADMIN — METHOCARBAMOL 750 MILLIGRAM(S): 500 TABLET, FILM COATED ORAL at 14:40

## 2022-09-02 RX ADMIN — OXYCODONE HYDROCHLORIDE 15 MILLIGRAM(S): 5 TABLET ORAL at 06:20

## 2022-09-02 RX ADMIN — OXYCODONE HYDROCHLORIDE 15 MILLIGRAM(S): 5 TABLET ORAL at 05:15

## 2022-09-02 RX ADMIN — ATORVASTATIN CALCIUM 20 MILLIGRAM(S): 80 TABLET, FILM COATED ORAL at 21:51

## 2022-09-02 RX ADMIN — MUPIROCIN 1 APPLICATION(S): 20 OINTMENT TOPICAL at 05:18

## 2022-09-02 RX ADMIN — Medication 5 MILLIGRAM(S): at 05:15

## 2022-09-02 RX ADMIN — OXYCODONE HYDROCHLORIDE 10 MILLIGRAM(S): 5 TABLET ORAL at 08:06

## 2022-09-02 RX ADMIN — TENOFOVIR DISOPROXIL FUMARATE 300 MILLIGRAM(S): 300 TABLET, FILM COATED ORAL at 11:52

## 2022-09-02 RX ADMIN — OXYCODONE HYDROCHLORIDE 10 MILLIGRAM(S): 5 TABLET ORAL at 08:36

## 2022-09-02 RX ADMIN — OXYCODONE HYDROCHLORIDE 10 MILLIGRAM(S): 5 TABLET ORAL at 12:40

## 2022-09-02 RX ADMIN — Medication 50 MILLIGRAM(S): at 05:15

## 2022-09-02 RX ADMIN — Medication 50 MILLIGRAM(S): at 18:10

## 2022-09-02 RX ADMIN — ENOXAPARIN SODIUM 40 MILLIGRAM(S): 100 INJECTION SUBCUTANEOUS at 20:04

## 2022-09-02 RX ADMIN — METHOCARBAMOL 750 MILLIGRAM(S): 500 TABLET, FILM COATED ORAL at 05:15

## 2022-09-02 RX ADMIN — OXYCODONE HYDROCHLORIDE 15 MILLIGRAM(S): 5 TABLET ORAL at 18:08

## 2022-09-02 NOTE — PROGRESS NOTE ADULT - ASSESSMENT
65 yo M with h/o HTN, RA, GERD, Hepatitis C, s/p prior lumbar decompression & fusion in 2015 who presents with worsening back pain x 2 months associated with unsteadiness when getting up and numbness in his feet who presented to [facility] for scheduled spine surgery. Now s/p Posterior L2 PSO, extension of fusion to T9, T10-L2 Decompression, w/ Plastics Closure 8/18/22. Post-op complicated by hypotension requiring pressors, elevated CPK, leukocytosis, electrolyte derangements, and symptomatic orthostatic hypotension.    Gait Instability, ADL impairments, and Functional impairments: Continue Comprehensive Rehab Program of PT/OT - Total of 3 hrs/day 5 days/week     # cord compression.   - s/p Posterior L2 PSO, extension of fusion to T9, T10-L2 Decompression w/Dr Conner, w/ Plastics Closure 8/18/22 .   - c/w MALLIKA, monitor output each shift.   - c/w oxycontin ER 15mg q12h (home dose) and - PRN pain regimen  --mobilize, IS    # hyponatremia: noted intermittently through hospital stay. Now improved   # mild hypophosphatemia noted 9/1 - 20mEq KCL x1. Encourage dietary intake.     Orthostatic hypotension: Improved. No episodes reported in therapy      Leukocytosis. : Stable WBC , likely reactive  -- monitor off abx, CXR neg     Hypertension: c/w low dose metoprolol    hold home valsartan 320mg daily  /77 9/1     #Rheumatoid arthritis.   - kevzara injection [id number]. held from OR.    - c/w prednisone 5mg daily  - c/w pantoprazole while inpatient for GI ppx and known GERD  - outpatient f/u with rheumatologist.     # Hepatitis C. :- c/w tenofovir 300mg daily.    #HLD: c/w atorvastatin    Pain Mgmt - Tylenol Oxycodone PRN, methocarbamol for spasms. flexeril PRN    GI/Bowel Mgmt - Senna, Miralax qd, dulcolax supp added prn. PRN simethicone for indigestion. PPX pantoprazole.     /Bladder Mgmt - Toileting prn. Using urinal     Hospitalist fu noted        65 yo M with h/o HTN, RA, GERD, Hepatitis C, s/p prior lumbar decompression & fusion in 2015 who presents with worsening back pain x 2 months associated with unsteadiness when getting up and numbness in his feet who presented to [facility] for scheduled spine surgery. Now s/p Posterior L2 PSO, extension of fusion to T9, T10-L2 Decompression, w/ Plastics Closure 8/18/22. Post-op complicated by hypotension requiring pressors, elevated CPK, leukocytosis, electrolyte derangements, and symptomatic orthostatic hypotension.    Gait Instability, ADL impairments, and Functional impairments: Continue Comprehensive Rehab Program of PT/OT - Total of 3 hrs/day 5 days/week     # cord compression.   - s/p Posterior L2 PSO, extension of fusion to T9, T10-L2 Decompression w/Dr Conner, w/ Plastics Closure 8/18/22 .   - c/w MALLIKA, monitor output each shift.   - c/w oxycontin ER 15mg q12h (home dose) and - PRN pain regimen  --mobilize, IS    # hyponatremia: noted intermittently through hospital stay. Now improved   # mild hypophosphatemia noted 9/1 - 20mEq KCL x1. Encourage dietary intake.     Orthostatic hypotension: Improved. No episodes reported in therapy      Leukocytosis. : Stable WBC , likely reactive  -- monitor off abx, CXR neg     Hypertension: c/w low dose metoprolol    hold home valsartan 320mg daily  /77 9/1     #Rheumatoid arthritis.   - kevzara injection [id number]. held from OR.    - c/w prednisone 5mg daily  - c/w pantoprazole while inpatient for GI ppx and known GERD  - outpatient f/u with rheumatologist.     # Hepatitis C. :- c/w tenofovir 300mg daily.    #HLD: c/w atorvastatin    Pain Mgmt - Tylenol Oxycodone PRN, methocarbamol for spasms. flexeril PRN    GI/Bowel Mgmt - Senna, Miralax qd, dulcolax supp prn. PRN simethicone for indigestion. PPX pantoprazole.     /Bladder Mgmt - Toileting prn. Using urinal     Hospitalist fu noted     Disp:   TDD 9/8 to home

## 2022-09-02 NOTE — PROGRESS NOTE ADULT - ASSESSMENT
66M with h/o HTN, RA, GERD, Hepatitis C, s/p prior lumbar decompression & fusion in 2015 presented to [facility] with worsening back pain x 2 months associated with unsteadiness and numbness in his feet for scheduled spine surgery. s/p Posterior L2 PSO, extension of fusion to T9, T10-L2 Decompression, w/ Plastics Closure 8/18/22 with EBL 750cc. Post-op complicated by hypotension requiring pressors, elevated CPK, leukocytosis, electrolyte derangements, and symptomatic orthostatic hypotension. Now admitted to [facility] inpatient rehab.    #Cord compression  - s/p Posterior L2 PSO, extension of fusion to T9, T10-L2 Decompression w/ Dr Conner, w/ Plastics Closure 8/18/22 with EBL 750cc.   - Continue hemovac/MALLIKA, monitor output. Per [facility] discharge, may be removed if output < 20 mL for 24 hrs after checking with Plastics Dr. Pop  - Pain and bowel regimen per primary  - Follow up with Surgery after post rehab    #Hypertension  #Orthostatic hypotension - improved  - Toprol 50 mg BID  - Continue to hold Valsartan (BP is stable)     #Anemia  - Normal Hb at PST 8/10, anemia may postoperative s/p spine surgery  - Received 1 u pRBCs 8/23  - Hb stable    #Thrombocytosis  - More likely reactive in origin  - Repeat CBC in AM - if continues to rise, would request routine Heme eval     #Hx Elevated CPK  - CPK peaked at 8014 on 8/19 then downtrended to 691  - s/p aggressive IVF  - repeat CPK normal    #Rheumatoid Arthritis  - Receives Kevzara injection [id number], currently on hold while in rehab   - Continue prednisone 5mg daily  - Continue pantoprazole while inpatient for GI ppx and known GERD  - outpatient f/u with rheumatologist    #Hepatitis C  - Continue tenofovir 300mg daily    #Hypokalemia:  - Supplemented yesterday, repeat in AM (will check Mg as well)    #DVT PPx  - Lovenox 66M with h/o HTN, RA, GERD, Hepatitis C, s/p prior lumbar decompression & fusion in 2015 presented to [facility] with worsening back pain x 2 months associated with unsteadiness and numbness in his feet for scheduled spine surgery. s/p Posterior L2 PSO, extension of fusion to T9, T10-L2 Decompression, w/ Plastics Closure 8/18/22 with EBL 750cc. Post-op complicated by hypotension requiring pressors, elevated CPK, leukocytosis, electrolyte derangements, and symptomatic orthostatic hypotension. Now admitted to [facility] inpatient rehab.    #Ambulatory dysfunction  #s/p cord compression requiring surgical intervention  - s/p Posterior L2 PSO, extension of fusion to T9, T10-L2 Decompression w/ Dr Conner, w/ Plastics Closure 8/18/22 with EBL 750cc.   - Continue hemovac/MALLIKA, monitor output. Per [facility] discharge, may be removed if output < 20 mL for 24 hrs after checking with Plastics Dr. Pop  - Pain and bowel regimen per primary  - Follow up with Surgery after post rehab    #Hypertension  #Orthostatic hypotension - improved  - Toprol 50 mg BID  - Continue to hold Valsartan (BP is stable)     #Anemia  - Normal Hb at PST 8/10, anemia may postoperative s/p spine surgery  - Received 1 u pRBCs 8/23  - Hb stable    #Thrombocytosis  - More likely reactive in origin  - Repeat CBC in AM - if continues to rise, would request routine Heme eval     #Hx Elevated CPK  - CPK peaked at 8014 on 8/19 then downtrended to 691  - s/p aggressive IVF  - repeat CPK normal    #Rheumatoid Arthritis  - Receives Kevzara injection [id number], currently on hold while in rehab   - Continue prednisone 5mg daily  - Continue pantoprazole while inpatient for GI ppx and known GERD  - outpatient f/u with rheumatologist    #Hepatitis C  - Continue tenofovir 300mg daily    #Hypokalemia:  - Supplemented yesterday, repeat in AM (will check Mg as well)    #DVT PPx  - Lovenox

## 2022-09-02 NOTE — PROGRESS NOTE ADULT - SUBJECTIVE AND OBJECTIVE BOX
Patient is a 66y old  Male who presents with a chief complaint of SCC s/p decompression (01 Sep 2022 10:35)    Patient seen and examined at bedside. No overnight events reported.     ALLERGIES:  gabapentin (Other)  Shrimp (Unknown)    MEDICATIONS  (STANDING):  atorvastatin 20 milliGRAM(s) Oral at bedtime  enoxaparin Injectable 40 milliGRAM(s) SubCutaneous every 24 hours  methocarbamol 750 milliGRAM(s) Oral every 8 hours  metoprolol tartrate 50 milliGRAM(s) Oral two times a day  mupirocin 2% Ointment 1 Application(s) Topical <User Schedule>  oxyCODONE  ER Tablet 15 milliGRAM(s) Oral every 12 hours  pantoprazole    Tablet 40 milliGRAM(s) Oral before breakfast  polyethylene glycol 3350 17 Gram(s) Oral two times a day  predniSONE   Tablet 5 milliGRAM(s) Oral daily  senna 2 Tablet(s) Oral at bedtime  tenofovir disoproxil fumarate (VIREAD) 300 milliGRAM(s) Oral daily    MEDICATIONS  (PRN):  acetaminophen     Tablet .. 650 milliGRAM(s) Oral every 6 hours PRN Moderate Pain (4 - 6)  bisacodyl Suppository 10 milliGRAM(s) Rectal daily PRN Constipation  cyclobenzaprine 5 milliGRAM(s) Oral three times a day PRN Muscle Spasm  oxyCODONE    IR 10 milliGRAM(s) Oral every 4 hours PRN Severe Pain (7 - 10)  simethicone 80 milliGRAM(s) Chew three times a day PRN Gas    Vital Signs Last 24 Hrs  T(F): 98.8 (02 Sep 2022 08:03), Max: 99.4 (01 Sep 2022 20:55)  HR: 76 (02 Sep 2022 08:03) (76 - 91)  BP: 119/78 (02 Sep 2022 08:03) (119/78 - 125/74)  RR: 16 (02 Sep 2022 08:03) (16 - 16)  SpO2: 97% (02 Sep 2022 08:03) (94% - 97%)  I&O's Summary    01 Sep 2022 07:01  -  02 Sep 2022 07:00  --------------------------------------------------------  IN: 0 mL / OUT: 40 mL / NET: -40 mL      PHYSICAL EXAM:  General: NAD, A/O x 3  ENT: No gross hearing impairment, Moist mucous membranes, no thrush  Neck: Supple, No JVD  Lungs: Clear to auscultation bilaterally, good air entry, non-labored breathing  Cardio: RRR, S1/S2, No murmur  Abdomen: Soft, Nontender, Nondistended; Bowel sounds present  Extremities: No calf tenderness, No cyanosis, No pitting edema  Psych: Appropriate mood and affect  MSK: Surgical drain in place; back wound C/D/I    LABS:                        8.8    9.48  )-----------( 740      ( 01 Sep 2022 06:15 )             26.5     09-01    138  |  101  |  6   ----------------------------<  89  3.2   |  31  |  0.86    Ca    8.6      01 Sep 2022 06:15    TPro  5.4  /  Alb  2.1  /  TBili  0.2  /  DBili  x   /  AST  24  /  ALT  25  /  AlkPhos  63  09-01    CARDIAC MARKERS ( 01 Sep 2022 06:15 )  x     / x     / 58 U/L / x     / x          08-18 Chol 124 mg/dL LDL -- HDL 45 mg/dL Trig 80 mg/dL      COVID-19 PCR: Elijahtec (08-25-22 @ 07:26)  COVID-19 PCR: Elijahtec (08-15-22 @ 16:40)

## 2022-09-02 NOTE — PROGRESS NOTE ADULT - SUBJECTIVE AND OBJECTIVE BOX
Patient is a 66y old  Male who presents with a chief complaint of non-speciifed spinal cord compression  s/p decompression    Subj: Patient seen and examined sitting this am. Lying in bed. NAD. Reports BM daily, eating well, sleeping better in this room. Endorses that pain is much improved from a few days ago. States that ice helped with therapies. MALLIKA drain present with serosang output.       ROS  Denies HA/dizziness  Denies CP/palpitations  Denies abdominal pain or nausea  Denies dysuria  +BM 9/1    PHYSICAL EXAM  Vital Signs Last 24 Hrs  T(C): 36.9 (01 Sep 2022 07:50), Max: 37 (31 Aug 2022 21:22)  T(F): 98.5 (01 Sep 2022 07:50), Max: 98.6 (31 Aug 2022 21:22)  HR: 93 (01 Sep 2022 07:50) (88 - 98)  BP: 127/77 (01 Sep 2022 07:50) (115/75 - 127/77)  RR: 16 (01 Sep 2022 07:50) (16 - 16)  SpO2: 95% (01 Sep 2022 07:50) (95% - 95%)    Parameters below as of 01 Sep 2022 07:50  Patient On (Oxygen Delivery Method): room air    24 hours (9/1-9/2) 40cc output    Constitutional - NAD,   Chest - breathing comfortably  Cardiovascular - well perfused  Abdomen - Soft, NTND  Extremities - No C/C/E, No calf tenderness   Anti gravity strength  Skin: Back incision with aquacell dressing, sutures + MALLIKA drain serosang output. Chest abrasion healing well. Small skin tear near dressing edges on back (L).   Psychiatric - Mood stable, Affect WNL    Function:  Min assist/CS with transfers/ambulation with walker     MEDICATIONS  (STANDING):  atorvastatin 20 milliGRAM(s) Oral at bedtime  enoxaparin Injectable 40 milliGRAM(s) SubCutaneous every 24 hours  methocarbamol 750 milliGRAM(s) Oral every 8 hours  metoprolol tartrate 50 milliGRAM(s) Oral two times a day  mupirocin 2% Ointment 1 Application(s) Topical <User Schedule>  oxyCODONE  ER Tablet 15 milliGRAM(s) Oral every 12 hours  pantoprazole    Tablet 40 milliGRAM(s) Oral before breakfast  polyethylene glycol 3350 17 Gram(s) Oral two times a day  predniSONE   Tablet 5 milliGRAM(s) Oral daily  senna 2 Tablet(s) Oral at bedtime  tenofovir disoproxil fumarate (VIREAD) 300 milliGRAM(s) Oral daily    MEDICATIONS  (PRN):  acetaminophen     Tablet .. 650 milliGRAM(s) Oral every 6 hours PRN Moderate Pain (4 - 6)  bisacodyl Suppository 10 milliGRAM(s) Rectal daily PRN Constipation  cyclobenzaprine 5 milliGRAM(s) Oral three times a day PRN Muscle Spasm  oxyCODONE    IR 10 milliGRAM(s) Oral every 4 hours PRN Severe Pain (7 - 10)  simethicone 80 milliGRAM(s) Chew three times a day PRN Gas

## 2022-09-02 NOTE — PROGRESS NOTE ADULT - ATTENDING COMMENTS
Patient seen on rounds this am.   Overall feels well  Pain well controlled  No new issues overnight   + BM  Denies any bladder issues    2. MALLIKA drain with more serous than sanginuous drainage.   Output decreasing slowly     3. Continue rehab program     4. Labs on Monday Patient seen on rounds this am.   Overall feels well  Pain well controlled  No new issues overnight   + BM  Denies any bladder issues    2. MALLIKA drain with more serous than sanginuous drainage.   Output decreasing slowly     3. Continue rehab program     4. Labs  with thrombocytosis - ? reactive   Routine labs on monday Patient seen on rounds this am.   Overall feels well  Pain well controlled  No new issues overnight   + BM  Denies any bladder issues    2. MALLIKA drain with more serous than sanginuous drainage.   Output decreasing slowly     3. Continue rehab program     4. Labs  with thrombocytosis - ? reactive   Routine labs on monday    5. New aquacell dressing applied by RN today. Incision clean - no erythema

## 2022-09-03 LAB
ANION GAP SERPL CALC-SCNC: 6 MMOL/L — SIGNIFICANT CHANGE UP (ref 5–17)
BUN SERPL-MCNC: 7 MG/DL — SIGNIFICANT CHANGE UP (ref 7–23)
CALCIUM SERPL-MCNC: 9 MG/DL — SIGNIFICANT CHANGE UP (ref 8.4–10.5)
CHLORIDE SERPL-SCNC: 103 MMOL/L — SIGNIFICANT CHANGE UP (ref 96–108)
CO2 SERPL-SCNC: 27 MMOL/L — SIGNIFICANT CHANGE UP (ref 22–31)
CREAT SERPL-MCNC: 0.98 MG/DL — SIGNIFICANT CHANGE UP (ref 0.5–1.3)
EGFR: 85 ML/MIN/1.73M2 — SIGNIFICANT CHANGE UP
GLUCOSE SERPL-MCNC: 80 MG/DL — SIGNIFICANT CHANGE UP (ref 70–99)
HCT VFR BLD CALC: 28.6 % — LOW (ref 39–50)
HGB BLD-MCNC: 9.4 G/DL — LOW (ref 13–17)
MCHC RBC-ENTMCNC: 32.2 PG — SIGNIFICANT CHANGE UP (ref 27–34)
MCHC RBC-ENTMCNC: 32.9 GM/DL — SIGNIFICANT CHANGE UP (ref 32–36)
MCV RBC AUTO: 97.9 FL — SIGNIFICANT CHANGE UP (ref 80–100)
NRBC # BLD: 0 /100 WBCS — SIGNIFICANT CHANGE UP (ref 0–0)
PLATELET # BLD AUTO: 693 K/UL — HIGH (ref 150–400)
POTASSIUM SERPL-MCNC: 4.4 MMOL/L — SIGNIFICANT CHANGE UP (ref 3.5–5.3)
POTASSIUM SERPL-SCNC: 4.4 MMOL/L — SIGNIFICANT CHANGE UP (ref 3.5–5.3)
RBC # BLD: 2.92 M/UL — LOW (ref 4.2–5.8)
RBC # FLD: 12.7 % — SIGNIFICANT CHANGE UP (ref 10.3–14.5)
SODIUM SERPL-SCNC: 136 MMOL/L — SIGNIFICANT CHANGE UP (ref 135–145)
WBC # BLD: 6.9 K/UL — SIGNIFICANT CHANGE UP (ref 3.8–10.5)
WBC # FLD AUTO: 6.9 K/UL — SIGNIFICANT CHANGE UP (ref 3.8–10.5)

## 2022-09-03 PROCEDURE — 99232 SBSQ HOSP IP/OBS MODERATE 35: CPT

## 2022-09-03 RX ADMIN — MUPIROCIN 1 APPLICATION(S): 20 OINTMENT TOPICAL at 06:42

## 2022-09-03 RX ADMIN — OXYCODONE HYDROCHLORIDE 15 MILLIGRAM(S): 5 TABLET ORAL at 19:10

## 2022-09-03 RX ADMIN — Medication 50 MILLIGRAM(S): at 18:40

## 2022-09-03 RX ADMIN — OXYCODONE HYDROCHLORIDE 10 MILLIGRAM(S): 5 TABLET ORAL at 11:25

## 2022-09-03 RX ADMIN — OXYCODONE HYDROCHLORIDE 15 MILLIGRAM(S): 5 TABLET ORAL at 06:41

## 2022-09-03 RX ADMIN — METHOCARBAMOL 750 MILLIGRAM(S): 500 TABLET, FILM COATED ORAL at 22:00

## 2022-09-03 RX ADMIN — OXYCODONE HYDROCHLORIDE 15 MILLIGRAM(S): 5 TABLET ORAL at 18:40

## 2022-09-03 RX ADMIN — ATORVASTATIN CALCIUM 20 MILLIGRAM(S): 80 TABLET, FILM COATED ORAL at 22:00

## 2022-09-03 RX ADMIN — METHOCARBAMOL 750 MILLIGRAM(S): 500 TABLET, FILM COATED ORAL at 15:06

## 2022-09-03 RX ADMIN — SENNA PLUS 2 TABLET(S): 8.6 TABLET ORAL at 22:00

## 2022-09-03 RX ADMIN — OXYCODONE HYDROCHLORIDE 10 MILLIGRAM(S): 5 TABLET ORAL at 10:55

## 2022-09-03 RX ADMIN — METHOCARBAMOL 750 MILLIGRAM(S): 500 TABLET, FILM COATED ORAL at 06:41

## 2022-09-03 RX ADMIN — CYCLOBENZAPRINE HYDROCHLORIDE 5 MILLIGRAM(S): 10 TABLET, FILM COATED ORAL at 10:56

## 2022-09-03 RX ADMIN — OXYCODONE HYDROCHLORIDE 10 MILLIGRAM(S): 5 TABLET ORAL at 22:05

## 2022-09-03 RX ADMIN — Medication 50 MILLIGRAM(S): at 06:42

## 2022-09-03 RX ADMIN — TENOFOVIR DISOPROXIL FUMARATE 300 MILLIGRAM(S): 300 TABLET, FILM COATED ORAL at 11:10

## 2022-09-03 RX ADMIN — Medication 5 MILLIGRAM(S): at 06:42

## 2022-09-03 RX ADMIN — PANTOPRAZOLE SODIUM 40 MILLIGRAM(S): 20 TABLET, DELAYED RELEASE ORAL at 06:42

## 2022-09-03 RX ADMIN — ENOXAPARIN SODIUM 40 MILLIGRAM(S): 100 INJECTION SUBCUTANEOUS at 20:31

## 2022-09-03 NOTE — PROGRESS NOTE ADULT - ASSESSMENT
65 yo M with h/o HTN, RA, GERD, Hepatitis C, s/p prior lumbar decompression & fusion in 2015 who presents with worsening back pain x 2 months associated with unsteadiness when getting up and numbness in his feet who presented to [facility] for scheduled spine surgery. Now s/p Posterior L2 PSO, extension of fusion to T9, T10-L2 Decompression, w/ Plastics Closure 8/18/22. Post-op complicated by hypotension requiring pressors, elevated CPK, leukocytosis, electrolyte derangements, and symptomatic orthostatic hypotension.    Gait Instability, ADL impairments, and Functional impairments: Continue Comprehensive Rehab Program of PT/OT - Total of 3 hrs/day 5 days/week     # cord compression.   - s/p Posterior L2 PSO, extension of fusion to T9, T10-L2 Decompression w/Dr Conner, w/ Plastics Closure 8/18/22 .   - c/w MALLIKA, monitor output each shift.   - c/w oxycontin ER 15mg q12h (home dose) and - PRN pain regimen  --mobilize, IS    # hyponatremia: noted intermittently through hospital stay. Now improved   # hypokalemia - 20mEq KCL x1. Encourage dietary intake.  K=4.4 9/3    Orthostatic hypotension: Improved.      Leukocytosis. : Stable WBC ,  -- monitor off abx, CXR neg    ##Thrombocytosis: yld=704 9/2; Plt =693 9/3  - More likely reactive in origin  - Repeat CBC 9/5 - monitor     Hypertension: c/w low dose metoprolol    hold home valsartan 320mg daily  /77 9/1     #Rheumatoid arthritis.   - kevzara injection [id number]. held from OR.    - c/w prednisone 5mg daily  - c/w pantoprazole while inpatient for GI ppx and known GERD  - outpatient f/u with rheumatologist.     # Hepatitis C. :- c/w tenofovir 300mg daily.    #HLD: c/w atorvastatin    Pain Mgmt - Tylenol Oxycodone PRN, methocarbamol for spasms. flexeril PRN    GI/Bowel Mgmt - Senna, Miralax qd, dulcolax supp prn. PRN simethicone for indigestion. PPX pantoprazole.     /Bladder Mgmt - Toileting prn. Using urinal     Labs:   CBC, CMP 9/5

## 2022-09-03 NOTE — PROGRESS NOTE ADULT - SUBJECTIVE AND OBJECTIVE BOX
Patient is a 66y old  Male who presents with a chief complaint of non-speciifed spinal cord compression  s/p decompression    Subj: Patient seen and examined this morning at bedside. Patient in bed in NAD, no SOB, no CP, (+)back pain controlled.     ROS  Denies HA/dizziness  Denies abdominal pain or nausea  Denies dysuria      PHYSICAL EXAM  Vital Signs Last 24 Hrs  T(C): 36.7 (03 Sep 2022 06:36), Max: 36.9 (02 Sep 2022 19:59)  T(F): 98.1 (03 Sep 2022 06:36), Max: 98.4 (02 Sep 2022 19:59)  HR: 91 (03 Sep 2022 06:36) (91 - 100)  BP: 132/84 (03 Sep 2022 06:36) (120/75 - 132/84)  BP(mean): --  RR: 18 (03 Sep 2022 06:36) (16 - 18)  SpO2: 96% (03 Sep 2022 06:36) (95% - 98%)    Parameters below as of 03 Sep 2022 06:36  Patient On (Oxygen Delivery Method): room air        Constitutional - NAD,   Chest - breathing comfortably, CYA  Cardiovascular - well perfused  Abdomen - Soft, NTND  Extremities - No C/C/E, No calf tenderness   Motor:  BUE: 5/5mp  BLE: 4-4+/5mp  Skin: Back incision with aquacell dressing, sutures + MALLIKA drain serosang output.   Psychiatric - Mood stable, Affect WNL        MEDICATIONS  (STANDING):  atorvastatin 20 milliGRAM(s) Oral at bedtime  enoxaparin Injectable 40 milliGRAM(s) SubCutaneous every 24 hours  methocarbamol 750 milliGRAM(s) Oral every 8 hours  metoprolol tartrate 50 milliGRAM(s) Oral two times a day  mupirocin 2% Ointment 1 Application(s) Topical <User Schedule>  oxyCODONE  ER Tablet 15 milliGRAM(s) Oral every 12 hours  pantoprazole    Tablet 40 milliGRAM(s) Oral before breakfast  polyethylene glycol 3350 17 Gram(s) Oral two times a day  predniSONE   Tablet 5 milliGRAM(s) Oral daily  senna 2 Tablet(s) Oral at bedtime  tenofovir disoproxil fumarate (VIREAD) 300 milliGRAM(s) Oral daily    MEDICATIONS  (PRN):  acetaminophen     Tablet .. 650 milliGRAM(s) Oral every 6 hours PRN Moderate Pain (4 - 6)  bisacodyl Suppository 10 milliGRAM(s) Rectal daily PRN Constipation  cyclobenzaprine 5 milliGRAM(s) Oral three times a day PRN Muscle Spasm  oxyCODONE    IR 10 milliGRAM(s) Oral every 4 hours PRN Severe Pain (7 - 10)  simethicone 80 milliGRAM(s) Chew three times a day PRN Gas

## 2022-09-04 PROCEDURE — 99232 SBSQ HOSP IP/OBS MODERATE 35: CPT

## 2022-09-04 RX ADMIN — PANTOPRAZOLE SODIUM 40 MILLIGRAM(S): 20 TABLET, DELAYED RELEASE ORAL at 06:09

## 2022-09-04 RX ADMIN — POLYETHYLENE GLYCOL 3350 17 GRAM(S): 17 POWDER, FOR SOLUTION ORAL at 17:14

## 2022-09-04 RX ADMIN — ENOXAPARIN SODIUM 40 MILLIGRAM(S): 100 INJECTION SUBCUTANEOUS at 18:12

## 2022-09-04 RX ADMIN — MUPIROCIN 1 APPLICATION(S): 20 OINTMENT TOPICAL at 08:33

## 2022-09-04 RX ADMIN — TENOFOVIR DISOPROXIL FUMARATE 300 MILLIGRAM(S): 300 TABLET, FILM COATED ORAL at 11:52

## 2022-09-04 RX ADMIN — OXYCODONE HYDROCHLORIDE 15 MILLIGRAM(S): 5 TABLET ORAL at 06:10

## 2022-09-04 RX ADMIN — METHOCARBAMOL 750 MILLIGRAM(S): 500 TABLET, FILM COATED ORAL at 21:12

## 2022-09-04 RX ADMIN — SENNA PLUS 2 TABLET(S): 8.6 TABLET ORAL at 21:12

## 2022-09-04 RX ADMIN — ATORVASTATIN CALCIUM 20 MILLIGRAM(S): 80 TABLET, FILM COATED ORAL at 21:11

## 2022-09-04 RX ADMIN — Medication 50 MILLIGRAM(S): at 06:09

## 2022-09-04 RX ADMIN — OXYCODONE HYDROCHLORIDE 15 MILLIGRAM(S): 5 TABLET ORAL at 17:13

## 2022-09-04 RX ADMIN — METHOCARBAMOL 750 MILLIGRAM(S): 500 TABLET, FILM COATED ORAL at 06:10

## 2022-09-04 RX ADMIN — Medication 50 MILLIGRAM(S): at 17:13

## 2022-09-04 RX ADMIN — METHOCARBAMOL 750 MILLIGRAM(S): 500 TABLET, FILM COATED ORAL at 13:55

## 2022-09-04 RX ADMIN — CYCLOBENZAPRINE HYDROCHLORIDE 5 MILLIGRAM(S): 10 TABLET, FILM COATED ORAL at 17:17

## 2022-09-04 RX ADMIN — Medication 5 MILLIGRAM(S): at 06:09

## 2022-09-04 NOTE — PROGRESS NOTE ADULT - ASSESSMENT
67 yo M with h/o HTN, RA, GERD, Hepatitis C, s/p prior lumbar decompression & fusion in 2015 who presents with worsening back pain x 2 months associated with unsteadiness when getting up and numbness in his feet who presented to [facility] for scheduled spine surgery. Now s/p Posterior L2 PSO, extension of fusion to T9, T10-L2 Decompression, w/ Plastics Closure 8/18/22. Post-op complicated by hypotension requiring pressors, elevated CPK, leukocytosis, electrolyte derangements, and symptomatic orthostatic hypotension.    Gait Instability, ADL impairments, and Functional impairments: Continue Comprehensive Rehab Program of PT/OT - Total of 3 hrs/day 5 days/week     # cord compression.   - s/p Posterior L2 PSO, extension of fusion to T9, T10-L2 Decompression w/Dr Conner, w/ Plastics Closure 8/18/22 .   - c/w MALLIKA, monitor output each shift.   - c/w oxycontin ER 15mg q12h (home dose) and - PRN pain regimen  --mobilize, IS    # hyponatremia: noted intermittently through hospital stay. Now improved   # hypokalemia - 20mEq KCL x1. Encourage dietary intake.  K=4.4 9/3    Orthostatic hypotension: Improved.      Leukocytosis. : Stable WBC ,  -- monitor off abx, CXR neg    ##Thrombocytosis: qzk=793 9/2; Plt =693 9/3  - More likely reactive in origin  - Repeat CBC 9/5 - monitor     Hypertension: c/w low dose metoprolol    hold home valsartan 320mg daily   /76 9/4     #Rheumatoid arthritis.   - kevzara injection [id number]. held from OR.    - c/w prednisone 5mg daily  - c/w pantoprazole while inpatient for GI ppx and known GERD  - outpatient f/u with rheumatologist.     # Hepatitis C. :- c/w tenofovir 300mg daily.    #HLD: c/w atorvastatin    Pain Mgmt - Tylenol Oxycodone PRN, methocarbamol for spasms. flexeril PRN    GI/Bowel Mgmt - Senna, Miralax qd, dulcolax supp prn. PRN simethicone for indigestion. PPX pantoprazole.     /Bladder Mgmt - Toileting prn. Using urinal     Labs:   CBC, CMP 9/5

## 2022-09-04 NOTE — PROGRESS NOTE ADULT - SUBJECTIVE AND OBJECTIVE BOX
Patient is a 66y old  Male who presents with a chief complaint of non-speciifed spinal cord compression  s/p decompression    Subj: Patient seen and examined this morning at bedside. Patient in bed in NAD, no SOB, no CP, (+)back pain controlled.     ROS  Denies HA/dizziness  Denies abdominal pain or nausea  Denies dysuria      PHYSICAL EXAM  Vital Signs Last 24 Hrs  T(C): 37.2 (03 Sep 2022 20:29), Max: 37.2 (03 Sep 2022 11:05)  T(F): 98.9 (03 Sep 2022 20:29), Max: 98.9 (03 Sep 2022 11:05)  HR: 71 (04 Sep 2022 06:08) (71 - 87)  BP: 122/76 (04 Sep 2022 06:08) (115/70 - 136/81)  BP(mean): --  RR: 18 (03 Sep 2022 20:29) (18 - 18)  SpO2: 94% (03 Sep 2022 20:29) (94% - 97%)    Parameters below as of 03 Sep 2022 20:29  Patient On (Oxygen Delivery Method): room air        Constitutional - NAD,   Chest - breathing comfortably, CYA  Cardiovascular - well perfused  Abdomen - Soft, NTND  Extremities - No C/C/E, No calf tenderness   Motor:  BUE: 5/5mp  BLE: 4-4+/5mp  Skin: Back incision with aquacell dressing, sutures + MALLIKA drain.   Psychiatric - Mood stable, Affect WNL        MEDICATIONS  (STANDING):  atorvastatin 20 milliGRAM(s) Oral at bedtime  enoxaparin Injectable 40 milliGRAM(s) SubCutaneous every 24 hours  methocarbamol 750 milliGRAM(s) Oral every 8 hours  metoprolol tartrate 50 milliGRAM(s) Oral two times a day  mupirocin 2% Ointment 1 Application(s) Topical <User Schedule>  oxyCODONE  ER Tablet 15 milliGRAM(s) Oral every 12 hours  pantoprazole    Tablet 40 milliGRAM(s) Oral before breakfast  polyethylene glycol 3350 17 Gram(s) Oral two times a day  predniSONE   Tablet 5 milliGRAM(s) Oral daily  senna 2 Tablet(s) Oral at bedtime  tenofovir disoproxil fumarate (VIREAD) 300 milliGRAM(s) Oral daily    MEDICATIONS  (PRN):  acetaminophen     Tablet .. 650 milliGRAM(s) Oral every 6 hours PRN Moderate Pain (4 - 6)  bisacodyl Suppository 10 milliGRAM(s) Rectal daily PRN Constipation  cyclobenzaprine 5 milliGRAM(s) Oral three times a day PRN Muscle Spasm  oxyCODONE    IR 10 milliGRAM(s) Oral every 4 hours PRN Severe Pain (7 - 10)  simethicone 80 milliGRAM(s) Chew three times a day PRN Gas

## 2022-09-04 NOTE — PROGRESS NOTE ADULT - NUTRITIONAL ASSESSMENT
Diet, Regular (08-27-22 @ 10:46) [Active]
Die

## 2022-09-05 PROCEDURE — 99232 SBSQ HOSP IP/OBS MODERATE 35: CPT

## 2022-09-05 RX ADMIN — OXYCODONE HYDROCHLORIDE 15 MILLIGRAM(S): 5 TABLET ORAL at 17:35

## 2022-09-05 RX ADMIN — OXYCODONE HYDROCHLORIDE 15 MILLIGRAM(S): 5 TABLET ORAL at 07:01

## 2022-09-05 RX ADMIN — SENNA PLUS 2 TABLET(S): 8.6 TABLET ORAL at 21:46

## 2022-09-05 RX ADMIN — Medication 50 MILLIGRAM(S): at 05:13

## 2022-09-05 RX ADMIN — OXYCODONE HYDROCHLORIDE 10 MILLIGRAM(S): 5 TABLET ORAL at 13:16

## 2022-09-05 RX ADMIN — OXYCODONE HYDROCHLORIDE 10 MILLIGRAM(S): 5 TABLET ORAL at 12:39

## 2022-09-05 RX ADMIN — METHOCARBAMOL 750 MILLIGRAM(S): 500 TABLET, FILM COATED ORAL at 15:39

## 2022-09-05 RX ADMIN — POLYETHYLENE GLYCOL 3350 17 GRAM(S): 17 POWDER, FOR SOLUTION ORAL at 21:46

## 2022-09-05 RX ADMIN — Medication 50 MILLIGRAM(S): at 17:35

## 2022-09-05 RX ADMIN — METHOCARBAMOL 750 MILLIGRAM(S): 500 TABLET, FILM COATED ORAL at 05:13

## 2022-09-05 RX ADMIN — ATORVASTATIN CALCIUM 20 MILLIGRAM(S): 80 TABLET, FILM COATED ORAL at 21:46

## 2022-09-05 RX ADMIN — METHOCARBAMOL 750 MILLIGRAM(S): 500 TABLET, FILM COATED ORAL at 21:46

## 2022-09-05 RX ADMIN — PANTOPRAZOLE SODIUM 40 MILLIGRAM(S): 20 TABLET, DELAYED RELEASE ORAL at 06:48

## 2022-09-05 RX ADMIN — TENOFOVIR DISOPROXIL FUMARATE 300 MILLIGRAM(S): 300 TABLET, FILM COATED ORAL at 12:39

## 2022-09-05 RX ADMIN — Medication 5 MILLIGRAM(S): at 05:13

## 2022-09-05 RX ADMIN — OXYCODONE HYDROCHLORIDE 15 MILLIGRAM(S): 5 TABLET ORAL at 05:13

## 2022-09-05 RX ADMIN — CYCLOBENZAPRINE HYDROCHLORIDE 5 MILLIGRAM(S): 10 TABLET, FILM COATED ORAL at 12:38

## 2022-09-05 RX ADMIN — ENOXAPARIN SODIUM 40 MILLIGRAM(S): 100 INJECTION SUBCUTANEOUS at 20:00

## 2022-09-05 NOTE — PROGRESS NOTE ADULT - SUBJECTIVE AND OBJECTIVE BOX
Patient is a 66y old  Male who presents with a chief complaint of non-speciifed spinal cord compression  s/p decompression    Subj: Patient seen and examined this morning at bedside. Patient in bed in NAD, no SOB, no CP, (+)back pain controlled.     ROS  Denies HA/dizziness  Denies abdominal pain or nausea  Denies dysuria      PHYSICAL EXAM  Vital Signs Last 24 Hrs  T(C): 36.8 (04 Sep 2022 20:20), Max: 37.1 (04 Sep 2022 08:15)  T(F): 98.2 (04 Sep 2022 20:20), Max: 98.7 (04 Sep 2022 08:15)  HR: 85 (05 Sep 2022 05:10) (73 - 91)  BP: 114/77 (05 Sep 2022 05:10) (113/56 - 138/85)  BP(mean): --  RR: 16 (04 Sep 2022 20:20) (16 - 16)  SpO2: 95% (04 Sep 2022 20:20) (94% - 95%)    Parameters below as of 04 Sep 2022 20:20  Patient On (Oxygen Delivery Method): room air        Constitutional - NAD,   Chest - breathing comfortably, CYA  Cardiovascular - well perfused  Abdomen - Soft, NTND  Extremities - No C/C/E, No calf tenderness   Motor:  BUE: 5/5mp  BLE: 4-4+/5mp  Skin: Back incision with aquacell dressing, sutures + MALLIKA drain with serosangenous fluid, no erythema   Psychiatric - Mood stable, Affect WNL        MEDICATIONS  (STANDING):  atorvastatin 20 milliGRAM(s) Oral at bedtime  enoxaparin Injectable 40 milliGRAM(s) SubCutaneous every 24 hours  methocarbamol 750 milliGRAM(s) Oral every 8 hours  metoprolol tartrate 50 milliGRAM(s) Oral two times a day  mupirocin 2% Ointment 1 Application(s) Topical <User Schedule>  oxyCODONE  ER Tablet 15 milliGRAM(s) Oral every 12 hours  pantoprazole    Tablet 40 milliGRAM(s) Oral before breakfast  polyethylene glycol 3350 17 Gram(s) Oral two times a day  predniSONE   Tablet 5 milliGRAM(s) Oral daily  senna 2 Tablet(s) Oral at bedtime  tenofovir disoproxil fumarate (VIREAD) 300 milliGRAM(s) Oral daily    MEDICATIONS  (PRN):  acetaminophen     Tablet .. 650 milliGRAM(s) Oral every 6 hours PRN Moderate Pain (4 - 6)  bisacodyl Suppository 10 milliGRAM(s) Rectal daily PRN Constipation  cyclobenzaprine 5 milliGRAM(s) Oral three times a day PRN Muscle Spasm  oxyCODONE    IR 10 milliGRAM(s) Oral every 4 hours PRN Severe Pain (7 - 10)  simethicone 80 milliGRAM(s) Chew three times a day PRN Gas    LABS:

## 2022-09-05 NOTE — PROGRESS NOTE ADULT - ASSESSMENT
66M with PMH HTN, RA, Hepatitis C, s/p prior lumbar decompression & fusion in 2015 presented to [facility] with worsening back pain x 2 months associated with unsteadiness and numbness in his feet for scheduled spine surgery. s/p Posterior L2 PSO, extension of fusion to T9, T10-L2 Decompression, w/ Plastics Closure 8/18/22 with EBL 750cc. Post-op complicated by hypotension requiring pressors, elevated CPK, leukocytosis, electrolyte derangements, and symptomatic orthostatic hypotension. Now admitted to [facility] for initiation of multidisciplinary rehab program.     #Cord compression  -s/p Posterior L2 PSO, extension of fusion to T9, T10-L2 Decompression w/ Dr Conner, w/ Plastics Closure 8/18/22 with EBL 750cc  -Continue comprehensive rehab program  -Continue hemovac/ MALLIKA drain, monitor output. Per [facility] discharge, may be removed if output < 20 mL for 24 hrs after checking with Plastics Dr. Pop  -Continue pain regimen with Oxycontin, Oxycodone PRN    #HTN  -Continue Toprol  -Valsartan held  -Monitor vitals    #Anemia   Likely post op blood loss anemia  -H/H stable, improving  -No overt signs of bleeding  -follow up routine CBC    #Thrombocytosis  Likely reactive to above  -Slowly downtrending  -Follow up routine CBC    #Rheumatoid Arthritis  -Receives Kevzara injection [id number], currently on hold while in rehab   -Continue prednisone 5mg daily    #Hepatitis C  -Continue tenofovir 300mg daily    #HLD  -Continue Lipitor    #Hx Elevated CPK  - CPK peaked at 8014 on 8/19 then downtrended to 691  - s/p aggressive IVF  - repeat CPK normal    #Hypokalemia  -Improved after repletion  -Follow up routine BMP    #Prophylactic Measure  -DVT ppx: Lovenox  -GI ppx: Protonix while on steroids  -Bowel regimen

## 2022-09-05 NOTE — PROGRESS NOTE ADULT - ASSESSMENT
65 yo M with h/o HTN, RA, GERD, Hepatitis C, s/p prior lumbar decompression & fusion in 2015 who presents with worsening back pain x 2 months associated with unsteadiness when getting up and numbness in his feet who presented to [facility] for scheduled spine surgery. Now s/p Posterior L2 PSO, extension of fusion to T9, T10-L2 Decompression, w/ Plastics Closure 8/18/22. Post-op complicated by hypotension requiring pressors, elevated CPK, leukocytosis, electrolyte derangements, and symptomatic orthostatic hypotension.    Gait Instability, ADL impairments, and Functional impairments: Continue Comprehensive Rehab Program of PT/OT - Total of 3 hrs/day 5 days/week     # cord compression.   - s/p Posterior L2 PSO, extension of fusion to T9, T10-L2 Decompression w/Dr Conner, w/ Plastics Closure 8/18/22 .   - c/w MALLIKA, monitor output each shift.   - c/w oxycontin ER 15mg q12h (home dose) and - PRN pain regimen  --mobilize, IS    # hyponatremia: noted intermittently through hospital stay. Now improved   # hypokalemia - 20mEq KCL x1. Encourage dietary intake.  K=4.4 9/3    Orthostatic hypotension: Improved.      Leukocytosis. : Stable WBC ,  -- monitor off abx, CXR neg    ##Thrombocytosis: uno=524 9/2; Plt =693 9/3  - More likely reactive in origin  - Repeat CBC 9/5 - monitor     Hypertension: c/w low dose metoprolol    hold home valsartan 320mg daily   /76 9/4     #Rheumatoid arthritis.   - kevzara injection [id number]. held from OR.    - c/w prednisone 5mg daily  - c/w pantoprazole while inpatient for GI ppx and known GERD  - outpatient f/u with rheumatologist.     # Hepatitis C. :- c/w tenofovir 300mg daily.    #HLD: c/w atorvastatin    Pain Mgmt - Tylenol Oxycodone PRN, methocarbamol for spasms. flexeril PRN    GI/Bowel Mgmt - Senna, Miralax qd, dulcolax supp prn. PRN simethicone for indigestion. PPX pantoprazole.     /Bladder Mgmt - Toileting prn. Using urinal     Labs:   F/U CBC, CMP 9/5

## 2022-09-05 NOTE — PROGRESS NOTE ADULT - SUBJECTIVE AND OBJECTIVE BOX
Patient is a 66y old  Male who presents with a chief complaint of SCC s/p decompression (05 Sep 2022 07:30)      Patient seen and examined at bedside. No overnight events.  Reports mild back pain around MALLIKA drain site when laying flat on his back     REVIEW OF SYSTEMS:  CONSTITUTIONAL: No fever or chills  CARDIOVASCULAR: No chest pain, palpitations    ALLERGIES:  gabapentin (Other)  Shrimp (Unknown)    MEDICATIONS  (STANDING):  atorvastatin 20 milliGRAM(s) Oral at bedtime  enoxaparin Injectable 40 milliGRAM(s) SubCutaneous every 24 hours  methocarbamol 750 milliGRAM(s) Oral every 8 hours  metoprolol tartrate 50 milliGRAM(s) Oral two times a day  mupirocin 2% Ointment 1 Application(s) Topical <User Schedule>  oxyCODONE  ER Tablet 15 milliGRAM(s) Oral every 12 hours  pantoprazole    Tablet 40 milliGRAM(s) Oral before breakfast  polyethylene glycol 3350 17 Gram(s) Oral two times a day  predniSONE   Tablet 5 milliGRAM(s) Oral daily  senna 2 Tablet(s) Oral at bedtime  tenofovir disoproxil fumarate (VIREAD) 300 milliGRAM(s) Oral daily    MEDICATIONS  (PRN):  acetaminophen     Tablet .. 650 milliGRAM(s) Oral every 6 hours PRN Moderate Pain (4 - 6)  bisacodyl Suppository 10 milliGRAM(s) Rectal daily PRN Constipation  cyclobenzaprine 5 milliGRAM(s) Oral three times a day PRN Muscle Spasm  oxyCODONE    IR 10 milliGRAM(s) Oral every 4 hours PRN Severe Pain (7 - 10)  simethicone 80 milliGRAM(s) Chew three times a day PRN Gas    Vital Signs Last 24 Hrs  T(F): 98.2 (05 Sep 2022 08:25), Max: 98.2 (04 Sep 2022 20:20)  HR: 87 (05 Sep 2022 08:25) (73 - 87)  BP: 120/80 (05 Sep 2022 08:25) (114/77 - 138/85)  RR: 16 (05 Sep 2022 08:25) (16 - 16)  SpO2: 96% (05 Sep 2022 08:25) (94% - 96%)  I&O's Summary    04 Sep 2022 07:01  -  05 Sep 2022 07:00  --------------------------------------------------------  IN: 0 mL / OUT: 485 mL / NET: -485 mL    05 Sep 2022 07:01  -  05 Sep 2022 14:14  --------------------------------------------------------  IN: 0 mL / OUT: 7 mL / NET: -7 mL          PHYSICAL EXAM:  GENERAL: NAD  HEENT:  AT/NC, anicteric, moist mucous membranes, EOMI, PERRL, no lid-lag, conjunctiva and sclera clear  CHEST/LUNG:  CTA b/l, no rales, wheezes, or rhonchi,  normal respiratory effort, no intercostal retractions  HEART:  RRR, S1, S2, no murmurs; no pitting edema  ABDOMEN:  BS+, soft, nontender, nondistended  MSK/EXTREMITIES: 2+ peripheral pulses, no clubbing or cyanosis; back incision c/d/i with gauze, MALLIKA drain with serosangeous fluid  NERVOUS SYSTEM: answers questions and follows commands appropriately A&Ox3 grossly moves all extremities   PSYCH: Appropriate affect, Alert & Awake; good judgement    LABS: Personally reviewed                        9.4    6.90  )-----------( 693      ( 03 Sep 2022 06:00 )             28.6       09-03    136  |  103  |  7   ----------------------------<  80  4.4   |  27  |  0.98    Ca    9.0      03 Sep 2022 06:00                  08-18 Chol 124 mg/dL LDL -- HDL 45 mg/dL Trig 80 mg/dL                      COVID-19 PCR: NotDetec (08-25-22 @ 07:26)  COVID-19 PCR: NotDetec (08-15-22 @ 16:40)      RADIOLOGY & ADDITIONAL TESTS: Personally reviewed

## 2022-09-06 LAB
ALBUMIN SERPL ELPH-MCNC: 2.4 G/DL — LOW (ref 3.3–5)
ALP SERPL-CCNC: 90 U/L — SIGNIFICANT CHANGE UP (ref 40–120)
ALT FLD-CCNC: 21 U/L — SIGNIFICANT CHANGE UP (ref 10–45)
ANION GAP SERPL CALC-SCNC: 8 MMOL/L — SIGNIFICANT CHANGE UP (ref 5–17)
AST SERPL-CCNC: 24 U/L — SIGNIFICANT CHANGE UP (ref 10–40)
BASOPHILS # BLD AUTO: 0.05 K/UL — SIGNIFICANT CHANGE UP (ref 0–0.2)
BASOPHILS NFR BLD AUTO: 0.5 % — SIGNIFICANT CHANGE UP (ref 0–2)
BILIRUB SERPL-MCNC: 0.2 MG/DL — SIGNIFICANT CHANGE UP (ref 0.2–1.2)
BUN SERPL-MCNC: 5 MG/DL — LOW (ref 7–23)
CALCIUM SERPL-MCNC: 9.2 MG/DL — SIGNIFICANT CHANGE UP (ref 8.4–10.5)
CHLORIDE SERPL-SCNC: 102 MMOL/L — SIGNIFICANT CHANGE UP (ref 96–108)
CO2 SERPL-SCNC: 29 MMOL/L — SIGNIFICANT CHANGE UP (ref 22–31)
CREAT SERPL-MCNC: 1.01 MG/DL — SIGNIFICANT CHANGE UP (ref 0.5–1.3)
EGFR: 82 ML/MIN/1.73M2 — SIGNIFICANT CHANGE UP
EOSINOPHIL # BLD AUTO: 0.21 K/UL — SIGNIFICANT CHANGE UP (ref 0–0.5)
EOSINOPHIL NFR BLD AUTO: 2.3 % — SIGNIFICANT CHANGE UP (ref 0–6)
GLUCOSE SERPL-MCNC: 84 MG/DL — SIGNIFICANT CHANGE UP (ref 70–99)
HCT VFR BLD CALC: 29.8 % — LOW (ref 39–50)
HGB BLD-MCNC: 9.7 G/DL — LOW (ref 13–17)
IMM GRANULOCYTES NFR BLD AUTO: 0.5 % — SIGNIFICANT CHANGE UP (ref 0–1.5)
LYMPHOCYTES # BLD AUTO: 3.27 K/UL — SIGNIFICANT CHANGE UP (ref 1–3.3)
LYMPHOCYTES # BLD AUTO: 35.6 % — SIGNIFICANT CHANGE UP (ref 13–44)
MAGNESIUM SERPL-MCNC: 1.5 MG/DL — LOW (ref 1.6–2.6)
MCHC RBC-ENTMCNC: 31.7 PG — SIGNIFICANT CHANGE UP (ref 27–34)
MCHC RBC-ENTMCNC: 32.6 GM/DL — SIGNIFICANT CHANGE UP (ref 32–36)
MCV RBC AUTO: 97.4 FL — SIGNIFICANT CHANGE UP (ref 80–100)
MONOCYTES # BLD AUTO: 1.24 K/UL — HIGH (ref 0–0.9)
MONOCYTES NFR BLD AUTO: 13.5 % — SIGNIFICANT CHANGE UP (ref 2–14)
NEUTROPHILS # BLD AUTO: 4.37 K/UL — SIGNIFICANT CHANGE UP (ref 1.8–7.4)
NEUTROPHILS NFR BLD AUTO: 47.6 % — SIGNIFICANT CHANGE UP (ref 43–77)
NRBC # BLD: 0 /100 WBCS — SIGNIFICANT CHANGE UP (ref 0–0)
PLATELET # BLD AUTO: 738 K/UL — HIGH (ref 150–400)
POTASSIUM SERPL-MCNC: 3.5 MMOL/L — SIGNIFICANT CHANGE UP (ref 3.5–5.3)
POTASSIUM SERPL-SCNC: 3.5 MMOL/L — SIGNIFICANT CHANGE UP (ref 3.5–5.3)
PROT SERPL-MCNC: 6 G/DL — SIGNIFICANT CHANGE UP (ref 6–8.3)
RBC # BLD: 3.06 M/UL — LOW (ref 4.2–5.8)
RBC # FLD: 12.7 % — SIGNIFICANT CHANGE UP (ref 10.3–14.5)
SODIUM SERPL-SCNC: 139 MMOL/L — SIGNIFICANT CHANGE UP (ref 135–145)
WBC # BLD: 9.19 K/UL — SIGNIFICANT CHANGE UP (ref 3.8–10.5)
WBC # FLD AUTO: 9.19 K/UL — SIGNIFICANT CHANGE UP (ref 3.8–10.5)

## 2022-09-06 PROCEDURE — 99233 SBSQ HOSP IP/OBS HIGH 50: CPT

## 2022-09-06 PROCEDURE — 99232 SBSQ HOSP IP/OBS MODERATE 35: CPT | Mod: GC

## 2022-09-06 RX ORDER — OXYCODONE HYDROCHLORIDE 5 MG/1
15 TABLET ORAL EVERY 12 HOURS
Refills: 0 | Status: DISCONTINUED | OUTPATIENT
Start: 2022-09-06 | End: 2022-09-08

## 2022-09-06 RX ORDER — MAGNESIUM OXIDE 400 MG ORAL TABLET 241.3 MG
400 TABLET ORAL
Refills: 0 | Status: DISCONTINUED | OUTPATIENT
Start: 2022-09-06 | End: 2022-09-08

## 2022-09-06 RX ORDER — OXYCODONE HYDROCHLORIDE 5 MG/1
10 TABLET ORAL EVERY 4 HOURS
Refills: 0 | Status: DISCONTINUED | OUTPATIENT
Start: 2022-09-06 | End: 2022-09-08

## 2022-09-06 RX ORDER — MAGNESIUM SULFATE 500 MG/ML
1 VIAL (ML) INJECTION ONCE
Refills: 0 | Status: DISCONTINUED | OUTPATIENT
Start: 2022-09-06 | End: 2022-09-06

## 2022-09-06 RX ADMIN — Medication 5 MILLIGRAM(S): at 06:24

## 2022-09-06 RX ADMIN — PANTOPRAZOLE SODIUM 40 MILLIGRAM(S): 20 TABLET, DELAYED RELEASE ORAL at 06:24

## 2022-09-06 RX ADMIN — Medication 50 MILLIGRAM(S): at 17:20

## 2022-09-06 RX ADMIN — OXYCODONE HYDROCHLORIDE 10 MILLIGRAM(S): 5 TABLET ORAL at 12:05

## 2022-09-06 RX ADMIN — METHOCARBAMOL 750 MILLIGRAM(S): 500 TABLET, FILM COATED ORAL at 21:04

## 2022-09-06 RX ADMIN — OXYCODONE HYDROCHLORIDE 15 MILLIGRAM(S): 5 TABLET ORAL at 17:19

## 2022-09-06 RX ADMIN — Medication 50 MILLIGRAM(S): at 06:24

## 2022-09-06 RX ADMIN — METHOCARBAMOL 750 MILLIGRAM(S): 500 TABLET, FILM COATED ORAL at 12:07

## 2022-09-06 RX ADMIN — OXYCODONE HYDROCHLORIDE 15 MILLIGRAM(S): 5 TABLET ORAL at 06:25

## 2022-09-06 RX ADMIN — SENNA PLUS 2 TABLET(S): 8.6 TABLET ORAL at 21:04

## 2022-09-06 RX ADMIN — MAGNESIUM OXIDE 400 MG ORAL TABLET 400 MILLIGRAM(S): 241.3 TABLET ORAL at 17:20

## 2022-09-06 RX ADMIN — MUPIROCIN 1 APPLICATION(S): 20 OINTMENT TOPICAL at 08:00

## 2022-09-06 RX ADMIN — ATORVASTATIN CALCIUM 20 MILLIGRAM(S): 80 TABLET, FILM COATED ORAL at 21:04

## 2022-09-06 RX ADMIN — ENOXAPARIN SODIUM 40 MILLIGRAM(S): 100 INJECTION SUBCUTANEOUS at 18:46

## 2022-09-06 RX ADMIN — OXYCODONE HYDROCHLORIDE 15 MILLIGRAM(S): 5 TABLET ORAL at 07:23

## 2022-09-06 RX ADMIN — TENOFOVIR DISOPROXIL FUMARATE 300 MILLIGRAM(S): 300 TABLET, FILM COATED ORAL at 12:04

## 2022-09-06 RX ADMIN — METHOCARBAMOL 750 MILLIGRAM(S): 500 TABLET, FILM COATED ORAL at 06:25

## 2022-09-06 NOTE — PROGRESS NOTE ADULT - ASSESSMENT
66M with PMH HTN, RA, Hepatitis C, s/p prior lumbar decompression & fusion in 2015 presented to [facility] with worsening back pain x 2 months associated with unsteadiness and numbness in his feet for scheduled spine surgery. s/p Posterior L2 PSO, extension of fusion to T9, T10-L2 Decompression, w/ Plastics Closure 8/18/22 with EBL 750cc. Post-op complicated by hypotension requiring pressors, elevated CPK, leukocytosis, electrolyte derangements, and symptomatic orthostatic hypotension. Now admitted to [facility] for initiation of multidisciplinary rehab program- pt/ot/dvt ppx    #Cord compression  -s/p Posterior L2 PSO, extension of fusion to T9, T10-L2 Decompression w/ Dr Conner, w/ Plastics Closure 8/18/22 with EBL 750cc  -Continue hemovac/ MALLIKA drain, monitor output. Per [facility] discharge, may be removed if output < 20 mL for 24 hrs after checking with Plastics Dr. Pop  -pain regimen with Oxycontin, Oxycodone PRN    #HTN  -Continue Toprol    #Anemia   Likely post op blood loss anemia  -H/H stable, improving  -No overt signs of bleeding  -follow up routine CBC    #Thrombocytosis  Likely reactive to above  -Follow up routine CBC    # hypomagnesemia-  replete mag    #Rheumatoid Arthritis  -Receives Kevzara injection [id number], currently on hold while in rehab   -Continue prednisone 5mg daily    #Hepatitis C  -Continue tenofovir 300mg daily    #HLD  -Continue Lipitor    #Hx Elevated CPK  - CPK peaked at 8014 on 8/19 then downtrended to 691  - s/p aggressive IVF  - repeat CPK normal    #Hypokalemia  -Improved after repletion  -Follow up routine BMP    -DVT ppx: Lovenox    -GI ppx: Protonix while on steroids    will follow  d/w dr. urena

## 2022-09-06 NOTE — PROGRESS NOTE ADULT - SUBJECTIVE AND OBJECTIVE BOX
Patient is a 66y old  Male who presents with a chief complaint of SCC s/p decompression (05 Sep 2022 07:30)      HPI:  67 yo RH male with PMHx of HTN, RA (on prednisone), lumbar fusion/chronic back pain with 2 month history of increased difficulty in ambulation, veering to left and numbness on the bottom of feet. Prior imaging of L spine in 2020 showed degenerative changes L1-L2, severe high grade stenosis which is progression from imaging done in 2015.  MRI lumbar spine w/unspecified cord compression.   Patient admitted 8/1/8 for  OR  for Posterior L2 PSO (pedicle subtraction osteotomy), extension of fusion to T9, T10-L2 Decompression with Plastics closure. S/p PRBC 8/23 for anemia. Post op course also complicated by orthostatic hypotension- meds adjusted by Cardiology. Medicine following for elevated CPK/WBC - managed w IVF, elevated wbc- likely reactive. Electrolyte abnormalities corrected. US LEs neg DVT 8/20. CXR clear. Pain management. PMR consulted and acute rehab recommended. Cleared for dc on 8/26.         (26 Aug 2022 13:01)        SUBJECTIVE: Patient seen and examined sitting this morning. Lying in bed. NAD. Reports BM daily, eating well, sleeping well. Reports pain is tolerable.  He reports bilateral anterior numbness that has become more apparent. MALLIKA drain present with serosanguinous output.         Review of Systems:  Denies HA/dizziness  Denies CP/palpitations  Denies abdominal pain or nausea  Denies dysuria  +BM 9/6        VITALS  66y  Vital Signs Last 24 Hrs  T(C): 36.7 (06 Sep 2022 08:56), Max: 36.7 (05 Sep 2022 20:31)  T(F): 98 (06 Sep 2022 08:56), Max: 98.1 (05 Sep 2022 20:31)  HR: 93 (06 Sep 2022 08:56) (80 - 93)  BP: 151/91 (06 Sep 2022 08:56) (112/63 - 151/91)  BP(mean): --  RR: 16 (06 Sep 2022 08:56) (16 - 18)  SpO2: 95% (06 Sep 2022 08:56) (95% - 95%)    Parameters below as of 06 Sep 2022 08:56  Patient On (Oxygen Delivery Method): room air          PHYSICAL EXAM:   Gen - NAD, Comfortable  Pulm - CTAB, No wheeze, No rhonchi, No crackles  Cardiovascular - well-perfused  Abdomen - Soft, NT/ND, +BS  Extremities - No C/C/E, No calf tenderness  Neuro-     Cognitive - AAOx3     Communication - Fluent, No dysarthria     Attention: Intact      Judgement: Good evidence of judgement     Memory: Recall 3 objects immediate and 3 min later         Cranial Nerves - CN 2-12 intact     Motor -                     LEFT    UE - ShAB 5/5, EF 5/5, EE 5/5, WE 5/5,  5/5                    RIGHT UE - ShAB 5/5, EF 5/5, EE 5/5, WE 5/5,  5/5                    LEFT    LE - HF 5/5, KE 5/5, DF 5/5, PF 5/5                    RIGHT LE - HF 5/5, KE 5/5, DF 5/5, PF 5/5        Sensory - Intact to LT     Reflexes - DTR Intact, No primitive reflexive     Coordination - FTN intact     Tone - normal  Psychiatric - Mood stable, Affect WNL  Skin:  all skin intact    Wounds: None Present        FUNCTIONAL STATUS:        RECENT LABS:                        9.7    9.19  )-----------( 738      ( 06 Sep 2022 05:20 )             29.8     09-06    139  |  102  |  5<L>  ----------------------------<  84  3.5   |  29  |  1.01    Ca    9.2      06 Sep 2022 05:20  Mg     1.5     09-06    TPro  6.0  /  Alb  2.4<L>  /  TBili  0.2  /  DBili  x   /  AST  24  /  ALT  21  /  AlkPhos  90  09-06    LIVER FUNCTIONS - ( 06 Sep 2022 05:20 )  Alb: 2.4 g/dL / Pro: 6.0 g/dL / ALK PHOS: 90 U/L / ALT: 21 U/L / AST: 24 U/L / GGT: x                   CAPILLARY BLOOD GLUCOSE            MEDICATIONS:  MEDICATIONS  (STANDING):  atorvastatin 20 milliGRAM(s) Oral at bedtime  enoxaparin Injectable 40 milliGRAM(s) SubCutaneous every 24 hours  methocarbamol 750 milliGRAM(s) Oral every 8 hours  metoprolol tartrate 50 milliGRAM(s) Oral two times a day  mupirocin 2% Ointment 1 Application(s) Topical <User Schedule>  oxyCODONE  ER Tablet 15 milliGRAM(s) Oral every 12 hours  pantoprazole    Tablet 40 milliGRAM(s) Oral before breakfast  polyethylene glycol 3350 17 Gram(s) Oral two times a day  predniSONE   Tablet 5 milliGRAM(s) Oral daily  senna 2 Tablet(s) Oral at bedtime  tenofovir disoproxil fumarate (VIREAD) 300 milliGRAM(s) Oral daily    MEDICATIONS  (PRN):  acetaminophen     Tablet .. 650 milliGRAM(s) Oral every 6 hours PRN Moderate Pain (4 - 6)  bisacodyl Suppository 10 milliGRAM(s) Rectal daily PRN Constipation  cyclobenzaprine 5 milliGRAM(s) Oral three times a day PRN Muscle Spasm  oxyCODONE    IR 10 milliGRAM(s) Oral every 4 hours PRN Severe Pain (7 - 10)  simethicone 80 milliGRAM(s) Chew three times a day PRN Gas     Patient is a 66y old  Male who presents with a chief complaint of SCC s/p decompression (05 Sep 2022 07:30)      HPI:  65 yo RH male with PMHx of HTN, RA (on prednisone), lumbar fusion/chronic back pain with 2 month history of increased difficulty in ambulation, veering to left and numbness on the bottom of feet. Prior imaging of L spine in 2020 showed degenerative changes L1-L2, severe high grade stenosis which is progression from imaging done in 2015.  MRI lumbar spine w/unspecified cord compression.   Patient admitted 8/1/8 for  OR  for Posterior L2 PSO (pedicle subtraction osteotomy), extension of fusion to T9, T10-L2 Decompression with Plastics closure. S/p PRBC 8/23 for anemia. Post op course also complicated by orthostatic hypotension- meds adjusted by Cardiology. Medicine following for elevated CPK/WBC - managed w IVF, elevated wbc- likely reactive. Electrolyte abnormalities corrected. US LEs neg DVT 8/20. CXR clear. Pain management. PMR consulted and acute rehab recommended. Cleared for dc on 8/26.         (26 Aug 2022 13:01)        SUBJECTIVE: Patient seen and examined sitting this morning. Lying in bed. NAD. Reports BM daily, eating well, sleeping well. Reports pain is tolerable.  He reports bilateral anterior numbness that has become more apparent. MALLIKA drain present with serosanguinous output.         Review of Systems:  Denies HA/dizziness  Denies CP/palpitations  Denies abdominal pain or nausea  Denies dysuria  +BM 9/6        VITALS  66y  Vital Signs Last 24 Hrs  T(C): 36.7 (06 Sep 2022 08:56), Max: 36.7 (05 Sep 2022 20:31)  T(F): 98 (06 Sep 2022 08:56), Max: 98.1 (05 Sep 2022 20:31)  HR: 93 (06 Sep 2022 08:56) (80 - 93)  BP: 151/91 (06 Sep 2022 08:56) (112/63 - 151/91)  BP(mean): --  RR: 16 (06 Sep 2022 08:56) (16 - 18)  SpO2: 95% (06 Sep 2022 08:56) (95% - 95%)    Parameters below as of 06 Sep 2022 08:56  Patient On (Oxygen Delivery Method): room air          PHYSICAL EXAM:   Constitutional - NAD,   Chest - breathing comfortably, CYA  Cardiovascular - well perfused  Abdomen - Soft, NTND  Extremities - No C/C/E, No calf tenderness   Motor:  BUE: 5/5mp  BLE: 4-4+/5mp  Skin: Back incision with aquacell dressing, sutures + MALLIKA drain with serosanguinous fluid, no erythema, midline chest device induce pressure wound    Psychiatric - Mood stable, Affect WNL               FUNCTIONAL STATUS:        RECENT LABS:                        9.7    9.19  )-----------( 738      ( 06 Sep 2022 05:20 )             29.8     09-06    139  |  102  |  5<L>  ----------------------------<  84  3.5   |  29  |  1.01    Ca    9.2      06 Sep 2022 05:20  Mg     1.5     09-06    TPro  6.0  /  Alb  2.4<L>  /  TBili  0.2  /  DBili  x   /  AST  24  /  ALT  21  /  AlkPhos  90  09-06    LIVER FUNCTIONS - ( 06 Sep 2022 05:20 )  Alb: 2.4 g/dL / Pro: 6.0 g/dL / ALK PHOS: 90 U/L / ALT: 21 U/L / AST: 24 U/L / GGT: x                   CAPILLARY BLOOD GLUCOSE            MEDICATIONS:  MEDICATIONS  (STANDING):  atorvastatin 20 milliGRAM(s) Oral at bedtime  enoxaparin Injectable 40 milliGRAM(s) SubCutaneous every 24 hours  methocarbamol 750 milliGRAM(s) Oral every 8 hours  metoprolol tartrate 50 milliGRAM(s) Oral two times a day  mupirocin 2% Ointment 1 Application(s) Topical <User Schedule>  oxyCODONE  ER Tablet 15 milliGRAM(s) Oral every 12 hours  pantoprazole    Tablet 40 milliGRAM(s) Oral before breakfast  polyethylene glycol 3350 17 Gram(s) Oral two times a day  predniSONE   Tablet 5 milliGRAM(s) Oral daily  senna 2 Tablet(s) Oral at bedtime  tenofovir disoproxil fumarate (VIREAD) 300 milliGRAM(s) Oral daily    MEDICATIONS  (PRN):  acetaminophen     Tablet .. 650 milliGRAM(s) Oral every 6 hours PRN Moderate Pain (4 - 6)  bisacodyl Suppository 10 milliGRAM(s) Rectal daily PRN Constipation  cyclobenzaprine 5 milliGRAM(s) Oral three times a day PRN Muscle Spasm  oxyCODONE    IR 10 milliGRAM(s) Oral every 4 hours PRN Severe Pain (7 - 10)  simethicone 80 milliGRAM(s) Chew three times a day PRN Gas     Patient is a 66y old  Male who presents with a chief complaint of SCC s/p decompression (05 Sep 2022 07:30)      HPI:  65 yo RH male with PMHx of HTN, RA (on prednisone), lumbar fusion/chronic back pain with 2 month history of increased difficulty in ambulation, veering to left and numbness on the bottom of feet. Prior imaging of L spine in 2020 showed degenerative changes L1-L2, severe high grade stenosis which is progression from imaging done in 2015.  MRI lumbar spine w/unspecified cord compression.   Patient admitted 8/1/8 for  OR  for Posterior L2 PSO (pedicle subtraction osteotomy), extension of fusion to T9, T10-L2 Decompression with Plastics closure. S/p PRBC 8/23 for anemia. Post op course also complicated by orthostatic hypotension- meds adjusted by Cardiology. Medicine following for elevated CPK/WBC - managed w IVF, elevated wbc- likely reactive. Electrolyte abnormalities corrected. US LEs neg DVT 8/20. CXR clear. Pain management. PMR consulted and acute rehab recommended. Cleared for dc on 8/26.         (26 Aug 2022 13:01)        SUBJECTIVE: Patient seen and examined sitting this morning. Lying in bed. NAD. Reports BM daily, eating well, sleeping well. Reports pain is tolerable.  He reports bilateral anterior numbness that has become more apparent but not painful. MALLIKA drain present with serosanguinous output.         Review of Systems:  Denies HA/dizziness  Denies CP/palpitations  Denies abdominal pain or nausea  Denies dysuria  +BM 9/6        VITALS  66y  Vital Signs Last 24 Hrs  T(C): 36.7 (06 Sep 2022 08:56), Max: 36.7 (05 Sep 2022 20:31)  T(F): 98 (06 Sep 2022 08:56), Max: 98.1 (05 Sep 2022 20:31)  HR: 93 (06 Sep 2022 08:56) (80 - 93)  BP: 151/91 (06 Sep 2022 08:56) (112/63 - 151/91)  BP(mean): --  RR: 16 (06 Sep 2022 08:56) (16 - 18)  SpO2: 95% (06 Sep 2022 08:56) (95% - 95%)    Parameters below as of 06 Sep 2022 08:56  Patient On (Oxygen Delivery Method): room air          PHYSICAL EXAM:   Constitutional - NAD,   Chest - breathing comfortably, CYA  Cardiovascular - well perfused  Abdomen - Soft, NTND  Extremities - No C/C/E, No calf tenderness   Motor:  BUE: 5/5mp  BLE: 4-4+/5mp  Skin: Back incision with aquacell dressing, sutures + MALLIKA drain with serosanguinous fluid, no erythema, midline chest device induce pressure wound    Psychiatric - Mood stable, Affect WNL               FUNCTIONAL STATUS:        RECENT LABS:                        9.7    9.19  )-----------( 738      ( 06 Sep 2022 05:20 )             29.8     09-06    139  |  102  |  5<L>  ----------------------------<  84  3.5   |  29  |  1.01    Ca    9.2      06 Sep 2022 05:20  Mg     1.5     09-06    TPro  6.0  /  Alb  2.4<L>  /  TBili  0.2  /  DBili  x   /  AST  24  /  ALT  21  /  AlkPhos  90  09-06    LIVER FUNCTIONS - ( 06 Sep 2022 05:20 )  Alb: 2.4 g/dL / Pro: 6.0 g/dL / ALK PHOS: 90 U/L / ALT: 21 U/L / AST: 24 U/L / GGT: x                   CAPILLARY BLOOD GLUCOSE            MEDICATIONS:  MEDICATIONS  (STANDING):  atorvastatin 20 milliGRAM(s) Oral at bedtime  enoxaparin Injectable 40 milliGRAM(s) SubCutaneous every 24 hours  methocarbamol 750 milliGRAM(s) Oral every 8 hours  metoprolol tartrate 50 milliGRAM(s) Oral two times a day  mupirocin 2% Ointment 1 Application(s) Topical <User Schedule>  oxyCODONE  ER Tablet 15 milliGRAM(s) Oral every 12 hours  pantoprazole    Tablet 40 milliGRAM(s) Oral before breakfast  polyethylene glycol 3350 17 Gram(s) Oral two times a day  predniSONE   Tablet 5 milliGRAM(s) Oral daily  senna 2 Tablet(s) Oral at bedtime  tenofovir disoproxil fumarate (VIREAD) 300 milliGRAM(s) Oral daily    MEDICATIONS  (PRN):  acetaminophen     Tablet .. 650 milliGRAM(s) Oral every 6 hours PRN Moderate Pain (4 - 6)  bisacodyl Suppository 10 milliGRAM(s) Rectal daily PRN Constipation  cyclobenzaprine 5 milliGRAM(s) Oral three times a day PRN Muscle Spasm  oxyCODONE    IR 10 milliGRAM(s) Oral every 4 hours PRN Severe Pain (7 - 10)  simethicone 80 milliGRAM(s) Chew three times a day PRN Gas

## 2022-09-06 NOTE — PROGRESS NOTE ADULT - SUBJECTIVE AND OBJECTIVE BOX
66y old  Male who presents with a chief complaint of SCC s/p decompression (05 Sep 2022 07:30)      Patient seen and examined at bedside. No overnight events.  c/o low  mild back pain, 7/10, positional, intermittent, +MALLIKA drain   no n/v, no sob          Vital Signs Last 24 Hrs  T(C): 36.7 (06 Sep 2022 08:56), Max: 36.7 (05 Sep 2022 20:31)  T(F): 98 (06 Sep 2022 08:56), Max: 98.1 (05 Sep 2022 20:31)  HR: 93 (06 Sep 2022 08:56) (80 - 93)  BP: 151/91 (06 Sep 2022 08:56) (112/63 - 151/91)  BP(mean): --  RR: 16 (06 Sep 2022 08:56) (16 - 18)  SpO2: 95% (06 Sep 2022 08:56) (95% - 95%)    Parameters below as of 06 Sep 2022 08:56  Patient On (Oxygen Delivery Method): room air                  9.7                  139  | 29   | 5            9.19  >-----------< 738     ------------------------< 84                    29.8                 3.5  | 102  | 1.01                                         Ca 9.2   Mg 1.5   Ph x          MEDICATIONS  (STANDING):  atorvastatin 20 milliGRAM(s) Oral at bedtime  enoxaparin Injectable 40 milliGRAM(s) SubCutaneous every 24 hours  methocarbamol 750 milliGRAM(s) Oral every 8 hours  metoprolol tartrate 50 milliGRAM(s) Oral two times a day  mupirocin 2% Ointment 1 Application(s) Topical <User Schedule>  oxyCODONE  ER Tablet 15 milliGRAM(s) Oral every 12 hours  pantoprazole    Tablet 40 milliGRAM(s) Oral before breakfast  polyethylene glycol 3350 17 Gram(s) Oral two times a day  predniSONE   Tablet 5 milliGRAM(s) Oral daily  senna 2 Tablet(s) Oral at bedtime  tenofovir disoproxil fumarate (VIREAD) 300 milliGRAM(s) Oral daily    MEDICATIONS  (PRN):  acetaminophen     Tablet .. 650 milliGRAM(s) Oral every 6 hours PRN Moderate Pain (4 - 6)  bisacodyl Suppository 10 milliGRAM(s) Rectal daily PRN Constipation  cyclobenzaprine 5 milliGRAM(s) Oral three times a day PRN Muscle Spasm  oxyCODONE    IR 10 milliGRAM(s) Oral every 4 hours PRN Severe Pain (7 - 10)  simethicone 80 milliGRAM(s) Chew three times a day PRN Gas   66y old  Male who presents with a chief complaint of SCC s/p decompression (05 Sep 2022 07:30)      Patient seen and examined at bedside. No overnight events.  c/o low  mild back pain, 7/10, positional, intermittent, +MALLIKA drain   no n/v, no sob          Vital Signs Last 24 Hrs  T(C): 36.7 (06 Sep 2022 08:56), Max: 36.7 (05 Sep 2022 20:31)  T(F): 98 (06 Sep 2022 08:56), Max: 98.1 (05 Sep 2022 20:31)  HR: 93 (06 Sep 2022 08:56) (80 - 93)  BP: 151/91 (06 Sep 2022 08:56) (112/63 - 151/91)  BP(mean): --  RR: 16 (06 Sep 2022 08:56) (16 - 18)  SpO2: 95% (06 Sep 2022 08:56) (95% - 95%)    Parameters below as of 06 Sep 2022 08:56  Patient On (Oxygen Delivery Method): room air    GENERAL- NAD  EAR/NOSE/MOUTH/THROAT - no pharyngeal exudates, no oral leisions,  MMM  EYES- SMOOTH, conjunctiva and Sclera clear  NECK- supple  RESPIRATORY-  clear to auscultation bilaterally, non laboured breathing  CARDIOVASCULAR - SIS2, RRR  GI - soft NT BS present  EXTREMITIES- no pedal edema  NEUROLOGY- no new gross focal deficits  PSYCHIATRY- AAO X                   9.7                  139  | 29   | 5            9.19  >-----------< 738     ------------------------< 84                    29.8                 3.5  | 102  | 1.01                                         Ca 9.2   Mg 1.5   Ph x          MEDICATIONS  (STANDING):  atorvastatin 20 milliGRAM(s) Oral at bedtime  enoxaparin Injectable 40 milliGRAM(s) SubCutaneous every 24 hours  methocarbamol 750 milliGRAM(s) Oral every 8 hours  metoprolol tartrate 50 milliGRAM(s) Oral two times a day  mupirocin 2% Ointment 1 Application(s) Topical <User Schedule>  oxyCODONE  ER Tablet 15 milliGRAM(s) Oral every 12 hours  pantoprazole    Tablet 40 milliGRAM(s) Oral before breakfast  polyethylene glycol 3350 17 Gram(s) Oral two times a day  predniSONE   Tablet 5 milliGRAM(s) Oral daily  senna 2 Tablet(s) Oral at bedtime  tenofovir disoproxil fumarate (VIREAD) 300 milliGRAM(s) Oral daily    MEDICATIONS  (PRN):  acetaminophen     Tablet .. 650 milliGRAM(s) Oral every 6 hours PRN Moderate Pain (4 - 6)  bisacodyl Suppository 10 milliGRAM(s) Rectal daily PRN Constipation  cyclobenzaprine 5 milliGRAM(s) Oral three times a day PRN Muscle Spasm  oxyCODONE    IR 10 milliGRAM(s) Oral every 4 hours PRN Severe Pain (7 - 10)  simethicone 80 milliGRAM(s) Chew three times a day PRN Gas

## 2022-09-06 NOTE — PROGRESS NOTE ADULT - ASSESSMENT
67 yo M with h/o HTN, RA, GERD, Hepatitis C, s/p prior lumbar decompression & fusion in 2015 who presents with worsening back pain x 2 months associated with unsteadiness when getting up and numbness in his feet who presented to [facility] for scheduled spine surgery. Now s/p Posterior L2 PSO, extension of fusion to T9, T10-L2 Decompression, w/ Plastics Closure 8/18/22. Post-op complicated by hypotension requiring pressors, elevated CPK, leukocytosis, electrolyte derangements, and symptomatic orthostatic hypotension.    Gait Instability, ADL impairments, and Functional impairments: Continue Comprehensive Rehab Program of PT/OT - Total of 3 hrs/day 5 days/week     # cord compression.   - s/p Posterior L2 PSO, extension of fusion to T9, T10-L2 Decompression w/Dr Conner, w/ Plastics Closure 8/18/22 .   - c/w MALLIKA, monitor output each shift. Per [facility] discharge, may be removed if output < 20 mL for 24 hrs after checking with Plastics Dr. Pop  - c/w oxycontin ER 15mg q12h (home dose) and - PRN pain regimen  -mobilize, IS    #Thrombocytosis: [id number] 9/6  - More likely reactive in origin  - Repeat CBC 9/7 - monitor    # hypomagnesemia- repleted mag and recheck CMP 9/7  # hyponatremia: noted intermittently through hospital stay. Now improved   # hypokalemia - 20mEq KCL x1. Encourage dietary intake.  K=4.4 9/3    #Orthostatic hypotension: Improved.    #Anemia - stable  Likely post op blood loss anemia  -follow up routine CBC    #Leukocytosis. : Stable WBC ,  - monitor off abx, CXR neg    #Hypertension:   -c/w low dose metoprolol   - hold home valsartan 320mg daily    #Rheumatoid arthritis.   - kevzara injection [id number]. held from OR.    - c/w prednisone 5mg daily  - c/w pantoprazole while inpatient for GI ppx and known GERD  - outpatient f/u with rheumatologist.     # Hepatitis C.  -c/w tenofovir 300mg daily.    #HLD:   -c/w atorvastatin    Pain Mgmt   -Oxycodone ER 15mg Q12h  -methocarbamol 750mg Q8h  -Tylenol Q6h PRN  -Oxycodone IR 10mg Q4h PRN   - flexeril 5mg TID PRN    GI/Bowel Mgmt   - Senna, Miralax qd,   - dulcolax supp prn.   - PRN simethicone for indigestion.   - PPX pantoprazole.     /Bladder Mgmt   - Toileting prn. Using urinal     Labs:   F/U CBC, CMP 9/7

## 2022-09-06 NOTE — PROGRESS NOTE ADULT - ATTENDING COMMENTS
Patient seen at bedside this am   No new issues over weekend  Pain controlled . Reports some numbness in thighs - does not feel this is new    MALLIKA drain out put - 42, 15, 29.5   will consult Plastics here for removal of drain or coordinate appointment with  Primary team soon after discharge     2. Labs with low mag - replete PO as difficult IV access  Platelets elevated - repeat in am if trending up - consider hematology consult  Hospitalist anjali noted

## 2022-09-07 ENCOUNTER — TRANSCRIPTION ENCOUNTER (OUTPATIENT)
Age: 67
End: 2022-09-07

## 2022-09-07 ENCOUNTER — NON-APPOINTMENT (OUTPATIENT)
Age: 67
End: 2022-09-07

## 2022-09-07 PROCEDURE — 99232 SBSQ HOSP IP/OBS MODERATE 35: CPT

## 2022-09-07 RX ORDER — ACETAMINOPHEN 500 MG
2 TABLET ORAL
Qty: 0 | Refills: 0 | DISCHARGE
Start: 2022-09-07

## 2022-09-07 RX ORDER — MAGNESIUM OXIDE 400 MG ORAL TABLET 241.3 MG
1 TABLET ORAL
Qty: 20 | Refills: 0
Start: 2022-09-07 | End: 2022-09-16

## 2022-09-07 RX ORDER — OXYCODONE HYDROCHLORIDE 5 MG/1
1 TABLET ORAL
Qty: 0 | Refills: 0 | DISCHARGE
Start: 2022-09-07

## 2022-09-07 RX ORDER — METOPROLOL TARTRATE 50 MG
1 TABLET ORAL
Qty: 60 | Refills: 0
Start: 2022-09-07 | End: 2022-10-06

## 2022-09-07 RX ORDER — CYCLOBENZAPRINE HYDROCHLORIDE 10 MG/1
1 TABLET, FILM COATED ORAL
Qty: 0 | Refills: 0 | DISCHARGE
Start: 2022-09-07

## 2022-09-07 RX ORDER — SENNA PLUS 8.6 MG/1
2 TABLET ORAL
Qty: 0 | Refills: 0 | DISCHARGE
Start: 2022-09-07

## 2022-09-07 RX ORDER — METHOCARBAMOL 500 MG/1
1 TABLET, FILM COATED ORAL
Qty: 30 | Refills: 0
Start: 2022-09-07 | End: 2022-09-16

## 2022-09-07 RX ORDER — CYCLOBENZAPRINE HYDROCHLORIDE 10 MG/1
1 TABLET, FILM COATED ORAL
Qty: 45 | Refills: 0
Start: 2022-09-07 | End: 2022-09-21

## 2022-09-07 RX ORDER — METHOCARBAMOL 500 MG/1
1 TABLET, FILM COATED ORAL
Qty: 0 | Refills: 0 | DISCHARGE
Start: 2022-09-07

## 2022-09-07 RX ORDER — POLYETHYLENE GLYCOL 3350 17 G/17G
17 POWDER, FOR SOLUTION ORAL
Qty: 0 | Refills: 0 | DISCHARGE
Start: 2022-09-07

## 2022-09-07 RX ORDER — PANTOPRAZOLE SODIUM 20 MG/1
1 TABLET, DELAYED RELEASE ORAL
Qty: 0 | Refills: 0 | DISCHARGE
Start: 2022-09-07

## 2022-09-07 RX ADMIN — ATORVASTATIN CALCIUM 20 MILLIGRAM(S): 80 TABLET, FILM COATED ORAL at 21:09

## 2022-09-07 RX ADMIN — SENNA PLUS 2 TABLET(S): 8.6 TABLET ORAL at 21:09

## 2022-09-07 RX ADMIN — Medication 5 MILLIGRAM(S): at 05:35

## 2022-09-07 RX ADMIN — OXYCODONE HYDROCHLORIDE 10 MILLIGRAM(S): 5 TABLET ORAL at 21:09

## 2022-09-07 RX ADMIN — MAGNESIUM OXIDE 400 MG ORAL TABLET 400 MILLIGRAM(S): 241.3 TABLET ORAL at 17:29

## 2022-09-07 RX ADMIN — METHOCARBAMOL 750 MILLIGRAM(S): 500 TABLET, FILM COATED ORAL at 21:09

## 2022-09-07 RX ADMIN — ENOXAPARIN SODIUM 40 MILLIGRAM(S): 100 INJECTION SUBCUTANEOUS at 17:30

## 2022-09-07 RX ADMIN — Medication 50 MILLIGRAM(S): at 05:35

## 2022-09-07 RX ADMIN — MUPIROCIN 1 APPLICATION(S): 20 OINTMENT TOPICAL at 08:28

## 2022-09-07 RX ADMIN — TENOFOVIR DISOPROXIL FUMARATE 300 MILLIGRAM(S): 300 TABLET, FILM COATED ORAL at 12:25

## 2022-09-07 RX ADMIN — Medication 50 MILLIGRAM(S): at 17:30

## 2022-09-07 RX ADMIN — OXYCODONE HYDROCHLORIDE 10 MILLIGRAM(S): 5 TABLET ORAL at 22:35

## 2022-09-07 RX ADMIN — OXYCODONE HYDROCHLORIDE 15 MILLIGRAM(S): 5 TABLET ORAL at 05:36

## 2022-09-07 RX ADMIN — METHOCARBAMOL 750 MILLIGRAM(S): 500 TABLET, FILM COATED ORAL at 05:35

## 2022-09-07 RX ADMIN — PANTOPRAZOLE SODIUM 40 MILLIGRAM(S): 20 TABLET, DELAYED RELEASE ORAL at 05:35

## 2022-09-07 RX ADMIN — POLYETHYLENE GLYCOL 3350 17 GRAM(S): 17 POWDER, FOR SOLUTION ORAL at 05:38

## 2022-09-07 RX ADMIN — OXYCODONE HYDROCHLORIDE 15 MILLIGRAM(S): 5 TABLET ORAL at 06:42

## 2022-09-07 RX ADMIN — METHOCARBAMOL 750 MILLIGRAM(S): 500 TABLET, FILM COATED ORAL at 12:25

## 2022-09-07 RX ADMIN — OXYCODONE HYDROCHLORIDE 15 MILLIGRAM(S): 5 TABLET ORAL at 17:29

## 2022-09-07 RX ADMIN — OXYCODONE HYDROCHLORIDE 10 MILLIGRAM(S): 5 TABLET ORAL at 12:25

## 2022-09-07 RX ADMIN — MAGNESIUM OXIDE 400 MG ORAL TABLET 400 MILLIGRAM(S): 241.3 TABLET ORAL at 08:27

## 2022-09-07 NOTE — DISCHARGE NOTE NURSING/CASE MANAGEMENT/SOCIAL WORK - NSDCPEFALRISK_GEN_ALL_CORE
For information on Fall & Injury Prevention, visit: [email or web address] OR  [email or web address] OR  https://www.cdc.gov/steadi/patient.html

## 2022-09-07 NOTE — DISCHARGE NOTE PROVIDER - NSDCFUADDINST_GEN_ALL_CORE_FT
please keep incision clean and dry, do not submerge wound in water for prolonged periods of time, pat dry after showering, and do not use any creams or ointments to incision.     Please do not take NSAIDs- ie. advil, motrin, ibuprofen, aleve, aspirin, naproxen, etc. until cleared by surgeon   Tylenol/ acetaminophen ok to take    Your BP medications have been adjusted due to prior episodes of low BP after surgery. Please take only those medications prescribed at discharge from rehab and follow up with your PCP for any further adjustments  Follow up with your other physicians as before  please keep incision clean and dry, do not submerge wound in water for prolonged periods of time, pat dry after showering, and do not use any creams or ointments to incision.   Please follow up with Plastic surgeon Dr. Pop for suture removal     Please do not take NSAIDs- ie. advil, motrin, ibuprofen, aleve, aspirin, naproxen, etc. until cleared by surgeon   Tylenol/ acetaminophen ok to take    Your BP medications have been adjusted due to prior episodes of low BP after surgery. Please take only those medications prescribed at discharge from rehab and follow up with your PCP for any further adjustments  Follow up with your other physicians as before

## 2022-09-07 NOTE — PROGRESS NOTE ADULT - SUBJECTIVE AND OBJECTIVE BOX
Patient is a 66y old  Male who presents with a chief complaint of SCC s/p decompression      SUBJECTIVE: Patient seen and examined sitting this morning.   No new issues overnight  Had some right foot discomfort in therapy.   Noted to have skin tear/abrasion - adaptic and band aide       Review of Systems:  Denies HA/dizziness  Denies CP/palpitations  Denies abdominal pain or nausea  Denies dysuria  +BM 9/6      Vital Signs Last 24 Hrs  T(C): 36.8 (07 Sep 2022 08:30), Max: 36.8 (06 Sep 2022 21:00)  T(F): 98.2 (07 Sep 2022 08:30), Max: 98.3 (06 Sep 2022 21:00)  HR: 89 (07 Sep 2022 08:30) (85 - 89)  BP: 112/76 (07 Sep 2022 08:30) (110/74 - 134/64)  BP(mean): --  RR: 16 (07 Sep 2022 08:30) (16 - 17)  SpO2: 96% (07 Sep 2022 08:30) (94% - 96%)    Parameters below as of 07 Sep 2022 08:30  Patient On (Oxygen Delivery Method): room air    MALLIKA drain output 32.5       PHYSICAL EXAM:   Constitutional - NAD,   Chest - breathing comfortably, CYA  Cardiovascular - well perfused  Abdomen - Soft, NTND  Extremities - No C/C/E, No calf tenderness , right big toe - skin tear - no active bleeding   Motor:  BUE: 5/5mp  BLE: 4-4+/5mp  Skin: Back incision with aquacell dressing, sutures + MALLIKA drain with serosanguinous fluid, no erythema, midline chest device induce pressure wound    Psychiatric - Mood stable, Affect WNL      FUNCTIONAL STATUS:  Supervision with ADLs         MEDICATIONS  (STANDING):  atorvastatin 20 milliGRAM(s) Oral at bedtime  enoxaparin Injectable 40 milliGRAM(s) SubCutaneous every 24 hours  magnesium oxide 400 milliGRAM(s) Oral two times a day with meals  methocarbamol 750 milliGRAM(s) Oral every 8 hours  metoprolol tartrate 50 milliGRAM(s) Oral two times a day  mupirocin 2% Ointment 1 Application(s) Topical <User Schedule>  oxyCODONE  ER Tablet 15 milliGRAM(s) Oral every 12 hours  pantoprazole    Tablet 40 milliGRAM(s) Oral before breakfast  polyethylene glycol 3350 17 Gram(s) Oral two times a day  predniSONE   Tablet 5 milliGRAM(s) Oral daily  senna 2 Tablet(s) Oral at bedtime  tenofovir disoproxil fumarate (VIREAD) 300 milliGRAM(s) Oral daily    MEDICATIONS  (PRN):  acetaminophen     Tablet .. 650 milliGRAM(s) Oral every 6 hours PRN Moderate Pain (4 - 6)  bisacodyl Suppository 10 milliGRAM(s) Rectal daily PRN Constipation  cyclobenzaprine 5 milliGRAM(s) Oral three times a day PRN Muscle Spasm  oxyCODONE    IR 10 milliGRAM(s) Oral every 4 hours PRN Severe Pain (7 - 10)  simethicone 80 milliGRAM(s) Chew three times a day PRN Gas

## 2022-09-07 NOTE — DISCHARGE NOTE PROVIDER - NSDCFUSCHEDAPPT_GEN_ALL_CORE_FT
Dar Rachel  [facility] Physician [facility]  INTMED [address]  Scheduled Appointment: 09/12/2022    Jason Flores  [facility] Physician [facility]  SPINECTR [address] [facility]  Scheduled Appointment: 09/15/2022    Saman Yang  [facility]  CARDIOLOGY [address]   Scheduled Appointment: 11/03/2022

## 2022-09-07 NOTE — CHART NOTE - NSCHARTNOTEFT_GEN_A_CORE
Nutrition Follow Up Note  Hospital Course   (Per Electronic Medical Record)    Source:  Patient [X]  Medical Record [X]      Diet:   Regular Diet (IDDSI Level 7) w/ Thin Liquids (IDDSI Level 0)  Tolerates Diet Consistency Well  No Chewing/Swallowing Difficulties  No Recent Nausea, Vomiting, Diarrhea or Constipation (as Per Patient)  Consumes % of Meals (as Per Documentation) - States Good Intake (Per Patient)   Jordan 1 Packet BID (Provides 180kcal & 28grams of Protein) - Order Pending Verification   Education Provided on Need for Supplementation   Family Brings in Food from Home   Obtained Food Preferences from Patient    Enteral/Parenteral Nutrition: Not Applicable    Current Weight: 214.2lb on 8/20  Obtain New Weight to Confirm  Obtain Weights Weekly     Pertinent Medications: MEDICATIONS  (STANDING):  atorvastatin 20 milliGRAM(s) Oral at bedtime  enoxaparin Injectable 40 milliGRAM(s) SubCutaneous every 24 hours  methocarbamol 750 milliGRAM(s) Oral every 8 hours  metoprolol tartrate 50 milliGRAM(s) Oral two times a day  mupirocin 2% Ointment 1 Application(s) Topical <User Schedule>  oxyCODONE  ER Tablet 15 milliGRAM(s) Oral every 12 hours  pantoprazole    Tablet 40 milliGRAM(s) Oral before breakfast  polyethylene glycol 3350 17 Gram(s) Oral two times a day  predniSONE   Tablet 5 milliGRAM(s) Oral daily  senna 2 Tablet(s) Oral at bedtime  tenofovir disoproxil fumarate (VIREAD) 300 milliGRAM(s) Oral daily    MEDICATIONS  (PRN):  acetaminophen     Tablet .. 650 milliGRAM(s) Oral every 6 hours PRN Moderate Pain (4 - 6)  bisacodyl Suppository 10 milliGRAM(s) Rectal daily PRN Constipation  cyclobenzaprine 5 milliGRAM(s) Oral three times a day PRN Muscle Spasm  oxyCODONE    IR 10 milliGRAM(s) Oral every 4 hours PRN Severe Pain (7 - 10)  simethicone 80 milliGRAM(s) Chew three times a day PRN Gas    Pertinent Labs:  09-01 Na138 mmol/L Glu 89 mg/dL K+ 3.2 mmol/L<L> Cr  0.86 mg/dL BUN 6 mg/dL<L> 09-01 Alb 2.1 g/dL<L> 08-18 Chol 124 mg/dL LDL --    HDL 45 mg/dL Trig 80 mg/dL    Skin: Multiple Pressure Ulcers     Edema: +1 Edema Noted to Left Lower Extremity    +2 Edema Noted to Right Lower Extremity   (as Per Documentation)     Last Bowel Movement: on 8/31    Estimated Needs:   [X] No Change Since Previous Assessment    Previous Nutrition Diagnosis:   No Active Nutrition Dx @ This Present Time    Nutrition Diagnosis is [X] Not Applicable     New Nutrition Diagnosis: [X] Increased Nutrient Needs Related to Wound Healing  as Evidence By Multiple Pressure Ulcers     Interventions:   1. Jordan 1 Packet BID  2. Education Provided on Need for Supplementation  3. Recommend Continue Nutrition Plan of Care     Monitoring & Evaluation:   [X] Weights   [X] PO Intake   [X] Skin Integrity   [X] Follow Up (Per Protocol)  [X] Tolerance to Diet Prescription   [X] Other: Labs     Registered Dietitian/Nutritionist Remains Available.  Jared De Leon RDN    Pager #[phone]  Phone# [phone]
REHABILITATION DIAGNOSIS/IMPAIRMENT GROUP CODE:  Unspecified Spinal cord compression    COMORBIDITIES/COMPLICATING CONDITIONS IMPACTING REHABILITATION:  HEALTH ISSUES - PROBLEM Dx:  s/p REvision back surgery  Pain   Impaired gait       PAST MEDICAL & SURGICAL HISTORY:  HTN (hypertension)  Rheumatoid arthritis  Chronic GERD  Degenerative lumbar spinal stenosis  History of hepatitis C  Lumbar cord compression  Fusion of spine, lumbar region 2015  Status post cervical spinal [id number]          Based upon consideration of the patient's impairments, functional status, complicating conditions and any other contributing factors and after information garnered from the assessments of all therapy disciplines involved in treating the patient and other pertinent clinicians:    INTERDISCIPLINARY REHABILITATION INTERVENTIONS:    [ x  ] Transfer Training  [ x  ] Bed Mobility  [ x  ] Therapeutic Exercise  [ x  ] Balance/Coordination Exercises  [ x  ] Locomotion retraining  [ x  ] Stairs  [ x  ] Functional Transfer Training  [ x  ] Bowel/Bladder program  [ x  ] Pain Management  [ x  ] Skin/Wound Care  [   ] Visual/Perceptual Training  [   ] Therapeutic Recreation Activities  [ x  ] Neuromuscular Re-education  [x   ] Activities of Daily Living  [   ] Speech Exercise  [   ] Swallowing Exercises  [   ] Vital Stim  [   ] Dietary Supplements  [   ] Calorie Count  [   ] Cognitive Exercises  [   ] Congnitive/Linguistic Treatment  [   ] Behavior Program  [   ] Neuropsych Therapy  [   ] Patient/Family Counseling  [ x  ] Family Training  [   ] Community Re-entry  [   ] Orthotic Evaluation  [   ] Prosthetic Eval/Training    MEDICAL PROGNOSIS:  fair    REHAB POTENTIAL:  fair  EXPECTED DAILY THERAPY:         PT:2 hrs/day       OT:1 hr/day        ST:na       P&O:na    EXPECTED INTENSITY OF PROGRAM:  2 hrs/day    EXPECTED FREQUENCY OF PROGRAM:  5 days/week    ESTIMATED LOS:  14-16 days    ESTIMATED DISPOSITION:  home    INTERDISCIPLINARY FUNCTIONAL OUTCOMES/GOALS:         Gait/Mobility:Independent       Transfers:Independent       ADLs:Independent       Functional Transfers:Independent       Medication Management:Independent       Communication:na       Cognitive:na       Dysphagia:na       Bladder:Independent       Bowel:Independent
Nutrition Follow Up Note  Hospital Course   (Per Electronic Medical Record)    Source:  Patient [X]  Medical Record [X]      Diet:   Regular Diet (IDDSI Level 7) w/ Thin Liquids (IDDSI Level 0)  Tolerates Diet Consistency Well  No Chewing/Swallowing Difficulties  No Recent Nausea, Vomiting, Diarrhea or Constipation (as Per Patient)  Consumes % of Meals (as Per Documentation) - States Good PO Intake/Appetite (Per Patient)  on Jordan 1 Packet BID (Provides 180kcal & 28grams of Protein)  Patient Takes Nutrition Supplement But Only Once Daily  Recommend Change Supplement to Jordan 1 Packet Daily (Provides 90kcal & 14grams of Protein)   Education Provided on Proper Nutrition  Family Brings in Food from Home     Enteral/Parenteral Nutrition: Not Applicable    Current Weight: 214.2lb on 8/28  Obtain New Weight  Obtain Weights Weekly     Pertinent Medications: MEDICATIONS  (STANDING):  atorvastatin 20 milliGRAM(s) Oral at bedtime  enoxaparin Injectable 40 milliGRAM(s) SubCutaneous every 24 hours  magnesium oxide 400 milliGRAM(s) Oral two times a day with meals  methocarbamol 750 milliGRAM(s) Oral every 8 hours  metoprolol tartrate 50 milliGRAM(s) Oral two times a day  mupirocin 2% Ointment 1 Application(s) Topical <User Schedule>  oxyCODONE  ER Tablet 15 milliGRAM(s) Oral every 12 hours  pantoprazole    Tablet 40 milliGRAM(s) Oral before breakfast  polyethylene glycol 3350 17 Gram(s) Oral two times a day  predniSONE   Tablet 5 milliGRAM(s) Oral daily  senna 2 Tablet(s) Oral at bedtime  tenofovir disoproxil fumarate (VIREAD) 300 milliGRAM(s) Oral daily    MEDICATIONS  (PRN):  acetaminophen     Tablet .. 650 milliGRAM(s) Oral every 6 hours PRN Moderate Pain (4 - 6)  bisacodyl Suppository 10 milliGRAM(s) Rectal daily PRN Constipation  cyclobenzaprine 5 milliGRAM(s) Oral three times a day PRN Muscle Spasm  oxyCODONE    IR 10 milliGRAM(s) Oral every 4 hours PRN Severe Pain (7 - 10)  simethicone 80 milliGRAM(s) Chew three times a day PRN Gas    Pertinent Labs:  09-06 Na139 mmol/L Glu 84 mg/dL K+ 3.5 mmol/L Cr  1.01 mg/dL BUN 5 mg/dL<L> 09-06 Alb 2.4 g/dL<L> 08-18 Chol 124 mg/dL LDL --    HDL 45 mg/dL Trig 80 mg/dL    Skin: Multiple Pressure Ulcers     Edema: None Noted Recently (as Per Documentation)     Last Bowel Movement: on 9/2    Estimated Needs:   [X] No Change Since Previous Assessment    Previous Nutrition Diagnosis:   Increased Nutrient Needs - Calories & Protein     Nutrition Diagnosis is [X] Ongoing - Recommend Change Jordan 1 Packet to Daily; Education Provided on Proper Nutrition     New Nutrition Diagnosis: [X] Not Applicable    Interventions:   1. Education Provided on Proper Nutrition   2. Recommend Change Jordan 1 Packet to Daily  3. Recommend Continue Nutrition Plan of Care     Monitoring & Evaluation:   [X] Weights   [X] PO Intake   [X] Skin Integrity   [X] Follow Up (Per Protocol)  [X] Tolerance to Diet Prescription   [X] Other: Labs     Registered Dietitian/Nutritionist Remains Available.  Jared De Leon RDN    Pager #[phone]  Phone# [phone]

## 2022-09-07 NOTE — DISCHARGE NOTE NURSING/CASE MANAGEMENT/SOCIAL WORK - NSSCTYPOFSERV_GEN_ALL_CORE
You will have a home care evaluation for [facility] services. Your home visiting nurse will call you after your discharge to schedule your first visit.

## 2022-09-07 NOTE — DISCHARGE NOTE NURSING/CASE MANAGEMENT/SOCIAL WORK - PATIENT PORTAL LINK FT
You can access the FollowMyHealth Patient Portal offered by [facility] by registering at the following website: [email or web address]. By joining [employer]’s FollowMyHealth portal, you will also be able to view your health information using other applications (apps) compatible with our system.

## 2022-09-07 NOTE — CONSULT NOTE ADULT - ASSESSMENT
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    -last remaining Ryan drain was removed due to low output, dry sterile dressing placed  -likely discharge home in a.m.  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    -last remaining Ryan drain was removed due to low output, dry sterile dressing placed  -likely discharge home in a.m.  Pt to followup with NS where sutures can be removed  -Pt seen with Dr. Chopra

## 2022-09-07 NOTE — DISCHARGE NOTE NURSING/CASE MANAGEMENT/SOCIAL WORK - NSDCFUADDAPPT_GEN_ALL_CORE_FT
Follow up with your Primary Care Doctor Follow up with your Primary Care Physician:  Dr. Saman Yang  Follow up with your Primary Care Physician:  [address] [facility] [address]  Scheduled Appointment: 9/12 at 12pm

## 2022-09-07 NOTE — DISCHARGE NOTE PROVIDER - NSDCFUADDAPPT_GEN_ALL_CORE_FT
Follow up with your Primary Care Physician:  [address] [facility] [address]  Scheduled Appointment: 9/12 at 12pm

## 2022-09-07 NOTE — DISCHARGE NOTE PROVIDER - HOSPITAL COURSE
65 yo RH male with PMHx of HTN, RA (on prednisone), lumbar fusion/chronic back pain with 2 month history of increased difficulty in ambulation, veering to left and numbness on the bottom of feet. Prior imaging of L spine in 2020 showed degenerative changes L1-L2, severe high grade stenosis which is progression from imaging done in 2015.  MRI lumbar spine w/unspecified cord compression.   Patient admitted 8/1/8 for  OR  for Posterior L2 PSO (pedicle subtraction osteotomy), extension of fusion to T9, T10-L2 Decompression with Plastics closure. S/p PRBC 8/23 for anemia. Post op course also complicated by orthostatic hypotension- meds adjusted by Cardiology. Medicine following for elevated CPK/WBC - managed w IVF, elevated wbc- likely reactive. Electrolyte abnormalities corrected. US LEs neg DVT 8/20. CXR clear. Pain management. PMR consulted and acute rehab recommended. Cleared for dc on 8/26.       Rehab course noted for mild electrolyte abnormalities that have improved with oral supplements. He also had thrombocytosis which he states that he will follow up as an outpatient. He refuses lab work at times  His incision was monitored closely and drainage form MALLIKA drain has been decreasing. Plastics has been consulted here for possible removal of MALLIKA drain.     He participated in daily therapy sessions and functionally improved for a discharge home.

## 2022-09-07 NOTE — PROGRESS NOTE ADULT - SUBJECTIVE AND OBJECTIVE BOX
66y old  Male who presents with a chief complaint of SCC s/p decompression (05 Sep 2022 07:30)    Patient seen and examined at bedside. No overnight events.  no new complaints, feels well.   no n/v, no sob      Vital Signs Last 24 Hrs  T(C): 36.8 (07 Sep 2022 08:30), Max: 36.8 (06 Sep 2022 21:00)  T(F): 98.2 (07 Sep 2022 08:30), Max: 98.3 (06 Sep 2022 21:00)  HR: 89 (07 Sep 2022 08:30) (85 - 89)  BP: 112/76 (07 Sep 2022 08:30) (110/74 - 134/64)  BP(mean): --  RR: 16 (07 Sep 2022 08:30) (16 - 17)  SpO2: 96% (07 Sep 2022 08:30) (94% - 96%)    Parameters below as of 07 Sep 2022 08:30  Patient On (Oxygen Delivery Method): room air      GENERAL- NAD  EAR/NOSE/MOUTH/THROAT - no pharyngeal exudates, no oral leisions,  MMM  EYES- SMOOTH, conjunctiva and Sclera clear  NECK- supple  RESPIRATORY-  clear to auscultation bilaterally, non laboured breathing  CARDIOVASCULAR - SIS2, RRR  GI - soft NT BS present  EXTREMITIES- no pedal edema  NEUROLOGY- no new gross focal deficits  PSYCHIATRY- AAO X 3                9.7                  139  | 29   | 5            9.19  >-----------< 738     ------------------------< 84                    29.8                 3.5  | 102  | 1.01                                         Ca 9.2   Mg 1.5   Ph x                MEDICATIONS  (STANDING):  atorvastatin 20 milliGRAM(s) Oral at bedtime  enoxaparin Injectable 40 milliGRAM(s) SubCutaneous every 24 hours  magnesium oxide 400 milliGRAM(s) Oral two times a day with meals  methocarbamol 750 milliGRAM(s) Oral every 8 hours  metoprolol tartrate 50 milliGRAM(s) Oral two times a day  mupirocin 2% Ointment 1 Application(s) Topical <User Schedule>  oxyCODONE  ER Tablet 15 milliGRAM(s) Oral every 12 hours  pantoprazole    Tablet 40 milliGRAM(s) Oral before breakfast  polyethylene glycol 3350 17 Gram(s) Oral two times a day  predniSONE   Tablet 5 milliGRAM(s) Oral daily  senna 2 Tablet(s) Oral at bedtime  tenofovir disoproxil fumarate (VIREAD) 300 milliGRAM(s) Oral daily    MEDICATIONS  (PRN):  acetaminophen     Tablet .. 650 milliGRAM(s) Oral every 6 hours PRN Moderate Pain (4 - 6)  bisacodyl Suppository 10 milliGRAM(s) Rectal daily PRN Constipation  cyclobenzaprine 5 milliGRAM(s) Oral three times a day PRN Muscle Spasm  oxyCODONE    IR 10 milliGRAM(s) Oral every 4 hours PRN Severe Pain (7 - 10)  simethicone 80 milliGRAM(s) Chew three times a day PRN Gas

## 2022-09-07 NOTE — PROGRESS NOTE ADULT - ASSESSMENT
67 yo M with h/o HTN, RA, GERD, Hepatitis C, s/p prior lumbar decompression & fusion in 2015 who presents with worsening back pain x 2 months associated with unsteadiness when getting up and numbness in his feet who presented to [facility] for scheduled spine surgery. Now s/p Posterior L2 PSO, extension of fusion to T9, T10-L2 Decompression, w/ Plastics Closure 8/18/22. Post-op complicated by hypotension requiring pressors, elevated CPK, leukocytosis, electrolyte derangements, and symptomatic orthostatic hypotension.       Continue Comprehensive Rehab Program of PT/OT - Total of 3 hrs/day 5 days/week     # cord compression.   - s/p Posterior L2 PSO, extension of fusion to T9, T10-L2 Decompression w/Dr Conner, w/ Plastics Closure 8/18/22 .   - c/w MALLIKA, monitor output each shift. Per [facility] discharge, may be removed if output < 20 mL for 24 hrs.   Plastics - Dr. Padilla   Requested Plastic surgery consult here   - c/w oxycontin ER 15mg q12h (home dose) and - PRN pain regimen    #Thrombocytosis: qge=108 9/6  - More likely reactive in origin  Patient refused lab work - will fu as outpatient     # hypomagnesemia- repleted mag and recheck CMP 9/7- refused labs . On PO supplements   # hyponatremia: noted intermittently through hospital stay. Now improved   # hypokalemia - supplemented x1 . Encourage dietary intake.     #Orthostatic hypotension: Improved.    #Anemia - stable  Likely post op blood loss anemia  -follow up routine CBC    #Leukocytosis. : Stable WBC ,  - monitor off abx, CXR neg    #Hypertension:   -c/w low dose metoprolol   - hold home valsartan 320mg daily    #Rheumatoid arthritis.   - kevzara injection [id number]. held from OR.    - c/w prednisone 5mg daily  - c/w pantoprazole while inpatient for GI ppx and known GERD  - outpatient f/u with rheumatologist.     # Hepatitis C.  -c/w tenofovir 300mg daily.    #HLD:   -c/w atorvastatin    Pain Mgmt   -Oxycodone ER 15mg Q12h  -methocarbamol 750mg Q8h  -Tylenol Q6h PRN  -Oxycodone IR 10mg Q4h PRN   - flexeril 5mg TID PRN    GI/Bowel Mgmt   - Senna, Miralax qd,   - dulcolax supp prn.   - PRN simethicone for indigestion.   - PPX pantoprazole.     /Bladder Mgmt   - Toileting prn. Using urinal       Disp:   home in am with home care

## 2022-09-07 NOTE — DISCHARGE NOTE PROVIDER - NSDCMRMEDTOKEN_GEN_ALL_CORE_FT
acetaminophen 325 mg oral tablet: 2 tab(s) orally every 6 hours, As needed, Moderate Pain (4 - 6)  Crestor 5 mg oral tablet: 1 tab(s) orally once a day (at bedtime)  cyclobenzaprine 5 mg oral tablet: 1 tab(s) orally 3 times a day, As needed, Muscle Spasm  magnesium oxide 400 mg oral tablet: 1 tab(s) orally 2 times a day (with meals)  methocarbamol 750 mg oral tablet: 1 tab(s) orally every 8 hours  metoprolol tartrate 50 mg oral tablet: 1 tab(s) orally 2 times a day  oxyCODONE 10 mg oral tablet: 1 tab(s) orally every 4 hours, As needed, Severe Pain (7 - 10)  oxyCODONE 15 mg oral tablet, extended release: 1 tab(s) orally every 12 hours  pantoprazole 40 mg oral delayed release tablet: 1 tab(s) orally once a day (before a meal)  polyethylene glycol 3350 oral powder for reconstitution: 17 gram(s) orally 2 times a day  predniSONE 5 mg oral tablet: 1 tab(s) orally once a day  senna leaf extract oral tablet: 2 tab(s) orally once a day (at bedtime)  tenofovir disoproxil fumarate 300 mg oral tablet: 1 tab(s) orally once a day   acetaminophen 325 mg oral tablet: 2 tab(s) orally every 6 hours, As needed, Moderate Pain (4 - 6)  Crestor 5 mg oral tablet: 1 tab(s) orally once a day (at bedtime)  cyclobenzaprine 5 mg oral tablet: 1 tab(s) orally 3 times a day, As needed, Muscle Spasm  magnesium oxide 400 mg oral tablet: 1 tab(s) orally 2 times a day (with meals)  metoprolol tartrate 50 mg oral tablet: 1 tab(s) orally 2 times a day  oxyCODONE 10 mg oral tablet: 1 tab(s) orally every 4 hours, As needed, Severe Pain (7 - 10)  oxyCODONE 15 mg oral tablet, extended release: 1 tab(s) orally every 12 hours  pantoprazole 40 mg oral delayed release tablet: 1 tab(s) orally once a day (before a meal)  polyethylene glycol 3350 oral powder for reconstitution: 17 gram(s) orally 2 times a day  predniSONE 5 mg oral tablet: 1 tab(s) orally once a day  senna leaf extract oral tablet: 2 tab(s) orally once a day (at bedtime)  tenofovir disoproxil fumarate 300 mg oral tablet: 1 tab(s) orally once a day

## 2022-09-07 NOTE — DISCHARGE NOTE PROVIDER - REASON FOR ADMISSION
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 SCC s/p decompression

## 2022-09-07 NOTE — DISCHARGE NOTE PROVIDER - CARE PROVIDER_API CALL
Sanjeev Conner)  Neurosurgery  [address], [address]  [place] [address]  Phone: [phone]  Fax: [phone]  Follow Up Time: 1 week    David Westfall (DO)  Cardiovascular Disease; Internal Medicine; Nuclear Cardiology  [address], [address]  [place] [address]  Phone: [phone]  Fax: [phone]  Follow Up Time: 2 weeks    Ventura Pop)  Plastic Surgery Surgery  [address] [place] [id number]  Phone: [phone]  Fax: [phone]  Follow Up Time: 2 weeks

## 2022-09-07 NOTE — DISCHARGE NOTE PROVIDER - PROVIDER TOKENS
PROVIDER:[TOKEN:[[id number]],FOLLOWUP:[1 week]],PROVIDER:[TOKEN:[[id number]],FOLLOWUP:[2 weeks]],PROVIDER:[TOKEN:[[id number]],FOLLOWUP:[2 weeks]]

## 2022-09-07 NOTE — DISCHARGE NOTE PROVIDER - NSDCCPCAREPLAN_GEN_ALL_CORE_FT
PRINCIPAL DISCHARGE DIAGNOSIS  Diagnosis: Spinal cord compression  Assessment and Plan of Treatment: You had revision back surgery and were admitted to rehab following surgery. You have completed therapy and ready for discharge home with home care services  While in rehab your lab work showed elevated platelets and low magnesium. You were prescribed oral magnesium supplements. Please follow up with your PCP to repeat blood work. If your platelets remain elevated you may need an appointment with hematology  Please follow up with your surgeons in 1-2 weeks      SECONDARY DISCHARGE DIAGNOSES  Diagnosis: Thrombocytosis  Assessment and Plan of Treatment: You labs showed elevated platelets. Please follow up with your PCP about this

## 2022-09-07 NOTE — DISCHARGE NOTE PROVIDER - NSDCACTIVITY_GEN_ALL_CORE
Do not drive or operate machinery/Do not make important decisions/Walking - Indoors allowed/Walking - Outdoors allowed/Follow Instructions Provided by your Surgical Team

## 2022-09-07 NOTE — PROGRESS NOTE ADULT - ASSESSMENT
66M with PMH HTN, RA, Hepatitis C, s/p prior lumbar decompression & fusion in 2015 presented to [facility] with worsening back pain x 2 months associated with unsteadiness and numbness in his feet for scheduled spine surgery. s/p Posterior L2 PSO, extension of fusion to T9, T10-L2 Decompression, w/ Plastics Closure 8/18/22 with EBL 750cc. Post-op complicated by hypotension requiring pressors, elevated CPK, leukocytosis, electrolyte derangements, and symptomatic orthostatic hypotension. Now admitted to [facility] for initiation of multidisciplinary rehab program- pt/ot/dvt ppx    #Cord compression  -s/p Posterior L2 PSO, extension of fusion to T9, T10-L2 Decompression w/ Dr Conner, w/ Plastics Closure 8/18/22 with EBL 750cc  -Continue hemovac/ MALLIKA drain, monitor output. Per [facility] discharge, may be removed if output < 20 mL for 24 hrs after checking with Plastics Dr. Pop  -pain regimen with Oxycontin, Oxycodone PRN    #HTN  -Continue Toprol    #Anemia   Likely post op blood loss anemia  -H/H stable, improving  -No overt signs of bleeding  -follow up routine CBC    #Thrombocytosis  - Likely reactive to above  -Follow up routine CBC    # hypomagnesemia-  replete mag    #Rheumatoid Arthritis  -Receives Kevzara injection [id number], currently on hold while in rehab   -Continue prednisone 5mg daily    #Hepatitis C  -Continue tenofovir 300mg daily    #HLD  -Continue Lipitor    #Hx Elevated CPK  - CPK peaked at 8014 on 8/19 then downtrended to 691  - s/p aggressive IVF  - repeat CPK normal    #Hypokalemia  -Improved after repletion  -Follow up routine BMP    -DVT ppx: Lovenox    -GI ppx: Protonix while on steroids    will follow  d/w dr. urena

## 2022-09-07 NOTE — CONSULT NOTE ADULT - SUBJECTIVE AND OBJECTIVE BOX
CC:  Patient is a 66y old  Male who presents with a chief complaint of Thoracic spinal cord compression s/p decompression and revision back surgery   s/p T10-L2 decompression, L2 pedicle subtraction osteotomy, and extension of fusion T9 to pelvis with plastics closure on 8/18/2022 for proximal junctional kyphosis and thoracic cord compression.        HPI:  65 yo RH male with PMHx of HTN, RA (on prednisone), lumbar fusion/chronic back pain with 2 month history of increased difficulty in ambulation, veering to left and numbness on the bottom of feet. Prior imaging of L spine in 2020 showed degenerative changes L1-L2, severe high grade stenosis which is progression from imaging done in 2015.  MRI lumbar spine w/unspecified cord compression.   Patient admitted 8/1/8 for  OR  for Posterior L2 PSO (pedicle subtraction osteotomy), extension of fusion to T9, T10-L2 Decompression with Plastics closure. S/p PRBC 8/23 for anemia. Post op course also complicated by orthostatic hypotension- meds adjusted by Cardiology. Medicine following for elevated CPK/WBC - managed w IVF, elevated wbc- likely reactive. Electrolyte abnormalities corrected. US LEs neg DVT 8/20. CXR clear. Pain management. PMR consulted and acute rehab recommended. Cleared for dc on 8/26.              PAST MEDICAL & SURGICAL HISTORY:  HTN (hypertension)      Rheumatoid arthritis      Chronic GERD      Degenerative lumbar spinal stenosis      History of hepatitis C      Lumbar cord compression      Fusion of spine, lumbar region  2015      Status post cervical spinal fusion  2014          Allergies    gabapentin (Other)  Shrimp (Unknown)    Intolerances        SOCIAL HISTORY          Smoking: Yes [ ]  No [ ]   ______pk yrs          ETOH  Yes [ ]  No [ ]  Social [ ]          DRUGS:  Yes [ ]  No [ ]  if so what______________    FAMILY HISTORY:  FH: emphysema (Father)        MEDICATIONS  (STANDING):  atorvastatin 20 milliGRAM(s) Oral at bedtime  enoxaparin Injectable 40 milliGRAM(s) SubCutaneous every 24 hours  magnesium oxide 400 milliGRAM(s) Oral two times a day with meals  methocarbamol 750 milliGRAM(s) Oral every 8 hours  metoprolol tartrate 50 milliGRAM(s) Oral two times a day  mupirocin 2% Ointment 1 Application(s) Topical <User Schedule>  oxyCODONE  ER Tablet 15 milliGRAM(s) Oral every 12 hours  pantoprazole    Tablet 40 milliGRAM(s) Oral before breakfast  polyethylene glycol 3350 17 Gram(s) Oral two times a day  predniSONE   Tablet 5 milliGRAM(s) Oral daily  senna 2 Tablet(s) Oral at bedtime  tenofovir disoproxil fumarate (VIREAD) 300 milliGRAM(s) Oral daily    MEDICATIONS  (PRN):  acetaminophen     Tablet .. 650 milliGRAM(s) Oral every 6 hours PRN Moderate Pain (4 - 6)  bisacodyl Suppository 10 milliGRAM(s) Rectal daily PRN Constipation  cyclobenzaprine 5 milliGRAM(s) Oral three times a day PRN Muscle Spasm  oxyCODONE    IR 10 milliGRAM(s) Oral every 4 hours PRN Severe Pain (7 - 10)  simethicone 80 milliGRAM(s) Chew three times a day PRN Gas         Review of systems:  General:  no fever or chills  Pulmonary:  no SOB  Cardiac:  denies chest pain, palpitations  GI:  no nausea, vomitting, or abddominal pain  :  no increase frequency, or burning  Heme:  no easy bruising with minor trauma  Musculoskeletal:  No history of back pain or trauma  Skin:  no history of rashes, injury, trauma  Neuro:   weakness improving         Vital Signs Last 24 Hrs  T(C): 36.8 (07 Sep 2022 08:30), Max: 36.8 (06 Sep 2022 21:00)  T(F): 98.2 (07 Sep 2022 08:30), Max: 98.3 (06 Sep 2022 21:00)  HR: 89 (07 Sep 2022 08:30) (85 - 89)  BP: 112/76 (07 Sep 2022 08:30) (110/74 - 128/75)  BP(mean): --  RR: 16 (07 Sep 2022 08:30) (16 - 17)  SpO2: 96% (07 Sep 2022 08:30) (94% - 96%)    Parameters below as of 07 Sep 2022 08:30  Patient On (Oxygen Delivery Method): room air        Physical Exam:     Physical Exam: General: NAD, Resting Comfortable,                                  HEENT: NC/AT, EOM I, PERRLA, Normal Conjunctivae  Cardio: RRR, Normal S1-S2, No M/G/R                              Pulm: No Respiratory Distress,  Lungs CTAB                        Abdomen: firm, slight distention, NT, Soft, BS+                                                MSK: No joint swelling, Full ROM.                                         Ext:  No calf tenderness , b/l pedal edema   Skin/Wounds: incision over spine with aquacel. surgical changed yesterday, incision C,D,I. singl drain  remaining. Output reviewed and maikol drain removed.       Anterior chest sternal area with device induced stage 3( superior) 4x3 cm  and stage 2, 7x3 cm  Skin tear to R lateral knee with adaptic. Other well healing scabs on shins and inner thighs, L wrist.     Neurological Examination    Cognitive: AAO x 3                                                                         Attention: Intact   Judgment: Good evidence of judgement                                  Mood/Affect: wnl                                                                           Communication:  Fluent,  No dysarthria   Swallow: intact  EOMI, no facial droop                                                                        Sensory: Intact to light touch, impaired proprioception BL hallucis                                                                                           Tone: normal Throughout     Motor    LEFT    UE: SF [5/5], EF [5/5], EE [5/5], WE [5/5],  [wnl]  RIGHT UE: SF [5/5], EF [5/5], EE [5/5], WE [5/5],  [wnl]    LEFT    LE:  HF [4/5], KE [5/5], DF [4/5], EHL [1/5],  PF [5/5]  RIGHT LE:  HF [3/5], KE [5/5], DF [5/5], EHL [1/5],  PF [5/5]      Reflex:  2+ UE and R patella, Diminished other LE. Babinski neutral    General:  Appears stated age, well-groomed, well-nourished, no distress  Extremities:    Skin:     other:  Musculoskeletal:    Neuro/Psych:  Alert, oriented tp time, place and person       LABS:                        9.7    9.19  )-----------( 738      ( 06 Sep 2022 05:20 )             29.8     09-06    139  |  102  |  5<L>  ----------------------------<  84  3.5   |  29  |  1.01    Ca    9.2      06 Sep 2022 05:20  Mg     1.5     09-06    TPro  6.0  /  Alb  2.4<L>  /  TBili  0.2  /  DBili  x   /  AST  24  /  ALT  21  /  AlkPhos  90  09-06          RADIOLOGY & ADDITIONAL STUDIES:  ACC: [id number] EXAM:  CT THORACIC SPINE                        ACC: [id number] EXAM:  CT LUMBAR SPINE                          PROCEDURE DATE:  08/19/2022          INTERPRETATION:  Clinical indication: Thoracic lumbar fusion    Serial thin sections alla multi slice scanner were obtained through the   thoracic and lumbosacral spine from the T1 to the S4-S5 level in a   stacked axial fashion reformatted at 1.25 mm sections with sagittal and   coronal computer generated reconstructed views.    There is generalized osteopenia. At the edge of the examination there is   evidence of prior cervical fusion from C5 to C7.        The thoracic vertebral bodies are normal in height without evidence of   acute fracture. At there is vertebroplasty material in the T9 vertebral   body. Transpedicular screws are identified T9-T12 bilaterally with   connecting rods. There has been a laminectomy at T10-T11 and T12. There   is vertebroplasty material in the L1 vertebral body with bilateral   pedicle screws. There is a compression deformity of the L2 vertebral   body. There is an interposition graft at L1-2. Interposition grafts at   L2-3 L3-4 L4-5 and L5-S1 are identified. There has been a laminectomy   from L1 through L5. Posterior element screws in the lumbar region are   identified bilaterally with connecting rods and mature bony fusion mass.    A drain is present.    There is no lucency surrounding the screws to suggest loosening or   infection.    IMPRESSION: Posterior fusion T9-S1 with connectingrods and pedicle   screws. Vertebral plasty material T9 and L1. Interposition grafts L1 to   through L5-S1.    --- End of Report ---            LINNEA BARCLAY MD; Attending Radiologist  This document has been electronically signed. Aug 19 2022  1:38PM    Risks, benefits, and alternatives to treatment discussed. All questions answered with understanding.    Procedure Performed:  ( x)Yes, removal Maikol Drain x 1     (  )No  Name of Procedure:      [  ]Debridement     [  ]I&D    [  ]Laceration Repair     [  ]Other:  (  )partial thickness     (  )full thickness     (  )subcutaneous     (  )muscle/tendon     (  )bone  (  )sharp     (  )surgical  
Patient is a 66y old  Male who presents with a chief complaint of SCC s/p decompression (26 Aug 2022 13:01)    HPI:  65 yo RH male with PMHx of HTN, RA (on prednisone), lumbar fusion/chronic back pain with 2 month history of increased difficulty in ambulation, veering to left and numbness on the bottom of feet. Prior imaging of L spine in 2020 showed degenerative changes L1-L2, severe high grade stenosis which is progression from imaging done in 2015.  MRI lumbar spine w/unspecified cord compression.   Patient admitted 8/1/8 for  OR  for Posterior L2 PSO (pedicle subtraction osteotomy), extension of fusion to T9, T10-L2 Decompression with Plastics closure. S/p PRBC 8/23 for anemia. Post op course also complicated by orthostatic hypotension- meds adjusted by Cardiology. Medicine following for elevated CPK/WBC - managed w IVF, elevated wbc- likely reactive. Electrolyte abnormalities corrected. US LEs neg DVT 8/20. CXR clear. Pain management. PMR consulted and acute rehab recommended. Cleared for dc on 8/26.     Patient was seen and examined at bedside for hospitalist consult. He reports feeling well and is motivated to participate in therapy.     PAST MEDICAL & SURGICAL HISTORY:  HTN (hypertension)  Rheumatoid arthritis  Chronic GERD  Degenerative lumbar spinal stenosis  History of hepatitis C  Lumbar cord compression  Fusion of spine, lumbar region 2015  Status post cervical spinal fusion 2014    SOCIAL HISTORY:  Tobacco: former smoker of 20 years  EtOH: social  Recreational drug use: denies    FAMILY HISTORY:  HTN in mother    ALLERGIES:  gabapentin (Other)  shellfish (Anaphylaxis)    MEDICATIONS  (STANDING):  atorvastatin 20 milliGRAM(s) Oral at bedtime  enoxaparin Injectable 40 milliGRAM(s) SubCutaneous every 24 hours  methocarbamol 750 milliGRAM(s) Oral every 8 hours  metoprolol tartrate 50 milliGRAM(s) Oral two times a day  oxyCODONE  ER Tablet 15 milliGRAM(s) Oral every 12 hours  pantoprazole    Tablet 40 milliGRAM(s) Oral before breakfast  polyethylene glycol 3350 17 Gram(s) Oral two times a day  predniSONE   Tablet 5 milliGRAM(s) Oral daily  senna 2 Tablet(s) Oral at bedtime  tenofovir disoproxil fumarate (VIREAD) 300 milliGRAM(s) Oral daily    MEDICATIONS  (PRN):  acetaminophen     Tablet .. 650 milliGRAM(s) Oral every 6 hours PRN Moderate Pain (4 - 6)  bisacodyl Suppository 10 milliGRAM(s) Rectal daily PRN Constipation  cyclobenzaprine 5 milliGRAM(s) Oral three times a day PRN Muscle Spasm  oxyCODONE    IR 10 milliGRAM(s) Oral every 4 hours PRN Severe Pain (7 - 10)    Review of Systems: Refer to HPI for pertinent positives and negatives. All other ROS reviewed and negative except as otherwise stated above.    Vital Signs Last 24 Hrs  T(F): 98.1 (27 Aug 2022 08:00), Max: 98.6 (26 Aug 2022 20:35)  HR: 86 (27 Aug 2022 08:00) (86 - 100)  BP: 110/70 (27 Aug 2022 08:00) (110/70 - 148/76)  RR: 15 (27 Aug 2022 08:00) (15 - 18)  SpO2: 98% (27 Aug 2022 08:00) (92% - 98%)    I&O's Summary    27 Aug 2022 07:01  -  27 Aug 2022 10:17  --------------------------------------------------------  IN: 0 mL / OUT: 60 mL / NET: -60 mL      PHYSICAL EXAM:  GENERAL: NAD, well-groomed  HEAD:  Atraumatic, Normocephalic  EYES: EOMI, PERRL, conjunctiva and sclera clear  ENMT: Moist mucous membranes, Good dentition  NECK: Supple, No JVD  CHEST/LUNG: Clear to auscultation bilaterally, non-labored breathing, good air entry  HEART: RRR; S1/S2, No murmur  ABDOMEN: Soft, Nontender, Nondistended; Bowel sounds present  BACK: Spine incision site dressing c/d/i, drain in place  VASCULAR: Normal pulses, Normal capillary refill  EXTREMITIES: No cyanosis, No edema, b/l LE weakness  SKIN: As above, area of previous skin tear on anterior chest wall, right lateral knee skin tear, healing scabs at shins, inner thighs and L wrist  PSYCH: Normal mood and affect  NERVOUS SYSTEM:  A/O x3, Good concentration; CN 2-12 intact, No focal deficits, moves all extremities    LABS:                        9.2    10.85 )-----------( 525      ( 27 Aug 2022 06:30 )             27.8     08-27    128  |  98  |  qns  ----------------------------<  qns  4.3   |  22  |  qns    Ca    qns      27 Aug 2022 08:24    TPro  5.8  /  Alb  qns  /  TBili  0.3  /  DBili  x   /  AST  44  /  ALT  34  /  AlkPhos  50  08-27      Serum Pro-Brain Natriuretic Peptide: 44 pg/mL (08-18-22 @ 23:11)        COVID-19 PCR: NotDetec (08-25-22 @ 07:26)  COVID-19 PCR: NotDetec (08-15-22 @ 16:40)    RADIOLOGY & ADDITIONAL TESTS:    Care Discussed with Consultants/Other Providers: Rehab

## 2022-09-08 VITALS
SYSTOLIC BLOOD PRESSURE: 125 MMHG | HEART RATE: 83 BPM | TEMPERATURE: 98 F | RESPIRATION RATE: 16 BRPM | OXYGEN SATURATION: 96 % | DIASTOLIC BLOOD PRESSURE: 78 MMHG

## 2022-09-08 PROCEDURE — 99232 SBSQ HOSP IP/OBS MODERATE 35: CPT

## 2022-09-08 PROCEDURE — 99238 HOSP IP/OBS DSCHRG MGMT 30/<: CPT

## 2022-09-08 RX ADMIN — OXYCODONE HYDROCHLORIDE 15 MILLIGRAM(S): 5 TABLET ORAL at 06:04

## 2022-09-08 RX ADMIN — CYCLOBENZAPRINE HYDROCHLORIDE 5 MILLIGRAM(S): 10 TABLET, FILM COATED ORAL at 09:29

## 2022-09-08 RX ADMIN — METHOCARBAMOL 750 MILLIGRAM(S): 500 TABLET, FILM COATED ORAL at 06:04

## 2022-09-08 RX ADMIN — Medication 5 MILLIGRAM(S): at 06:04

## 2022-09-08 RX ADMIN — OXYCODONE HYDROCHLORIDE 10 MILLIGRAM(S): 5 TABLET ORAL at 09:29

## 2022-09-08 RX ADMIN — MUPIROCIN 1 APPLICATION(S): 20 OINTMENT TOPICAL at 08:06

## 2022-09-08 RX ADMIN — MAGNESIUM OXIDE 400 MG ORAL TABLET 400 MILLIGRAM(S): 241.3 TABLET ORAL at 08:07

## 2022-09-08 RX ADMIN — Medication 50 MILLIGRAM(S): at 06:04

## 2022-09-08 RX ADMIN — PANTOPRAZOLE SODIUM 40 MILLIGRAM(S): 20 TABLET, DELAYED RELEASE ORAL at 06:04

## 2022-09-08 RX ADMIN — OXYCODONE HYDROCHLORIDE 15 MILLIGRAM(S): 5 TABLET ORAL at 06:28

## 2022-09-08 NOTE — PROGRESS NOTE ADULT - SUBJECTIVE AND OBJECTIVE BOX
66y old  Male who presents with a chief complaint of SCC s/p decompression (05 Sep 2022 07:30)    Patient seen and examined at bedside. No overnight events.  no new complaints, feels well.   no n/v, no sob  happy to be d/c home today    Vital Signs Last 24 Hrs  T(C): 36.7 (08 Sep 2022 08:05), Max: 37.1 (07 Sep 2022 19:27)  T(F): 98.1 (08 Sep 2022 08:05), Max: 98.7 (07 Sep 2022 19:27)  HR: 83 (08 Sep 2022 08:05) (82 - 86)  BP: 125/78 (08 Sep 2022 08:05) (125/78 - 131/85)  BP(mean): --  RR: 16 (08 Sep 2022 08:05) (16 - 16)  SpO2: 96% (08 Sep 2022 08:05) (96% - 96%)    Parameters below as of 08 Sep 2022 08:05  Patient On (Oxygen Delivery Method): room air      GENERAL- NAD  EAR/NOSE/MOUTH/THROAT - no pharyngeal exudates, no oral lesion's  MMM  EYES- SMOOTH, conjunctiva and Sclera clear  NECK- supple  RESPIRATORY-  clear to auscultation bilaterally, non laboured breathing  CARDIOVASCULAR - SIS2, RRR  GI - soft NT BS present  EXTREMITIES- no pedal edema  NEUROLOGY- no new gross focal deficits  PSYCHIATRY- AAO X 3        MEDICATIONS  (STANDING):  atorvastatin 20 milliGRAM(s) Oral at bedtime  enoxaparin Injectable 40 milliGRAM(s) SubCutaneous every 24 hours  magnesium oxide 400 milliGRAM(s) Oral two times a day with meals  methocarbamol 750 milliGRAM(s) Oral every 8 hours  metoprolol tartrate 50 milliGRAM(s) Oral two times a day  mupirocin 2% Ointment 1 Application(s) Topical <User Schedule>  oxyCODONE  ER Tablet 15 milliGRAM(s) Oral every 12 hours  pantoprazole    Tablet 40 milliGRAM(s) Oral before breakfast  polyethylene glycol 3350 17 Gram(s) Oral two times a day  predniSONE   Tablet 5 milliGRAM(s) Oral daily  senna 2 Tablet(s) Oral at bedtime  tenofovir disoproxil fumarate (VIREAD) 300 milliGRAM(s) Oral daily    MEDICATIONS  (PRN):  acetaminophen     Tablet .. 650 milliGRAM(s) Oral every 6 hours PRN Moderate Pain (4 - 6)  bisacodyl Suppository 10 milliGRAM(s) Rectal daily PRN Constipation  cyclobenzaprine 5 milliGRAM(s) Oral three times a day PRN Muscle Spasm  oxyCODONE    IR 10 milliGRAM(s) Oral every 4 hours PRN Severe Pain (7 - 10)  simethicone 80 milliGRAM(s) Chew three times a day PRN Gas

## 2022-09-08 NOTE — PROGRESS NOTE ADULT - ASSESSMENT
66M with PMH HTN, RA, Hepatitis C, s/p prior lumbar decompression & fusion in 2015 presented to [facility] with worsening back pain x 2 months associated with unsteadiness and numbness in his feet for scheduled spine surgery. s/p Posterior L2 PSO, extension of fusion to T9, T10-L2 Decompression, w/ Plastics Closure 8/18/22 with EBL 750cc. Post-op complicated by hypotension requiring pressors, elevated CPK, leukocytosis, electrolyte derangements, and symptomatic orthostatic hypotension. Now admitted to [facility] for initiation of multidisciplinary rehab program- pt/ot/dvt ppx    #Cord compression  -s/p Posterior L2 PSO, extension of fusion to T9, T10-L2 Decompression w/ Dr Conner, w/ Plastics Closure 8/18/22 with EBL 750cc  -pain regimen with Oxycontin, Oxycodone PRN    #HTN  -Continue Toprol    #Anemia   - Likely post op blood loss anemia  -H/H stable, improving  -No overt signs of bleeding  -follow up routine CBC    #Thrombocytosis  - Likely reactive to above  -Follow up routine CBC  - f/u with pmd as outpatient    # hypomagnesemia-  repleted mag    #Rheumatoid Arthritis  -Receives Kevzara injection [id number], currently on hold while in rehab   -Continue prednisone 5mg daily    #Hepatitis C  -Continue tenofovir    #HLD  -Continue Lipitor    #Hx Elevated CPK  - CPK peaked at 8014 on 8/19 then downtrended to 691  - s/p aggressive IVF  - repeat CPK normal    #Hypokalemia  -Improved after repletion  -Follow up routine BMP    -DVT ppx: Lovenox    -GI ppx: Protonix while on steroids    will follow  d/w dr. urena

## 2022-09-08 NOTE — PROGRESS NOTE ADULT - PROVIDER SPECIALTY LIST ADULT
Hospitalist
Hospitalist
Rehab Medicine
Hospitalist
Physiatry
Rehab Medicine
Hospitalist
Hospitalist
Physiatry
Physiatry
Rehab Medicine
Hospitalist
Rehab Medicine

## 2022-09-08 NOTE — PROGRESS NOTE ADULT - ASSESSMENT
65 yo M with h/o HTN, RA, GERD, Hepatitis C, s/p prior lumbar decompression & fusion in 2015 who presents with worsening back pain x 2 months associated with unsteadiness when getting up and numbness in his feet who presented to [facility] for scheduled spine surgery. Now s/p Posterior L2 PSO, extension of fusion to T9, T10-L2 Decompression, w/ Plastics Closure 8/18/22. Post-op complicated by hypotension requiring pressors, elevated CPK, leukocytosis, electrolyte derangements, and symptomatic orthostatic hypotension.       Continue Comprehensive Rehab Program of PT/OT - Total of 3 hrs/day 5 days/week     # cord compression.   - s/p Posterior L2 PSO, extension of fusion to T9, T10-L2 Decompression w/Dr Conner, w/ Plastics Closure 8/18/22 .   - c/w MALLIKA, monitor output each shift. Per [facility] discharge, may be removed if output < 20 mL for 24 hrs.   Plastics - Dr. Padilla   Requested Plastic surgery consult here   - c/w oxycontin ER 15mg q12h (home dose) and - PRN pain regimen    #Thrombocytosis: asa=600 9/6  - More likely reactive in origin  Patient refused lab work - will fu as outpatient     # hypomagnesemia- repleted mag and recheck CMP 9/7- refused labs . On PO supplements   # hyponatremia: noted intermittently through hospital stay. Now improved   # hypokalemia - supplemented x1 . Encourage dietary intake.     #Orthostatic hypotension: Improved.    #Anemia - stable  Likely post op blood loss anemia  -follow up routine CBC    #Leukocytosis. : Stable WBC ,  - monitor off abx, CXR neg    #Hypertension:   -c/w low dose metoprolol   - hold home valsartan 320mg daily    #Rheumatoid arthritis.   - kevzara injection [id number]. held from OR.    - c/w prednisone 5mg daily  - c/w pantoprazole while inpatient for GI ppx and known GERD  - outpatient f/u with rheumatologist.     # Hepatitis C.  -c/w tenofovir 300mg daily.    #HLD:   -c/w atorvastatin    Pain Mgmt   -Oxycodone ER 15mg Q12h  -methocarbamol 750mg Q8h  -Tylenol Q6h PRN  -Oxycodone IR 10mg Q4h PRN   - flexeril 5mg TID PRN    GI/Bowel Mgmt   - Senna, Miralax qd,   - dulcolax supp prn.   - PRN simethicone for indigestion.   - PPX pantoprazole.     /Bladder Mgmt   - Toileting prn. Using urinal       Disp:   home in am with home care  65 yo M with h/o HTN, RA, GERD, Hepatitis C, s/p prior lumbar decompression & fusion in 2015 who presents with worsening back pain x 2 months associated with unsteadiness when getting up and numbness in his feet who presented to [facility] for scheduled spine surgery. Now s/p Posterior L2 PSO, extension of fusion to T9, T10-L2 Decompression, w/ Plastics Closure 8/18/22. Post-op complicated by hypotension requiring pressors, elevated CPK, leukocytosis, electrolyte derangements, and symptomatic orthostatic hypotension.       Continue Comprehensive Rehab Program of PT/OT - Total of 3 hrs/day 5 days/week     # cord compression.   - s/p Posterior L2 PSO, extension of fusion to T9, T10-L2 Decompression w/Dr Conner, w/ Plastics Closure 8/18/22 .   - s/p MALLIKA removal on 9/7/22 by Plastics Dr. Padilla with fu outpatient for suture removal  - c/w oxycontin ER 15mg q12h (home dose) and - PRN pain regimen    #Thrombocytosis: yly=317 9/6  - More likely reactive in origin  Patient refused lab work - will fu as outpatient     # hypomagnesemia- repleted mag and recheck CMP 9/7- refused labs . On PO supplements and will follow-up outpatient  # hyponatremia: noted intermittently through hospital stay. Now improved   # hypokalemia - supplemented x1 . Encourage dietary intake.     #Orthostatic hypotension: Improved.    #Anemia - stable  Likely post op blood loss anemia  -follow up routine CBC    #Leukocytosis. : Stable WBC ,  - monitor off abx, CXR neg    #Hypertension:   -c/w low dose metoprolol   - hold home valsartan 320mg daily    #Rheumatoid arthritis.   - kevzara injection [id number]. held from OR.    - c/w prednisone 5mg daily  - c/w pantoprazole while inpatient for GI ppx and known GERD  - outpatient f/u with rheumatologist.     # Hepatitis C.  -c/w tenofovir 300mg daily.    #HLD:   -c/w atorvastatin    Pain Mgmt   -Oxycodone ER 15mg Q12h  -methocarbamol 750mg Q8h  -Tylenol Q6h PRN  -Oxycodone IR 10mg Q4h PRN   - flexeril 5mg TID PRN    GI/Bowel Mgmt   - Senna, Miralax qd,   - dulcolax supp prn.   - PRN simethicone for indigestion.   - PPX pantoprazole.     /Bladder Mgmt   - Toileting prn. Using urinal       Disp:   home today 65 yo M with h/o HTN, RA, GERD, Hepatitis C, s/p prior lumbar decompression & fusion in 2015 who presents with worsening back pain x 2 months associated with unsteadiness when getting up and numbness in his feet who presented to [facility] for scheduled spine surgery. Now s/p Posterior L2 PSO, extension of fusion to T9, T10-L2 Decompression, w/ Plastics Closure 8/18/22. Post-op complicated by hypotension requiring pressors, elevated CPK, leukocytosis, electrolyte derangements, and symptomatic orthostatic hypotension.       Continue Comprehensive Rehab Program of PT/OT - Total of 3 hrs/day 5 days/week     # cord compression.   - s/p Posterior L2 PSO, extension of fusion to T9, T10-L2 Decompression w/Dr Conner, w/ Plastics Closure 8/18/22 .   - s/p MALLIKA removal on 9/7/22 by Plastics Dr. Padilla with fu outpatient for suture removal  - c/w oxycontin ER 15mg q12h (home dose) and - PRN pain regimen    #Thrombocytosis: qhw=072 9/6  - More likely reactive in origin  Patient refused lab work - will fu as outpatient     # hypomagnesemia- repleted mag and recheck CMP 9/7- refused labs . On PO supplements and will follow-up outpatient  # hyponatremia: noted intermittently through hospital stay. Now improved   # hypokalemia - supplemented x1 . Encourage dietary intake.     #Anemia - stable  Likely post op blood loss anemia  -follow up routine CBC    #Leukocytosis. : Stable WBC ,  - monitor off abx, CXR neg    #Hypertension:   -c/w low dose metoprolol   - hold home valsartan 320mg daily    #Rheumatoid arthritis.   - kevzara injection [id number]. held from OR.    - c/w prednisone 5mg daily  - c/w pantoprazole while inpatient for GI ppx and known GERD  - outpatient f/u with rheumatologist.     # Hepatitis C.  -c/w tenofovir 300mg daily.    #HLD:   -c/w atorvastatin    Pain Mgmt   -Oxycodone ER 15mg Q12h  -methocarbamol 750mg Q8h  -Tylenol Q6h PRN  -Oxycodone IR 10mg Q4h PRN   - flexeril 5mg TID PRN    GI/Bowel Mgmt   - Senna, Miralax qd,   - dulcolax supp prn.   - PRN simethicone for indigestion.   - PPX pantoprazole.     /Bladder Mgmt   - Toileting prn. Using urinal       Disp:   home today

## 2022-09-08 NOTE — PROGRESS NOTE ADULT - REASON FOR ADMISSION
SCC s/p decompression

## 2022-09-08 NOTE — PROGRESS NOTE ADULT - SUBJECTIVE AND OBJECTIVE BOX
Patient is a 66y old  Male who presents with a chief complaint of SCC s/p decompression (07 Sep 2022 17:36)      SUBJECTIVE / OVERNIGHT EVENTS:  No acute events overnight. Patient seen and evaluated at bedside. No fever/chills.  Denies SOB at rest, chest pain, palpitations, abdominal pain, nausea/vomiting    ADDITIONAL REVIEW OF SYSTEMS:    MEDICATIONS  (STANDING):  atorvastatin 20 milliGRAM(s) Oral at bedtime  enoxaparin Injectable 40 milliGRAM(s) SubCutaneous every 24 hours  magnesium oxide 400 milliGRAM(s) Oral two times a day with meals  methocarbamol 750 milliGRAM(s) Oral every 8 hours  metoprolol tartrate 50 milliGRAM(s) Oral two times a day  mupirocin 2% Ointment 1 Application(s) Topical <User Schedule>  oxyCODONE  ER Tablet 15 milliGRAM(s) Oral every 12 hours  pantoprazole    Tablet 40 milliGRAM(s) Oral before breakfast  polyethylene glycol 3350 17 Gram(s) Oral two times a day  predniSONE   Tablet 5 milliGRAM(s) Oral daily  senna 2 Tablet(s) Oral at bedtime  tenofovir disoproxil fumarate (VIREAD) 300 milliGRAM(s) Oral daily    MEDICATIONS  (PRN):  acetaminophen     Tablet .. 650 milliGRAM(s) Oral every 6 hours PRN Moderate Pain (4 - 6)  bisacodyl Suppository 10 milliGRAM(s) Rectal daily PRN Constipation  cyclobenzaprine 5 milliGRAM(s) Oral three times a day PRN Muscle Spasm  oxyCODONE    IR 10 milliGRAM(s) Oral every 4 hours PRN Severe Pain (7 - 10)  simethicone 80 milliGRAM(s) Chew three times a day PRN Gas      CAPILLARY BLOOD GLUCOSE        I&O's Summary      PHYSICAL EXAM:  Vital Signs Last 24 Hrs  T(C): 37.1 (07 Sep 2022 19:27), Max: 37.1 (07 Sep 2022 19:27)  T(F): 98.7 (07 Sep 2022 19:27), Max: 98.7 (07 Sep 2022 19:27)  HR: 82 (08 Sep 2022 06:06) (82 - 89)  BP: 129/79 (08 Sep 2022 06:06) (112/76 - 131/85)  BP(mean): --  RR: 16 (07 Sep 2022 19:27) (16 - 16)  SpO2: 96% (07 Sep 2022 19:27) (96% - 96%)    Parameters below as of 07 Sep 2022 19:27  Patient On (Oxygen Delivery Method): room air        CONSTITUTIONAL: NAD, well-developed  RESPIRATORY: Normal respiratory effort; lungs are clear to auscultation bilaterally  CARDIOVASCULAR: Regular rate and rhythm, normal S1 and S2, no murmur/rub/gallop; No lower extremity edema; Peripheral pulses are 2+ bilaterally  ABDOMEN: Nontender to palpation, normoactive bowel sounds, no rebound/guarding; No hepatosplenomegaly  MUSCLOSKELETAL: no clubbing or cyanosis of digits; no joint swelling or tenderness to palpation  PSYCH: A+O to person, place, and time; affect appropriate    LABS:          RADIOLOGY & ADDITIONAL TESTS:  Results Reviewed:   Imaging Personally Reviewed:  Electrocardiogram Personally Reviewed:    COORDINATION OF CARE:  Care Discussed with Consultants/Other Providers [Y/N]:  Prior or Outpatient Records Reviewed [Y/N]:   SUBJECTIVE: Patient seen and examined at bedside this morning. No acute overnight events. Reviewed recommended follow-up appointments        REVIEW OF SYSTEMS  Denies HA/dizziness  Denies CP/palpitations  Denies abdominal pain or nausea  Denies dysuria  +BM 9/8          VITALS  66y  Vital Signs Last 24 Hrs  T(C): 36.7 (08 Sep 2022 08:05), Max: 37.1 (07 Sep 2022 19:27)  T(F): 98.1 (08 Sep 2022 08:05), Max: 98.7 (07 Sep 2022 19:27)  HR: 83 (08 Sep 2022 08:05) (82 - 86)  BP: 125/78 (08 Sep 2022 08:05) (125/78 - 131/85)  BP(mean): --  RR: 16 (08 Sep 2022 08:05) (16 - 16)  SpO2: 96% (08 Sep 2022 08:05) (96% - 96%)    Parameters below as of 08 Sep 2022 08:05  Patient On (Oxygen Delivery Method): room air      PHYSICAL EXAM:   Gen - NAD, Comfortable  Pulm - breathing comfortably  Cardiovascular - RRR, S1S2  Abdomen - Soft, NT/ND, +BS  Extremities - No C/C/E, No calf tenderness  Extremities - No C/C/E, No calf tenderness , right big toe - skin tear - no active bleeding   Psychiatric - Mood stable, Affect WNL        RECENT LABS:  Patient refused labwork          MEDICATIONS:  MEDICATIONS  (STANDING):  atorvastatin 20 milliGRAM(s) Oral at bedtime  enoxaparin Injectable 40 milliGRAM(s) SubCutaneous every 24 hours  magnesium oxide 400 milliGRAM(s) Oral two times a day with meals  methocarbamol 750 milliGRAM(s) Oral every 8 hours  metoprolol tartrate 50 milliGRAM(s) Oral two times a day  mupirocin 2% Ointment 1 Application(s) Topical <User Schedule>  oxyCODONE  ER Tablet 15 milliGRAM(s) Oral every 12 hours  pantoprazole    Tablet 40 milliGRAM(s) Oral before breakfast  polyethylene glycol 3350 17 Gram(s) Oral two times a day  predniSONE   Tablet 5 milliGRAM(s) Oral daily  senna 2 Tablet(s) Oral at bedtime  tenofovir disoproxil fumarate (VIREAD) 300 milliGRAM(s) Oral daily    MEDICATIONS  (PRN):  acetaminophen     Tablet .. 650 milliGRAM(s) Oral every 6 hours PRN Moderate Pain (4 - 6)  bisacodyl Suppository 10 milliGRAM(s) Rectal daily PRN Constipation  cyclobenzaprine 5 milliGRAM(s) Oral three times a day PRN Muscle Spasm  oxyCODONE    IR 10 milliGRAM(s) Oral every 4 hours PRN Severe Pain (7 - 10)  simethicone 80 milliGRAM(s) Chew three times a day PRN Gas

## 2022-09-08 NOTE — PROGRESS NOTE ADULT - ATTENDING COMMENTS
Patient seen at bedside   No new issues overnight  MALLIKA drain removed by Plastics yesterday - consult appreciated  Patient aware that he needs to fu with Plastics for suture removal     Pain well controlled. Patient aware that robaxin was not covered by insurance and hence flexeril prn ordered at [facility]. Discussed increased sedative effects with flexeril and to take it only as needed and preferably at night     Patient aware of all follow up regarding Platelets and electrolyte abnormalities     Stable for discharge home today with home care

## 2022-09-09 ENCOUNTER — NON-APPOINTMENT (OUTPATIENT)
Age: 67
End: 2022-09-09

## 2022-09-09 DIAGNOSIS — K59.00 CONSTIPATION, UNSPECIFIED: ICD-10-CM

## 2022-09-09 RX ORDER — MAGNESIUM OXIDE 241.3 MG/1000MG
400 TABLET ORAL TWICE DAILY
Qty: 30 | Refills: 0 | Status: COMPLETED | COMMUNITY
Start: 2022-09-09

## 2022-09-09 RX ORDER — POLYETHYLENE GLYCOL 3350 17 G/17G
17 POWDER, FOR SOLUTION ORAL TWICE DAILY
Qty: 30 | Refills: 0 | Status: ACTIVE | COMMUNITY
Start: 2022-09-09 | End: 1900-01-01

## 2022-09-09 RX ORDER — SENNOSIDES 8.6 MG TABLETS 8.6 MG/1
8.6 TABLET ORAL
Qty: 30 | Refills: 0 | Status: ACTIVE | COMMUNITY
Start: 2022-09-09 | End: 1900-01-01

## 2022-09-12 ENCOUNTER — APPOINTMENT (OUTPATIENT)
Dept: INTERNAL MEDICINE | Facility: CLINIC | Age: 67
End: 2022-09-12

## 2022-09-12 VITALS
OXYGEN SATURATION: 99 % | SYSTOLIC BLOOD PRESSURE: 108 MMHG | HEART RATE: 96 BPM | WEIGHT: 214 LBS | DIASTOLIC BLOOD PRESSURE: 78 MMHG | HEIGHT: 74 IN | BODY MASS INDEX: 27.46 KG/M2 | TEMPERATURE: 98.7 F

## 2022-09-12 DIAGNOSIS — R07.81 PLEURODYNIA: ICD-10-CM

## 2022-09-12 DIAGNOSIS — K59.00 CONSTIPATION, UNSPECIFIED: ICD-10-CM

## 2022-09-12 DIAGNOSIS — J44.9 CHRONIC OBSTRUCTIVE PULMONARY DISEASE, UNSPECIFIED: ICD-10-CM

## 2022-09-12 PROCEDURE — 99496 TRANSJ CARE MGMT HIGH F2F 7D: CPT

## 2022-09-12 RX ORDER — CEFUROXIME AXETIL 500 MG/1
500 TABLET ORAL
Qty: 20 | Refills: 0 | Status: COMPLETED | COMMUNITY
Start: 2022-08-09 | End: 2022-09-12

## 2022-09-12 RX ORDER — DOXYCYCLINE 100 MG/1
100 TABLET, FILM COATED ORAL
Qty: 14 | Refills: 0 | Status: COMPLETED | COMMUNITY
Start: 2022-07-21 | End: 2022-09-12

## 2022-09-12 RX ORDER — PREDNISONE 10 MG/1
10 TABLET ORAL
Qty: 90 | Refills: 2 | Status: DISCONTINUED | COMMUNITY
Start: 2020-12-29 | End: 2022-09-12

## 2022-09-12 RX ORDER — METHYLPREDNISOLONE 4 MG/1
4 TABLET ORAL
Qty: 1 | Refills: 0 | Status: COMPLETED | COMMUNITY
Start: 2022-08-09 | End: 2022-09-12

## 2022-09-12 RX ORDER — VALSARTAN 320 MG/1
320 TABLET, COATED ORAL
Qty: 90 | Refills: 0 | Status: DISCONTINUED | COMMUNITY
Start: 2018-10-08 | End: 2022-09-12

## 2022-09-12 NOTE — HISTORY OF PRESENT ILLNESS
[Post-hospitalization from ___ Hospital] : Post-hospitalization from [unfilled] Hospital [Admitted on: ___] : The patient was admitted on [unfilled] [Discharged on ___] : discharged on [unfilled] [Discharge Summary] : discharge summary [Pertinent Labs] : pertinent labs [Radiology Findings] : radiology findings [Discharge Med List] : discharge medication list [Med Reconciliation] : medication reconciliation has been completed [FreeTextEntry2] : HUNTER AMADO is a 66 year old male who presented to the office today for post hospital discharge after undergoing spinal surgery complicated by unclear etiology of new anemia required transfusion.\par \par Only complaint is that his left lower side ribs hurt him since 2 days prior to discharge. IT does not when deep breath, but does hurts when laying on that side. \leslie Also has constipation despite being on stool softeners, on opioids, required suppository in hospital and asking for script as outpatient. Last BM 3 days ago, no n/v, abd pain.\par Patient feels well.  Denies chest pain, sob, caceres, dizziness, palpitations, LE swelling, syncope, n/v, headache.\par No back pain, f/c, urinary incontinence, saddle anesthesia or LE weakness.

## 2022-09-12 NOTE — REVIEW OF SYSTEMS
[Abdominal Pain] : no abdominal pain [Nausea] : no nausea [Constipation] : constipation [Diarrhea] : no diarrhea [Vomiting] : no vomiting [Heartburn] : no heartburn [Melena] : no melena [Joint Pain] : no joint pain [Joint Stiffness] : no joint stiffness [Muscle Pain] : no muscle pain [Muscle Weakness] : no muscle weakness [Back Pain] : no back pain [Joint Swelling] : no joint swelling [Negative] : Musculoskeletal [FreeTextEntry9] : +rib pain

## 2022-09-12 NOTE — PHYSICAL EXAM
[No Acute Distress] : no acute distress [Well Nourished] : well nourished [Well Developed] : well developed [Well-Appearing] : well-appearing [Normal Sclera/Conjunctiva] : normal sclera/conjunctiva [Normal Outer Ear/Nose] : the outer ears and nose were normal in appearance [Normal Oropharynx] : the oropharynx was normal [No JVD] : no jugular venous distention [Supple] : supple [No Respiratory Distress] : no respiratory distress  [No Accessory Muscle Use] : no accessory muscle use [Clear to Auscultation] : lungs were clear to auscultation bilaterally [Normal Rate] : normal rate  [Regular Rhythm] : with a regular rhythm [Normal S1, S2] : normal S1 and S2 [No Murmur] : no murmur heard [No Carotid Bruits] : no carotid bruits [No Varicosities] : no varicosities [Pedal Pulses Present] : the pedal pulses are present [No Edema] : there was no peripheral edema [No Extremity Clubbing/Cyanosis] : no extremity clubbing/cyanosis [Soft] : abdomen soft [Non Tender] : non-tender [Non-distended] : non-distended [No CVA Tenderness] : no CVA  tenderness [No Spinal Tenderness] : no spinal tenderness [No Joint Swelling] : no joint swelling [Grossly Normal Strength/Tone] : grossly normal strength/tone [No Rash] : no rash [Coordination Grossly Intact] : coordination grossly intact [No Focal Deficits] : no focal deficits [Normal Gait] : normal gait [Normal Affect] : the affect was normal [Normal Insight/Judgement] : insight and judgment were intact [Alert and Oriented x3] : oriented to person, place, and time [de-identified] : +lower left sided rib tenderness [de-identified] : surgical dressing over back, nontender, c d, i

## 2022-09-15 ENCOUNTER — APPOINTMENT (OUTPATIENT)
Dept: SPINE | Facility: CLINIC | Age: 67
End: 2022-09-15

## 2022-09-15 VITALS
HEIGHT: 74 IN | SYSTOLIC BLOOD PRESSURE: 122 MMHG | DIASTOLIC BLOOD PRESSURE: 87 MMHG | HEART RATE: 101 BPM | WEIGHT: 210 LBS | OXYGEN SATURATION: 98 % | BODY MASS INDEX: 26.95 KG/M2

## 2022-09-15 DIAGNOSIS — R14.0 ABDOMINAL DISTENSION (GASEOUS): ICD-10-CM

## 2022-09-15 DIAGNOSIS — Z09 ENCOUNTER FOR FOLLOW-UP EXAMINATION AFTER COMPLETED TREATMENT FOR CONDITIONS OTHER THAN MALIGNANT NEOPLASM: ICD-10-CM

## 2022-09-15 PROCEDURE — 99024 POSTOP FOLLOW-UP VISIT: CPT

## 2022-09-15 RX ORDER — CYCLOBENZAPRINE HYDROCHLORIDE 5 MG/1
5 TABLET, FILM COATED ORAL
Qty: 45 | Refills: 0 | Status: DISCONTINUED | COMMUNITY
Start: 2022-09-07 | End: 2022-09-15

## 2022-09-15 NOTE — ASSESSMENT
[FreeTextEntry1] : Mr Sunday Hernández is a 66 year old man S/P Thoracic Lumbar Fusion\par Doing well post op without complications\par Wound edges approximated without signs of infection.\par  Incision cleansed thoroughly with Betadine and alcohol preps. Pt tolerated well.\par Pain well controlled with post op medications including some OTC. Instructed to begin weaning off narcotics.\par Scheduled to follow up with Dr. Conner in 2 weeks with scoliosis Xray.\par \par CT abdomen to assess abdominal distension in LUQ. Will discuss with Dr. Conner\par Methocarbamol added for muscle spasms.\par Medication Purpose, Action, Possible interactions, Side effects as well as adverse effects explain to pt.\par Teachback Protocol implemented\par \par \par

## 2022-09-15 NOTE — REASON FOR VISIT
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 His thoracic to lumbar incision is well healed with small amount swelling upper portion but no drainage. His incision is well approximated and is healing well. He was seen and evaluated by Plastic surgeon. He ambulates with walker and is receiving Pt at home.\par He reports that his pain is worse with lying down but overall pain syndrome is improved.\par His pain is well controlled with medications. He ambulates with walker with steady upright gait.\par He reports increase abdominal girth and LUQ swelling. Abdomen is soft and non distended on Right. He is concerned about the asymmetric swelling on the left side.  No abdominal constipation or point tenderness. No nausea and vomiting.

## 2022-09-15 NOTE — PHYSICAL EXAM
[General Appearance - Alert] : alert [General Appearance - In No Acute Distress] : in no acute distress [No Drainage] : without drainage [Normal Skin] : normal [Oriented To Time, Place, And Person] : oriented to person, place, and time [Impaired Insight] : insight and judgment were intact [Motor Tone] : muscle tone was normal in all four extremities [Motor Strength] : muscle strength was normal in all four extremities [Sensation Tactile Decrease] : light touch was intact [] : no respiratory distress [Heart Rate And Rhythm] : heart rate was normal and rhythm regular [Skin Color & Pigmentation] : normal skin color and pigmentation [No Visual Abnormalities] : no visible abnormailities [FreeTextEntry6] : Motor Strength BLE 4/5 [FreeTextEntry1] : Ambulates with walker with steady gait

## 2022-09-16 ENCOUNTER — NON-APPOINTMENT (OUTPATIENT)
Age: 67
End: 2022-09-16

## 2022-09-19 ENCOUNTER — RESULT REVIEW (OUTPATIENT)
Age: 67
End: 2022-09-19

## 2022-09-19 ENCOUNTER — APPOINTMENT (OUTPATIENT)
Dept: CT IMAGING | Facility: CLINIC | Age: 67
End: 2022-09-19

## 2022-09-19 ENCOUNTER — OUTPATIENT (OUTPATIENT)
Dept: OUTPATIENT SERVICES | Facility: HOSPITAL | Age: 67
LOS: 1 days | End: 2022-09-19
Payer: MEDICARE

## 2022-09-19 DIAGNOSIS — M43.26 FUSION OF SPINE, LUMBAR REGION: Chronic | ICD-10-CM

## 2022-09-19 DIAGNOSIS — Z00.00 ENCOUNTER FOR GENERAL ADULT MEDICAL EXAMINATION WITHOUT ABNORMAL FINDINGS: ICD-10-CM

## 2022-09-19 DIAGNOSIS — Z98.1 ARTHRODESIS STATUS: Chronic | ICD-10-CM

## 2022-09-19 PROCEDURE — 74150 CT ABDOMEN W/O CONTRAST: CPT | Mod: 26,MH

## 2022-09-19 PROCEDURE — 74150 CT ABDOMEN W/O CONTRAST: CPT

## 2022-09-21 ENCOUNTER — NON-APPOINTMENT (OUTPATIENT)
Age: 67
End: 2022-09-21

## 2022-09-22 PROCEDURE — 87521 HEPATITIS C PROBE&RVRS TRNSC: CPT

## 2022-09-22 PROCEDURE — 86803 HEPATITIS C AB TEST: CPT

## 2022-09-22 PROCEDURE — 97163 PT EVAL HIGH COMPLEX 45 MIN: CPT

## 2022-09-22 PROCEDURE — 85027 COMPLETE CBC AUTOMATED: CPT

## 2022-09-22 PROCEDURE — 80053 COMPREHEN METABOLIC PANEL: CPT

## 2022-09-22 PROCEDURE — 97110 THERAPEUTIC EXERCISES: CPT

## 2022-09-22 PROCEDURE — 82550 ASSAY OF CK (CPK): CPT

## 2022-09-22 PROCEDURE — 97112 NEUROMUSCULAR REEDUCATION: CPT

## 2022-09-22 PROCEDURE — 83735 ASSAY OF MAGNESIUM: CPT

## 2022-09-22 PROCEDURE — 80048 BASIC METABOLIC PNL TOTAL CA: CPT

## 2022-09-22 PROCEDURE — 97535 SELF CARE MNGMENT TRAINING: CPT

## 2022-09-22 PROCEDURE — 85025 COMPLETE CBC W/AUTO DIFF WBC: CPT

## 2022-09-22 PROCEDURE — 83930 ASSAY OF BLOOD OSMOLALITY: CPT

## 2022-09-22 PROCEDURE — 97167 OT EVAL HIGH COMPLEX 60 MIN: CPT

## 2022-09-22 PROCEDURE — 97116 GAIT TRAINING THERAPY: CPT

## 2022-09-22 PROCEDURE — 83935 ASSAY OF URINE OSMOLALITY: CPT

## 2022-09-22 PROCEDURE — 36415 COLL VENOUS BLD VENIPUNCTURE: CPT

## 2022-09-22 PROCEDURE — 84300 ASSAY OF URINE SODIUM: CPT

## 2022-09-22 PROCEDURE — 97530 THERAPEUTIC ACTIVITIES: CPT

## 2022-09-24 ENCOUNTER — RESULT REVIEW (OUTPATIENT)
Age: 67
End: 2022-09-24

## 2022-09-24 ENCOUNTER — OUTPATIENT (OUTPATIENT)
Dept: OUTPATIENT SERVICES | Facility: HOSPITAL | Age: 67
LOS: 1 days | End: 2022-09-24
Payer: MEDICARE

## 2022-09-24 ENCOUNTER — APPOINTMENT (OUTPATIENT)
Dept: RADIOLOGY | Facility: CLINIC | Age: 67
End: 2022-09-24

## 2022-09-24 DIAGNOSIS — M51.26 OTHER INTERVERTEBRAL DISC DISPLACEMENT, LUMBAR REGION: ICD-10-CM

## 2022-09-24 DIAGNOSIS — M43.26 FUSION OF SPINE, LUMBAR REGION: Chronic | ICD-10-CM

## 2022-09-24 DIAGNOSIS — G95.20 UNSPECIFIED CORD COMPRESSION: ICD-10-CM

## 2022-09-24 DIAGNOSIS — Z98.1 ARTHRODESIS STATUS: Chronic | ICD-10-CM

## 2022-09-24 PROCEDURE — 71100 X-RAY EXAM RIBS UNI 2 VIEWS: CPT | Mod: 26,LT

## 2022-09-24 PROCEDURE — 71046 X-RAY EXAM CHEST 2 VIEWS: CPT | Mod: 26

## 2022-09-24 PROCEDURE — 72082 X-RAY EXAM ENTIRE SPI 2/3 VW: CPT | Mod: 26

## 2022-09-24 PROCEDURE — 71100 X-RAY EXAM RIBS UNI 2 VIEWS: CPT

## 2022-09-24 PROCEDURE — 72082 X-RAY EXAM ENTIRE SPI 2/3 VW: CPT

## 2022-09-24 PROCEDURE — 71046 X-RAY EXAM CHEST 2 VIEWS: CPT

## 2022-09-30 NOTE — PATIENT PROFILE ADULT - NSPROPTRIGHTNOTIFY_GEN_A_NUR
declines Tremfya Counseling: I discussed with the patient the risks of guselkumab including but not limited to immunosuppression, serious infections, and drug reactions.  The patient understands that monitoring is required including a PPD at baseline and must alert us or the primary physician if symptoms of infection or other concerning signs are noted.

## 2022-10-05 ENCOUNTER — APPOINTMENT (OUTPATIENT)
Dept: NEUROSURGERY | Facility: CLINIC | Age: 67
End: 2022-10-05

## 2022-10-05 VITALS
HEIGHT: 74 IN | HEART RATE: 70 BPM | BODY MASS INDEX: 26.95 KG/M2 | DIASTOLIC BLOOD PRESSURE: 90 MMHG | WEIGHT: 210 LBS | OXYGEN SATURATION: 94 % | SYSTOLIC BLOOD PRESSURE: 132 MMHG

## 2022-10-05 DIAGNOSIS — G89.18 OTHER ACUTE POSTPROCEDURAL PAIN: ICD-10-CM

## 2022-10-05 PROCEDURE — 99024 POSTOP FOLLOW-UP VISIT: CPT

## 2022-10-05 NOTE — REASON FOR VISIT
[Spouse] : spouse [de-identified] : S/P  Revision posterior T9 to L4 exposure.\par  Examination of prior L2 to pelvis fusion.\par  Intraoperative CT scan for spinal navigation.\par  [employer] CT-guided stereotactic placement of bilateral T9, T10, T11, T12 and L1 pedicle screws.\par  Removal of prior L2 pedicle screws, amputation of titanium rods at L3, and placement of open close connectors at L3-L4 bilaterally.\par  T10-T11 laminectomies for resection of leftsided posterior partially calcified synovial cyst causing spinal cord compression and thoracic myelopathy.\par  T12-L1 posterior column osteotomy with bilateral skeletonization of the T12 nerve roots for were deformity correction.\par  Revision L1-L2 laminectomies with decompression of the thecal sac and dissection of the epidural scar tissue.\par  Bilateral skeletonization of the L1 and L2 nerve roots with L1-L2 and L2-L3 facetectomy.\par  Ventral epidural dissection with mobilization of the thecal sac from the L1-L2 disk space down to the inferior L2 vertebral body.\par  L1-L2 diskectomy.\par  L2 three-column extended pedicles subtraction osteotomy for deformity correction.\par  Bilateral anterior interbody arthrodesis across L2 corpectomy defect using titanium Harms biomechanical cages filled with morselized allograft.\par  Bilateral T9 and L1 vertebroplasties via fenestrated screws.\par  Closure of T12-L1 posterior column osteotomy and L2 threecolumn osteotomy sequentially using bilateral triple yolanda construct with approximately 50 degrees of sagittal correction.\par  Intraoperative ultrasound to confirm acceptable buckling of the thecal sac and lack of conus medullaris compression.\par  T9-L4 posterolateral arthrodesis instrumented fusion.\par  Use of morselized autograft, allograft and bone morphogenic protein.\par  Complex plastic closure by Dr. Ventura Pop.\par  Complexity modifier due to extensive deformity necessitating extensive three-column and posterior column osteotomies going through prior fusion mass, extensive epidural dissection due to scar tissue from excessive motion as well as proximal junctional  kyphosis as well as prior surgery including the dissection planes, both of which added at least 150% complexity to the case. [de-identified] : 8/18/22 [de-identified] : Today he reports that he has fell X 2 over the past 3 week due mostly when he is transition t a standing option\par Overall his pain syndrome is getting better. He is still on oxycodone. He will begin Pregabalin

## 2022-10-05 NOTE — PHYSICAL EXAM
[General Appearance - Alert] : alert [General Appearance - In No Acute Distress] : in no acute distress [Oriented To Time, Place, And Person] : oriented to person, place, and time [Impaired Insight] : insight and judgment were intact [No Visual Abnormalities] : no visible abnormailities [Neck Appearance] : the appearance of the neck was normal [] : no respiratory distress [FreeTextEntry1] : Ambulates with walker

## 2022-10-05 NOTE — ASSESSMENT
[FreeTextEntry1] : Mr. Sunday Springer is a 65 year old S/P Thoracic Lumbar Revision Fusion\par He report ongoing mid upper back pain\par \par Outpatient Kyphoplasty for bone cement\par Prophylactic T8 Kyphoplasty with Dr. Armas\par \par \par Please see Dr. Conner's dictation for details.\par I, Dr. Rohan Conner evaluated the patient with the nurse practitioner Jason Flores and established the plan of care. I personally discuss this patient with the nurse practitioner at the time of the visit. I agree with the assessment and plan as written, unless noted below.\par \par

## 2022-10-18 ENCOUNTER — APPOINTMENT (OUTPATIENT)
Dept: NEUROSURGERY | Facility: CLINIC | Age: 67
End: 2022-10-18

## 2022-10-18 VITALS
WEIGHT: 210 LBS | SYSTOLIC BLOOD PRESSURE: 113 MMHG | DIASTOLIC BLOOD PRESSURE: 78 MMHG | HEART RATE: 72 BPM | BODY MASS INDEX: 26.95 KG/M2 | OXYGEN SATURATION: 93 % | HEIGHT: 74 IN

## 2022-10-18 DIAGNOSIS — M48.061 SPINAL STENOSIS, LUMBAR REGION WITHOUT NEUROGENIC CLAUDICATION: ICD-10-CM

## 2022-10-18 PROCEDURE — 99024 POSTOP FOLLOW-UP VISIT: CPT

## 2022-10-20 NOTE — PHYSICAL EXAM
[General Appearance - Alert] : alert [General Appearance - In No Acute Distress] : in no acute distress [General Appearance - Well-Appearing] : healthy appearing [Oriented To Time, Place, And Person] : oriented to person, place, and time [Person] : oriented to person [Place] : oriented to place [Time] : oriented to time [Motor Tone] : muscle tone was normal in all four extremities [Abnormal Walk] : normal gait [Intact] : all motor groups within normal limits of strength and tone bilaterally [Sclera] : the sclera and conjunctiva were normal [Outer Ear] : the ears and nose were normal in appearance [Neck Appearance] : the appearance of the neck was normal [] : no respiratory distress [Skin Color & Pigmentation] : normal skin color and pigmentation

## 2022-10-24 NOTE — HISTORY OF PRESENT ILLNESS
[de-identified] : Mr. Hernández is a pleasant 67yo male who presents today with his wife to discuss T8 kyphoplasty.  He has a complicated back history culminating in an L2 to pelvis fusion in 2014.  He presented to Dr. 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  On 8/18/22, he underwent an extensive revision from T9 to pelvis.  He is recovering well from surgery.

## 2022-10-24 NOTE — ASSESSMENT
[FreeTextEntry1] : Impression:\par s/p thoracic lumbar revision fusion by Dr. Conner\par Recovering Very Well\par \par There is a risk of T8 compression fracture as it is right above his hardware from fusion.  We discussed the role of prophylactic kyphoplasty at this level to prevent this from occurring. \par \par The risks, benefits and alternatives of percutaneous treatment were discussed with the patient in detail. In my personal experience, the risks are very rare, but the possibility is not zero. Risks include worsening pain, infection in the spine, sepsis, infection in the skin, bleeding in the spine, paralysis, leg weakness.\par \par Plan:\par T8 Kyphoplasty on 11/18/22\par

## 2022-11-03 ENCOUNTER — APPOINTMENT (OUTPATIENT)
Dept: CARDIOLOGY | Facility: CLINIC | Age: 67
End: 2022-11-03

## 2022-11-04 ENCOUNTER — RESULT REVIEW (OUTPATIENT)
Age: 67
End: 2022-11-04

## 2022-11-04 ENCOUNTER — APPOINTMENT (OUTPATIENT)
Dept: CARDIOLOGY | Facility: CLINIC | Age: 67
End: 2022-11-04

## 2022-11-04 ENCOUNTER — NON-APPOINTMENT (OUTPATIENT)
Age: 67
End: 2022-11-04

## 2022-11-04 VITALS
DIASTOLIC BLOOD PRESSURE: 80 MMHG | BODY MASS INDEX: 25.15 KG/M2 | WEIGHT: 196 LBS | SYSTOLIC BLOOD PRESSURE: 120 MMHG | OXYGEN SATURATION: 97 % | TEMPERATURE: 98 F | HEART RATE: 55 BPM | HEIGHT: 74 IN

## 2022-11-04 DIAGNOSIS — Z23 ENCOUNTER FOR IMMUNIZATION: ICD-10-CM

## 2022-11-04 DIAGNOSIS — F17.200 NICOTINE DEPENDENCE, UNSPECIFIED, UNCOMPLICATED: ICD-10-CM

## 2022-11-04 DIAGNOSIS — R94.31 ABNORMAL ELECTROCARDIOGRAM [ECG] [EKG]: ICD-10-CM

## 2022-11-04 PROCEDURE — 90662 IIV NO PRSV INCREASED AG IM: CPT

## 2022-11-04 PROCEDURE — 99214 OFFICE O/P EST MOD 30 MIN: CPT

## 2022-11-04 PROCEDURE — G0008: CPT

## 2022-11-04 PROCEDURE — 93000 ELECTROCARDIOGRAM COMPLETE: CPT

## 2022-11-04 RX ORDER — BENZONATATE 200 MG/1
200 CAPSULE ORAL
Qty: 40 | Refills: 0 | Status: ACTIVE | COMMUNITY
Start: 2022-11-04 | End: 1900-01-01

## 2022-11-04 RX ORDER — ALBUTEROL SULFATE 90 UG/1
108 (90 BASE) AEROSOL, METERED RESPIRATORY (INHALATION)
Qty: 1 | Refills: 1 | Status: ACTIVE | COMMUNITY
Start: 2022-11-04 | End: 1900-01-01

## 2022-11-04 NOTE — REVIEW OF SYSTEMS
[Cough] : cough [Negative] : Psychiatric [FreeTextEntry7] : see hpi  [FreeTextEntry9] : see hpi  [FreeTextEntry1] : breast pain

## 2022-11-04 NOTE — PHYSICAL EXAM
[Well Developed] : well developed [Well Nourished] : well nourished [No Acute Distress] : no acute distress [Normal Conjunctiva] : normal conjunctiva [Normal Venous Pressure] : normal venous pressure [No Carotid Bruit] : no carotid bruit [Normal S1, S2] : normal S1, S2 [No Murmur] : no murmur [No Rub] : no rub [No Gallop] : no gallop [Clear Lung Fields] : clear lung fields [Good Air Entry] : good air entry [No Respiratory Distress] : no respiratory distress  [Soft] : abdomen soft [Non Tender] : non-tender [No Masses/organomegaly] : no masses/organomegaly [Normal Bowel Sounds] : normal bowel sounds [Normal Gait] : normal gait [No Edema] : no edema [No Cyanosis] : no cyanosis [No Clubbing] : no clubbing [No Varicosities] : no varicosities [No Rash] : no rash [No Skin Lesions] : no skin lesions [Moves all extremities] : moves all extremities [No Focal Deficits] : no focal deficits [Normal Speech] : normal speech [Alert and Oriented] : alert and oriented [Normal memory] : normal memory [de-identified] : gynecomastia bilateral minimal  [de-identified] : mild erythema bilateral nasal mucosa  [de-identified] : mild abdominal discomfort  No [de-identified] : reproducible pain back area scar noted back area  [de-identified] : b

## 2022-11-04 NOTE — HISTORY OF PRESENT ILLNESS
[FreeTextEntry1] : pt presents for f/u pt s/p recent extensive thoracic and ls  spine surgery .pt with 4 complaints 1. pt with persistent cough and congestion .pt denies fever chills /n/v diaphoresis 2,. breast pain bilateral described as soreness of breasts 3. mild intermittent diffuse abdominal discomfort no diahrrea  4. back pain improving still persists seeing surgeon and pending f/u procedure .pt denies dyspnea pt denies any chest  pain dizziness ,lightheadedness ,nausea vomiting diaphoresis\par

## 2022-11-09 ENCOUNTER — APPOINTMENT (OUTPATIENT)
Dept: NEUROSURGERY | Facility: CLINIC | Age: 67
End: 2022-11-09

## 2022-11-12 ENCOUNTER — APPOINTMENT (OUTPATIENT)
Dept: RADIOLOGY | Facility: IMAGING CENTER | Age: 67
End: 2022-11-12

## 2022-11-12 ENCOUNTER — OUTPATIENT (OUTPATIENT)
Dept: OUTPATIENT SERVICES | Facility: HOSPITAL | Age: 67
LOS: 1 days | End: 2022-11-12
Payer: MEDICARE

## 2022-11-12 ENCOUNTER — APPOINTMENT (OUTPATIENT)
Dept: CT IMAGING | Facility: IMAGING CENTER | Age: 67
End: 2022-11-12

## 2022-11-12 DIAGNOSIS — M43.26 FUSION OF SPINE, LUMBAR REGION: Chronic | ICD-10-CM

## 2022-11-12 DIAGNOSIS — R05.9 COUGH, UNSPECIFIED: ICD-10-CM

## 2022-11-12 DIAGNOSIS — Z98.1 ARTHRODESIS STATUS: Chronic | ICD-10-CM

## 2022-11-12 PROCEDURE — 71046 X-RAY EXAM CHEST 2 VIEWS: CPT | Mod: 26

## 2022-11-12 PROCEDURE — 74176 CT ABD & PELVIS W/O CONTRAST: CPT

## 2022-11-12 PROCEDURE — 71046 X-RAY EXAM CHEST 2 VIEWS: CPT

## 2022-11-12 PROCEDURE — 74176 CT ABD & PELVIS W/O CONTRAST: CPT | Mod: 26,MH

## 2022-11-15 ENCOUNTER — OUTPATIENT (OUTPATIENT)
Dept: OUTPATIENT SERVICES | Facility: HOSPITAL | Age: 67
LOS: 1 days | End: 2022-11-15
Payer: MEDICARE

## 2022-11-15 VITALS
HEIGHT: 74 IN | WEIGHT: 190.04 LBS | RESPIRATION RATE: 18 BRPM | DIASTOLIC BLOOD PRESSURE: 95 MMHG | HEART RATE: 77 BPM | OXYGEN SATURATION: 97 % | TEMPERATURE: 99 F | SYSTOLIC BLOOD PRESSURE: 129 MMHG

## 2022-11-15 DIAGNOSIS — M43.26 FUSION OF SPINE, LUMBAR REGION: Chronic | ICD-10-CM

## 2022-11-15 DIAGNOSIS — Z98.1 ARTHRODESIS STATUS: Chronic | ICD-10-CM

## 2022-11-15 DIAGNOSIS — M48.061 SPINAL STENOSIS, LUMBAR REGION WITHOUT NEUROGENIC CLAUDICATION: ICD-10-CM

## 2022-11-15 DIAGNOSIS — Z01.818 ENCOUNTER FOR OTHER PREPROCEDURAL EXAMINATION: ICD-10-CM

## 2022-11-15 DIAGNOSIS — Z11.52 ENCOUNTER FOR SCREENING FOR COVID-19: ICD-10-CM

## 2022-11-15 LAB — SARS-COV-2 RNA SPEC QL NAA+PROBE: SIGNIFICANT CHANGE UP

## 2022-11-15 PROCEDURE — C9803: CPT

## 2022-11-15 PROCEDURE — U0005: CPT

## 2022-11-15 PROCEDURE — U0003: CPT

## 2022-11-15 PROCEDURE — G0463: CPT

## 2022-11-15 NOTE — H&P PST ADULT - ASSESSMENT
CAPRINI SCORE [CLOT updated 18]    AGE RELATED RISK FACTORS                                                       MOBILITY RELATED FACTORS  [ ] Age 41-60 years                                            (1 Point)                    [ ] Bed rest                                                        (1 Point)  [x ] Age: 61-74 years                                           (2 Points)                  [ ] Plaster cast                                                   (2 Points)  [ ] Age= 75 years                                              (3 Points)                    [ ] Bed bound for more than 72 hours                 (2 Points)    DISEASE RELATED RISK FACTORS                                               GENDER SPECIFIC FACTORS  [ ] Edema in the lower extremities                       (1 Point)              [ ] Pregnancy                                                     (1 Point)  [ ] Varicose veins                                               (1 Point)                     [ ] Post-partum < 6 weeks                                   (1 Point)             [ x] BMI > 25 Kg/m2                                            (1 Point)                     [ ] Hormonal therapy  or oral contraception          (1 Point)                 [ ] Sepsis (in the previous month)                        (1 Point)               [ ] History of pregnancy complications                 (1 point)  [ ] Pneumonia or serious lung disease                                               [ ] Unexplained or recurrent                     (1 Point)           (in the previous month)                               (1 Point)  [ ] Abnormal pulmonary function test                     (1 Point)                 SURGERY RELATED RISK FACTORS  [ ] Acute myocardial infarction                              (1 Point)               [ ]  Section                                             (1 Point)  [ ] Congestive heart failure (in the previous month)  (1 Point)      [ ] Minor surgery                                                  (1 Point)   [ ] Inflammatory bowel disease                             (1 Point)               [ ] Arthroscopic surgery                                        (2 Points)  [ ] Central venous access                                      (2 Points)                x[ ] General surgery lasting more than 45 minutes (2 points)  [ ] Present or previous malignancy                     (2 Points)                [ ] Elective arthroplasty                                         (5 points)    [ ] Stroke (in the previous month)                          (5 Points)                                                                                                                                                           HEMATOLOGY RELATED FACTORS                                                 TRAUMA RELATED RISK FACTORS  [ ] Prior episodes of VTE                                     (3 Points)                [ ] Fracture of the hip, pelvis, or leg                       (5 Points)  [ ] Positive family history for VTE                         (3 Points)             [ ] Acute spinal cord injury (in the previous month)  (5 Points)  [ ] Prothrombin 24751 A                                     (3 Points)               [ ] Paralysis  (less than 1 month)                             (5 Points)  [ ] Factor V Leiden                                             (3 Points)                  [ ] Multiple Trauma within 1 month                        (5 Points)  [ ] Lupus anticoagulants                                     (3 Points)                                                           [ ] Anticardiolipin antibodies                               (3 Points)                                                       [ ] High homocysteine in the blood                      (3 Points)                                             [ ] Other congenital or acquired thrombophilia      (3 Points)                                                [ ] Heparin induced thrombocytopenia                  (3 Points)                                     Total Score [    5      ]

## 2022-11-15 NOTE — H&P PST ADULT - PROBLEM SELECTOR PLAN 1
Posterior approach  Exploration of prior L2-Pelvis fusion  T10-L2 decompression  T9- Pelvis fusion  Labs- CBC, CMP, MRSA/MSSA, T&S  Pre op instructions discussed  Pre op cardiology evaluation Kyphoplasty   pre-op instructions discussed   labs sent   covid test 11/15/22  medical eval 11/17/22

## 2022-11-15 NOTE — H&P PST ADULT - HISTORY OF PRESENT ILLNESS
67 yo M with h/o HTN, RA, GERD, Hepatitis C, s/p prior lumbar decompression & fusion in 2015  c/o worsening of  back pain x 2 months,  unsteady when getting up ("crashes into walls")  numbness in his feet with difficulty with ADLs . Pt had neurology evaluation- s/p MRI lumbar spine revealed unspecified cord compression s/p exploration of prior L2 pelvis fusion, T10-L2 decompression, T9 pelvis fusion on 8/18/22      **Pt denies any double vision, vision changes, speech changes, urinary or bowel incontinence, fever, chills, or sick contacts  **Covid 19 PCR on 8/15/22     67 yo M with h/o HTN, RA, GERD, Hepatitis C, s/p prior lumbar decompression & fusion in 2015  c/o worsening of  back pain x 2 months,  unsteady when getting up ("crashes into walls")  numbness in his feet with difficulty with ADLs . Pt had neurology evaluation- s/p MRI lumbar spine revealed unspecified cord compression s/p exploration of prior L2 pelvis fusion, T10-L2 decompression, T9 pelvis fusion on 8/18/22      Pt is very hard stick -refused to do blood work at PST    68 yo M with h/o HTN, RA (on prednisone ), GERD, Hepatitis C ( on meds), chronic back pian s/p prior lumbar decompression & fusion in 2015. pt has been   c/o worsening of  back pain for few months, unsteady when getting up ("crashes into walls")  numbness in his feet with difficulty with ADLs . Pt had neurology evaluation- s/p MRI lumbar spine revealed unspecified cord compression s/p  Posterior L2 PSO (pedicle subtraction osteotomy), extension of fusion to T9, T10-L2 Decompression with Plastics closure in 8/18/22. Presents to PST for scheduled  Kyphoplasty on 11/15/22.      Pt is very hard stick -attempted x1 ,refused to do blood work today -

## 2022-11-15 NOTE — H&P PST ADULT - FALL HARM RISK - HARM RISK INTERVENTIONS

## 2022-11-16 ENCOUNTER — APPOINTMENT (OUTPATIENT)
Dept: RHEUMATOLOGY | Facility: CLINIC | Age: 67
End: 2022-11-16

## 2022-11-16 ENCOUNTER — NON-APPOINTMENT (OUTPATIENT)
Age: 67
End: 2022-11-16

## 2022-11-16 VITALS
SYSTOLIC BLOOD PRESSURE: 129 MMHG | HEART RATE: 108 BPM | TEMPERATURE: 97.6 F | WEIGHT: 196 LBS | BODY MASS INDEX: 25.15 KG/M2 | DIASTOLIC BLOOD PRESSURE: 73 MMHG | OXYGEN SATURATION: 98 % | HEIGHT: 74 IN | RESPIRATION RATE: 16 BRPM

## 2022-11-16 DIAGNOSIS — M17.12 UNILATERAL PRIMARY OSTEOARTHRITIS, LEFT KNEE: ICD-10-CM

## 2022-11-16 PROCEDURE — 99214 OFFICE O/P EST MOD 30 MIN: CPT

## 2022-11-17 ENCOUNTER — NON-APPOINTMENT (OUTPATIENT)
Age: 67
End: 2022-11-17

## 2022-11-17 ENCOUNTER — APPOINTMENT (OUTPATIENT)
Dept: CARDIOLOGY | Facility: CLINIC | Age: 67
End: 2022-11-17

## 2022-11-17 VITALS
WEIGHT: 196 LBS | DIASTOLIC BLOOD PRESSURE: 74 MMHG | RESPIRATION RATE: 16 BRPM | BODY MASS INDEX: 25.15 KG/M2 | HEART RATE: 86 BPM | SYSTOLIC BLOOD PRESSURE: 125 MMHG | TEMPERATURE: 98.1 F | OXYGEN SATURATION: 99 % | HEIGHT: 74 IN

## 2022-11-17 DIAGNOSIS — Z71.85 ENCOUNTER FOR IMMUNIZATION SAFETY COUNSELING: ICD-10-CM

## 2022-11-17 DIAGNOSIS — I10 ESSENTIAL (PRIMARY) HYPERTENSION: ICD-10-CM

## 2022-11-17 DIAGNOSIS — M46.1 SACROILIITIS, NOT ELSEWHERE CLASSIFIED: ICD-10-CM

## 2022-11-17 DIAGNOSIS — N62 HYPERTROPHY OF BREAST: ICD-10-CM

## 2022-11-17 DIAGNOSIS — L29.9 PRURITUS, UNSPECIFIED: ICD-10-CM

## 2022-11-17 DIAGNOSIS — R10.9 UNSPECIFIED ABDOMINAL PAIN: ICD-10-CM

## 2022-11-17 DIAGNOSIS — M79.10 MYALGIA, UNSPECIFIED SITE: ICD-10-CM

## 2022-11-17 DIAGNOSIS — R89.9 UNSPECIFIED ABNORMAL FINDING IN SPECIMENS FROM OTHER ORGANS, SYSTEMS AND TISSUES: ICD-10-CM

## 2022-11-17 DIAGNOSIS — Z13.228 ENCOUNTER FOR SCREENING FOR OTHER METABOLIC DISORDERS: ICD-10-CM

## 2022-11-17 PROCEDURE — 99214 OFFICE O/P EST MOD 30 MIN: CPT

## 2022-11-17 NOTE — PHYSICAL EXAM

## 2022-11-17 NOTE — HISTORY OF PRESENT ILLNESS
[FreeTextEntry1] : This is a 68 y/o male with a pmhx of sacroilitis s/p surgery, anemia here today for a follow up. Patient was last seen in the office on 11/4/22 with breast pain and was sent for mammo sono. Patient states he did not have the tests done yet. Patient reports he is still experiencing abdominal pain. He also reports his chest is sore since his surgery. pt for kyphoplasty in am

## 2022-11-17 NOTE — REVIEW OF SYSTEMS
[Negative] : Heme/Lymph [Abdominal Pain] : abdominal pain [Nausea] : no nausea [Vomiting] : no vomiting [Heartburn] : no heartburn [Change in Appetite] : no change in appetite [Change In The Stool] : no change in stool [Dysphagia] : no dysphagia [Diarrhea] : diarrhea [Constipation] : no constipation [Blood in stool] : no blood in stoo

## 2022-11-18 ENCOUNTER — APPOINTMENT (OUTPATIENT)
Dept: NEUROSURGERY | Facility: HOSPITAL | Age: 67
End: 2022-11-18

## 2022-11-18 ENCOUNTER — RESULT REVIEW (OUTPATIENT)
Age: 67
End: 2022-11-18

## 2022-11-18 ENCOUNTER — TRANSCRIPTION ENCOUNTER (OUTPATIENT)
Age: 67
End: 2022-11-18

## 2022-11-18 ENCOUNTER — OUTPATIENT (OUTPATIENT)
Dept: OUTPATIENT SERVICES | Facility: HOSPITAL | Age: 67
LOS: 1 days | End: 2022-11-18
Payer: MEDICARE

## 2022-11-18 VITALS
RESPIRATION RATE: 18 BRPM | HEART RATE: 70 BPM | DIASTOLIC BLOOD PRESSURE: 93 MMHG | TEMPERATURE: 98 F | HEIGHT: 74 IN | SYSTOLIC BLOOD PRESSURE: 126 MMHG | OXYGEN SATURATION: 96 % | WEIGHT: 190.04 LBS

## 2022-11-18 VITALS
OXYGEN SATURATION: 98 % | RESPIRATION RATE: 16 BRPM | HEART RATE: 68 BPM | SYSTOLIC BLOOD PRESSURE: 117 MMHG | DIASTOLIC BLOOD PRESSURE: 80 MMHG

## 2022-11-18 DIAGNOSIS — M48.061 SPINAL STENOSIS, LUMBAR REGION WITHOUT NEUROGENIC CLAUDICATION: ICD-10-CM

## 2022-11-18 DIAGNOSIS — M80.88XA OTHER OSTEOPOROSIS WITH CURRENT PATHOLOGICAL FRACTURE, VERTEBRA(E), INITIAL ENCOUNTER FOR FRACTURE: ICD-10-CM

## 2022-11-18 DIAGNOSIS — M43.26 FUSION OF SPINE, LUMBAR REGION: Chronic | ICD-10-CM

## 2022-11-18 DIAGNOSIS — Z98.1 ARTHRODESIS STATUS: Chronic | ICD-10-CM

## 2022-11-18 PROBLEM — M17.12 PRIMARY OSTEOARTHRITIS OF LEFT KNEE: Status: ACTIVE | Noted: 2022-02-25

## 2022-11-18 PROCEDURE — 22513 PERQ VERTEBRAL AUGMENTATION: CPT

## 2022-11-18 PROCEDURE — C1713: CPT

## 2022-11-18 RX ORDER — SODIUM CHLORIDE 9 MG/ML
1000 INJECTION INTRAMUSCULAR; INTRAVENOUS; SUBCUTANEOUS
Refills: 0 | Status: DISCONTINUED | OUTPATIENT
Start: 2022-11-18 | End: 2022-12-02

## 2022-11-18 RX ORDER — ACETAMINOPHEN 500 MG
1000 TABLET ORAL ONCE
Refills: 0 | Status: DISCONTINUED | OUTPATIENT
Start: 2022-11-18 | End: 2022-12-02

## 2022-11-18 RX ORDER — OXYCODONE AND ACETAMINOPHEN 5; 325 MG/1; MG/1
1 TABLET ORAL
Qty: 16 | Refills: 0
Start: 2022-11-18 | End: 2022-11-21

## 2022-11-18 RX ORDER — ROSUVASTATIN CALCIUM 5 MG/1
1 TABLET ORAL
Qty: 0 | Refills: 0 | DISCHARGE

## 2022-11-18 RX ORDER — HYDROMORPHONE HYDROCHLORIDE 2 MG/ML
0.5 INJECTION INTRAMUSCULAR; INTRAVENOUS; SUBCUTANEOUS
Refills: 0 | Status: DISCONTINUED | OUTPATIENT
Start: 2022-11-18 | End: 2022-11-18

## 2022-11-18 RX ADMIN — HYDROMORPHONE HYDROCHLORIDE 0.5 MILLIGRAM(S): 2 INJECTION INTRAMUSCULAR; INTRAVENOUS; SUBCUTANEOUS at 14:02

## 2022-11-18 NOTE — ASU DISCHARGE PLAN (ADULT/PEDIATRIC) - NURSING INSTRUCTIONS
Please feel free to contact us at [phone] if any problems arise. After 6PM, Monday through Friday, on weekends and on holidays, please call [phone] and ask for the radiology resident on call to be paged.

## 2022-11-18 NOTE — ASU PATIENT PROFILE, ADULT - FALL HARM RISK - HARM RISK INTERVENTIONS

## 2022-11-18 NOTE — PHYSICAL EXAM
[General Appearance - Alert] : alert [General Appearance - In No Acute Distress] : in no acute distress [Auscultation Breath Sounds / Voice Sounds] : lungs were clear to auscultation bilaterally [Heart Rate And Rhythm] : heart rate was normal and rhythm regular [Heart Sounds] : normal S1 and S2 [Heart Sounds Gallop] : no gallops [Murmurs] : no murmurs [Heart Sounds Pericardial Friction Rub] : no pericardial rub [Full Pulse] : the pedal pulses are present [Edema] : there was no peripheral edema [Bowel Sounds] : normal bowel sounds [Abdomen Soft] : soft [Abdomen Tenderness] : non-tender [Abdomen Mass (___ Cm)] : no abdominal mass palpated [Cervical Lymph Nodes Enlarged Posterior Bilaterally] : posterior cervical [Cervical Lymph Nodes Enlarged Anterior Bilaterally] : anterior cervical [Supraclavicular Lymph Nodes Enlarged Bilaterally] : supraclavicular [Skin Color & Pigmentation] : normal skin color and pigmentation [Skin Turgor] : normal skin turgor [] : no rash [Oriented To Time, Place, And Person] : oriented to person, place, and time [Impaired Insight] : insight and judgment were intact [Affect] : the affect was normal [FreeTextEntry1] : -all joints with full ROM - no active synovitis - weakness over the quadriceps - antalgic gait

## 2022-11-18 NOTE — HISTORY OF PRESENT ILLNESS
[___ Month(s) Ago] : [unfilled] month(s) ago [RF] : rheumatoid factor [CCP] : Cyclic citrullinated peptide (CCP) antibody [Joint Swelling] : joint swelling [Joint Pain] : joint pain [Morning Stiffness] : morning stiffness [Patient is currently asymptomatic] : patient is currently asymptomatic [FreeTextEntry1] : s/p thoracic and lumbar fusion\par just  started kevzara - 2-3 injection\par on prednisone 5 mg daily with ongoing joint pain and stiffness\par pending T8 kyphoplasty\par on Lyrica for bilateral leg neuropathy\par has lost 30 lbs - appetite is low\par CT scan done 3 days again with no signs of malignancy. \par prolactin is elevated [TextBox_2] : 2010

## 2022-11-18 NOTE — CHART NOTE - NSCHARTNOTEFT_GEN_A_CORE
"  Physical Therapy Daily Treatment Note     Name: Fely Wetzel  Clinic Number: [id number]    Therapy Diagnosis:   Encounter Diagnoses   Name Primary?    Chronic left shoulder pain     Decreased range of motion of left shoulder      Physician: Ismael Garcia MD    Visit Date: 6/25/2019    Physician Orders: PT Eval and Treat   Medical Diagnosis from Referral: M25.512 (ICD-10-CM) - Left shoulder pain   Evaluation Date: 6/12/2019  Authorization Period Expiration: 6/30/2019  Plan of Care Expiration: 9/6/2019  Visit # / Visits authorized: 3/8     Time In: 9:00 AM  Time Out: 10:00 AM  Total Billable Time: 53 minutes     Precautions: Standard and Diabetes      Subjective     Pt reports: that her shoulder still hurts and it's mainly in the anterior region of the L shoulder. Stated that the "pulleys" therex is most helpful.   She was compliant with home exercise program.  Response to previous treatment: fair  Functional change: min    Pain: 5/10  Location: left shoulder      Objective     Fely received therapeutic exercises to develop strength, endurance, ROM and posture for 53 minutes including:  Pulleys flex, scap: 3 min ea  Scap squeezes 5" x 20  Shoulder Isometrics all planes 5" x 10  Table slides flexion 10" x 10   Table slides scap 10 x 10"  Sleeper stretch 5 x 10"  SL abd 2 x 10  SL er 2 x 10  Supine flashers 1 x 15 x YTB  +Rows w/ OTB 2 x 10  +Ext pulldown w/ OTB 2 x 10  +PROM all 4 ways x 5 mins      Home Exercises Provided and Patient Education Provided     Education provided:   - none today    Written Home Exercises Provided: Patient instructed to cont prior HEP.  Exercises were reviewed and Fely was able to demonstrate them prior to the end of the session.  Fely demonstrated good  understanding of the education provided.     See EMR under Patient Instructions for exercises provided prior visit.    Assessment     Initiated banded exercises today without any adverse effects. Pt required TC to "
Interventional Neuro- Radiology   Procedure Note PA-C    Procedure: Kyphoplasty   Pre- Procedure Diagnosis:    Post- Procedure Diagnosis:    : Dr Luis A Armas  Fellow:    Dr Robert Camejo   Physician Assistant: Val Rivera PA-C    Nurse:                   Sondra Ibarra RN  Radiologic Tech:   Saman Velasco LRT,   Adrian Moreno LRT  Anesthesiologist:   Dr Vanessa López CRNA Myke         I/Os: EBL less than 10cc  IV fluids:     cc     Urine output     cc    Contrast Omnipaque 240      cc             Vitals: BP         HR      Spo2     %          Preliminary Report:  Using a 5 Sami long sheath to the right groin under MAC sedation via left vertebral artery,  left internal carotid artery, left external carotid artery, right vertebral artery, right internal carotid artery, right external carotid artery a selective cerebral angiography was performed and demonstrated                       Official note to follow.  Patient tolerated procedure well, hemodynamically stable, no change in neurological status compared to baseline.  Results discussed with neuro ICU team, patient and patient's family. Right groin sheath was removed, manual compression held to hemostasis for 20 minutes, no active bleeding, no hematoma, quick clot and safeguard balloon dressing applied at
Interventional Neuro Radiology  Pre-Procedure Note PA-C    This is a 67y ____ hand dominant Male      HPI:      Allergies: gabapentin (Other)  Shrimp (Unknown)      PAST MEDICAL & SURGICAL HISTORY:  HTN (hypertension)      Rheumatoid arthritis      Chronic GERD      Degenerative lumbar spinal stenosis      History of hepatitis C      Lumbar cord compression      Fusion of spine, lumbar region  2015      Status post cervical spinal fusion  2014          Social History:     FAMILY HISTORY:  FH: emphysema (Father)        Current Medications:     Labs:                   Blood Bank:       Assessment/Plan:   This is a 67 year old right hand dominant male  presents with   Patient presents to neuro-IR for Kyphoplasty
decrease UT recruitment with banded exercises. Continues to display poor scapular positioning with side lying shoulder exercises.  Fely is progressing well towards her goals.   Pt prognosis is Good.     Pt will continue to benefit from skilled outpatient physical therapy to address the deficits listed in the problem list box on initial evaluation, provide pt/family education and to maximize pt's level of independence in the home and community environment.     Pt's spiritual, cultural and educational needs considered and pt agreeable to plan of care and goals.     Anticipated barriers to physical therapy: none    Goals:   Short Term Goals (4 Weeks):   1. Pt to demonstrate improved PROM by 10% to allow pt to perform self care activities with increased ease. - not met, progressing  2. Pt to demonstrate improved flexibility by a half grade to allow improved ADLs with increased ease.  - not met, progressing  3. Pt will report <5/10 pain at worst within the L shoulder for ease with dressing. - not met, progressing  4. Pt will report being independent with her HEP for maintenance of improvements gained during therapy sessions - not met, progressing  5. Pt to demonstrate improved functional ability with FOTO limitation <=50% disability. - not met, progressing     Long Term Goals (20 Weeks):   1. Pt to demonstrate improved ROM to WNL, R=L, to allow pt to perform self care with increased ease. - not met, progressing  2. Pt to demonstrate improved flexibility by a full grade to allow improved postural alignment with increased ease. - not met, progressing  3. Pt will demonstrate >=4+/5 strength within the L shoulder for ease with house hold chores - not met, progressing  4. Pt to demonstrate improved functional ability with FOTO limitation <=41% disability. - not met, progressing  5. Pt independent with HEP and demonstrates good return technique. - not met, progressing      Plan     Continue with POC for ROM, flexibility, and 
strength.  Plan of care Certification: 6/12/2019 to 9/6/2019.       Usama Woods, PTA

## 2022-11-18 NOTE — ASU PATIENT PROFILE, ADULT - VISION (WITH CORRECTIVE LENSES IF THE PATIENT USUALLY WEARS THEM):
not with pt/Partially impaired: cannot see medication labels or newsprint, but can see obstacles in path, and the surrounding layout; can count fingers at arm's length

## 2022-11-18 NOTE — ASSESSMENT
[FreeTextEntry1] : 63 year-old male with long standing RA - not treated for at least 3 years with signs of progressive disease with likely wrist fusion\par Lumbar and cervical fusion in 2015 - hx. of stenosis - pending new surgery sometime in august 2022\par \par 1. RA -  still on prednisone 10 mg - could not tolerate leflunomide with severe diarrhea\par no flares - but ongoing stiffness and joint pain - probably long term damage - stable on kevzara - but flaring since stopping secondary to spinal fusion - s/p 3 injection of kevzara with ongoing joint pain and swelling - start prednisone 15 mg daily for another 2 weeks then will try to taper off again\par 2. Hepatitis B and C - on tenofovir  hep C viral load at 0 - needs to make an appt - has not been seen since 2019 - needs f/u\par 3. spinal fusion - x-rays with no C1-2 widening - worsening thoracic pain - not planning any more surgery - bilateral leg and hand cramping - bilateral feet paresthesias  s/p new fusion - walking with walker, pending kyphoplasty at t8 - weight loss after surgery - Ct scan is normal\par 5. Knee pain and swelling MRI  with severe loss of cartilage and multiple meniscal tear and cyst - no pain today\par 6. foot pain - likely plantar fasciitis - referral to podiatry - resolved\par 7. weight loss - ct scan is negative for malignancy\par \par I reviewed previous labs results with patients.\par Diagnosis and Prognosis discussed\par Continue with current medications\par F/u  3 months

## 2022-11-18 NOTE — ASU DISCHARGE PLAN (ADULT/PEDIATRIC) - CARE PROVIDER_API CALL
Luis A Armas; MPH)  Radiology  [address], [address]  [place] [address]  Phone: [phone]  Fax: [phone]  Follow Up Time:

## 2022-11-22 ENCOUNTER — OUTPATIENT (OUTPATIENT)
Dept: OUTPATIENT SERVICES | Facility: HOSPITAL | Age: 67
LOS: 1 days | End: 2022-11-22
Payer: MEDICARE

## 2022-11-22 ENCOUNTER — APPOINTMENT (OUTPATIENT)
Dept: RADIOLOGY | Facility: CLINIC | Age: 67
End: 2022-11-22

## 2022-11-22 DIAGNOSIS — M43.26 FUSION OF SPINE, LUMBAR REGION: Chronic | ICD-10-CM

## 2022-11-22 DIAGNOSIS — Z98.1 ARTHRODESIS STATUS: Chronic | ICD-10-CM

## 2022-11-22 DIAGNOSIS — M48.04 SPINAL STENOSIS, THORACIC REGION: ICD-10-CM

## 2022-11-22 DIAGNOSIS — M48.061 SPINAL STENOSIS, LUMBAR REGION WITHOUT NEUROGENIC CLAUDICATION: ICD-10-CM

## 2022-11-22 PROCEDURE — 72082 X-RAY EXAM ENTIRE SPI 2/3 VW: CPT | Mod: 26

## 2022-11-22 PROCEDURE — 72082 X-RAY EXAM ENTIRE SPI 2/3 VW: CPT

## 2022-11-23 ENCOUNTER — NON-APPOINTMENT (OUTPATIENT)
Age: 67
End: 2022-11-23

## 2022-11-23 ENCOUNTER — APPOINTMENT (OUTPATIENT)
Dept: NEUROSURGERY | Facility: CLINIC | Age: 67
End: 2022-11-23

## 2022-11-23 VITALS
OXYGEN SATURATION: 94 % | BODY MASS INDEX: 25.15 KG/M2 | WEIGHT: 196 LBS | HEART RATE: 65 BPM | SYSTOLIC BLOOD PRESSURE: 126 MMHG | DIASTOLIC BLOOD PRESSURE: 82 MMHG | HEIGHT: 74 IN

## 2022-11-23 PROCEDURE — 99212 OFFICE O/P EST SF 10 MIN: CPT

## 2022-11-25 NOTE — REASON FOR VISIT
[Follow-Up: _____] : a [unfilled] follow-up visit [FreeTextEntry1] : Today he reports that he was found to have low iron levels causing severe fatigue.\par Overall he is doing well with his Spine since surgery and reports improved progress.

## 2022-11-25 NOTE — PHYSICAL EXAM
[General Appearance - Alert] : alert [General Appearance - In No Acute Distress] : in no acute distress [Oriented To Time, Place, And Person] : oriented to person, place, and time [Impaired Insight] : insight and judgment were intact [] : no respiratory distress [Heart Rate And Rhythm] : heart rate was normal and rhythm regular [FreeTextEntry1] : Ambulates with cane [Skin Color & Pigmentation] : normal skin color and pigmentation

## 2022-11-25 NOTE — ASSESSMENT
[FreeTextEntry1] : Sunday Edgar  S/P Complex thoracic to pelvis fusion \par Doing well post fusion \par Continue to follow up with PCP for iron deficiency\par \par Follow up 6 months\par \par Please see Dr. Conner's dictation for details.\par I, Dr. Rohan Conner evaluated the patient with the nurse practitioner Jason Flores and established the plan of care. I personally discuss this patient with the nurse practitioner at the time of the visit. I agree with the assessment and plan as written, unless noted below.\par \par

## 2022-11-28 ENCOUNTER — APPOINTMENT (OUTPATIENT)
Dept: ULTRASOUND IMAGING | Facility: CLINIC | Age: 67
End: 2022-11-28

## 2022-11-28 ENCOUNTER — APPOINTMENT (OUTPATIENT)
Dept: MAMMOGRAPHY | Facility: CLINIC | Age: 67
End: 2022-11-28

## 2022-12-01 NOTE — H&P ADULT - NSHPPHYSICALEXAM_GEN_ALL_CORE
General: NAD, Resting Comfortable,                                  HEENT: NC/AT, EOM I, PERRLA, Normal Conjunctivae  Cardio: RRR, Normal S1-S2, No M/G/R                              Pulm: No Respiratory Distress,  Lungs CTAB                        Abdomen: ND/NT, Soft, BS+                                                MSK: No joint swelling, Full ROM                                         Ext: No C/C/E, Pulses 2+ throughout, No calf tenderness    Skin:  all skin intact                                                                 Wounds: none  Decubitus Ulcers: None Present     Neurological Examination    Cognitive: AAO x 3                                                                         Attention: Intact   Judgment: Good evidence of judgement                               Memory: Recall 3 objects immediate and 3 min later      Mood/Affect: wnl                                                                           Communication:  Fluent,  No dysarthria   Swallow: intact  CN II - XII  intact  Coordination: FTN/HTS intact                                                                              Sensory: Intact to light touch, PP and Vibration                                                                                             Tone: normal Throughout   Balance: impaired    Motor    LEFT    UE: SF [5/5], EF [5/5], EE [5/5], WE [5/5],  [wnl]  RIGHT UE: SF [5/5], EF [5/5], EE [5/5], WE [5/5],  [wnl]  LEFT    LE:  HF [5/5], KE [5/5], DF [5/5], EHL [5/5],  PF [5/5]  RIGHT LE:  HF [5/5], KE [5/5], DF [5/5], EHL [5/5],  PF [5/5]      Reflex:  2 + thoroughout, Hernandez/Babinski negative General: NAD, Resting Comfortable,                                  HEENT: NC/AT, EOM I, PERRLA, Normal Conjunctivae  Cardio: RRR, Normal S1-S2, No M/G/R                              Pulm: No Respiratory Distress,  Lungs CTAB                        Abdomen: firm, slight distention, reports feeling bloated, NT, Soft, BS+                                                MSK: No joint swelling, Full ROM. BLLE edema of feet.                                        Ext: No C/C/E, Pulses 2+ throughout, No calf tenderness    Skin/Wounds: incision over spine with aquacel. 3 drain ports w gauze and tegaderm. 1 drain remains. Chest with device induced stage 3 and stage 2, 7x3 and 4x3 respectively.  Skin tear to R lateral knee with adaptic. Other well healing scabs on shins and inner thighs, L wrist.   Decubitus Ulcers: None Present     Neurological Examination    Cognitive: AAO x 3                                                                         Attention: Intact   Judgment: Good evidence of judgement                                  Mood/Affect: wnl                                                                           Communication:  Fluent,  No dysarthria   Swallow: intact  CN II - XII  grossly intact                                                                             Sensory: Intact to light touch, impaired proprioception BL hallucis                                                                                           Tone: normal Throughout   Balance: impaired    Motor    LEFT    UE: SF [5/5], EF [5/5], EE [5/5], WE [5/5],  [wnl]  RIGHT UE: SF [5/5], EF [5/5], EE [5/5], WE [5/5],  [wnl]    LEFT    LE:  HF [4/5], KE [5/5], DF [4/5], EHL [1/5],  PF [5/5]  RIGHT LE:  HF [3/5], KE [5/5], DF [5/5], EHL [1/5],  PF [5/5]      Reflex:  2+ UE and R patella, Diminished other LE. Babinski neutral Yes General: NAD, Resting Comfortable,                                  HEENT: NC/AT, EOM I, PERRLA, Normal Conjunctivae  Cardio: RRR, Normal S1-S2, No M/G/R                              Pulm: No Respiratory Distress,  Lungs CTAB                        Abdomen: firm, slight distention, NT, Soft, BS+                                                MSK: No joint swelling, Full ROM.                                         Ext:  No calf tenderness , b/l pedal edema   Skin/Wounds: incision over spine with aquacel. 3 drain ports w gauze and tegaderm. 1 drain remains. Anterior chest sternal area with device induced stage 3( superior) 4x3 cm  and stage 2, 7x3 cm  Skin tear to R lateral knee with adaptic. Other well healing scabs on shins and inner thighs, L wrist.     Neurological Examination    Cognitive: AAO x 3                                                                         Attention: Intact   Judgment: Good evidence of judgement                                  Mood/Affect: wnl                                                                           Communication:  Fluent,  No dysarthria   Swallow: intact  EOMI, no facial droop                                                                        Sensory: Intact to light touch, impaired proprioception BL hallucis                                                                                           Tone: normal Throughout     Motor    LEFT    UE: SF [5/5], EF [5/5], EE [5/5], WE [5/5],  [wnl]  RIGHT UE: SF [5/5], EF [5/5], EE [5/5], WE [5/5],  [wnl]    LEFT    LE:  HF [4/5], KE [5/5], DF [4/5], EHL [1/5],  PF [5/5]  RIGHT LE:  HF [3/5], KE [5/5], DF [5/5], EHL [1/5],  PF [5/5]      Reflex:  2+ UE and R patella, Diminished other LE. Babinski neutral

## 2022-12-21 ENCOUNTER — APPOINTMENT (OUTPATIENT)
Dept: CARDIOLOGY | Facility: CLINIC | Age: 67
End: 2022-12-21

## 2022-12-21 ENCOUNTER — OUTPATIENT (OUTPATIENT)
Dept: OUTPATIENT SERVICES | Facility: HOSPITAL | Age: 67
LOS: 1 days | End: 2022-12-21
Payer: MEDICARE

## 2022-12-21 ENCOUNTER — APPOINTMENT (OUTPATIENT)
Dept: RADIOLOGY | Facility: IMAGING CENTER | Age: 67
End: 2022-12-21

## 2022-12-21 VITALS
WEIGHT: 198 LBS | DIASTOLIC BLOOD PRESSURE: 80 MMHG | TEMPERATURE: 98.6 F | RESPIRATION RATE: 1 BRPM | HEIGHT: 74 IN | HEART RATE: 78 BPM | BODY MASS INDEX: 25.41 KG/M2 | OXYGEN SATURATION: 95 % | SYSTOLIC BLOOD PRESSURE: 128 MMHG

## 2022-12-21 VITALS
TEMPERATURE: 98.6 F | BODY MASS INDEX: 25.41 KG/M2 | WEIGHT: 198 LBS | OXYGEN SATURATION: 95 % | HEIGHT: 74 IN | SYSTOLIC BLOOD PRESSURE: 128 MMHG | HEART RATE: 79 BPM | DIASTOLIC BLOOD PRESSURE: 80 MMHG

## 2022-12-21 DIAGNOSIS — R05.9 COUGH, UNSPECIFIED: ICD-10-CM

## 2022-12-21 PROBLEM — Z71.85 IMMUNIZATION COUNSELING: Status: ACTIVE | Noted: 2022-11-04

## 2022-12-21 PROBLEM — L29.9 ITCHING: Status: ACTIVE | Noted: 2022-12-21

## 2022-12-21 PROCEDURE — 71046 X-RAY EXAM CHEST 2 VIEWS: CPT | Mod: 26

## 2022-12-21 PROCEDURE — 99214 OFFICE O/P EST MOD 30 MIN: CPT

## 2022-12-21 PROCEDURE — 71046 X-RAY EXAM CHEST 2 VIEWS: CPT

## 2022-12-21 RX ORDER — ALBUTEROL SULFATE 90 UG/1
108 (90 BASE) INHALANT RESPIRATORY (INHALATION)
Qty: 1 | Refills: 0 | Status: ACTIVE | COMMUNITY
Start: 2022-12-21 | End: 1900-01-01

## 2022-12-21 RX ORDER — FEXOFENADINE HYDROCHLORIDE 180 MG/1
180 TABLET ORAL DAILY
Qty: 30 | Refills: 0 | Status: ACTIVE | COMMUNITY
Start: 2022-12-21 | End: 1900-01-01

## 2022-12-26 ENCOUNTER — APPOINTMENT (OUTPATIENT)
Dept: ULTRASOUND IMAGING | Facility: CLINIC | Age: 67
End: 2022-12-26

## 2022-12-26 ENCOUNTER — APPOINTMENT (OUTPATIENT)
Dept: MAMMOGRAPHY | Facility: CLINIC | Age: 67
End: 2022-12-26

## 2023-01-03 ENCOUNTER — APPOINTMENT (OUTPATIENT)
Dept: CARDIOLOGY | Facility: CLINIC | Age: 68
End: 2023-01-03
Payer: MEDICARE

## 2023-01-03 VITALS
SYSTOLIC BLOOD PRESSURE: 122 MMHG | DIASTOLIC BLOOD PRESSURE: 86 MMHG | OXYGEN SATURATION: 99 % | TEMPERATURE: 98.2 F | WEIGHT: 202 LBS | BODY MASS INDEX: 25.93 KG/M2 | HEART RATE: 101 BPM | HEIGHT: 74 IN

## 2023-01-03 DIAGNOSIS — E83.42 HYPOMAGNESEMIA: ICD-10-CM

## 2023-01-03 DIAGNOSIS — R05.9 COUGH, UNSPECIFIED: ICD-10-CM

## 2023-01-03 DIAGNOSIS — D64.9 ANEMIA, UNSPECIFIED: ICD-10-CM

## 2023-01-03 DIAGNOSIS — E78.5 HYPERLIPIDEMIA, UNSPECIFIED: ICD-10-CM

## 2023-01-03 PROCEDURE — 99214 OFFICE O/P EST MOD 30 MIN: CPT

## 2023-01-03 RX ORDER — ALBUTEROL SULFATE 2.5 MG/3ML
(2.5 MG/3ML) SOLUTION RESPIRATORY (INHALATION)
Qty: 1 | Refills: 1 | Status: ACTIVE | COMMUNITY
Start: 2023-01-03 | End: 1900-01-01

## 2023-01-03 RX ORDER — BUDESONIDE 0.25 MG/2ML
0.25 INHALANT ORAL TWICE DAILY
Qty: 1 | Refills: 1 | Status: ACTIVE | COMMUNITY
Start: 2023-01-03 | End: 1900-01-01

## 2023-01-03 NOTE — HISTORY OF PRESENT ILLNESS
[FreeTextEntry1] : This is a 67 year old female who presents to the office for f.u. pt last seen on 12/21 for cough and wheeze, CXR unchanged, RVP negative for covid pt was given doxy, prednisone and inhaler. pt continues to report cough s/p mediations. pt states he took his daughter old budesonide ands reports moderate relief. \par pt also reports some back pain \par \par Pt denies any CP, SOB, heart palpitations, dizziness, abdominal pain N/V/D fever or chills\par

## 2023-01-04 ENCOUNTER — NON-APPOINTMENT (OUTPATIENT)
Age: 68
End: 2023-01-04

## 2023-01-04 RX ORDER — FERROUS SULFATE 137(45) MG
142 (45 FE) TABLET, EXTENDED RELEASE ORAL
Qty: 30 | Refills: 0 | Status: ACTIVE | COMMUNITY
Start: 2023-01-04 | End: 1900-01-01

## 2023-01-04 RX ORDER — IRON POLYSACCHARIDE COMPLEX 150 MG
150 CAPSULE ORAL
Qty: 30 | Refills: 1 | Status: DISCONTINUED | COMMUNITY
Start: 2022-11-17 | End: 2023-01-04

## 2023-01-11 ENCOUNTER — NON-APPOINTMENT (OUTPATIENT)
Age: 68
End: 2023-01-11

## 2023-01-12 ENCOUNTER — NON-APPOINTMENT (OUTPATIENT)
Age: 68
End: 2023-01-12

## 2023-01-12 LAB
ANION GAP SERPL CALC-SCNC: 14 MMOL/L
BASOPHILS # BLD AUTO: 0.03 K/UL
BASOPHILS NFR BLD AUTO: 0.3 %
BUN SERPL-MCNC: 21 MG/DL
CALCIUM SERPL-MCNC: 9.8 MG/DL
CHLORIDE SERPL-SCNC: 98 MMOL/L
CO2 SERPL-SCNC: 25 MMOL/L
CREAT SERPL-MCNC: 0.88 MG/DL
EGFR: 94 ML/MIN/1.73M2
EOSINOPHIL # BLD AUTO: 0.11 K/UL
EOSINOPHIL NFR BLD AUTO: 1 %
FERRITIN SERPL-MCNC: 67 NG/ML
FOLATE SERPL-MCNC: 14.9 NG/ML
GLUCOSE SERPL-MCNC: 78 MG/DL
HCT VFR BLD CALC: 44.5 %
HGB BLD-MCNC: 14.6 G/DL
IMM GRANULOCYTES NFR BLD AUTO: 0.3 %
IRON SATN MFR SERPL: 28 %
IRON SERPL-MCNC: 88 UG/DL
LYMPHOCYTES # BLD AUTO: 3.81 K/UL
LYMPHOCYTES NFR BLD AUTO: 34.2 %
MAGNESIUM SERPL-MCNC: 2.5 MG/DL
MAN DIFF?: NORMAL
MCHC RBC-ENTMCNC: 31.3 PG
MCHC RBC-ENTMCNC: 32.8 GM/DL
MCV RBC AUTO: 95.5 FL
MONOCYTES # BLD AUTO: 1.13 K/UL
MONOCYTES NFR BLD AUTO: 10.2 %
NEUTROPHILS # BLD AUTO: 6.02 K/UL
NEUTROPHILS NFR BLD AUTO: 54 %
PLATELET # BLD AUTO: 249 K/UL
POTASSIUM SERPL-SCNC: 3.8 MMOL/L
RBC # BLD: 4.66 M/UL
RBC # FLD: 16 %
SODIUM SERPL-SCNC: 137 MMOL/L
TIBC SERPL-MCNC: 318 UG/DL
TRANSFERRIN SERPL-MCNC: 243 MG/DL
UIBC SERPL-MCNC: 230 UG/DL
VIT B12 SERPL-MCNC: 1385 PG/ML
WBC # FLD AUTO: 11.13 K/UL

## 2023-01-17 ENCOUNTER — APPOINTMENT (OUTPATIENT)
Dept: RHEUMATOLOGY | Facility: CLINIC | Age: 68
End: 2023-01-17
Payer: MEDICARE

## 2023-01-17 PROCEDURE — 99214 OFFICE O/P EST MOD 30 MIN: CPT

## 2023-01-17 RX ORDER — CEFUROXIME AXETIL 500 MG/1
500 TABLET ORAL
Qty: 20 | Refills: 0 | Status: DISCONTINUED | COMMUNITY
Start: 2022-11-04 | End: 2023-01-17

## 2023-01-17 RX ORDER — DOXYCYCLINE 100 MG/1
100 TABLET, FILM COATED ORAL
Qty: 20 | Refills: 0 | Status: DISCONTINUED | COMMUNITY
Start: 2022-12-21 | End: 2023-01-17

## 2023-01-17 RX ORDER — PREGABALIN 75 MG/1
75 CAPSULE ORAL TWICE DAILY
Qty: 60 | Refills: 0 | Status: DISCONTINUED | COMMUNITY
Start: 2022-11-16 | End: 2023-01-17

## 2023-01-17 RX ORDER — PREDNISONE 20 MG/1
20 TABLET ORAL
Qty: 12 | Refills: 0 | Status: DISCONTINUED | COMMUNITY
Start: 2022-12-21 | End: 2023-01-17

## 2023-01-17 RX ORDER — OXYCODONE HYDROCHLORIDE 15 MG/1
15 TABLET, FILM COATED, EXTENDED RELEASE ORAL
Qty: 60 | Refills: 0 | Status: DISCONTINUED | COMMUNITY
Start: 2022-05-18 | End: 2023-01-17

## 2023-01-17 NOTE — ASSESSMENT
[FreeTextEntry1] : 63 year-old male with long standing RA - not treated for at least 3 years with signs of progressive disease with likely wrist fusion\par Lumbar and cervical fusion in 2015 - hx. of stenosis - pending new surgery sometime in august 2022\par \par 1. RA -  still on prednisone 10 mg - could not tolerate leflunomide with severe diarrhea\par no flares - but ongoing stiffness and joint pain - probably long term damage - stable on kevzara - but flaring since stopping secondary to spinal fusion - stable disease on on kevzara with 95% improvement\par 2. Hepatitis B and C - on tenofovir  hep C viral load at 0 - needs to make an appt - has not been seen since 2019 - needs f/u\par 3. spinal fusion - x-rays with no C1-2 widening - worsening thoracic pain - not planning any more surgery - bilateral leg and hand cramping - bilateral feet paresthesias  s/p new fusion - walking with walker, pending kyphoplasty at t8 - weight loss after surgery - Ct scan is normal\par 5. Knee pain and swelling MRI  with severe loss of cartilage and multiple meniscal tear and cyst - no pain today, no swelling. \par 6. foot pain - likely plantar fasciitis - referral to podiatry - resolved\par 7. weight loss - has stopped - weight is stable\par \par \par labs done at pcp with normal cbc and cmp\par I reviewed previous labs results with patients.\par Diagnosis and Prognosis discussed\par Continue with current medications\par F/u  3 months

## 2023-01-17 NOTE — HISTORY OF PRESENT ILLNESS
[___ Month(s) Ago] : [unfilled] month(s) ago [RF] : rheumatoid factor [CCP] : Cyclic citrullinated peptide (CCP) antibody [Joint Swelling] : joint swelling [Joint Pain] : joint pain [Morning Stiffness] : morning stiffness [Patient is currently asymptomatic] : patient is currently asymptomatic [FreeTextEntry1] : s/p thoracic and lumbar fusion\par joint pain is stable on kevzara\par on prednisone 5 mg daily with ongoing joint pain and stiffness\par kyphoplasty is done\par on Lyrica for bilateral leg neuropathy\par weight loss has stopped - now gaining. \par labs done with cardiology - normal CBC - normal iron [TextBox_2] : 2010

## 2023-02-03 ENCOUNTER — NON-APPOINTMENT (OUTPATIENT)
Age: 68
End: 2023-02-03

## 2023-02-26 ENCOUNTER — RX RENEWAL (OUTPATIENT)
Age: 68
End: 2023-02-26

## 2023-03-24 ENCOUNTER — RX RENEWAL (OUTPATIENT)
Age: 68
End: 2023-03-24

## 2023-04-08 ENCOUNTER — RX RENEWAL (OUTPATIENT)
Age: 68
End: 2023-04-08

## 2023-04-18 ENCOUNTER — APPOINTMENT (OUTPATIENT)
Dept: RHEUMATOLOGY | Facility: CLINIC | Age: 68
End: 2023-04-18
Payer: MEDICARE

## 2023-04-18 VITALS
SYSTOLIC BLOOD PRESSURE: 132 MMHG | HEART RATE: 96 BPM | DIASTOLIC BLOOD PRESSURE: 82 MMHG | BODY MASS INDEX: 27.59 KG/M2 | WEIGHT: 215 LBS | HEIGHT: 74 IN | OXYGEN SATURATION: 97 % | TEMPERATURE: 97.3 F

## 2023-04-18 DIAGNOSIS — R26.89 OTHER ABNORMALITIES OF GAIT AND MOBILITY: ICD-10-CM

## 2023-04-18 DIAGNOSIS — M54.16 RADICULOPATHY, LUMBAR REGION: ICD-10-CM

## 2023-04-18 DIAGNOSIS — G62.9 POLYNEUROPATHY, UNSPECIFIED: ICD-10-CM

## 2023-04-18 PROCEDURE — 99215 OFFICE O/P EST HI 40 MIN: CPT

## 2023-04-18 NOTE — PHYSICAL EXAM
[General Appearance - Alert] : alert [General Appearance - In No Acute Distress] : in no acute distress [Auscultation Breath Sounds / Voice Sounds] : lungs were clear to auscultation bilaterally [Heart Rate And Rhythm] : heart rate was normal and rhythm regular [Heart Sounds] : normal S1 and S2 [Heart Sounds Gallop] : no gallops [Murmurs] : no murmurs [Heart Sounds Pericardial Friction Rub] : no pericardial rub [Full Pulse] : the pedal pulses are present [Edema] : there was no peripheral edema [Bowel Sounds] : normal bowel sounds [Abdomen Soft] : soft [Abdomen Tenderness] : non-tender [Abdomen Mass (___ Cm)] : no abdominal mass palpated [Cervical Lymph Nodes Enlarged Posterior Bilaterally] : posterior cervical [Supraclavicular Lymph Nodes Enlarged Bilaterally] : supraclavicular [Cervical Lymph Nodes Enlarged Anterior Bilaterally] : anterior cervical [Skin Color & Pigmentation] : normal skin color and pigmentation [Skin Turgor] : normal skin turgor [] : no rash [Impaired Insight] : insight and judgment were intact [Oriented To Time, Place, And Person] : oriented to person, place, and time [Affect] : the affect was normal [FreeTextEntry1] : romberg is positive

## 2023-04-18 NOTE — ASSESSMENT
[FreeTextEntry1] : 63 year-old male with long standing RA - not treated for at least 3 years with signs of progressive disease with likely wrist fusion\par Lumbar and cervical fusion in 2015 - hx. of stenosis - pending new surgery sometime in august 2022\par \par 1. RA -  stable on kevzara -  stable on kevzara - 95% improvement - continue with current regiemn\par 2. Hepatitis B and C - on tenofovir  hep C viral load at 0 - needs to make an appt - has not been seen since 2019 - needs f/u\par 3. spinal fusion - x-rays with no C1-2 widening - worsening thoracic pain - not planning any more surgery - bilateral leg and hand cramping - bilateral feet paresthesias  s/p new fusion - walking with walker, pending kyphoplasty at t8 - weight loss after surgery - Ct scan is normal\par 5. Knee pain and swelling MRI  with severe loss of cartilage and multiple meniscal tear and cyst - no pain today, no swelling. \par 6.neuropathy with loss of balance - referral to neurology\par 7. weight loss - has stopped - weight is stable\par \par \par labs done at pcp with normal cbc and cmp\par I reviewed previous labs results with patients.\par Diagnosis and Prognosis discussed\par Continue with current medications\par F/u  3 months

## 2023-04-18 NOTE — HISTORY OF PRESENT ILLNESS
[___ Month(s) Ago] : [unfilled] month(s) ago [RF] : rheumatoid factor [CCP] : Cyclic citrullinated peptide (CCP) antibody [Joint Swelling] : joint swelling [Joint Pain] : joint pain [Morning Stiffness] : morning stiffness [Patient is currently asymptomatic] : patient is currently asymptomatic [FreeTextEntry1] : s/p thoracic and lumbar fusion\par ongoing balance issues with bilateral feet paresthesias\par has bilateral plantar paresthesias -  ongoing balance problems\par joint pain is stable on kevzara\par on prednisone 5 mg daily with ongoing joint pain and stiffness\par kyphoplasty is done\par on Lyrica for bilateral leg neuropathy\par weight loss has stopped - now gaining. \par labs done with cardiology in 01/2023  - normal work-up - no abnormalities [TextBox_2] : 2010

## 2023-04-19 PROBLEM — M54.16 LUMBAR RADICULOPATHY: Status: ACTIVE | Noted: 2020-06-23

## 2023-04-19 PROBLEM — G62.9 POLYNEUROPATHY: Status: ACTIVE | Noted: 2022-05-23

## 2023-04-21 LAB
M TB IFN-G BLD-IMP: NEGATIVE
QUANTIFERON TB PLUS MITOGEN MINUS NIL: 9.9 IU/ML
QUANTIFERON TB PLUS NIL: 0.02 IU/ML
QUANTIFERON TB PLUS TB1 MINUS NIL: -0.01 IU/ML
QUANTIFERON TB PLUS TB2 MINUS NIL: -0.01 IU/ML

## 2023-05-09 ENCOUNTER — RESULT REVIEW (OUTPATIENT)
Age: 68
End: 2023-05-09

## 2023-05-09 RX ORDER — METHOCARBAMOL 750 MG/1
750 TABLET, FILM COATED ORAL EVERY 8 HOURS
Qty: 90 | Refills: 1 | Status: ACTIVE | COMMUNITY
Start: 2022-09-15 | End: 1900-01-01

## 2023-05-22 ENCOUNTER — RX RENEWAL (OUTPATIENT)
Age: 68
End: 2023-05-22

## 2023-05-22 ENCOUNTER — APPOINTMENT (OUTPATIENT)
Dept: RADIOLOGY | Facility: CLINIC | Age: 68
End: 2023-05-22
Payer: MEDICARE

## 2023-05-22 PROCEDURE — 72082 X-RAY EXAM ENTIRE SPI 2/3 VW: CPT

## 2023-05-24 ENCOUNTER — APPOINTMENT (OUTPATIENT)
Dept: NEUROSURGERY | Facility: CLINIC | Age: 68
End: 2023-05-24
Payer: MEDICARE

## 2023-05-24 VITALS
DIASTOLIC BLOOD PRESSURE: 72 MMHG | WEIGHT: 215 LBS | OXYGEN SATURATION: 97 % | HEIGHT: 74 IN | BODY MASS INDEX: 27.59 KG/M2 | HEART RATE: 69 BPM | SYSTOLIC BLOOD PRESSURE: 121 MMHG

## 2023-05-24 DIAGNOSIS — M47.14 OTHER SPONDYLOSIS WITH MYELOPATHY, THORACIC REGION: ICD-10-CM

## 2023-05-24 PROCEDURE — 99212 OFFICE O/P EST SF 10 MIN: CPT

## 2023-05-26 NOTE — REASON FOR VISIT
[Follow-Up: _____] : a [unfilled] follow-up visit [FreeTextEntry1] : Today her reports that her is doing well in relation to his spine. He ambulates with cane with steady gait. HE reports persistent abdominal complaint such as soreness in left upper quadrant area. HE denies nausea and vomiting and abdominal bloating. Regular stools. He has not seen a GI for this issue.

## 2023-05-26 NOTE — PHYSICAL EXAM
[General Appearance - Alert] : alert [General Appearance - In No Acute Distress] : in no acute distress [Oriented To Time, Place, And Person] : oriented to person, place, and time [] : no respiratory distress [No Visual Abnormalities] : no visible abnormailities [Heart Rate And Rhythm] : heart rate was normal and rhythm regular [FreeTextEntry1] : Ambulates with cane

## 2023-05-26 NOTE — ASSESSMENT
[FreeTextEntry1] : Mr. HUNTER AMADO is presenting S/P Thoracic to pelvis Fusion\par The recommendation is for the following:\par Imaging evaluation shows evidence of Scoliosis Xrays shows intact hardware and construct\par \par Plan:\par Follow up:in one year with scoliosis Xray\par \par Please see Dr. Conner's dictation for details.\par I, Dr. Rohan Conner evaluated the patient with the nurse practitioner Jason Flores and established the plan of care. I personally discuss this patient with the nurse practitioner at the time of the visit. I agree with the assessment and plan as written, unless noted below.\par \par \par \par \par

## 2023-05-26 NOTE — REVIEW OF SYSTEMS
[Abdominal Pain] : abdominal pain [Negative] : Heme/Lymph [Vomiting] : no vomiting [Constipation] : no constipation [Diarrhea] : no diarrhea

## 2023-06-14 ENCOUNTER — NON-APPOINTMENT (OUTPATIENT)
Age: 68
End: 2023-06-14

## 2023-07-06 ENCOUNTER — NON-APPOINTMENT (OUTPATIENT)
Age: 68
End: 2023-07-06

## 2023-07-18 ENCOUNTER — APPOINTMENT (OUTPATIENT)
Dept: GASTROENTEROLOGY | Facility: CLINIC | Age: 68
End: 2023-07-18

## 2023-08-03 ENCOUNTER — LABORATORY RESULT (OUTPATIENT)
Age: 68
End: 2023-08-03

## 2023-08-03 ENCOUNTER — APPOINTMENT (OUTPATIENT)
Dept: RHEUMATOLOGY | Facility: CLINIC | Age: 68
End: 2023-08-03
Payer: MEDICARE

## 2023-08-03 VITALS
DIASTOLIC BLOOD PRESSURE: 89 MMHG | OXYGEN SATURATION: 93 % | HEART RATE: 71 BPM | WEIGHT: 215 LBS | SYSTOLIC BLOOD PRESSURE: 144 MMHG | HEIGHT: 74 IN | BODY MASS INDEX: 27.59 KG/M2

## 2023-08-03 DIAGNOSIS — N20.0 CALCULUS OF KIDNEY: ICD-10-CM

## 2023-08-03 PROCEDURE — 99214 OFFICE O/P EST MOD 30 MIN: CPT

## 2023-08-03 NOTE — HISTORY OF PRESENT ILLNESS
[___ Month(s) Ago] : [unfilled] month(s) ago [RF] : rheumatoid factor [CCP] : Cyclic citrullinated peptide (CCP) antibody [Joint Swelling] : joint swelling [Joint Pain] : joint pain [Morning Stiffness] : morning stiffness [Patient is currently asymptomatic] : patient is currently asymptomatic [FreeTextEntry1] : joint pain is stable on Kevzara on prednisone 5 mg daily with ongoing joint pain and stiffness kyphoplasty is done on Lyrica for bilateral leg neuropathy ongoing abdominal tenderness - no bloating  ct scan done in11/2022 with nephrolythiasis weight loss has stopped - now gaining.  labs done with cardiology in 01/2023  - normal work-up - no abnormalities [TextBox_2] : 2010

## 2023-08-03 NOTE — PHYSICAL EXAM
[General Appearance - Alert] : alert [General Appearance - In No Acute Distress] : in no acute distress [Auscultation Breath Sounds / Voice Sounds] : lungs were clear to auscultation bilaterally [Heart Rate And Rhythm] : heart rate was normal and rhythm regular [Heart Sounds] : normal S1 and S2 [Heart Sounds Gallop] : no gallops [Murmurs] : no murmurs [Heart Sounds Pericardial Friction Rub] : no pericardial rub [Full Pulse] : the pedal pulses are present [Edema] : there was no peripheral edema [Bowel Sounds] : normal bowel sounds [Abdomen Soft] : soft [Abdomen Tenderness] : non-tender [Abdomen Mass (___ Cm)] : no abdominal mass palpated [Cervical Lymph Nodes Enlarged Posterior Bilaterally] : posterior cervical [Cervical Lymph Nodes Enlarged Anterior Bilaterally] : anterior cervical [Supraclavicular Lymph Nodes Enlarged Bilaterally] : supraclavicular [Skin Color & Pigmentation] : normal skin color and pigmentation [Skin Turgor] : normal skin turgor [] : no rash [Oriented To Time, Place, And Person] : oriented to person, place, and time [Impaired Insight] : insight and judgment were intact [Affect] : the affect was normal [FreeTextEntry1] : romberg is positive

## 2023-08-08 ENCOUNTER — TRANSCRIPTION ENCOUNTER (OUTPATIENT)
Age: 68
End: 2023-08-08

## 2023-08-08 LAB
ALBUMIN SERPL ELPH-MCNC: 4.5 G/DL
ALP BLD-CCNC: 74 U/L
ALT SERPL-CCNC: 17 U/L
ANION GAP SERPL CALC-SCNC: 16 MMOL/L
APPEARANCE: CLEAR
AST SERPL-CCNC: 20 U/L
BILIRUB SERPL-MCNC: 0.4 MG/DL
BILIRUBIN URINE: ABNORMAL
BLOOD URINE: NEGATIVE
BUN SERPL-MCNC: 11 MG/DL
CALCIUM SERPL-MCNC: 10.1 MG/DL
CHLORIDE SERPL-SCNC: 101 MMOL/L
CO2 SERPL-SCNC: 28 MMOL/L
COLOR: NORMAL
CREAT SERPL-MCNC: 1.03 MG/DL
CRP SERPL-MCNC: <3 MG/L
EGFR: 80 ML/MIN/1.73M2
ERYTHROCYTE [SEDIMENTATION RATE] IN BLOOD BY WESTERGREN METHOD: 17 MM/HR
FERRITIN SERPL-MCNC: 100 NG/ML
FOLATE SERPL-MCNC: 12.7 NG/ML
GLUCOSE QUALITATIVE U: NEGATIVE MG/DL
GLUCOSE SERPL-MCNC: 87 MG/DL
IRON SATN MFR SERPL: 41 %
IRON SERPL-MCNC: 116 UG/DL
KETONES URINE: 15 MG/DL
LEUKOCYTE ESTERASE URINE: ABNORMAL
NITRITE URINE: NEGATIVE
PH URINE: 5.5
POTASSIUM SERPL-SCNC: 3.8 MMOL/L
PROT SERPL-MCNC: 7 G/DL
PROTEIN URINE: 30 MG/DL
SODIUM SERPL-SCNC: 145 MMOL/L
SPECIFIC GRAVITY URINE: >1.03
TIBC SERPL-MCNC: 281 UG/DL
TSH SERPL-ACNC: 1.06 UIU/ML
UIBC SERPL-MCNC: 165 UG/DL
UROBILINOGEN URINE: 1 MG/DL
VIT B12 SERPL-MCNC: 738 PG/ML

## 2023-08-11 NOTE — ED ADULT NURSE NOTE - NURSING MUSC EXTREMITY LIMITED ROM
Pt had 7 beats of vtach then flipped back into normal sinus ryhthm, pt asymtomatic. See Vitals below. Dr. Tashia Mcintosh notified via phone, see placed orders.      08/11/23 1729   Vital Signs   Pulse 64   Respirations 18   BP (!) 156/56   MAP (Calculated) 89   BP Location Left upper arm   BP Method Automatic   Oxygen Therapy   SpO2 90 %   O2 Device None (Room air) left lower extremity

## 2023-08-17 NOTE — ED CDU PROVIDER INITIAL DAY NOTE - DATE/TIME 1
Location of patient: [facility]    [place] call from [facility] with [employer]. Subjective: Caller states \"covid\"     Current Symptoms: covid positive, cough, body aches pulse up , chills, sweats, fatigue , sob ,cp , h/a hx htn diabetes , bradycardia   As we were talking daughter states she is getting worse and going to call 911 , so did not complete all of the triage as daughter was going to call 911     Onset: 3 days     Associated Symptoms: NA    Pain Severity: 10/10    Temperature: 99       What has been tried:     LMP: NA Pregnant: NA    Recommended disposition: Go to ED Now    Care advice provided, patient verbalizes understanding; denies any other questions or concerns; instructed to call back for any new or worsening symptoms. Patient/caller agrees to proceed to   Emergency Department    Attention Provider: Thank you for allowing me to participate in the care of your patient. The patient was connected to triage in response to information provided to the ECC/PSC. Please do not respond through this encounter as the response is not directed to a shared pool.       Reason for Disposition   MODERATE difficulty breathing (e.g., speaks in phrases, SOB even at rest, pulse 100-120)    Protocols used: Coronavirus (COVID-19) Diagnosed or Suspected-ADULT-[place]
21-Apr-2022 10:10

## 2023-09-18 ENCOUNTER — RX RENEWAL (OUTPATIENT)
Age: 68
End: 2023-09-18

## 2023-09-23 ENCOUNTER — RX RENEWAL (OUTPATIENT)
Age: 68
End: 2023-09-23

## 2023-10-08 ENCOUNTER — RX RENEWAL (OUTPATIENT)
Age: 68
End: 2023-10-08

## 2023-11-01 ENCOUNTER — NON-APPOINTMENT (OUTPATIENT)
Age: 68
End: 2023-11-01

## 2023-11-02 ENCOUNTER — APPOINTMENT (OUTPATIENT)
Dept: RHEUMATOLOGY | Facility: CLINIC | Age: 68
End: 2023-11-02
Payer: MEDICARE

## 2023-11-02 VITALS
BODY MASS INDEX: 30.16 KG/M2 | SYSTOLIC BLOOD PRESSURE: 155 MMHG | HEART RATE: 73 BPM | OXYGEN SATURATION: 96 % | WEIGHT: 235 LBS | DIASTOLIC BLOOD PRESSURE: 96 MMHG | HEIGHT: 74 IN

## 2023-11-02 DIAGNOSIS — S22.31XA FRACTURE OF ONE RIB, RIGHT SIDE, INITIAL ENCOUNTER FOR CLOSED FRACTURE: ICD-10-CM

## 2023-11-02 PROCEDURE — 99214 OFFICE O/P EST MOD 30 MIN: CPT

## 2023-11-03 ENCOUNTER — RX RENEWAL (OUTPATIENT)
Age: 68
End: 2023-11-03

## 2023-11-22 ENCOUNTER — RX RENEWAL (OUTPATIENT)
Age: 68
End: 2023-11-22

## 2023-12-10 ENCOUNTER — RX RENEWAL (OUTPATIENT)
Age: 68
End: 2023-12-10

## 2024-01-06 ENCOUNTER — RX RENEWAL (OUTPATIENT)
Age: 69
End: 2024-01-06

## 2024-01-06 RX ORDER — METOPROLOL TARTRATE 50 MG/1
50 TABLET, FILM COATED ORAL
Qty: 180 | Refills: 1 | Status: ACTIVE | COMMUNITY
Start: 2021-07-26 | End: 1900-01-01

## 2024-01-09 ENCOUNTER — APPOINTMENT (OUTPATIENT)
Dept: HEPATOLOGY | Facility: CLINIC | Age: 69
End: 2024-01-09

## 2024-01-15 NOTE — PROGRESS NOTE ADULT - SUBJECTIVE AND OBJECTIVE BOX
Patient is a 66y old  Male who presents with a chief complaint of non-speciifed spinal cord compression  s/p decompression      HPI:  65 yo RH male with PMHx of HTN, RA (on prednisone), lumbar fusion/chronic back pain with 2 month history of increased difficulty in ambulation, veering to left and numbness on the bottom of feet. Prior imaging of L spine in 2020 showed degenerative changes L1-L2, severe high grade stenosis which is progression from imaging done in 2015.  MRI lumbar spine w/unspecified cord compression.   Patient admitted 8/1/8 for  OR  for Posterior L2 PSO (pedicle subtraction osteotomy), extension of fusion to T9, T10-L2 Decompression with Plastics closure. S/p PRBC 8/23 for anemia. Post op course also complicated by orthostatic hypotension- meds adjusted by Cardiology. Medicine following for elevated CPK/WBC - managed w IVF, elevated wbc- likely reactive. Electrolyte abnormalities corrected. US LEs neg DVT 8/20. CXR clear. Pain management. PMR consulted and acute rehab recommended and transferred to IRF 8/26        TODAY'S SUBJECTIVE & REVIEW OF SYMPTOMS  Patient seen at bedside this am   Tolerated am therapy evaluations,   but reports feeling tired as unable to sleep last night.   Also with constipation     Denies HA/dizziness  Denies CP/palpitations  Denies abdominal pain or nausea  Denies dysuria      PHYSICAL EXAM    Vital Signs Last 24 Hrs  T(C): 36.7 (27 Aug 2022 08:00), Max: 37 (26 Aug 2022 20:35)  T(F): 98.1 (27 Aug 2022 08:00), Max: 98.6 (26 Aug 2022 20:35)  HR: 98 (27 Aug 2022 17:58) (86 - 100)  BP: 133/83 (27 Aug 2022 17:58) (110/70 - 133/83)  BP(mean): --  RR: 15 (27 Aug 2022 17:58) (15 - 16)  SpO2: 95% (27 Aug 2022 17:58) (95% - 98%)    Parameters below as of 27 Aug 2022 17:58  Patient On (Oxygen Delivery Method): room air  Drain output 85ml since admission         Constitutional - NAD, Comfortable  Chest - breathing comfortably  Cardiovascular - well perfused.   Abdomen - BS+, Soft, NTND  Extremities - No C/C/E, No calf tenderness   Anti gravity strength  Skin: Back incision with aquacell dressing, MALLIKA drain   Psychiatric - Mood stable, Affect WNL    MEDICATIONS  (STANDING):  atorvastatin 20 milliGRAM(s) Oral at bedtime  enoxaparin Injectable 40 milliGRAM(s) SubCutaneous every 24 hours  methocarbamol 750 milliGRAM(s) Oral every 8 hours  metoprolol tartrate 50 milliGRAM(s) Oral two times a day  mupirocin 2% Ointment 1 Application(s) Topical <User Schedule>  oxyCODONE  ER Tablet 15 milliGRAM(s) Oral every 12 hours  pantoprazole    Tablet 40 milliGRAM(s) Oral before breakfast  polyethylene glycol 3350 17 Gram(s) Oral two times a day  predniSONE   Tablet 5 milliGRAM(s) Oral daily  senna 2 Tablet(s) Oral at bedtime  tenofovir disoproxil fumarate (VIREAD) 300 milliGRAM(s) Oral daily    MEDICATIONS  (PRN):  acetaminophen     Tablet .. 650 milliGRAM(s) Oral every 6 hours PRN Moderate Pain (4 - 6)  bisacodyl Suppository 10 milliGRAM(s) Rectal daily PRN Constipation  cyclobenzaprine 5 milliGRAM(s) Oral three times a day PRN Muscle Spasm  oxyCODONE    IR 10 milliGRAM(s) Oral every 4 hours PRN Severe Pain (7 - 10)      RECENT LABS/IMAGING                        9.2    10.85 )-----------( 525      ( 27 Aug 2022 06:30 )             27.8     08-27    128<L>  |  98  |  7   ----------------------------<  83  4.0   |  29  |  0.96    Ca    8.4      27 Aug 2022 08:24    TPro  5.5<L>  /  Alb  2.0  /  TBili  0.3  /  DBili  x   /  AST  40  /  ALT  39  /  AlkPhos  50  08-27                 15-[place]-2024 09:20

## 2024-01-19 ENCOUNTER — NON-APPOINTMENT (OUTPATIENT)
Age: 69
End: 2024-01-19

## 2024-01-19 ENCOUNTER — EMERGENCY (EMERGENCY)
Facility: HOSPITAL | Age: 69
LOS: 1 days | Discharge: HOME | End: 2024-01-19
Attending: STUDENT IN AN ORGANIZED HEALTH CARE EDUCATION/TRAINING PROGRAM
Payer: MEDICARE

## 2024-01-19 VITALS
HEART RATE: 100 BPM | RESPIRATION RATE: 18 BRPM | WEIGHT: 229.94 LBS | SYSTOLIC BLOOD PRESSURE: 165 MMHG | OXYGEN SATURATION: 95 % | TEMPERATURE: 98 F | DIASTOLIC BLOOD PRESSURE: 100 MMHG | HEIGHT: 74 IN

## 2024-01-19 DIAGNOSIS — M43.26 FUSION OF SPINE, LUMBAR REGION: Chronic | ICD-10-CM

## 2024-01-19 DIAGNOSIS — Z98.1 ARTHRODESIS STATUS: Chronic | ICD-10-CM

## 2024-01-19 PROCEDURE — 99285 EMERGENCY DEPT VISIT HI MDM: CPT | Mod: GC

## 2024-01-20 VITALS
OXYGEN SATURATION: 96 % | SYSTOLIC BLOOD PRESSURE: 145 MMHG | TEMPERATURE: 98 F | DIASTOLIC BLOOD PRESSURE: 94 MMHG | RESPIRATION RATE: 16 BRPM | HEART RATE: 85 BPM

## 2024-01-20 LAB
ALBUMIN SERPL ELPH-MCNC: 4.2 G/DL — SIGNIFICANT CHANGE UP (ref 3.3–5)
ALP SERPL-CCNC: 60 U/L — SIGNIFICANT CHANGE UP (ref 40–120)
ALT FLD-CCNC: 14 U/L — SIGNIFICANT CHANGE UP (ref 10–45)
ANION GAP SERPL CALC-SCNC: 14 MMOL/L — SIGNIFICANT CHANGE UP (ref 5–17)
APPEARANCE UR: CLEAR — SIGNIFICANT CHANGE UP
AST SERPL-CCNC: 18 U/L — SIGNIFICANT CHANGE UP (ref 10–40)
BASE EXCESS BLDV CALC-SCNC: 5.5 MMOL/L — HIGH (ref -2–3)
BASOPHILS # BLD AUTO: 0.02 K/UL — SIGNIFICANT CHANGE UP (ref 0–0.2)
BASOPHILS NFR BLD AUTO: 0.2 % — SIGNIFICANT CHANGE UP (ref 0–2)
BILIRUB SERPL-MCNC: 0.3 MG/DL — SIGNIFICANT CHANGE UP (ref 0.2–1.2)
BILIRUB UR-MCNC: NEGATIVE — SIGNIFICANT CHANGE UP
BUN SERPL-MCNC: 21 MG/DL — SIGNIFICANT CHANGE UP (ref 7–23)
CA-I SERPL-SCNC: 1.24 MMOL/L — SIGNIFICANT CHANGE UP (ref 1.15–1.33)
CALCIUM SERPL-MCNC: 9.8 MG/DL — SIGNIFICANT CHANGE UP (ref 8.4–10.5)
CHLORIDE BLDV-SCNC: 99 MMOL/L — SIGNIFICANT CHANGE UP (ref 96–108)
CHLORIDE SERPL-SCNC: 98 MMOL/L — SIGNIFICANT CHANGE UP (ref 96–108)
CO2 BLDV-SCNC: 33 MMOL/L — HIGH (ref 22–26)
CO2 SERPL-SCNC: 27 MMOL/L — SIGNIFICANT CHANGE UP (ref 22–31)
COLOR SPEC: YELLOW — SIGNIFICANT CHANGE UP
CREAT SERPL-MCNC: 0.94 MG/DL — SIGNIFICANT CHANGE UP (ref 0.5–1.3)
DIFF PNL FLD: NEGATIVE — SIGNIFICANT CHANGE UP
EGFR: 88 ML/MIN/1.73M2 — SIGNIFICANT CHANGE UP
EOSINOPHIL # BLD AUTO: 0.15 K/UL — SIGNIFICANT CHANGE UP (ref 0–0.5)
EOSINOPHIL NFR BLD AUTO: 1.3 % — SIGNIFICANT CHANGE UP (ref 0–6)
GAS PNL BLDV: 134 MMOL/L — LOW (ref 136–145)
GAS PNL BLDV: SIGNIFICANT CHANGE UP
GAS PNL BLDV: SIGNIFICANT CHANGE UP
GLUCOSE BLDV-MCNC: 102 MG/DL — HIGH (ref 70–99)
GLUCOSE SERPL-MCNC: 111 MG/DL — HIGH (ref 70–99)
GLUCOSE UR QL: NEGATIVE MG/DL — SIGNIFICANT CHANGE UP
HCO3 BLDV-SCNC: 31 MMOL/L — HIGH (ref 22–29)
HCT VFR BLD CALC: 41.5 % — SIGNIFICANT CHANGE UP (ref 39–50)
HCT VFR BLDA CALC: 42 % — SIGNIFICANT CHANGE UP (ref 39–51)
HGB BLD CALC-MCNC: 14 G/DL — SIGNIFICANT CHANGE UP (ref 12.6–17.4)
HGB BLD-MCNC: 14.2 G/DL — SIGNIFICANT CHANGE UP (ref 13–17)
IMM GRANULOCYTES NFR BLD AUTO: 0.4 % — SIGNIFICANT CHANGE UP (ref 0–0.9)
KETONES UR-MCNC: ABNORMAL MG/DL
LACTATE BLDV-MCNC: 1.8 MMOL/L — SIGNIFICANT CHANGE UP (ref 0.5–2)
LEUKOCYTE ESTERASE UR-ACNC: NEGATIVE — SIGNIFICANT CHANGE UP
LIDOCAIN IGE QN: 18 U/L — SIGNIFICANT CHANGE UP (ref 7–60)
LYMPHOCYTES # BLD AUTO: 25.8 % — SIGNIFICANT CHANGE UP (ref 13–44)
LYMPHOCYTES # BLD AUTO: 3.05 K/UL — SIGNIFICANT CHANGE UP (ref 1–3.3)
MCHC RBC-ENTMCNC: 33.6 PG — SIGNIFICANT CHANGE UP (ref 27–34)
MCHC RBC-ENTMCNC: 34.2 GM/DL — SIGNIFICANT CHANGE UP (ref 32–36)
MCV RBC AUTO: 98.1 FL — SIGNIFICANT CHANGE UP (ref 80–100)
MONOCYTES # BLD AUTO: 1 K/UL — HIGH (ref 0–0.9)
MONOCYTES NFR BLD AUTO: 8.5 % — SIGNIFICANT CHANGE UP (ref 2–14)
NEUTROPHILS # BLD AUTO: 7.56 K/UL — HIGH (ref 1.8–7.4)
NEUTROPHILS NFR BLD AUTO: 63.8 % — SIGNIFICANT CHANGE UP (ref 43–77)
NITRITE UR-MCNC: NEGATIVE — SIGNIFICANT CHANGE UP
NRBC # BLD: 0 /100 WBCS — SIGNIFICANT CHANGE UP (ref 0–0)
PCO2 BLDV: 48 MMHG — SIGNIFICANT CHANGE UP (ref 42–55)
PH BLDV: 7.42 — SIGNIFICANT CHANGE UP (ref 7.32–7.43)
PH UR: 5.5 — SIGNIFICANT CHANGE UP (ref 5–8)
PLATELET # BLD AUTO: 205 K/UL — SIGNIFICANT CHANGE UP (ref 150–400)
PO2 BLDV: 56 MMHG — HIGH (ref 25–45)
POTASSIUM BLDV-SCNC: 3.2 MMOL/L — LOW (ref 3.5–5.1)
POTASSIUM SERPL-MCNC: 3.2 MMOL/L — LOW (ref 3.5–5.3)
POTASSIUM SERPL-SCNC: 3.2 MMOL/L — LOW (ref 3.5–5.3)
PROT SERPL-MCNC: 6.6 G/DL — SIGNIFICANT CHANGE UP (ref 6–8.3)
PROT UR-MCNC: NEGATIVE MG/DL — SIGNIFICANT CHANGE UP
RBC # BLD: 4.23 M/UL — SIGNIFICANT CHANGE UP (ref 4.2–5.8)
RBC # FLD: 12.2 % — SIGNIFICANT CHANGE UP (ref 10.3–14.5)
SAO2 % BLDV: 88.8 % — HIGH (ref 67–88)
SODIUM SERPL-SCNC: 139 MMOL/L — SIGNIFICANT CHANGE UP (ref 135–145)
SP GR SPEC: 1.03 — SIGNIFICANT CHANGE UP (ref 1–1.03)
TROPONIN T, HIGH SENSITIVITY RESULT: 12 NG/L — SIGNIFICANT CHANGE UP (ref 0–51)
UROBILINOGEN FLD QL: 0.2 MG/DL — SIGNIFICANT CHANGE UP (ref 0.2–1)
WBC # BLD: 11.83 K/UL — HIGH (ref 3.8–10.5)
WBC # FLD AUTO: 11.83 K/UL — HIGH (ref 3.8–10.5)

## 2024-01-20 PROCEDURE — 85018 HEMOGLOBIN: CPT

## 2024-01-20 PROCEDURE — 80053 COMPREHEN METABOLIC PANEL: CPT

## 2024-01-20 PROCEDURE — 84132 ASSAY OF SERUM POTASSIUM: CPT

## 2024-01-20 PROCEDURE — 87086 URINE CULTURE/COLONY COUNT: CPT

## 2024-01-20 PROCEDURE — 82803 BLOOD GASES ANY COMBINATION: CPT

## 2024-01-20 PROCEDURE — 84484 ASSAY OF TROPONIN QUANT: CPT

## 2024-01-20 PROCEDURE — 74176 CT ABD & PELVIS W/O CONTRAST: CPT | Mod: MD

## 2024-01-20 PROCEDURE — 99284 EMERGENCY DEPT VISIT MOD MDM: CPT | Mod: 25

## 2024-01-20 PROCEDURE — 83605 ASSAY OF LACTIC ACID: CPT

## 2024-01-20 PROCEDURE — 82435 ASSAY OF BLOOD CHLORIDE: CPT

## 2024-01-20 PROCEDURE — 82947 ASSAY GLUCOSE BLOOD QUANT: CPT

## 2024-01-20 PROCEDURE — 81003 URINALYSIS AUTO W/O SCOPE: CPT

## 2024-01-20 PROCEDURE — 85014 HEMATOCRIT: CPT

## 2024-01-20 PROCEDURE — 96374 THER/PROPH/DIAG INJ IV PUSH: CPT

## 2024-01-20 PROCEDURE — 83690 ASSAY OF LIPASE: CPT

## 2024-01-20 PROCEDURE — 85025 COMPLETE CBC W/AUTO DIFF WBC: CPT

## 2024-01-20 PROCEDURE — 74176 CT ABD & PELVIS W/O CONTRAST: CPT | Mod: 26,MD

## 2024-01-20 PROCEDURE — 82330 ASSAY OF CALCIUM: CPT

## 2024-01-20 PROCEDURE — 84295 ASSAY OF SERUM SODIUM: CPT

## 2024-01-20 RX ORDER — MORPHINE SULFATE 50 MG/1
6 CAPSULE, EXTENDED RELEASE ORAL ONCE
Refills: 0 | Status: DISCONTINUED | OUTPATIENT
Start: 2024-01-20 | End: 2024-01-20

## 2024-01-20 RX ORDER — POLYETHYLENE GLYCOL 3350 17 G/17G
17 POWDER, FOR SOLUTION ORAL ONCE
Refills: 0 | Status: COMPLETED | OUTPATIENT
Start: 2024-01-20 | End: 2024-01-20

## 2024-01-20 RX ORDER — SODIUM CHLORIDE 9 MG/ML
1000 INJECTION INTRAMUSCULAR; INTRAVENOUS; SUBCUTANEOUS ONCE
Refills: 0 | Status: COMPLETED | OUTPATIENT
Start: 2024-01-20 | End: 2024-01-20

## 2024-01-20 RX ORDER — SENNA PLUS 8.6 MG/1
1 TABLET ORAL ONCE
Refills: 0 | Status: COMPLETED | OUTPATIENT
Start: 2024-01-20 | End: 2024-01-20

## 2024-01-20 RX ADMIN — SENNA PLUS 1 TABLET(S): 8.6 TABLET ORAL at 04:49

## 2024-01-20 RX ADMIN — POLYETHYLENE GLYCOL 3350 17 GRAM(S): 17 POWDER, FOR SOLUTION ORAL at 04:48

## 2024-01-20 RX ADMIN — MORPHINE SULFATE 6 MILLIGRAM(S): 50 CAPSULE, EXTENDED RELEASE ORAL at 03:20

## 2024-01-20 RX ADMIN — SODIUM CHLORIDE 1000 MILLILITER(S): 9 INJECTION INTRAMUSCULAR; INTRAVENOUS; SUBCUTANEOUS at 03:20

## 2024-01-20 NOTE — ED PROVIDER NOTE - CLINICAL SUMMARY MEDICAL DECISION MAKING FREE TEXT BOX
Patient presents emergency department complaining of flank pain.  Hemodynamically stable afebrile on presentation.  Patient physical exam significant for left flank tenderness.  Given patient presentation and history we will obtain lab work and CT scan to evaluate for any acute pathologies.  Pending labs and imaging for further disposition.

## 2024-01-20 NOTE — ED PROVIDER NOTE - PHYSICAL EXAMINATION
Physical Exam:  Gen: NAD, AOx3, non-toxic appearing, able to ambulate without assistance  Head: NCAT  HEENT: EOMI, PEERLA, normal conjunctiva, tongue midline, oral mucosa moist  Lung: CTAB, no respiratory distress, no wheezes/rhonchi/rales B/L, speaking in full sentences  CV: RRR, no murmurs, rubs or gallops  Abd: soft, NT, ND, no guarding, no rigidity, no rebound tenderness, + Left CVA tenderness   MSK: no visible deformities, ROM normal in UE/LE, no back pain  Neuro: No focal sensory or motor deficits  Skin: Warm, well perfused, no rash, no leg swelling

## 2024-01-20 NOTE — ED ADULT NURSE NOTE - OBJECTIVE STATEMENT
pt is a 69yo male PMH RA, HTN, chronic back pain (on PO morphine) presenting to the ED complaining of flank pain. pt reports onset of left sided flank pain upon waking up in the AM on 1/19, reports pain as constant, took PO morphine at home with no relief achieved. pt denies history of kidney stones but states "there was this one spot that my doctor saw in the kidney a while back but he said it was so small it will pass on its own". pt A&Ox3 gross neuro intact, no difficulty speaking in complete sentences, pulses x 4, corey x4, abdomen soft nontender nondistended, skin intact, L flank nontender on exam. pt denies chest pain, sob, ha, n/v/d, abdominal pain, f/c, urinary symptoms, hematuria

## 2024-01-20 NOTE — ED PROVIDER NOTE - ATTENDING CONTRIBUTION TO CARE
6* M with h/o HTN, RA (on prednisone ), GERD, Hepatitis C ( on meds), chronic back pian s/p prior lumbar decompression & fusion in 2015, w/ kyphoplasty 11/15/22 here w/ L flank pain, started at 6 AM constant and shart, took 2 morphine tabs w/ minimal improvement pt w/ no f/c/n/v/hematuria, pt states that he had pain similar and was a kidney stone. pt w/ no prior intervention for kidney stones.   On exam, pt is awake and alert oriented x3, has clear lungs soft abdomen has L flank tenderness. pt w/ no lower leg pain, no leg swelling 2+ radial pulse.   Plan for labs imaging and reassessment findings c/f possible nephrolithiasis, less likely intraabdominal infection, all prior ct were w/out contrast, plan for ct noncontrast, and reassessment of pain.

## 2024-01-20 NOTE — ED ADULT NURSE NOTE - SKIN TEMPERATURE MOISTURE
warm/dry Rhombic Flap Text: The defect edges were debeveled with a #15 scalpel blade.  Given the location of the defect and the proximity to free margins a rhombic flap was deemed most appropriate.  Using a sterile surgical marker, an appropriate rhombic flap was drawn incorporating the defect.    The area thus outlined was incised deep to adipose tissue with a #15 scalpel blade.  The skin margins were undermined to an appropriate distance in all directions utilizing iris scissors.

## 2024-01-20 NOTE — ED PROVIDER NOTE - PATIENT PORTAL LINK FT
You can access the FollowMyHealth Patient Portal offered by [facility] by registering at the following website: [email or web address]. By joining [facility]’s FollowMyHealth portal, you will also be able to view your health information using other applications (apps) compatible with our system.

## 2024-01-20 NOTE — ED PROVIDER NOTE - OBJECTIVE STATEMENT
Patient is a 68-year-old male with a past medical history hypertension, GERD, hepatitis C, chronic back pain who presents emergency department complaining of left flank pain.  Patient states it started at 6 AM and is sharp in nature.  States the pain is nonradiating and worse with movement.  She denies any fevers, chills, chest pain, shortness of breath, urinary symptoms.  Patient states he took morphine at home with little relief.  Patient denies any history of stones

## 2024-01-20 NOTE — ED PROVIDER NOTE - PROGRESS NOTE DETAILS
Patient PCP - DR galindo- spoke with patient about calling patients PCP to close loop but patient prefers to be dc'd and follow up outpatient. Requesting we do not call his PCP at this time. Patient to be discharged with strict return precautions.

## 2024-01-20 NOTE — ED ADULT NURSE NOTE - NSFALLUNIVINTERV_ED_ALL_ED
Bed/Stretcher in lowest position, wheels locked, appropriate side rails in place/Call bell, personal items and telephone in reach/Instruct patient to call for assistance before getting out of bed/chair/stretcher/Non-slip footwear applied when patient is off stretcher/[place] to call system/Physically safe environment - no spills, clutter or unnecessary equipment/Purposeful proactive rounding/Room/bathroom lighting operational, light cord in reach

## 2024-01-20 NOTE — ED PROVIDER NOTE - NSFOLLOWUPINSTRUCTIONS_ED_ALL_ED_FT
Please follow up with your doctor within 1 week. Bring copies of your results with you (provided in your discharge paperwork). Please stay well-hydrated.    PLEASE TAKE MIRALAX AND SENNA AS INDICATED EVERYDAY UNTIL YOU SEE A PRIMARY CARE DOCTOR. BOTH THESE MEDICATIONS ARE AVAILABLE OVER THE COUNTER.     PLEASE RETURN TO ED FOR NEW OR WORSENING SYMPTOMS (intractable nausea/vomiting, severe bleeding, fever over 100.4F, loss of movement of one or more limbs, seizure)    Finally, please follow up with [your PCP] regarding the incidental findings seen on your imaging (please see discharge paperwork for results).

## 2024-01-21 LAB
CULTURE RESULTS: SIGNIFICANT CHANGE UP
SPECIMEN SOURCE: SIGNIFICANT CHANGE UP

## 2024-01-22 ENCOUNTER — NON-APPOINTMENT (OUTPATIENT)
Age: 69
End: 2024-01-22

## 2024-02-02 ENCOUNTER — NON-APPOINTMENT (OUTPATIENT)
Age: 69
End: 2024-02-02

## 2024-02-06 ENCOUNTER — APPOINTMENT (OUTPATIENT)
Dept: RHEUMATOLOGY | Facility: CLINIC | Age: 69
End: 2024-02-06
Payer: MEDICARE

## 2024-02-06 VITALS
SYSTOLIC BLOOD PRESSURE: 141 MMHG | HEIGHT: 74 IN | DIASTOLIC BLOOD PRESSURE: 79 MMHG | HEART RATE: 78 BPM | RESPIRATION RATE: 16 BRPM | WEIGHT: 235 LBS | BODY MASS INDEX: 30.16 KG/M2 | OXYGEN SATURATION: 98 % | TEMPERATURE: 98.1 F

## 2024-02-06 DIAGNOSIS — B02.9 ZOSTER W/OUT COMPLICATIONS: ICD-10-CM

## 2024-02-06 DIAGNOSIS — M54.9 DORSALGIA, UNSPECIFIED: ICD-10-CM

## 2024-02-06 DIAGNOSIS — G89.29 DORSALGIA, UNSPECIFIED: ICD-10-CM

## 2024-02-06 DIAGNOSIS — D84.9 IMMUNODEFICIENCY, UNSPECIFIED: ICD-10-CM

## 2024-02-06 PROCEDURE — G2211 COMPLEX E/M VISIT ADD ON: CPT

## 2024-02-06 PROCEDURE — 99215 OFFICE O/P EST HI 40 MIN: CPT

## 2024-02-06 RX ORDER — PREGABALIN 75 MG/1
75 CAPSULE ORAL
Qty: 60 | Refills: 2 | Status: DISCONTINUED | COMMUNITY
Start: 2022-09-15 | End: 2024-02-06

## 2024-02-07 PROBLEM — B02.9 HERPES ZOSTER WITHOUT COMPLICATION: Status: ACTIVE | Noted: 2024-02-07

## 2024-02-07 PROBLEM — D84.9 IMMUNOSUPPRESSION: Status: ACTIVE | Noted: 2024-02-07

## 2024-02-07 PROBLEM — M54.9 BACK PAIN, CHRONIC: Status: ACTIVE | Noted: 2019-08-01

## 2024-02-07 RX ORDER — ZOSTER VACCINE RECOMBINANT, ADJUVANTED 50 MCG/0.5
50 KIT INTRAMUSCULAR
Qty: 1 | Refills: 1 | Status: ACTIVE | COMMUNITY
Start: 2024-02-07 | End: 1900-01-01

## 2024-02-07 NOTE — ASSESSMENT
[FreeTextEntry1] : 1. RA - stable on kevzara and prednisone 5 mg - stable on kevzara - 95% improvement - continue with current regimen - stable disease CBC/CMP/ESR and CRP are normal send for x-rays of hands and feet given new onset of toe stiffness - unsure if related to RA and or ongoing spinal stenosis (most likely) - will send for x-rays 2. Hepatitis B and C - on tenofovir hep C viral load at 0 - needs to make an appt - has not been seen since 2019 - needs f/u 3. spinal fusion - x-rays with no C1-2 widening - worsening thoracic pain - not planning any more surgery - bilateral leg and hand cramping - bilateral feet paresthesia's s/p new fusion - walking cane 5. Knee pain and swelling MRI with severe loss of cartilage and multiple meniscal tear and cyst - no pain today, no swelling. 6.neuropathy with loss of balance - on pregabalin 75 mg bid 7. weight loss - has stopped - weight is stable 8. abdominal tenderness - ct scan was normal  resolved 9. right rib pain X 1 month - resolved 10. shingles - 2 weeks ago - no  open lesions on exam today - on gabapentin for pain - prepare for vaccine after 90 days will order it now  I reviewed previous labs results with patients. labs done 3 months ago with stable CBC/CMP/ESR and CRP - plan for next visit x-rays ordered today Diagnosis and Prognosis discussed. Continue with current medications. F/u 3 months.

## 2024-02-07 NOTE — PHYSICAL EXAM

## 2024-02-07 NOTE — HISTORY OF PRESENT ILLNESS
[___ Month(s) Ago] : [unfilled] month(s) ago [RF] : rheumatoid factor [CCP] : Cyclic citrullinated peptide (CCP) antibody [Joint Swelling] : joint swelling [Joint Pain] : joint pain [Morning Stiffness] : morning stiffness [Patient is currently asymptomatic] : patient is currently asymptomatic [FreeTextEntry1] : recent episode of shingles over the left rib cage - received anti-viral. on gabapentin 500 mg daily to help with the pain and has lidocaine patches RA is well controlled - no flares from the RA feels well overall except for the ongoing shingles pain. labs done at the hospital in 01/202024 0 with normal CBC and CMP reports new onset of bilateral toe stiffness - no swelling or pain  [TextBox_2] : 2010

## 2024-02-16 RX ORDER — SARILUMAB 200 MG/1.14ML
200 INJECTION, SOLUTION SUBCUTANEOUS
Qty: 4.56 | Refills: 5 | Status: ACTIVE | COMMUNITY
Start: 2019-11-26 | End: 1900-01-01

## 2024-02-16 NOTE — PHYSICAL THERAPY INITIAL EVALUATION ADULT - IMPAIRED TRANSFERS: SIT/STAND, REHAB EVAL
[FreeTextEntry1] : Quentin Donovan is an 87yo M who is being seen for an on treatment visit.  He is accompanied by his daughter and son in law for the visit.    Diagnosis: T2N0, Stage II High Grade Urothelial Carcinoma  Brief Oncological History: History of lung cancer resected in 2015.   Noted 3 months of hematuria, Dr. Ortega, his outside Urologist ordered CT imaging.  9/28/23 - CT A/P at [facility] showed tumor arising at the right posterolatearl bladder diverticulum, invading the right lateral wall of the bladder. Suspected locoregional involvement, right perivesical fat without LNs or hydronephrosis. Also concern for pyelonephritis.   11/15/23 - Underwent cystoscopy with TURBT, pathology showing high grade invasive urothelial carcinoma, with papillary and flat urothelial carcinoma in situ. Carcinoma invades the muscularis propria. No LVI.   12/02/23 - CT C/A/P noted Multiple bladder masses measuring up to 4 cm, some of which are cystic with 2 bladder diverticuli along the dome.  12/15/23 - presented in consultation to discuss radiation therapy options. Mr. Mcgregor is accompanied by his family today. He denies any pain and hasn't had any bleeding since his surgery as per his family members. They look forward to the next steps in his treatment.  He is 88 years old and lives in a care home. He has had almost no hematuria since TURBT. We discussed and reviewed the natural history of disease, radiographic and pathological findings and the role of radiation therapy in his cancer care. He is relatively frail and chemoradiation or cystectomy would not be appropriate. We discussed and recommended a palliative course of external radiation therapy, 1 fraction per week for 5-6 weeks for palliation of bleeding. He is aware this is not given with intent to cure, but to control symptoms in the short to medium term. Observation and a watch-and-wait approach was also discussed, as an alternative. He has opted for palliative radiation now, and we have discussed logistics and acute and late side effects in detail. This was understood by the patient with appropriate time provided to answer the patient and families' questions. Consent obtained for CT Simulation, which was signed by the patient and his health proxy (daughter).  1/26/24 - started on RADIATION Therapy to Bladder, 3600 cGy in 6 weekly treatments.  1/26/24 - OTV 1/6 fx completed. Mr. Mcgregor tolerated treatment well today.  He complains of tiredness, as per the family he doesn't get much activity in the center as the stopped his PT.  Denies pain. He has had an increase in urinary frequency and the family noticed that the urine is a bit darker in color, no gross hematuria noted.  Denies any bowel issues.  Reinforced what to expect with radiation therapy.    2/2/24 - OTV 2/6 fx completed.  Mr. Mcgregor did well with treatment today, continues with tiredness.  Son in law states that he noticed a clot in his urine, no willy bleeding and no pain.  No bowel issues.     2/9/24 - OTV 3/6 fx completed. Mr. Mcgregor continues to tolerate the treatments. Daughter states he still has some occasional blood in the urine, no gross hematuria noted, does have some dysuria.  Denies any bowel issues.  Continues with tiredness and fatigue.    2/16/24 - OTV 4/6 fx completed. Mr. Mcgregor looks good today; he seems a bit stronger and more alert -his family agrees.  He denies any pain or other complaints.  His son in law states that urine is clearer, no clots noted.  No bowel complaints.  They are pleased the treatments are going well.  
decreased strength

## 2024-02-17 ENCOUNTER — RX RENEWAL (OUTPATIENT)
Age: 69
End: 2024-02-17

## 2024-02-17 RX ORDER — ROSUVASTATIN CALCIUM 5 MG/1
5 TABLET, FILM COATED ORAL
Qty: 90 | Refills: 1 | Status: ACTIVE | COMMUNITY
Start: 2022-08-08 | End: 1900-01-01

## 2024-03-02 ENCOUNTER — RX RENEWAL (OUTPATIENT)
Age: 69
End: 2024-03-02

## 2024-03-02 RX ORDER — OMEPRAZOLE 20 MG/1
20 CAPSULE, DELAYED RELEASE ORAL
Qty: 90 | Refills: 1 | Status: ACTIVE | COMMUNITY
Start: 2019-01-02 | End: 1900-01-01

## 2024-03-19 ENCOUNTER — NON-APPOINTMENT (OUTPATIENT)
Age: 69
End: 2024-03-19

## 2024-03-19 ENCOUNTER — APPOINTMENT (OUTPATIENT)
Dept: HEPATOLOGY | Facility: CLINIC | Age: 69
End: 2024-03-19
Payer: MEDICARE

## 2024-03-19 VITALS
HEIGHT: 74 IN | RESPIRATION RATE: 16 BRPM | BODY MASS INDEX: 27.46 KG/M2 | OXYGEN SATURATION: 96 % | DIASTOLIC BLOOD PRESSURE: 96 MMHG | WEIGHT: 214 LBS | TEMPERATURE: 96.3 F | HEART RATE: 78 BPM | SYSTOLIC BLOOD PRESSURE: 154 MMHG

## 2024-03-19 DIAGNOSIS — R73.03 PREDIABETES.: ICD-10-CM

## 2024-03-19 DIAGNOSIS — D84.821 IMMUNODEFICIENCY DUE TO DRUGS: ICD-10-CM

## 2024-03-19 DIAGNOSIS — R76.8 OTHER SPECIFIED ABNORMAL IMMUNOLOGICAL FINDINGS IN SERUM: ICD-10-CM

## 2024-03-19 DIAGNOSIS — E66.9 OBESITY, UNSPECIFIED: ICD-10-CM

## 2024-03-19 DIAGNOSIS — Z86.19 PERSONAL HISTORY OF OTHER INFECTIOUS AND PARASITIC DISEASES: ICD-10-CM

## 2024-03-19 DIAGNOSIS — Z79.899 IMMUNODEFICIENCY DUE TO DRUGS: ICD-10-CM

## 2024-03-19 DIAGNOSIS — K76.0 FATTY (CHANGE OF) LIVER, NOT ELSEWHERE CLASSIFIED: ICD-10-CM

## 2024-03-19 DIAGNOSIS — E66.3 OVERWEIGHT: ICD-10-CM

## 2024-03-19 PROCEDURE — 99214 OFFICE O/P EST MOD 30 MIN: CPT

## 2024-03-19 PROCEDURE — G2211 COMPLEX E/M VISIT ADD ON: CPT

## 2024-03-19 RX ORDER — TENOFOVIR DISOPROXIL FUMARATE 300 MG/1
300 TABLET ORAL
Qty: 90 | Refills: 3 | Status: ACTIVE | COMMUNITY
Start: 2018-12-18 | End: 1900-01-01

## 2024-03-19 NOTE — HISTORY OF PRESENT ILLNESS
[Needlestick Exposure] : needlestick exposure [IV Drug Use] : IV drug use [Autoimmune Disorder] : autoimmune disorder [Fatigue] : denies fatigue [Malaise] : denies malaise [Fever] : denies fever [Diffuse Joint Pain (Arthralgias)] : arthralgias stable [Muscle Aches, Generalized (Myalgias)] : denies myalgias [Yellow Skin Or Eyes (Jaundice)] : denies jaundice [Urine Tests Nonspecific Abnormal Findings Biliuria] : denies dark urine [Abdominal Pain] : denies abdominal pain [Light Colored Bowel Movement (Acholic Stools)] : denies pale stools [Skin: Rash] : denies rash [Insomnia] : denies insomnia [Itching (Pruritus)] : denies pruritus [Shortness Of Breath] : denies shortness of breath [Infected Sexual Partner] : no infected sexual partner [Tattoo] : no tattoos [Hemodialysis] : no hemodialysis [Body Piercing] : no body piercing [Transfusion before 1992] : no transfusion before 1992 [Transplant before 1992] : no transplant before 1992 [Incarceration] : no incarceration [Alcohol Abuse] : no alcohol abuse [Household Contact to HBV] : no household contact to HBV [Travel to Endemic Area] : no travel to an endemic area [Cocaine Use] : no cocaine use [Occupational Exposure] : no occupational exposure [Wt Gain ___ Lbs] : no recent weight gain [Wt Loss ___ Lbs] : no recent weight loss [de-identified] : used IV drugs remotely [FreeTextEntry1] : - 3/19/24: returns after >1.5 yrs instead of one. No recent bloodwork done, fibroscan not done. He gained 20 lbs and lost it again since the last visit after back surgery and by dieting. Has back and left knee pain, otherwise doing well, has been taking sarilumab q2 weeks, prednisone 5 mg/d, and tenofovir daily. Exam: 214 lbs, BMI 27.5, central obesity, walks with cane, abdomen nontender, no organomegaly, no leg edema.  - 7/18/22: returns after >3 yrs. Finished Mavyret in early 2019. Had recent BW. Doing well, lost 30 lbs by dieting, 210 lbs, BMI 27. He needs another back surgery. Now off adalimumab, on sarilumab (anti-IL-6)  - Mr. AMADO is a 63 year old man with rheumatoid arthritis, who was treated for hepatitis C in 2002 and was found to have again detectable HCV RNA. He is also HBcAb positive (isolated) and planned to start Humira. Adalimumab and methotrexate were ordered, but he has not started these, pending today's appointment.  His RA was diagnosed in 2000, and he was treated with several drugs including Enbrel and "different kinds of injections" - is unsure about methotrexate. His symptoms improved and he stopped taking these. Weight 237 lbs with BMI 30.4 as of 12/14 - used to be 250 lbs for a long time, max weight was almost 300 lbs. Alcohol: does not drink, never did heavily.  - 3/12/19: arthritis not improved. Did not complain of early satiety anymore. - 1/29/19: started Tenofovir (TDF) in mid-January. Since then, has been feeling a little "off" and an empty feeling in his stomach, and early satiety. Has not lost weight. Has not started Humira yet pending today's appointment. Did not tolerate Methotrexate as he developed bloating and diarrhea - not on it. Is on prednisone 30 mg/d for his RA. Has not started hepatitis C medication, but it has been ordered. Taking pain medication for back pain.  HCV treatment (8 wk tx) / after HCV tx completion then start Hep B treatment with tenofovir / then start humira.  Workup: - 11/16/22 CT wo: sub-cm L hepatic lobe cyst. Spleen normal. Punctate renal stones, non-obstructing. 3 mm bladder stone. Atherosclerosis. S/p  kyphoplasy at several levels.  - fibroscan 12/20/18: 4.5 kPa which is consistent with F0-F1 disease,  dB/m (S 3). - US abdomen 12/20/18: liver: diffusely coarsened echotexture, no mass. Spleen WNL.

## 2024-03-19 NOTE — ASSESSMENT
[FreeTextEntry1] : Mr. AMADO is a 68-year-old man with history of hepatitis C, isolated HBcAb positivity, MAFLD, and rheumatoid arthritis.  - h/o hepatitis C recurrence after treatment in 2002, s/p Mavyret in 2019 with SVR12, i.e. successful treatment - HBcAb positive, HBsAb indeterminate; on tenofovir because of immunosuppression and very spotty compliance with follow-up. Reactivation of hepatitis B with anti-IL-6 agents has been described, as well as with higher doses of steroids, but the risk of his combination is not well defined. - LFTs and PLT normal; fibroscan suggests no significant fibrosis, but steatosis - imaging: US 2018 showed coarsened echotexture of liver - early satiety since starting Tenofovir - possibly side effect. Is on PPI. Resolved. - obese BMI 27 after 30 lbs weight loss by dieting; glucose intolerance  Plan: - continue Tenofovir TDF, can be stopped 6 months after sarilumab stopped depending on other immunosuppressives - bloodwork today, US abdomen, fibroscan, return in 6 weeks

## 2024-03-19 NOTE — CONSULT LETTER
[Dear  ___] : Dear  [unfilled], [Consult Letter:] : I had the pleasure of evaluating your patient, [unfilled]. [Please see my note below.] : Please see my note below. [Consult Closing:] : Thank you very much for allowing me to participate in the care of this patient.  If you have any questions, please do not hesitate to contact me. [Sincerely,] : Sincerely, [FreeTextEntry2] : PETAR YOUNG (PCP) [FreeTextEntry3] : Jeremias Benton MD , [facility] Gastroenterology [facility] [place] [address] office: [phone] fax: [phone]

## 2024-04-24 LAB
ACTH SER-ACNC: 4.1 PG/ML
ALBUMIN SERPL ELPH-MCNC: 3.9 G/DL
ALBUMIN SERPL ELPH-MCNC: 4.3 G/DL
ALP BLD-CCNC: 75 U/L
ALP BLD-CCNC: 82 U/L
ALT SERPL-CCNC: 13 U/L
ALT SERPL-CCNC: 8 U/L
AMYLASE/CREAT SERPL: 87 U/L
ANA SER IF-ACNC: NEGATIVE
ANION GAP SERPL CALC-SCNC: 15 MMOL/L
ANION GAP SERPL CALC-SCNC: 15 MMOL/L
APPEARANCE: CLEAR
AST SERPL-CCNC: 19 U/L
AST SERPL-CCNC: 24 U/L
BACTERIA UR CULT: NORMAL
BACTERIA: NEGATIVE
BASOPHILS # BLD AUTO: 0.03 K/UL
BASOPHILS NFR BLD AUTO: 0.3 %
BILIRUB DIRECT SERPL-MCNC: 0.1 MG/DL
BILIRUB INDIRECT SERPL-MCNC: 0.3 MG/DL
BILIRUB SERPL-MCNC: 0.3 MG/DL
BILIRUB SERPL-MCNC: 0.4 MG/DL
BILIRUBIN URINE: NEGATIVE
BLOOD URINE: NEGATIVE
BUN SERPL-MCNC: 11 MG/DL
BUN SERPL-MCNC: 15 MG/DL
CALCIUM SERPL-MCNC: 10 MG/DL
CALCIUM SERPL-MCNC: 9.7 MG/DL
CCP AB SER IA-ACNC: >250 UNITS
CHLORIDE SERPL-SCNC: 100 MMOL/L
CHLORIDE SERPL-SCNC: 98 MMOL/L
CHOLEST SERPL-MCNC: 205 MG/DL
CK SERPL-CCNC: 51 U/L
CO2 SERPL-SCNC: 21 MMOL/L
CO2 SERPL-SCNC: 26 MMOL/L
COLOR: YELLOW
CORTICOSTEROID BIND GLOBULIN: 1.2 MG/DL
CORTIS SERPL-MCNC: 1.7 UG/DL
CORTISOL, FREE: 0.09 UG/DL
CREAT SERPL-MCNC: 0.84 MG/DL
CREAT SERPL-MCNC: 1.23 MG/DL
CRP SERPL-MCNC: 13 MG/L
DSDNA AB SER-ACNC: <12 IU/ML
EGFR: 65 ML/MIN/1.73M2
EGFR: 96 ML/MIN/1.73M2
EOSINOPHIL # BLD AUTO: 0.2 K/UL
EOSINOPHIL NFR BLD AUTO: 1.9 %
ERYTHROCYTE [SEDIMENTATION RATE] IN BLOOD BY WESTERGREN METHOD: 17 MM/HR
ESTIMATED AVERAGE GLUCOSE: 111 MG/DL
FERRITIN SERPL-MCNC: 91 NG/ML
FOLATE SERPL-MCNC: 9 NG/ML
FSH SERPL-MCNC: 3.3 IU/L
GLUCOSE QUALITATIVE U: NEGATIVE
GLUCOSE SERPL-MCNC: 113 MG/DL
GLUCOSE SERPL-MCNC: 80 MG/DL
HAPTOGLOB SERPL-MCNC: 175 MG/DL
HBA1C MFR BLD HPLC: 5.5 %
HCT VFR BLD CALC: 41.5 %
HDLC SERPL-MCNC: 50 MG/DL
HGB BLD-MCNC: 13.2 G/DL
HYALINE CASTS: 0 /LPF
IGF-1 INTERP: NORMAL
IGF-I BLD-MCNC: 134 NG/ML
IMM GRANULOCYTES NFR BLD AUTO: 0.3 %
IRON SERPL-MCNC: 29 UG/DL
KETONES URINE: NEGATIVE
LDH SERPL-CCNC: 274 U/L
LDLC SERPL CALC-MCNC: 132 MG/DL
LEUKOCYTE ESTERASE URINE: NEGATIVE
LH SERPL-ACNC: 6.9 IU/L
LPL SERPL-CCNC: 23 U/L
LYMPHOCYTES # BLD AUTO: 2.26 K/UL
LYMPHOCYTES NFR BLD AUTO: 21.7 %
MAGNESIUM SERPL-MCNC: 2 MG/DL
MAN DIFF?: NORMAL
MCHC RBC-ENTMCNC: 30.3 PG
MCHC RBC-ENTMCNC: 31.8 GM/DL
MCV RBC AUTO: 95.4 FL
MICROSCOPIC-UA: NORMAL
MONOCYTES # BLD AUTO: 1.19 K/UL
MONOCYTES NFR BLD AUTO: 11.4 %
NEUTROPHILS # BLD AUTO: 6.72 K/UL
NEUTROPHILS NFR BLD AUTO: 64.4 %
NITRITE URINE: NEGATIVE
NONHDLC SERPL-MCNC: 155 MG/DL
PFCX: 5.2 %
PH URINE: 6.5
PLATELET # BLD AUTO: 278 K/UL
POTASSIUM SERPL-SCNC: 3.4 MMOL/L
POTASSIUM SERPL-SCNC: 4 MMOL/L
PROLACTIN SERPL-MCNC: 12.9 NG/ML
PROLACTIN SERPL-MCNC: 44.2 NG/ML
PROT SERPL-MCNC: 7 G/DL
PROT SERPL-MCNC: 7.6 G/DL
PROTEIN URINE: NORMAL
RAPID RVP RESULT: NOT DETECTED
RBC # BLD: 4.35 M/UL
RBC # FLD: 13.4 %
RED BLOOD CELLS URINE: 3 /HPF
RF+CCP IGG SER-IMP: ABNORMAL
RHEUMATOID FACT SER QL: 22 IU/ML
SARS-COV-2 RNA PNL RESP NAA+PROBE: NOT DETECTED
SHBG SERPL-SCNC: 77 NMOL/L
SODIUM SERPL-SCNC: 137 MMOL/L
SODIUM SERPL-SCNC: 138 MMOL/L
SPECIFIC GRAVITY URINE: 1.02
SQUAMOUS EPITHELIAL CELLS: 1 /HPF
T4 FREE SERPL-MCNC: 1.1 NG/DL
T4 FREE SERPL-MCNC: 1.5 NG/DL
TRANSFERRIN SERPL-MCNC: 199 MG/DL
TRIGL SERPL-MCNC: 117 MG/DL
TSH SERPL-ACNC: 1.01 UIU/ML
TSH SERPL-ACNC: 1.51 UIU/ML
UROBILINOGEN URINE: NORMAL
VIT B12 SERPL-MCNC: 827 PG/ML
WBC # FLD AUTO: 10.43 K/UL
WHITE BLOOD CELLS URINE: 2 /HPF

## 2024-04-29 RX ORDER — PREDNISONE 5 MG/1
5 TABLET ORAL
Qty: 120 | Refills: 0 | Status: ACTIVE | COMMUNITY
Start: 2022-08-04 | End: 1900-01-01

## 2024-05-07 ENCOUNTER — APPOINTMENT (OUTPATIENT)
Dept: HEPATOLOGY | Facility: CLINIC | Age: 69
End: 2024-05-07

## 2024-05-09 ENCOUNTER — RESULT REVIEW (OUTPATIENT)
Age: 69
End: 2024-05-09

## 2024-05-09 ENCOUNTER — APPOINTMENT (OUTPATIENT)
Dept: ENDOCRINOLOGY | Facility: CLINIC | Age: 69
End: 2024-05-09
Payer: MEDICARE

## 2024-05-09 ENCOUNTER — APPOINTMENT (OUTPATIENT)
Dept: RHEUMATOLOGY | Facility: CLINIC | Age: 69
End: 2024-05-09
Payer: MEDICARE

## 2024-05-09 VITALS
DIASTOLIC BLOOD PRESSURE: 102 MMHG | WEIGHT: 230 LBS | HEIGHT: 74 IN | OXYGEN SATURATION: 94 % | HEART RATE: 69 BPM | RESPIRATION RATE: 16 BRPM | BODY MASS INDEX: 29.52 KG/M2 | SYSTOLIC BLOOD PRESSURE: 157 MMHG

## 2024-05-09 VITALS — HEIGHT: 70.5 IN | BODY MASS INDEX: 30.72 KG/M2 | WEIGHT: 217 LBS

## 2024-05-09 DIAGNOSIS — M06.9 RHEUMATOID ARTHRITIS, UNSPECIFIED: ICD-10-CM

## 2024-05-09 DIAGNOSIS — M17.0 BILATERAL PRIMARY OSTEOARTHRITIS OF KNEE: ICD-10-CM

## 2024-05-09 DIAGNOSIS — F11.90 OPIOID USE, UNSPECIFIED, UNCOMPLICATED: ICD-10-CM

## 2024-05-09 DIAGNOSIS — Z79.52 LONG TERM (CURRENT) USE OF SYSTEMIC STEROIDS: ICD-10-CM

## 2024-05-09 PROCEDURE — 77080 DXA BONE DENSITY AXIAL: CPT | Mod: GA

## 2024-05-09 PROCEDURE — 99214 OFFICE O/P EST MOD 30 MIN: CPT

## 2024-05-09 PROCEDURE — G2211 COMPLEX E/M VISIT ADD ON: CPT

## 2024-05-09 RX ORDER — HYDROCHLOROTHIAZIDE 25 MG/1
25 TABLET ORAL
Qty: 90 | Refills: 3 | Status: ACTIVE | COMMUNITY
Start: 2022-07-21 | End: 1900-01-01

## 2024-05-09 NOTE — ASSESSMENT
[FreeTextEntry1] : 1. RA - stable on kevzara and prednisone 5 mg - stable on kevzara - 95% improvement - continue with current regimen - stable disease CDAI 4 CBC/CMP/ESR and CRP are normal send for x-rays of hands and feet - new order given 2. Hepatitis B and C - on tenofovir hep C viral load at 0 - seen by ID - stable 3. spinal fusion - x-rays with no C1-2 widening - worsening thoracic pain - not planning any more surgery - bilateral leg and hand cramping - bilateral feet paresthesia's s/p new fusion - walking cane 5. Knee pain and swelling MRI with severe loss of cartilage and multiple meniscal tear and cyst -mild pain returning - send for new x-rays - mild effusion on exam -  6.neuropathy with loss of balance -  improved - minimal  - off pregabalin 7. weight loss - has stopped - weight is stable 8. abdominal tenderness - ct scan was normal resolved 9. right rib pain X 1 month - resolved 10. shingles - 2 weeks ago - had 1st vaccine - pending 2nd dose -  I reviewed previous labs results with patients. labs today x-rays ordered today Diagnosis and Prognosis discussed. Continue with current medications. F/u 3 months.

## 2024-05-09 NOTE — PHYSICAL EXAM
[General Appearance - Alert] : alert [General Appearance - In No Acute Distress] : in no acute distress [Neck Appearance] : the appearance of the neck was normal [Neck Cervical Mass (___cm)] : no neck mass was observed [Jugular Venous Distention Increased] : there was no jugular-venous distention [Thyroid Diffuse Enlargement] : the thyroid was not enlarged [Thyroid Nodule] : there were no palpable thyroid nodules [Auscultation Breath Sounds / Voice Sounds] : lungs were clear to auscultation bilaterally [Heart Rate And Rhythm] : heart rate was normal and rhythm regular [Heart Sounds] : normal S1 and S2 [Heart Sounds Gallop] : no gallops [Murmurs] : no murmurs [Heart Sounds Pericardial Friction Rub] : no pericardial rub [Full Pulse] : the pedal pulses are present [Edema] : there was no peripheral edema [Bowel Sounds] : normal bowel sounds [Abdomen Soft] : soft [Abdomen Tenderness] : non-tender [Abdomen Mass (___ Cm)] : no abdominal mass palpated [Cervical Lymph Nodes Enlarged Posterior Bilaterally] : posterior cervical [Cervical Lymph Nodes Enlarged Anterior Bilaterally] : anterior cervical [Supraclavicular Lymph Nodes Enlarged Bilaterally] : supraclavicular [Skin Color & Pigmentation] : normal skin color and pigmentation [Skin Turgor] : normal skin turgor [] : no rash [Oriented To Time, Place, And Person] : oriented to person, place, and time [Impaired Insight] : insight and judgment were intact [Affect] : the affect was normal [FreeTextEntry1] : -all joints with full ROM - no active synovitis - weakness over the quadriceps - antalgic gait - left knee with mild effusion

## 2024-05-09 NOTE — HISTORY OF PRESENT ILLNESS
[___ Month(s) Ago] : [unfilled] month(s) ago [RF] : rheumatoid factor [CCP] : Cyclic citrullinated peptide (CCP) antibody [Joint Swelling] : joint swelling [Joint Pain] : joint pain [Morning Stiffness] : morning stiffness [Patient is currently asymptomatic] : patient is currently asymptomatic [FreeTextEntry1] : RA is well controlled - pain level from chronic back pain is stable on gabapentin 500 mg daily to help with the pain and has lidocaine patches shingles - pending 2nd dose of the vaccine.  takes prednisone 5-10 mg daily depending on the day last bone density in 2021 - normal left knee pain is increasing - but still mild -  [TextBox_2] : 2010

## 2024-05-10 PROBLEM — M17.0 BILATERAL PRIMARY OSTEOARTHRITIS OF KNEE: Status: ACTIVE | Noted: 2021-10-12

## 2024-05-10 PROBLEM — F11.90 CHRONIC NARCOTIC USE: Status: ACTIVE | Noted: 2019-03-06

## 2024-05-14 ENCOUNTER — TRANSCRIPTION ENCOUNTER (OUTPATIENT)
Age: 69
End: 2024-05-14

## 2024-05-14 LAB
ALBUMIN SERPL ELPH-MCNC: 5 G/DL
ALP BLD-CCNC: 75 U/L
ALT SERPL-CCNC: 30 U/L
ANION GAP SERPL CALC-SCNC: 14 MMOL/L
AST SERPL-CCNC: 28 U/L
BASOPHILS # BLD AUTO: 0.02 K/UL
BASOPHILS NFR BLD AUTO: 0.2 %
BILIRUB SERPL-MCNC: 0.3 MG/DL
BUN SERPL-MCNC: 15 MG/DL
CALCIUM SERPL-MCNC: 10.4 MG/DL
CHLORIDE SERPL-SCNC: 98 MMOL/L
CO2 SERPL-SCNC: 29 MMOL/L
CREAT SERPL-MCNC: 1.05 MG/DL
CRP SERPL-MCNC: <3 MG/L
EGFR: 77 ML/MIN/1.73M2
EOSINOPHIL # BLD AUTO: 0.04 K/UL
EOSINOPHIL NFR BLD AUTO: 0.4 %
ERYTHROCYTE [SEDIMENTATION RATE] IN BLOOD BY WESTERGREN METHOD: 13 MM/HR
GLUCOSE SERPL-MCNC: 92 MG/DL
HCT VFR BLD CALC: 47.7 %
HGB BLD-MCNC: 15.5 G/DL
IMM GRANULOCYTES NFR BLD AUTO: 0.3 %
LYMPHOCYTES # BLD AUTO: 1.58 K/UL
LYMPHOCYTES NFR BLD AUTO: 16.8 %
MAN DIFF?: NORMAL
MCHC RBC-ENTMCNC: 32.5 GM/DL
MCHC RBC-ENTMCNC: 33.3 PG
MCV RBC AUTO: 102.4 FL
MONOCYTES # BLD AUTO: 0.38 K/UL
MONOCYTES NFR BLD AUTO: 4 %
NEUTROPHILS # BLD AUTO: 7.35 K/UL
NEUTROPHILS NFR BLD AUTO: 78.3 %
PLATELET # BLD AUTO: 181 K/UL
POTASSIUM SERPL-SCNC: 4.3 MMOL/L
PROT SERPL-MCNC: 7.4 G/DL
RBC # BLD: 4.66 M/UL
RBC # FLD: 12.3 %
SODIUM SERPL-SCNC: 141 MMOL/L
WBC # FLD AUTO: 9.4 K/UL

## 2024-05-22 ENCOUNTER — APPOINTMENT (OUTPATIENT)
Dept: NEUROSURGERY | Facility: CLINIC | Age: 69
End: 2024-05-22

## 2024-06-07 NOTE — HISTORY OF PRESENT ILLNESS
[Needlestick Exposure] : needlestick exposure [IV Drug Use] : IV drug use [Autoimmune Disorder] : autoimmune disorder [Fatigue] : denies fatigue [Malaise] : denies malaise [Fever] : denies fever [Diffuse Joint Pain (Arthralgias)] : arthralgias stable [Muscle Aches, Generalized (Myalgias)] : denies myalgias [Yellow Skin Or Eyes (Jaundice)] : denies jaundice [Abdominal Pain] : denies abdominal pain [Urine Tests Nonspecific Abnormal Findings Biliuria] : denies dark urine [Light Colored Bowel Movement (Acholic Stools)] : denies pale stools [Insomnia] : denies insomnia [Skin: Rash] : denies rash [Itching (Pruritus)] : denies pruritus [Shortness Of Breath] : denies shortness of breath [Infected Sexual Partner] : no infected sexual partner [Tattoo] : no tattoos [Body Piercing] : no body piercing [Hemodialysis] : no hemodialysis [Transfusion before 1992] : no transfusion before 1992 [Transplant before 1992] : no transplant before 1992 [Incarceration] : no incarceration [Alcohol Abuse] : no alcohol abuse [Household Contact to HBV] : no household contact to HBV [Travel to Endemic Area] : no travel to an endemic area [Occupational Exposure] : no occupational exposure [Cocaine Use] : no cocaine use [Wt Gain ___ Lbs] : no recent weight gain [Wt Loss ___ Lbs] : no recent weight loss [de-identified] : used IV drugs remotely [FreeTextEntry1] : - 5/7/24: bloodwork not done - not even at the last visit; ultrasound not done.  Exam:  - 3/19/24: returns after >1.5 yrs instead of one. No recent bloodwork done, fibroscan not done. He gained 20 lbs and lost it again since the last visit after back surgery and by dieting. Has back and left knee pain, otherwise doing well, has been taking sarilumab q2 weeks, prednisone 5 mg/d, and tenofovir daily. Exam: 214 lbs, BMI 27.5, central obesity, walks with cane, abdomen nontender, no organomegaly, no leg edema.  - 7/18/22: returns after >3 yrs. Finished Mavyret in early 2019. Had recent BW. Doing well, lost 30 lbs by dieting, 210 lbs, BMI 27. He needs another back surgery. Now off adalimumab, on sarilumab (anti-IL-6)  - Mr. AMADO is a 63 year old man with rheumatoid arthritis, who was treated for hepatitis C in 2002 and was found to have again detectable HCV RNA. He is also HBcAb positive (isolated) and planned to start Humira. Adalimumab and methotrexate were ordered, but he has not started these, pending today's appointment.  His RA was diagnosed in 2000, and he was treated with several drugs including Enbrel and "different kinds of injections" - is unsure about methotrexate. His symptoms improved and he stopped taking these. Weight 237 lbs with BMI 30.4 as of 12/14 - used to be 250 lbs for a long time, max weight was almost 300 lbs. Alcohol: does not drink, never did heavily.  - 3/12/19: arthritis not improved. Did not complain of early satiety anymore. - 1/29/19: started Tenofovir (TDF) in mid-January. Since then, has been feeling a little "off" and an empty feeling in his stomach, and early satiety. Has not lost weight. Has not started Humira yet pending today's appointment. Did not tolerate Methotrexate as he developed bloating and diarrhea - not on it. Is on prednisone 30 mg/d for his RA. Has not started hepatitis C medication, but it has been ordered. Taking pain medication for back pain.  HCV treatment (8 wk tx) / after HCV tx completion then start Hep B treatment with tenofovir / then start humira.  Workup: - 11/16/22 CT wo: sub-cm L hepatic lobe cyst. Spleen normal. Punctate renal stones, non-obstructing. 3 mm bladder stone. Atherosclerosis. S/p  kyphoplasy at several levels.  - fibroscan 12/20/18: 4.5 kPa which is consistent with F0-F1 disease,  dB/m (S 3). - US abdomen 12/20/18: liver: diffusely coarsened echotexture, no mass. Spleen WNL.

## 2024-06-07 NOTE — CONSULT LETTER
[Dear  ___] : Dear  [unfilled], [Consult Letter:] : I had the pleasure of evaluating your patient, [unfilled]. [Please see my note below.] : Please see my note below. [Consult Closing:] : Thank you very much for allowing me to participate in the care of this patient.  If you have any questions, please do not hesitate to contact me. [Sincerely,] : Sincerely, [FreeTextEntry2] : PETAR YOUNG (PCP) [FreeTextEntry3] : Jeremias Benton MD , [facility] Hepatology and Gastroenterology [facility] [place] [address] office: [phone] fax: [phone]

## 2024-06-07 NOTE — ASSESSMENT
[FreeTextEntry1] : Mr. AMADO is a 68-year-old man with history of hepatitis C, isolated HBcAb positivity, MAFLD, and rheumatoid arthritis.  - h/o hepatitis C, s/p unsuccessful treatment in 2002, and successful treatment with Mavyret in 2019 with SVR12 - HBcAb positive, HBsAb indeterminate; on tenofovir because of immunosuppression and very spotty compliance with follow-up. Reactivation of hepatitis B with anti-IL-6 agents has been described, as well as with higher doses of steroids, but the risk of his combination is not well defined. - LFTs and PLT normal; fibroscan suggests no significant fibrosis, but steatosis - imaging: US 2018 showed coarsened echotexture of liver - early satiety since starting Tenofovir - possibly side effect. Is on PPI. Resolved. - obese BMI 27 after 30 lbs weight loss by dieting; glucose intolerance  Plan: - continue Tenofovir TDF, can be stopped 6 months after sarilumab stopped depending on other immunosuppressives - bloodwork today, US abdomen, fibroscan, return in 6 weeks

## 2024-06-12 ENCOUNTER — RESULT REVIEW (OUTPATIENT)
Age: 69
End: 2024-06-12

## 2024-06-12 ENCOUNTER — APPOINTMENT (OUTPATIENT)
Dept: RADIOLOGY | Facility: IMAGING CENTER | Age: 69
End: 2024-06-12

## 2024-06-12 ENCOUNTER — OUTPATIENT (OUTPATIENT)
Dept: OUTPATIENT SERVICES | Facility: HOSPITAL | Age: 69
LOS: 1 days | End: 2024-06-12
Payer: MEDICARE

## 2024-06-12 DIAGNOSIS — M17.0 BILATERAL PRIMARY OSTEOARTHRITIS OF KNEE: ICD-10-CM

## 2024-06-12 DIAGNOSIS — Z79.52 LONG TERM (CURRENT) USE OF SYSTEMIC STEROIDS: ICD-10-CM

## 2024-06-12 DIAGNOSIS — M48.061 SPINAL STENOSIS, LUMBAR REGION WITHOUT NEUROGENIC CLAUDICATION: ICD-10-CM

## 2024-06-12 DIAGNOSIS — Z98.1 ARTHRODESIS STATUS: Chronic | ICD-10-CM

## 2024-06-12 DIAGNOSIS — M06.9 RHEUMATOID ARTHRITIS, UNSPECIFIED: ICD-10-CM

## 2024-06-12 DIAGNOSIS — M43.26 FUSION OF SPINE, LUMBAR REGION: Chronic | ICD-10-CM

## 2024-06-12 PROCEDURE — 77080 DXA BONE DENSITY AXIAL: CPT

## 2024-06-12 PROCEDURE — 73120 X-RAY EXAM OF HAND: CPT

## 2024-06-12 PROCEDURE — 73564 X-RAY EXAM KNEE 4 OR MORE: CPT | Mod: 26,50

## 2024-06-12 PROCEDURE — 73120 X-RAY EXAM OF HAND: CPT | Mod: 26,50

## 2024-06-12 PROCEDURE — 77080 DXA BONE DENSITY AXIAL: CPT | Mod: 26

## 2024-06-12 PROCEDURE — 72082 X-RAY EXAM ENTIRE SPI 2/3 VW: CPT | Mod: 26

## 2024-06-12 PROCEDURE — 73564 X-RAY EXAM KNEE 4 OR MORE: CPT

## 2024-06-12 PROCEDURE — 72082 X-RAY EXAM ENTIRE SPI 2/3 VW: CPT

## 2024-06-18 ENCOUNTER — NON-APPOINTMENT (OUTPATIENT)
Age: 69
End: 2024-06-18

## 2024-06-19 ENCOUNTER — APPOINTMENT (OUTPATIENT)
Dept: NEUROSURGERY | Facility: CLINIC | Age: 69
End: 2024-06-19
Payer: MEDICARE

## 2024-06-19 VITALS
HEART RATE: 74 BPM | SYSTOLIC BLOOD PRESSURE: 137 MMHG | WEIGHT: 217 LBS | OXYGEN SATURATION: 94 % | BODY MASS INDEX: 31.07 KG/M2 | DIASTOLIC BLOOD PRESSURE: 97 MMHG | HEIGHT: 70 IN

## 2024-06-19 DIAGNOSIS — M43.8X9 OTHER SPECIFIED DEFORMING DORSOPATHIES, SITE UNSPECIFIED: ICD-10-CM

## 2024-06-19 PROCEDURE — 99213 OFFICE O/P EST LOW 20 MIN: CPT

## 2024-06-23 NOTE — PHYSICAL EXAM
[Oriented To Time, Place, And Person] : oriented to person, place, and time [Impaired Insight] : insight and judgment were intact [No Visual Abnormalities] : no visible abnormailities [Non- Antalgic] : non-antalgic [Neck Appearance] : the appearance of the neck was normal [] : no respiratory distress [Heart Rate And Rhythm] : heart rate was normal and rhythm regular [FreeTextEntry1] : ambuates with cane [Skin Color & Pigmentation] : normal skin color and pigmentation

## 2024-06-23 NOTE — HISTORY OF PRESENT ILLNESS
[FreeTextEntry1] : progressive gait difficulty [de-identified] : Mr. Sunday Hernández is a 66 year old man presenting with a PMHx of RA,  HTN, GERD, Hepatitic C, Smoker, S/P Prior Lumbar Surgeries (decompression and  fusion) who presents for evaluation of lumbar spine.\par  \par  In 2014 he underwent Cervical spine fusion followed by lowerback surgery that same month\par  Over the year her reports progressive gait difficulty. Progressive gait difficulty. He uses LSO brace and cane to ambulate. He reports that he is unable to stand with an erect posture. He ambulates with cane with great difficulty. He states that he spends most of his days lying down. His current pain syndrome is impacting his quality of life. He denies any bowel bladder difficulty.

## 2024-06-23 NOTE — ASSESSMENT
[FreeTextEntry1] : Mr. 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     Imaging and diagnostic workup evaluation show evidence of intact scoliosis hardware   Plan: -Physical Therapy -Scoliosis Xray in 6 months     Follow up: in 6 months with scoliosis Xray     Please see Dr. Conner's dictation for details. I, Dr. Rohan Conner evaluated the patient with the nurse practitioner Jason Flores and established the plan of care. I discussed this patient with the nurse practitioner during the visit. I agree with the assessment and plan as written unless noted below.

## 2024-06-23 NOTE — REASON FOR VISIT
[Follow-Up: _____] : a [unfilled] follow-up visit [FreeTextEntry1] : HUNTER AMADO  is an 68 year who returns to the office for a follow-up visit and review of the diagnostic workup. Today Mr. AMADO  is concerned about His persistent pain. He remains under pain management. The majority of his pain is when he engages in twisting movements. He experiences muscle spasms. He denies motor weakness.

## 2024-06-26 RX ORDER — OXYCODONE HYDROCHLORIDE 15 MG/1
15 TABLET, FILM COATED, EXTENDED RELEASE ORAL
Qty: 60 | Refills: 0 | Status: ACTIVE | COMMUNITY
Start: 2019-08-01 | End: 1900-01-01

## 2024-06-26 RX ORDER — OXYCODONE AND ACETAMINOPHEN 10; 325 MG/1; MG/1
10-325 TABLET ORAL
Qty: 120 | Refills: 0 | Status: ACTIVE | COMMUNITY
Start: 2018-12-07 | End: 1900-01-01

## 2024-08-15 ENCOUNTER — APPOINTMENT (OUTPATIENT)
Dept: RHEUMATOLOGY | Facility: CLINIC | Age: 69
End: 2024-08-15
Payer: MEDICARE

## 2024-08-15 VITALS
BODY MASS INDEX: 32.93 KG/M2 | DIASTOLIC BLOOD PRESSURE: 84 MMHG | WEIGHT: 230 LBS | SYSTOLIC BLOOD PRESSURE: 131 MMHG | OXYGEN SATURATION: 95 % | HEIGHT: 70 IN | HEART RATE: 70 BPM

## 2024-08-15 DIAGNOSIS — M17.12 UNILATERAL PRIMARY OSTEOARTHRITIS, LEFT KNEE: ICD-10-CM

## 2024-08-15 DIAGNOSIS — M06.9 RHEUMATOID ARTHRITIS, UNSPECIFIED: ICD-10-CM

## 2024-08-15 DIAGNOSIS — K02.9 DENTAL CARIES, UNSPECIFIED: ICD-10-CM

## 2024-08-15 PROCEDURE — G2211 COMPLEX E/M VISIT ADD ON: CPT

## 2024-08-15 PROCEDURE — 99214 OFFICE O/P EST MOD 30 MIN: CPT

## 2024-08-15 NOTE — PHYSICAL EXAM
[General Appearance - Alert] : alert [General Appearance - In No Acute Distress] : in no acute distress [Neck Appearance] : the appearance of the neck was normal [Neck Cervical Mass (___cm)] : no neck mass was observed [Jugular Venous Distention Increased] : there was no jugular-venous distention [Thyroid Diffuse Enlargement] : the thyroid was not enlarged [Thyroid Nodule] : there were no palpable thyroid nodules [Auscultation Breath Sounds / Voice Sounds] : lungs were clear to auscultation bilaterally [Heart Rate And Rhythm] : heart rate was normal and rhythm regular [Heart Sounds] : normal S1 and S2 [Heart Sounds Gallop] : no gallops [Murmurs] : no murmurs [Heart Sounds Pericardial Friction Rub] : no pericardial rub [Full Pulse] : the pedal pulses are present [Edema] : there was no peripheral edema [Bowel Sounds] : normal bowel sounds [Abdomen Soft] : soft [Abdomen Tenderness] : non-tender [Abdomen Mass (___ Cm)] : no abdominal mass palpated [Cervical Lymph Nodes Enlarged Posterior Bilaterally] : posterior cervical [Cervical Lymph Nodes Enlarged Anterior Bilaterally] : anterior cervical [Supraclavicular Lymph Nodes Enlarged Bilaterally] : supraclavicular [Skin Color & Pigmentation] : normal skin color and pigmentation [] : no rash [Skin Turgor] : normal skin turgor [Oriented To Time, Place, And Person] : oriented to person, place, and time [Impaired Insight] : insight and judgment were intact [Affect] : the affect was normal [FreeTextEntry1] : -all joints with full ROM -mild synovitis over the right wrist - weakness over the quadriceps - antalgic gait - left knee with mild effusion

## 2024-08-15 NOTE — HISTORY OF PRESENT ILLNESS
[___ Month(s) Ago] : [unfilled] month(s) ago [RF] : rheumatoid factor [CCP] : Cyclic citrullinated peptide (CCP) antibody [Joint Swelling] : joint swelling [Joint Pain] : joint pain [Morning Stiffness] : morning stiffness [Patient is currently asymptomatic] : patient is currently asymptomatic [FreeTextEntry1] : ongoing pain now over the wrists and knees with increase in swelling and pain for the last 2 months hand x-rays with worsening RA -  knee x-rays also with worsening OA - has popping over the left knee as well reports that needs increase in prednisone from 5 to 10 mg daily now bone density is normal  [TextBox_2] : 2010

## 2024-08-15 NOTE — ASSESSMENT
[FreeTextEntry1] : 1. RA - stable on kevzara and prednisone 5 mg - stable on kevzara - 95% improvement - no flaring for the last 2 months  x-rays with progression of disease - rinvoq sample provided - stop kevzara for now CBC/CMP/ESR and CRP are normal 2. Hepatitis B and C - on tenofovir hep C viral load at 0 - seen by ID - stable 3. spinal fusion - x-rays with no C1-2 widening - worsening thoracic pain - not planning any more surgery - bilateral leg and hand cramping - bilateral feet paresthesia's s/p new fusion - walking cane 5. Knee pain and swelling MRI with severe loss of cartilage and multiple meniscal tear and cyst -mild pain returning -x-rays with worsening OA - new onset of popping over the left knee - send for mri 6.neuropathy with loss of balance -  improved - minimal  - off pregabalin 7. weight loss - has stopped - weight is stable 8. abdominal tenderness - ct scan was normal resolved 9. right rib pain X 1 month - resolved 10. shingles - 2 weeks ago - had 1st vaccine - pending 2nd dose - 11. bone health - done on 06/2024 - normal   I reviewed previous labs results with patients. labs today x-rays ordered today Diagnosis and Prognosis discussed. Continue with current medications. F/u 3 months.

## 2024-08-22 ENCOUNTER — RX RENEWAL (OUTPATIENT)
Age: 69
End: 2024-08-22

## 2024-09-06 RX ORDER — PREGABALIN 75 MG/1
75 CAPSULE ORAL
Qty: 30 | Refills: 0 | Status: ACTIVE | COMMUNITY
Start: 2024-09-06 | End: 1900-01-01

## 2024-09-16 ENCOUNTER — TRANSCRIPTION ENCOUNTER (OUTPATIENT)
Age: 69
End: 2024-09-16

## 2024-09-17 ENCOUNTER — TRANSCRIPTION ENCOUNTER (OUTPATIENT)
Age: 69
End: 2024-09-17

## 2024-09-18 NOTE — H&P PST ADULT - HEIGHT IN CM
Procedure:  RLE exploration, DEBRIDEMENT WOUND (WASH OUT), psb vac placement (Right: Leg Lower)    Relevant Problems   CARDIO   (+) Atrial fibrillation (HCC)   (+) Atypical atrial flutter (HCC)      GI/HEPATIC   (+) Large hiatal hernia      /RENAL   (+) HOSSEIN (acute kidney injury) (HCC)   (+) Incidental 6cm right Renal lesion on CT      HEMATOLOGY   (+) Anemia   (+) Anemia due to chronic kidney disease and iron deficiency      MUSCULOSKELETAL   (+) Large hiatal hernia      PULMONARY   (+) Acute on chronic respiratory failure with hypoxia and hypercapnia (HCC)   (+) Pleural effusion, right        Physical Exam    Airway       Dental       Cardiovascular      Pulmonary      Other Findings  post-pubertal.      Anesthesia Plan  ASA Score- 4     Anesthesia Type- general with ASA Monitors.         Additional Monitors:     Airway Plan:     Comment: Pt is DNR/DNI, had extensive discussion with her tylerugher and ADRIANA Millan, who agreed with proceeding with surgery today and with intubation for procedure. Discussed high possibility that she may remain intubated following the procedure and she was agreeable with proceeding with surgery today. .       Plan Factors-    Chart reviewed.   Existing labs reviewed. Patient summary reviewed.                  Induction- intravenous.    Postoperative Plan-         Informed Consent- Anesthetic plan and risks discussed with daughter and healthcare power of .  I personally reviewed this patient with the CRNA. Discussed and agreed on the Anesthesia Plan with the CRNA..        
187.96

## 2024-09-21 ENCOUNTER — RX RENEWAL (OUTPATIENT)
Age: 69
End: 2024-09-21

## 2024-09-23 ENCOUNTER — TRANSCRIPTION ENCOUNTER (OUTPATIENT)
Age: 69
End: 2024-09-23

## 2024-09-24 ENCOUNTER — TRANSCRIPTION ENCOUNTER (OUTPATIENT)
Age: 69
End: 2024-09-24

## 2024-10-09 ENCOUNTER — RX RENEWAL (OUTPATIENT)
Age: 69
End: 2024-10-09

## 2024-10-14 ENCOUNTER — APPOINTMENT (OUTPATIENT)
Dept: MRI IMAGING | Facility: CLINIC | Age: 69
End: 2024-10-14

## 2024-10-23 ENCOUNTER — TRANSCRIPTION ENCOUNTER (OUTPATIENT)
Age: 69
End: 2024-10-23

## 2024-10-24 ENCOUNTER — APPOINTMENT (OUTPATIENT)
Dept: RHEUMATOLOGY | Facility: CLINIC | Age: 69
End: 2024-10-24
Payer: MEDICARE

## 2024-10-24 VITALS
SYSTOLIC BLOOD PRESSURE: 149 MMHG | DIASTOLIC BLOOD PRESSURE: 93 MMHG | OXYGEN SATURATION: 96 % | HEART RATE: 68 BPM | HEIGHT: 70 IN | BODY MASS INDEX: 32.93 KG/M2 | WEIGHT: 230 LBS

## 2024-10-24 DIAGNOSIS — M17.10 UNILATERAL PRIMARY OSTEOARTHRITIS, UNSPECIFIED KNEE: ICD-10-CM

## 2024-10-24 DIAGNOSIS — M06.9 RHEUMATOID ARTHRITIS, UNSPECIFIED: ICD-10-CM

## 2024-10-24 DIAGNOSIS — M17.12 UNILATERAL PRIMARY OSTEOARTHRITIS, LEFT KNEE: ICD-10-CM

## 2024-10-24 PROCEDURE — 99214 OFFICE O/P EST MOD 30 MIN: CPT

## 2024-10-24 PROCEDURE — G2211 COMPLEX E/M VISIT ADD ON: CPT

## 2024-10-24 RX ORDER — METHYLPRED ACET/NACL,ISO-OS/PF 40 MG/ML
40 VIAL (ML) INJECTION
Refills: 0 | Status: COMPLETED | OUTPATIENT
Start: 2024-10-24

## 2024-10-24 RX ORDER — HYDROXYCHLOROQUINE SULFATE 200 MG/1
200 TABLET, FILM COATED ORAL
Qty: 120 | Refills: 2 | Status: ACTIVE | COMMUNITY
Start: 2024-10-24 | End: 1900-01-01

## 2024-10-24 RX ADMIN — METHYLPREDNISOLONE ACETATE MG/ML: 40 INJECTION, SUSPENSION INTRA-ARTICULAR; INTRALESIONAL; INTRAMUSCULAR; SOFT TISSUE at 00:00

## 2024-11-20 ENCOUNTER — TRANSCRIPTION ENCOUNTER (OUTPATIENT)
Age: 69
End: 2024-11-20

## 2024-11-23 ENCOUNTER — RX RENEWAL (OUTPATIENT)
Age: 69
End: 2024-11-23

## 2024-12-04 ENCOUNTER — APPOINTMENT (OUTPATIENT)
Dept: NEUROSURGERY | Facility: CLINIC | Age: 69
End: 2024-12-04
Payer: MEDICARE

## 2024-12-04 VITALS
SYSTOLIC BLOOD PRESSURE: 136 MMHG | BODY MASS INDEX: 32.93 KG/M2 | DIASTOLIC BLOOD PRESSURE: 86 MMHG | WEIGHT: 230 LBS | OXYGEN SATURATION: 98 % | RESPIRATION RATE: 16 BRPM | HEIGHT: 70 IN | HEART RATE: 84 BPM

## 2024-12-04 DIAGNOSIS — G62.9 POLYNEUROPATHY, UNSPECIFIED: ICD-10-CM

## 2024-12-04 PROCEDURE — 99213 OFFICE O/P EST LOW 20 MIN: CPT

## 2024-12-09 NOTE — DISCHARGE NOTE PROVIDER - NSDCCAREPROVSEEN_GEN_ALL_CORE_FT
----- Message from Sang sent at 12/9/2024  8:22 AM CST -----  Contact: [phone]  Type:  Sooner Apoointment Request    Caller is requesting a sooner appointment.  Caller declined first available appointment listed below.  Caller will not accept being placed on the waitlist and is requesting a message be sent to doctor.  Name of Caller:mom  When is the first available appointment?Reschedule appointment to next availability  Symptoms:   follow up  Stomach pain, constipation      Would the patient rather a call back or a [facility]? Call back  Best Call Back Number:[phone]  Additional Information: [phone]... mom car would not start this morning  
Mom unable to come to appointment due to car problems.  Pt rescheduled to a later date.  Mom confirmed date and time  
Kierra, Carine Moscoso

## 2024-12-16 ENCOUNTER — TRANSCRIPTION ENCOUNTER (OUTPATIENT)
Age: 69
End: 2024-12-16

## 2024-12-28 ENCOUNTER — RESULT REVIEW (OUTPATIENT)
Age: 69
End: 2024-12-28

## 2024-12-28 ENCOUNTER — APPOINTMENT (OUTPATIENT)
Dept: CT IMAGING | Facility: CLINIC | Age: 69
End: 2024-12-28
Payer: MEDICARE

## 2024-12-28 ENCOUNTER — OUTPATIENT (OUTPATIENT)
Dept: OUTPATIENT SERVICES | Facility: HOSPITAL | Age: 69
LOS: 1 days | End: 2024-12-28
Payer: MEDICARE

## 2024-12-28 ENCOUNTER — APPOINTMENT (OUTPATIENT)
Dept: MRI IMAGING | Facility: CLINIC | Age: 69
End: 2024-12-28
Payer: MEDICARE

## 2024-12-28 DIAGNOSIS — Z98.1 ARTHRODESIS STATUS: Chronic | ICD-10-CM

## 2024-12-28 DIAGNOSIS — M48.04 SPINAL STENOSIS, THORACIC REGION: ICD-10-CM

## 2024-12-28 DIAGNOSIS — M48.061 SPINAL STENOSIS, LUMBAR REGION WITHOUT NEUROGENIC CLAUDICATION: ICD-10-CM

## 2024-12-28 DIAGNOSIS — M43.26 FUSION OF SPINE, LUMBAR REGION: Chronic | ICD-10-CM

## 2024-12-28 DIAGNOSIS — M17.12 UNILATERAL PRIMARY OSTEOARTHRITIS, LEFT KNEE: ICD-10-CM

## 2024-12-28 PROCEDURE — 72146 MRI CHEST SPINE W/O DYE: CPT | Mod: 26,MH

## 2024-12-28 PROCEDURE — 72131 CT LUMBAR SPINE W/O DYE: CPT | Mod: 26,MH

## 2024-12-28 PROCEDURE — 76376 3D RENDER W/INTRP POSTPROCES: CPT | Mod: 26

## 2024-12-28 PROCEDURE — 72148 MRI LUMBAR SPINE W/O DYE: CPT | Mod: 26,MH

## 2024-12-28 PROCEDURE — 72131 CT LUMBAR SPINE W/O DYE: CPT

## 2024-12-28 PROCEDURE — 73721 MRI JNT OF LWR EXTRE W/O DYE: CPT

## 2024-12-28 PROCEDURE — 73721 MRI JNT OF LWR EXTRE W/O DYE: CPT | Mod: 26,LT,MH

## 2024-12-28 PROCEDURE — 72128 CT CHEST SPINE W/O DYE: CPT | Mod: 26,MH

## 2024-12-28 PROCEDURE — 72148 MRI LUMBAR SPINE W/O DYE: CPT

## 2024-12-28 PROCEDURE — 72128 CT CHEST SPINE W/O DYE: CPT

## 2024-12-28 PROCEDURE — 76376 3D RENDER W/INTRP POSTPROCES: CPT

## 2024-12-28 PROCEDURE — 72146 MRI CHEST SPINE W/O DYE: CPT

## 2025-01-03 NOTE — DIETITIAN INITIAL EVALUATION ADULT - NSPROEDALEARNPREF_GEN_A_NUR
Melba, Pharm Sheldonh with [facility], is requesting additional information to be added to 's Enrollment Form for Rezdiffra.    Please add in the prescribing directions, refill quantity and allergies. I did inform Mai that pt has no know allergies and the medication is to be taken as 100 mg daily. Due to pt being a new client for [facility], the above information must be indicated/listed on the form.    Please re-fax to: [phone]   Education Provided on Proper Nutrition/verbal instruction

## 2025-01-13 ENCOUNTER — NON-APPOINTMENT (OUTPATIENT)
Age: 70
End: 2025-01-13

## 2025-01-14 ENCOUNTER — APPOINTMENT (OUTPATIENT)
Dept: NEUROSURGERY | Facility: CLINIC | Age: 70
End: 2025-01-14
Payer: MEDICARE

## 2025-01-14 ENCOUNTER — NON-APPOINTMENT (OUTPATIENT)
Age: 70
End: 2025-01-14

## 2025-01-14 VITALS
HEIGHT: 74 IN | DIASTOLIC BLOOD PRESSURE: 90 MMHG | SYSTOLIC BLOOD PRESSURE: 135 MMHG | WEIGHT: 230 LBS | HEART RATE: 80 BPM | OXYGEN SATURATION: 95 % | RESPIRATION RATE: 16 BRPM | BODY MASS INDEX: 29.52 KG/M2

## 2025-01-14 DIAGNOSIS — M48.04 SPINAL STENOSIS, THORACIC REGION: ICD-10-CM

## 2025-01-14 PROCEDURE — 99212 OFFICE O/P EST SF 10 MIN: CPT

## 2025-01-15 NOTE — OCCUPATIONAL THERAPY INITIAL EVALUATION ADULT - FINE MOTOR COORDINATION, RIGHT HAND, MANIPULATE OBJECTS, OT EVAL
Subjective:      Patient ID: Angeli Padilla is a 49 y.o. female    Hypertension  The current episode started more than 1 year ago. Past treatments include angiotensin blockers. The current treatment provides moderate improvement.   Hyperlipidemia  The problem is uncontrolled. Current antihyperlipidemic treatment includes statins. The current treatment provides moderate improvement of lipids.   Gastroesophageal Reflux  She reports no coughing. She has tried a PPI for the symptoms.     Here in follow up for htn and lipids and pre diabetes and bipolar and gerd   and blood work.  Still not seeing any behavioral health-having stresses at home -still feeling depressed.   .   Weight up few pounds since last time.  No missed doses of medications over the last 6 weeks although had only been taking crestor 10 mg dose--gerd stable with ppi--looking into fmla .    Review of Systems   Constitutional:  Negative for chills and fever.   Respiratory:  Negative for cough.    Gastrointestinal:  Negative for diarrhea and vomiting.     Reviewed allergy, medical, social, surgical, family and med list changes and updated   Files--reviewed blood work with slightly elevated triglycerides      Social History     Socioeconomic History    Marital status: Single     Spouse name: None    Number of children: 2    Years of education: None    Highest education level: None   Occupational History    Occupation: [employer] business, self employed   Tobacco Use    Smoking status: Former     Current packs/day: 0.00     Average packs/day: 0.5 packs/day for 30.0 years (15.0 ttl pk-yrs)     Types: Cigarettes     Start date: 1988     Quit date: 2018     Years since quittin.5     Passive exposure: Past    Smokeless tobacco: Never   Vaping Use    Vaping status: Never Used   Substance and Sexual Activity    Alcohol use: Yes     Comment: socially    Drug use: Yes     Frequency: 1.0 times per week     Types: Marijuana (Weed)     Comment: 
normal performance

## 2025-01-20 ENCOUNTER — TRANSCRIPTION ENCOUNTER (OUTPATIENT)
Age: 70
End: 2025-01-20

## 2025-01-21 ENCOUNTER — TRANSCRIPTION ENCOUNTER (OUTPATIENT)
Age: 70
End: 2025-01-21

## 2025-01-22 ENCOUNTER — TRANSCRIPTION ENCOUNTER (OUTPATIENT)
Age: 70
End: 2025-01-22

## 2025-01-28 ENCOUNTER — APPOINTMENT (OUTPATIENT)
Dept: RHEUMATOLOGY | Facility: CLINIC | Age: 70
End: 2025-01-28
Payer: MEDICARE

## 2025-01-28 ENCOUNTER — LABORATORY RESULT (OUTPATIENT)
Age: 70
End: 2025-01-28

## 2025-01-28 VITALS
BODY MASS INDEX: 29.52 KG/M2 | HEART RATE: 75 BPM | HEIGHT: 74 IN | SYSTOLIC BLOOD PRESSURE: 137 MMHG | OXYGEN SATURATION: 96 % | DIASTOLIC BLOOD PRESSURE: 92 MMHG | WEIGHT: 230 LBS | RESPIRATION RATE: 16 BRPM

## 2025-01-28 DIAGNOSIS — N52.9 MALE ERECTILE DYSFUNCTION, UNSPECIFIED: ICD-10-CM

## 2025-01-28 DIAGNOSIS — M06.9 RHEUMATOID ARTHRITIS, UNSPECIFIED: ICD-10-CM

## 2025-01-28 PROCEDURE — G2211 COMPLEX E/M VISIT ADD ON: CPT

## 2025-01-28 PROCEDURE — 99214 OFFICE O/P EST MOD 30 MIN: CPT

## 2025-01-29 LAB
ALBUMIN SERPL ELPH-MCNC: 4.5 G/DL
ALP BLD-CCNC: 61 U/L
ALT SERPL-CCNC: 28 U/L
ANION GAP SERPL CALC-SCNC: 11 MMOL/L
AST SERPL-CCNC: 27 U/L
BASOPHILS # BLD AUTO: 0.02 K/UL
BASOPHILS NFR BLD AUTO: 0.2 %
BILIRUB SERPL-MCNC: 0.3 MG/DL
BUN SERPL-MCNC: 17 MG/DL
CALCIUM SERPL-MCNC: 9.1 MG/DL
CHLORIDE SERPL-SCNC: 98 MMOL/L
CO2 SERPL-SCNC: 32 MMOL/L
CREAT SERPL-MCNC: 1.08 MG/DL
EGFR: 74 ML/MIN/1.73M2
EOSINOPHIL # BLD AUTO: 0.11 K/UL
EOSINOPHIL NFR BLD AUTO: 1.3 %
ERYTHROCYTE [SEDIMENTATION RATE] IN BLOOD BY WESTERGREN METHOD: 18 MM/HR
GLUCOSE SERPL-MCNC: 88 MG/DL
HBV CORE IGG+IGM SER QL: REACTIVE
HBV SURFACE AB SER QL: NONREACTIVE
HBV SURFACE AG SER QL: NONREACTIVE
HCT VFR BLD CALC: 41.8 %
HCV AB SER QL: REACTIVE
HCV S/CO RATIO: 9.32 S/CO
HGB BLD-MCNC: 13.8 G/DL
IMM GRANULOCYTES NFR BLD AUTO: 0.2 %
LYMPHOCYTES # BLD AUTO: 2.73 K/UL
LYMPHOCYTES NFR BLD AUTO: 31.8 %
MAN DIFF?: NORMAL
MCHC RBC-ENTMCNC: 33 G/DL
MCHC RBC-ENTMCNC: 33.3 PG
MCV RBC AUTO: 100.7 FL
MONOCYTES # BLD AUTO: 0.99 K/UL
MONOCYTES NFR BLD AUTO: 11.5 %
NEUTROPHILS # BLD AUTO: 4.72 K/UL
NEUTROPHILS NFR BLD AUTO: 55 %
PLATELET # BLD AUTO: 173 K/UL
POTASSIUM SERPL-SCNC: 3.6 MMOL/L
PROT SERPL-MCNC: 6.7 G/DL
RBC # BLD: 4.15 M/UL
RBC # FLD: 11.7 %
SODIUM SERPL-SCNC: 141 MMOL/L
WBC # FLD AUTO: 8.59 K/UL

## 2025-01-31 LAB
M TB IFN-G BLD-IMP: NEGATIVE
QUANTIFERON TB PLUS MITOGEN MINUS NIL: >10 IU/ML
QUANTIFERON TB PLUS NIL: 0.04 IU/ML
QUANTIFERON TB PLUS TB1 MINUS NIL: 0 IU/ML
QUANTIFERON TB PLUS TB2 MINUS NIL: 0 IU/ML

## 2025-02-10 ENCOUNTER — APPOINTMENT (OUTPATIENT)
Dept: UROLOGY | Facility: CLINIC | Age: 70
End: 2025-02-10
Payer: MEDICARE

## 2025-02-10 ENCOUNTER — NON-APPOINTMENT (OUTPATIENT)
Age: 70
End: 2025-02-10

## 2025-02-10 VITALS
HEART RATE: 76 BPM | WEIGHT: 230 LBS | OXYGEN SATURATION: 90 % | BODY MASS INDEX: 29.52 KG/M2 | SYSTOLIC BLOOD PRESSURE: 145 MMHG | RESPIRATION RATE: 16 BRPM | TEMPERATURE: 97.2 F | HEIGHT: 74 IN | DIASTOLIC BLOOD PRESSURE: 89 MMHG

## 2025-02-10 DIAGNOSIS — Z12.5 ENCOUNTER FOR SCREENING FOR MALIGNANT NEOPLASM OF PROSTATE: ICD-10-CM

## 2025-02-10 DIAGNOSIS — Z00.00 ENCOUNTER FOR GENERAL ADULT MEDICAL EXAMINATION W/OUT ABNORMAL FINDINGS: ICD-10-CM

## 2025-02-10 DIAGNOSIS — N52.9 MALE ERECTILE DYSFUNCTION, UNSPECIFIED: ICD-10-CM

## 2025-02-10 PROCEDURE — G2211 COMPLEX E/M VISIT ADD ON: CPT

## 2025-02-10 PROCEDURE — 99204 OFFICE O/P NEW MOD 45 MIN: CPT

## 2025-02-11 LAB
CHOLEST SERPL-MCNC: 215 MG/DL
CRP SERPL HS-MCNC: 0.46 MG/L
ESTIMATED AVERAGE GLUCOSE: 111 MG/DL
HBA1C MFR BLD HPLC: 5.5 %
HDLC SERPL-MCNC: 75 MG/DL
LDLC SERPL CALC-MCNC: 127 MG/DL
NONHDLC SERPL-MCNC: 141 MG/DL
PSA FREE FLD-MCNC: 58 %
PSA FREE SERPL-MCNC: 0.24 NG/ML
PSA SERPL-MCNC: 0.41 NG/ML
TRIGL SERPL-MCNC: 75 MG/DL

## 2025-02-12 ENCOUNTER — OUTPATIENT (OUTPATIENT)
Dept: OUTPATIENT SERVICES | Facility: HOSPITAL | Age: 70
LOS: 1 days | End: 2025-02-12

## 2025-02-12 ENCOUNTER — APPOINTMENT (OUTPATIENT)
Dept: INTERNAL MEDICINE | Facility: CLINIC | Age: 70
End: 2025-02-12

## 2025-02-12 ENCOUNTER — NON-APPOINTMENT (OUTPATIENT)
Age: 70
End: 2025-02-12

## 2025-02-12 DIAGNOSIS — M43.26 FUSION OF SPINE, LUMBAR REGION: Chronic | ICD-10-CM

## 2025-02-12 DIAGNOSIS — Z98.1 ARTHRODESIS STATUS: Chronic | ICD-10-CM

## 2025-02-18 ENCOUNTER — APPOINTMENT (OUTPATIENT)
Dept: ORTHOPEDIC SURGERY | Facility: CLINIC | Age: 70
End: 2025-02-18
Payer: MEDICARE

## 2025-02-18 VITALS
SYSTOLIC BLOOD PRESSURE: 127 MMHG | HEIGHT: 74 IN | HEART RATE: 67 BPM | WEIGHT: 230 LBS | BODY MASS INDEX: 29.52 KG/M2 | DIASTOLIC BLOOD PRESSURE: 86 MMHG

## 2025-02-18 DIAGNOSIS — M17.12 UNILATERAL PRIMARY OSTEOARTHRITIS, LEFT KNEE: ICD-10-CM

## 2025-02-18 PROCEDURE — 99204 OFFICE O/P NEW MOD 45 MIN: CPT | Mod: 25

## 2025-02-18 PROCEDURE — 20610 DRAIN/INJ JOINT/BURSA W/O US: CPT | Mod: LT

## 2025-02-18 RX ADMIN — Medication 0 MG/3ML: at 00:00

## 2025-02-18 RX ADMIN — LIDOCAINE HYDROCHLORIDE 5 %: 10 INJECTION, SOLUTION INFILTRATION; PERINEURAL at 00:00

## 2025-02-19 ENCOUNTER — RX CHANGE (OUTPATIENT)
Age: 70
End: 2025-02-19

## 2025-02-19 RX ORDER — TADALAFIL 20 MG/1
20 TABLET ORAL
Qty: 10 | Refills: 3 | Status: DISCONTINUED | COMMUNITY
Start: 2025-02-10 | End: 2025-02-19

## 2025-02-19 RX ORDER — LIDOCAINE HYDROCHLORIDE 10 MG/ML
1 INJECTION, SOLUTION INFILTRATION; PERINEURAL
Refills: 0 | Status: COMPLETED | OUTPATIENT
Start: 2025-02-18

## 2025-02-19 RX ORDER — HYALURONATE SOD, CROSS-LINKED 30 MG/3 ML
30 SYRINGE (ML) INTRAARTICULAR
Refills: 0 | Status: COMPLETED | OUTPATIENT
Start: 2025-02-18

## 2025-02-19 RX ORDER — TADALAFIL 20 MG/1
20 TABLET ORAL
Qty: 90 | Refills: 3 | Status: ACTIVE | COMMUNITY
Start: 1900-01-01 | End: 1900-01-01

## 2025-03-10 ENCOUNTER — APPOINTMENT (OUTPATIENT)
Dept: UROLOGY | Facility: CLINIC | Age: 70
End: 2025-03-10
Payer: MEDICARE

## 2025-03-10 DIAGNOSIS — N52.9 MALE ERECTILE DYSFUNCTION, UNSPECIFIED: ICD-10-CM

## 2025-03-10 PROCEDURE — G2211 COMPLEX E/M VISIT ADD ON: CPT

## 2025-03-10 PROCEDURE — 99214 OFFICE O/P EST MOD 30 MIN: CPT

## 2025-03-10 RX ORDER — PAPAVER/PHENTOLAMINE/ALPROSTAD 150-5-50
150-5-50 VIAL (EA) INTRACAVERNOSAL
Qty: 1 | Refills: 0 | Status: ACTIVE | COMMUNITY
Start: 2025-03-10 | End: 1900-01-01

## 2025-03-18 ENCOUNTER — TRANSCRIPTION ENCOUNTER (OUTPATIENT)
Age: 70
End: 2025-03-18

## 2025-03-19 ENCOUNTER — TRANSCRIPTION ENCOUNTER (OUTPATIENT)
Age: 70
End: 2025-03-19

## 2025-03-24 ENCOUNTER — APPOINTMENT (OUTPATIENT)
Dept: UROLOGY | Facility: CLINIC | Age: 70
End: 2025-03-24
Payer: MEDICARE

## 2025-03-24 VITALS
TEMPERATURE: 98.1 F | DIASTOLIC BLOOD PRESSURE: 92 MMHG | HEART RATE: 90 BPM | SYSTOLIC BLOOD PRESSURE: 160 MMHG | OXYGEN SATURATION: 93 %

## 2025-03-24 DIAGNOSIS — N52.9 MALE ERECTILE DYSFUNCTION, UNSPECIFIED: ICD-10-CM

## 2025-03-24 PROCEDURE — 99213 OFFICE O/P EST LOW 20 MIN: CPT | Mod: 25

## 2025-03-24 PROCEDURE — 54235 NJX CORPORA CAVERNOSA RX AGT: CPT

## 2025-03-24 PROCEDURE — J3490T: CUSTOM | Mod: NC

## 2025-03-28 ENCOUNTER — TRANSCRIPTION ENCOUNTER (OUTPATIENT)
Age: 70
End: 2025-03-28

## 2025-04-08 ENCOUNTER — RX RENEWAL (OUTPATIENT)
Age: 70
End: 2025-04-08

## 2025-04-08 DIAGNOSIS — R76.8 OTHER SPECIFIED ABNORMAL IMMUNOLOGICAL FINDINGS IN SERUM: ICD-10-CM

## 2025-04-22 ENCOUNTER — TRANSCRIPTION ENCOUNTER (OUTPATIENT)
Age: 70
End: 2025-04-22

## 2025-04-29 ENCOUNTER — APPOINTMENT (OUTPATIENT)
Dept: RHEUMATOLOGY | Facility: CLINIC | Age: 70
End: 2025-04-29
Payer: MEDICARE

## 2025-04-29 VITALS
WEIGHT: 230 LBS | HEIGHT: 74 IN | RESPIRATION RATE: 16 BRPM | DIASTOLIC BLOOD PRESSURE: 96 MMHG | OXYGEN SATURATION: 94 % | HEART RATE: 79 BPM | SYSTOLIC BLOOD PRESSURE: 144 MMHG | BODY MASS INDEX: 29.52 KG/M2

## 2025-04-29 DIAGNOSIS — M06.9 RHEUMATOID ARTHRITIS, UNSPECIFIED: ICD-10-CM

## 2025-04-29 DIAGNOSIS — M17.12 UNILATERAL PRIMARY OSTEOARTHRITIS, LEFT KNEE: ICD-10-CM

## 2025-04-29 PROCEDURE — 99214 OFFICE O/P EST MOD 30 MIN: CPT

## 2025-04-29 PROCEDURE — G2211 COMPLEX E/M VISIT ADD ON: CPT

## 2025-05-03 ENCOUNTER — EMERGENCY (EMERGENCY)
Facility: HOSPITAL | Age: 70
LOS: 1 days | End: 2025-05-03
Attending: EMERGENCY MEDICINE
Payer: MEDICARE

## 2025-05-03 VITALS
WEIGHT: 229.94 LBS | HEART RATE: 98 BPM | HEIGHT: 74 IN | DIASTOLIC BLOOD PRESSURE: 83 MMHG | OXYGEN SATURATION: 98 % | RESPIRATION RATE: 20 BRPM | TEMPERATURE: 98 F | SYSTOLIC BLOOD PRESSURE: 140 MMHG

## 2025-05-03 DIAGNOSIS — Z98.1 ARTHRODESIS STATUS: Chronic | ICD-10-CM

## 2025-05-03 DIAGNOSIS — M43.26 FUSION OF SPINE, LUMBAR REGION: Chronic | ICD-10-CM

## 2025-05-03 PROCEDURE — 99284 EMERGENCY DEPT VISIT MOD MDM: CPT | Mod: FS,25

## 2025-05-03 PROCEDURE — 90471 IMMUNIZATION ADMIN: CPT

## 2025-05-03 PROCEDURE — 99283 EMERGENCY DEPT VISIT LOW MDM: CPT | Mod: 25

## 2025-05-03 PROCEDURE — 90715 TDAP VACCINE 7 YRS/> IM: CPT

## 2025-05-03 PROCEDURE — 12001 RPR S/N/AX/GEN/TRNK 2.5CM/<: CPT

## 2025-05-03 RX ORDER — CLOSTRIDIUM TETANI TOXOID ANTIGEN (FORMALDEHYDE INACTIVATED), CORYNEBACTERIUM DIPHTHERIAE TOXOID ANTIGEN (FORMALDEHYDE INACTIVATED), BORDETELLA PERTUSSIS TOXOID ANTIGEN (GLUTARALDEHYDE INACTIVATED), BORDETELLA PERTUSSIS FILAMENTOUS HEMAGGLUTININ ANTIGEN (FORMALDEHYDE INACTIVATED), BORDETELLA PERTUSSIS PERTACTIN ANTIGEN, AND BORDETELLA PERTUSSIS FIMBRIAE 2/3 ANTIGEN 5; 2; 2.5; 5; 3; 5 [LF]/.5ML; [LF]/.5ML; UG/.5ML; UG/.5ML; UG/.5ML; UG/.5ML
0.5 INJECTION, SUSPENSION INTRAMUSCULAR ONCE
Refills: 0 | Status: COMPLETED | OUTPATIENT
Start: 2025-05-03 | End: 2025-05-03

## 2025-05-03 RX ADMIN — CLOSTRIDIUM TETANI TOXOID ANTIGEN (FORMALDEHYDE INACTIVATED), CORYNEBACTERIUM DIPHTHERIAE TOXOID ANTIGEN (FORMALDEHYDE INACTIVATED), BORDETELLA PERTUSSIS TOXOID ANTIGEN (GLUTARALDEHYDE INACTIVATED), BORDETELLA PERTUSSIS FILAMENTOUS HEMAGGLUTININ ANTIGEN (FORMALDEHYDE INACTIVATED), BORDETELLA PERTUSSIS PERTACTIN ANTIGEN, AND BORDETELLA PERTUSSIS FIMBRIAE 2/3 ANTIGEN 0.5 MILLILITER(S): 5; 2; 2.5; 5; 3; 5 INJECTION, SUSPENSION INTRAMUSCULAR at 19:53

## 2025-05-03 NOTE — ED ADULT NURSE NOTE - OBJECTIVE STATEMENT
69y M A&Ox4 c/o lacerations. pt states was using a razor to cut rope when he went to far with it and cut his left index finger. upon assessment pt is well appearing, PMS intact, bleeding controlled, no sings of acute distress, no signs of acute blood loss, gross neuro intact.

## 2025-05-03 NOTE — ED PROVIDER NOTE - OBJECTIVE STATEMENT
70yo male with PMHx of HTN, RA presents to ED with left non dominant thumb laceration with a razor accidently while cutting rope today. Denies other injuries. Denies sensory changes or weakness to fingers. Unsure last TD.

## 2025-05-03 NOTE — ED PROVIDER NOTE - PATIENT PORTAL LINK FT
You can access the FollowMyHealth Patient Portal offered by [facility] by registering at the following website: [email or web address]. By joining [facility]’s FollowMyHealth portal, you will also be able to view your health information using other applications (apps) compatible with our system.

## 2025-05-03 NOTE — ED PROVIDER NOTE - PHYSICAL EXAMINATION
NAD. VSS. Afebrile, Lungs clear. +Left thumb: 1.5cm superficial laceration on the tip, N/V- intact, full ROMs of fingers.

## 2025-05-03 NOTE — ED PROVIDER NOTE - CLINICAL SUMMARY MEDICAL DECISION MAKING FREE TEXT BOX
Adult male status post laceration distal thumb superficial horizontal with flap presents for repair.  Tdap unknown.  Plan wound repair in usual fashion.  Kaz Boo MD, Facep

## 2025-05-03 NOTE — ED PROVIDER NOTE - ATTENDING APP SHARED VISIT CONTRIBUTION OF CARE
69-year-old male past med history hypertension, RA, comes to ER complaints of laceration left thumb.  Patient states he was cutting rope with a razor slipped cut his left distal thumb.  Patient right-handed.Tdap?  Physical exam adult male awake alert GCS 15 speech fluent NAD except left hand laceration.  MSK left hand superficial flap laceration distal palmar portion first digit.  All tendons intact.  Sensation intact.  Kaz Boo MD, Facep

## 2025-05-03 NOTE — ED PROVIDER NOTE - NSFOLLOWUPINSTRUCTIONS_ED_ALL_ED_FT
Please see the information of Sutured wound care.    Elevate the affected hand.  Keep the wound clean and dry washing with soap and water.  Bacitracin ointment to wound twice a day.    Keep continue your current medications as prescribed.  Take Tylenol (2 tablets of 500mg every 8hours) as needed for pain.    Return in 10-14days for suture removal or follow up with your Dr.    Return for any concerns, fever, chills, redness/discharge around wound, or worsening pain.

## 2025-05-13 ENCOUNTER — TRANSCRIPTION ENCOUNTER (OUTPATIENT)
Age: 70
End: 2025-05-13

## 2025-05-14 ENCOUNTER — TRANSCRIPTION ENCOUNTER (OUTPATIENT)
Age: 70
End: 2025-05-14

## 2025-05-15 ENCOUNTER — TRANSCRIPTION ENCOUNTER (OUTPATIENT)
Age: 70
End: 2025-05-15

## 2025-05-16 ENCOUNTER — TRANSCRIPTION ENCOUNTER (OUTPATIENT)
Age: 70
End: 2025-05-16

## 2025-05-20 ENCOUNTER — TRANSCRIPTION ENCOUNTER (OUTPATIENT)
Age: 70
End: 2025-05-20

## 2025-06-18 ENCOUNTER — TRANSCRIPTION ENCOUNTER (OUTPATIENT)
Age: 70
End: 2025-06-18

## 2025-06-18 ENCOUNTER — NON-APPOINTMENT (OUTPATIENT)
Age: 70
End: 2025-06-18

## 2025-06-24 ENCOUNTER — NON-APPOINTMENT (OUTPATIENT)
Age: 70
End: 2025-06-24

## 2025-07-01 ENCOUNTER — NON-APPOINTMENT (OUTPATIENT)
Age: 70
End: 2025-07-01

## 2025-07-01 ENCOUNTER — APPOINTMENT (OUTPATIENT)
Dept: NEUROSURGERY | Facility: CLINIC | Age: 70
End: 2025-07-01

## 2025-07-01 ENCOUNTER — RX RENEWAL (OUTPATIENT)
Age: 70
End: 2025-07-01

## 2025-07-01 ENCOUNTER — TRANSCRIPTION ENCOUNTER (OUTPATIENT)
Age: 70
End: 2025-07-01

## 2025-07-03 ENCOUNTER — APPOINTMENT (OUTPATIENT)
Dept: HEPATOLOGY | Facility: CLINIC | Age: 70
End: 2025-07-03

## 2025-07-08 ENCOUNTER — APPOINTMENT (OUTPATIENT)
Dept: NEUROSURGERY | Facility: CLINIC | Age: 70
End: 2025-07-08

## 2025-07-08 ENCOUNTER — TRANSCRIPTION ENCOUNTER (OUTPATIENT)
Age: 70
End: 2025-07-08

## 2025-07-17 ENCOUNTER — TRANSCRIPTION ENCOUNTER (OUTPATIENT)
Age: 70
End: 2025-07-17

## 2025-07-18 ENCOUNTER — TRANSCRIPTION ENCOUNTER (OUTPATIENT)
Age: 70
End: 2025-07-18

## 2025-07-29 ENCOUNTER — APPOINTMENT (OUTPATIENT)
Dept: RHEUMATOLOGY | Facility: CLINIC | Age: 70
End: 2025-07-29
Payer: MEDICARE

## 2025-07-29 VITALS
HEART RATE: 91 BPM | RESPIRATION RATE: 16 BRPM | SYSTOLIC BLOOD PRESSURE: 133 MMHG | BODY MASS INDEX: 29.52 KG/M2 | WEIGHT: 230 LBS | OXYGEN SATURATION: 95 % | HEIGHT: 74 IN | DIASTOLIC BLOOD PRESSURE: 90 MMHG

## 2025-07-29 DIAGNOSIS — M06.9 RHEUMATOID ARTHRITIS, UNSPECIFIED: ICD-10-CM

## 2025-07-29 PROCEDURE — G2211 COMPLEX E/M VISIT ADD ON: CPT

## 2025-07-29 PROCEDURE — 99214 OFFICE O/P EST MOD 30 MIN: CPT

## 2025-07-30 ENCOUNTER — TRANSCRIPTION ENCOUNTER (OUTPATIENT)
Age: 70
End: 2025-07-30

## 2025-07-30 LAB
ALBUMIN SERPL ELPH-MCNC: 4.3 G/DL
ALP BLD-CCNC: 59 U/L
ALT SERPL-CCNC: 25 U/L
ANION GAP SERPL CALC-SCNC: 16 MMOL/L
AST SERPL-CCNC: 31 U/L
BASOPHILS # BLD AUTO: 0.04 K/UL
BASOPHILS NFR BLD AUTO: 0.7 %
BILIRUB SERPL-MCNC: 0.4 MG/DL
BUN SERPL-MCNC: 11 MG/DL
CALCIUM SERPL-MCNC: 9.5 MG/DL
CHLORIDE SERPL-SCNC: 99 MMOL/L
CO2 SERPL-SCNC: 25 MMOL/L
CREAT SERPL-MCNC: 1.12 MG/DL
CRP SERPL-MCNC: <3 MG/L
EGFRCR SERPLBLD CKD-EPI 2021: 71 ML/MIN/1.73M2
EOSINOPHIL # BLD AUTO: 0.3 K/UL
EOSINOPHIL NFR BLD AUTO: 5.1 %
ERYTHROCYTE [SEDIMENTATION RATE] IN BLOOD BY WESTERGREN METHOD: 4 MM/HR
GLUCOSE SERPL-MCNC: 80 MG/DL
HCT VFR BLD CALC: 44.5 %
HGB BLD-MCNC: 14.8 G/DL
IMM GRANULOCYTES NFR BLD AUTO: 0.2 %
LYMPHOCYTES # BLD AUTO: 1.89 K/UL
LYMPHOCYTES NFR BLD AUTO: 32 %
MAN DIFF?: NORMAL
MCHC RBC-ENTMCNC: 33.3 G/DL
MCHC RBC-ENTMCNC: 33.9 PG
MCV RBC AUTO: 102.1 FL
MONOCYTES # BLD AUTO: 0.58 K/UL
MONOCYTES NFR BLD AUTO: 9.8 %
NEUTROPHILS # BLD AUTO: 3.08 K/UL
NEUTROPHILS NFR BLD AUTO: 52.2 %
PLATELET # BLD AUTO: 172 K/UL
POTASSIUM SERPL-SCNC: 3.6 MMOL/L
PROT SERPL-MCNC: 6.6 G/DL
RBC # BLD: 4.36 M/UL
RBC # FLD: 12.6 %
SODIUM SERPL-SCNC: 141 MMOL/L
WBC # FLD AUTO: 5.9 K/UL

## 2025-08-14 ENCOUNTER — TRANSCRIPTION ENCOUNTER (OUTPATIENT)
Age: 70
End: 2025-08-14

## 2025-08-26 ENCOUNTER — TRANSCRIPTION ENCOUNTER (OUTPATIENT)
Age: 70
End: 2025-08-26

## 2025-08-27 ENCOUNTER — TRANSCRIPTION ENCOUNTER (OUTPATIENT)
Age: 70
End: 2025-08-27

## (undated) DEVICE — MEDICATION LABELS W MARKER

## (undated) DEVICE — FRAZIER SUCTION TIP 8FR

## (undated) DEVICE — FOLEY TRAY 16FR 5CC LTX UMETER CLOSED

## (undated) DEVICE — DRAIN JACKSON PRATT 3 SPRING RESERVOIR W 10FR PVC DRAIN

## (undated) DEVICE — DRSG DERMABOND PRINEO 22CM

## (undated) DEVICE — STRYKER BONE MILL BLADE FINE 3.2MM

## (undated) DEVICE — ELCTR BOVIE TIP BLADE INSULATED 2.75" EDGE

## (undated) DEVICE — ELCTR 4-DISC 20MM 49" (RED, BLUE, GREEN, BLACK)

## (undated) DEVICE — DRAPE STERILE-Z PATIENT

## (undated) DEVICE — WARMING BLANKET UPPER ADULT

## (undated) DEVICE — VENODYNE/SCD SLEEVE CALF MEDIUM

## (undated) DEVICE — DRSG STERISTRIPS 0.5 X 4"

## (undated) DEVICE — DRAPE LIGHT HANDLE COVER (BLUE)

## (undated) DEVICE — GLV 8 PROTEXIS (WHITE)

## (undated) DEVICE — SPONGE SURGICAL STRIP 1/2 X 6"

## (undated) DEVICE — PACK LUMBAR LAMI

## (undated) DEVICE — DRAPE 3/4 SHEET W REINFORCEMENT 56X77"

## (undated) DEVICE — IRRIGATION TRAY W PISTON SYRINGE 60ML

## (undated) DEVICE — MIDAS REX MR8 MATCH HEAD FLUTED LG BORE 3MM X 14CM

## (undated) DEVICE — DRSG XEROFORM 1 X 8"

## (undated) DEVICE — VENODYNE/SCD SLEEVE CALF LARGE

## (undated) DEVICE — Device

## (undated) DEVICE — ELCTR SUBDERMAL CORKSCREW NDL 1.2MM

## (undated) DEVICE — GLV 7 PROTEXIS (WHITE)

## (undated) DEVICE — PREP CHLORAPREP HI-LITE ORANGE 26ML

## (undated) DEVICE — CATH IV SAFE INSYTE 14G X 1.75" (ORANGE)

## (undated) DEVICE — ELCTR PEDICLE SCREW PROBE 3MM BALL 1.8MM X 100MM

## (undated) DEVICE — MIDAS REX MR8 BALL DIAMOND SM BORE 5MM X 10CM

## (undated) DEVICE — GOWN TRIMAX LG

## (undated) DEVICE — SOL IRR POUR NS 0.9% 500ML

## (undated) DEVICE — BLADE SCALPEL SAFETYLOCK #10

## (undated) DEVICE — SUT VICRYL 0 18" CT-1 UNDYED (POP-OFF)

## (undated) DEVICE — DRSG TELFA 3 X 8

## (undated) DEVICE — SUT VICRYL 3-0 18" CP-2 UNDYED (POP-OFF)

## (undated) DEVICE — MIDAS REX MR8 METAL CUTTER 3MM X 9CM

## (undated) DEVICE — PACK GENERAL MINOR

## (undated) DEVICE — TAP DUAL LEAD 6MM

## (undated) DEVICE — GLV 8.5 PROTEXIS (WHITE)

## (undated) DEVICE — ELCTR SUBDERMAL NDL 27G X 1/2" WITH TWISTED PAIR

## (undated) DEVICE — DRAIN RESERVOIR FOR JACKSON PRATT 100CC CARDINAL

## (undated) DEVICE — STAPLER SKIN VISI-STAT 35 WIDE

## (undated) DEVICE — DEPUY CANNULA FEN OPEN STERILE

## (undated) DEVICE — DRAIN JACKSON PRATT 7MM FLAT FULL NO TROCAR

## (undated) DEVICE — TAP DUAL LEAD 5MM

## (undated) DEVICE — SOL IRR POUR H2O 250ML

## (undated) DEVICE — WARMING BLANKET LOWER ADULT

## (undated) DEVICE — SUT VICRYL 2-0 18" CP-2 UNDYED (POP-OFF)

## (undated) DEVICE — SPECIMEN CONTAINER 100ML

## (undated) DEVICE — MARKING PEN W RULER

## (undated) DEVICE — FRAZIER SUCTION TIP 12FR

## (undated) DEVICE — DRAPE 1/2 SHEET 40X57"

## (undated) DEVICE — DRAPE MAYO STAND 30"

## (undated) DEVICE — MISONIX BONESCALPEL BLUNT BLADE & TUBESET 20MM

## (undated) DEVICE — FOLEY TRAY 16FR LF URINE METER SURESTEP

## (undated) DEVICE — SPONGE SURGICAL STRIP 1/4 X 6"

## (undated) DEVICE — DRAPE INSTRUMENT POUCH 6.75" X 11"

## (undated) DEVICE — LAP PAD 18 X 18"

## (undated) DEVICE — ELCTR BOVIE PENCIL HANDPIECE

## (undated) DEVICE — GLV 6.5 PROTEXIS (WHITE)

## (undated) DEVICE — SPONGE PEANUT AUTO COUNT

## (undated) DEVICE — VISITEC 4X4

## (undated) DEVICE — SPHERE MARKER (5 SPHERES)

## (undated) DEVICE — POSITIONER FOAM EGG CRATE ULNAR 2PCS (PINK)

## (undated) DEVICE — PACK BREAST MAJOR

## (undated) DEVICE — GLV 7.5 PROTEXIS (WHITE)

## (undated) DEVICE — NDL SPINAL 18G X 3.5" (PINK)

## (undated) DEVICE — DRAPE TOWEL BLUE 17" X 24"

## (undated) DEVICE — ELCTR AQUAMANTYS BIPOLAR SEALER 6.0

## (undated) DEVICE — MISONIX BONESCALPEL DIAMOND SHAVER & TUBESET

## (undated) DEVICE — PACK EXTREMITY

## (undated) DEVICE — STRYKER BONE MILL BLADE MEDIUM 5.0MM

## (undated) DEVICE — PACK THYROID HEAD NECK

## (undated) DEVICE — BLADE SCALPEL SAFETYLOCK #15

## (undated) DEVICE — ELCTR MONOPOLAR STIMULATOR PROBE FLUSH-TIP

## (undated) DEVICE — DRAPE BACK TABLE COVER HEAVY DUTY 60"

## (undated) DEVICE — ELCTR SUBDERMAL NDL CLASSIC 1.5M X 59" (6 COLOR)

## (undated) DEVICE — GLV 8 PROTEXIS ORTHO (CREAM)